# Patient Record
Sex: MALE | Race: WHITE | NOT HISPANIC OR LATINO | Employment: UNEMPLOYED | URBAN - METROPOLITAN AREA
[De-identification: names, ages, dates, MRNs, and addresses within clinical notes are randomized per-mention and may not be internally consistent; named-entity substitution may affect disease eponyms.]

---

## 2020-01-23 ENCOUNTER — TELEPHONE (OUTPATIENT)
Dept: SCHEDULING | Facility: IMAGING CENTER | Age: 65
End: 2020-01-23

## 2020-02-05 ENCOUNTER — HOSPITAL ENCOUNTER (OUTPATIENT)
Dept: RADIOLOGY | Facility: MEDICAL CENTER | Age: 65
End: 2020-02-05
Attending: PHYSICIAN ASSISTANT
Payer: COMMERCIAL

## 2020-02-05 ENCOUNTER — HOSPITAL ENCOUNTER (OUTPATIENT)
Dept: LAB | Facility: MEDICAL CENTER | Age: 65
End: 2020-02-05
Attending: PHYSICIAN ASSISTANT
Payer: COMMERCIAL

## 2020-02-05 DIAGNOSIS — R10.11 RIGHT UPPER QUADRANT PAIN: ICD-10-CM

## 2020-02-05 LAB
ALBUMIN SERPL BCP-MCNC: 4.6 G/DL (ref 3.2–4.9)
ALBUMIN/GLOB SERPL: 1.4 G/DL
ALP SERPL-CCNC: 96 U/L (ref 30–99)
ALT SERPL-CCNC: 43 U/L (ref 2–50)
ANION GAP SERPL CALC-SCNC: 7 MMOL/L (ref 0–11.9)
AST SERPL-CCNC: 21 U/L (ref 12–45)
BASOPHILS # BLD AUTO: 1.2 % (ref 0–1.8)
BASOPHILS # BLD: 0.07 K/UL (ref 0–0.12)
BILIRUB SERPL-MCNC: 0.8 MG/DL (ref 0.1–1.5)
BUN SERPL-MCNC: 14 MG/DL (ref 8–22)
CALCIUM SERPL-MCNC: 10.1 MG/DL (ref 8.5–10.5)
CHLORIDE SERPL-SCNC: 106 MMOL/L (ref 96–112)
CO2 SERPL-SCNC: 27 MMOL/L (ref 20–33)
CREAT SERPL-MCNC: 0.84 MG/DL (ref 0.5–1.4)
EOSINOPHIL # BLD AUTO: 0.16 K/UL (ref 0–0.51)
EOSINOPHIL NFR BLD: 2.8 % (ref 0–6.9)
ERYTHROCYTE [DISTWIDTH] IN BLOOD BY AUTOMATED COUNT: 47.2 FL (ref 35.9–50)
GLOBULIN SER CALC-MCNC: 3.4 G/DL (ref 1.9–3.5)
GLUCOSE SERPL-MCNC: 81 MG/DL (ref 65–99)
HCT VFR BLD AUTO: 47.3 % (ref 42–52)
HGB BLD-MCNC: 15.4 G/DL (ref 14–18)
IMM GRANULOCYTES # BLD AUTO: 0.01 K/UL (ref 0–0.11)
IMM GRANULOCYTES NFR BLD AUTO: 0.2 % (ref 0–0.9)
LIPASE SERPL-CCNC: 24 U/L (ref 11–82)
LYMPHOCYTES # BLD AUTO: 2.42 K/UL (ref 1–4.8)
LYMPHOCYTES NFR BLD: 42.5 % (ref 22–41)
MCH RBC QN AUTO: 30.6 PG (ref 27–33)
MCHC RBC AUTO-ENTMCNC: 32.6 G/DL (ref 33.7–35.3)
MCV RBC AUTO: 94 FL (ref 81.4–97.8)
MONOCYTES # BLD AUTO: 0.59 K/UL (ref 0–0.85)
MONOCYTES NFR BLD AUTO: 10.4 % (ref 0–13.4)
NEUTROPHILS # BLD AUTO: 2.45 K/UL (ref 1.82–7.42)
NEUTROPHILS NFR BLD: 42.9 % (ref 44–72)
NRBC # BLD AUTO: 0 K/UL
NRBC BLD-RTO: 0 /100 WBC
PLATELET # BLD AUTO: 242 K/UL (ref 164–446)
PMV BLD AUTO: 11.7 FL (ref 9–12.9)
POTASSIUM SERPL-SCNC: 4.3 MMOL/L (ref 3.6–5.5)
PROT SERPL-MCNC: 8 G/DL (ref 6–8.2)
RBC # BLD AUTO: 5.03 M/UL (ref 4.7–6.1)
SODIUM SERPL-SCNC: 140 MMOL/L (ref 135–145)
WBC # BLD AUTO: 5.7 K/UL (ref 4.8–10.8)

## 2020-02-05 PROCEDURE — 85025 COMPLETE CBC W/AUTO DIFF WBC: CPT

## 2020-02-05 PROCEDURE — 76705 ECHO EXAM OF ABDOMEN: CPT

## 2020-02-05 PROCEDURE — 80053 COMPREHEN METABOLIC PANEL: CPT

## 2020-02-05 PROCEDURE — 36415 COLL VENOUS BLD VENIPUNCTURE: CPT

## 2020-02-05 PROCEDURE — 83690 ASSAY OF LIPASE: CPT

## 2020-05-09 ENCOUNTER — APPOINTMENT (OUTPATIENT)
Dept: RADIOLOGY | Facility: MEDICAL CENTER | Age: 65
DRG: 004 | End: 2020-05-09
Attending: EMERGENCY MEDICINE
Payer: COMMERCIAL

## 2020-05-09 ENCOUNTER — APPOINTMENT (OUTPATIENT)
Dept: RADIOLOGY | Facility: MEDICAL CENTER | Age: 65
DRG: 004 | End: 2020-05-09
Attending: SURGERY
Payer: COMMERCIAL

## 2020-05-09 ENCOUNTER — APPOINTMENT (OUTPATIENT)
Dept: RADIOLOGY | Facility: MEDICAL CENTER | Age: 65
DRG: 004 | End: 2020-05-09
Attending: NEUROLOGICAL SURGERY
Payer: COMMERCIAL

## 2020-05-09 ENCOUNTER — APPOINTMENT (OUTPATIENT)
Dept: RADIOLOGY | Facility: MEDICAL CENTER | Age: 65
DRG: 004 | End: 2020-05-09
Attending: ORTHOPAEDIC SURGERY
Payer: COMMERCIAL

## 2020-05-09 ENCOUNTER — APPOINTMENT (OUTPATIENT)
Dept: RADIOLOGY | Facility: MEDICAL CENTER | Age: 65
DRG: 004 | End: 2020-05-09
Attending: NURSE PRACTITIONER
Payer: COMMERCIAL

## 2020-05-09 ENCOUNTER — HOSPITAL ENCOUNTER (INPATIENT)
Facility: MEDICAL CENTER | Age: 65
LOS: 20 days | DRG: 004 | End: 2020-05-29
Attending: EMERGENCY MEDICINE | Admitting: SURGERY
Payer: COMMERCIAL

## 2020-05-09 DIAGNOSIS — S00.01XA ABRASION OF SCALP, INITIAL ENCOUNTER: ICD-10-CM

## 2020-05-09 DIAGNOSIS — S06.342A: ICD-10-CM

## 2020-05-09 DIAGNOSIS — S02.85XA: ICD-10-CM

## 2020-05-09 DIAGNOSIS — R40.2431 GLASGOW COMA SCALE TOTAL SCORE 3-8, IN THE FIELD (EMT OR AMBULANCE) (HCC): ICD-10-CM

## 2020-05-09 DIAGNOSIS — S42.034A CLOSED NONDISPLACED FRACTURE OF ACROMIAL END OF RIGHT CLAVICLE, INITIAL ENCOUNTER: ICD-10-CM

## 2020-05-09 DIAGNOSIS — S09.90XA CLOSED HEAD INJURY, INITIAL ENCOUNTER: ICD-10-CM

## 2020-05-09 DIAGNOSIS — S02.0XXA CLOSED FRACTURE OF VAULT OF SKULL, INITIAL ENCOUNTER (HCC): ICD-10-CM

## 2020-05-09 DIAGNOSIS — S06.6X2A: ICD-10-CM

## 2020-05-09 DIAGNOSIS — S40.811A ABRASION OF RIGHT UPPER EXTREMITY, INITIAL ENCOUNTER: ICD-10-CM

## 2020-05-09 PROBLEM — T14.90XA TRAUMA: Status: ACTIVE | Noted: 2020-05-09

## 2020-05-09 PROBLEM — S42.035A CLOSED NONDISPLACED FRACTURE OF ACROMIAL END OF LEFT CLAVICLE: Status: ACTIVE | Noted: 2020-05-09

## 2020-05-09 PROBLEM — J96.90 RESPIRATORY FAILURE FOLLOWING TRAUMA (HCC): Status: ACTIVE | Noted: 2020-05-09

## 2020-05-09 PROBLEM — Z53.09 CONTRAINDICATION TO DEEP VEIN THROMBOSIS (DVT) PROPHYLAXIS: Status: ACTIVE | Noted: 2020-05-09

## 2020-05-09 PROBLEM — I62.9 ICB (INTRACRANIAL BLEED) (HCC): Status: ACTIVE | Noted: 2020-05-09

## 2020-05-09 PROBLEM — Z11.9 SCREENING EXAMINATION FOR INFECTIOUS DISEASE: Status: ACTIVE | Noted: 2020-05-09

## 2020-05-09 LAB
ABO + RH BLD: ABNORMAL
ABO GROUP BLD: ABNORMAL
ACTION RANGE TRIGGERED IACRT: NO
ALBUMIN SERPL BCP-MCNC: 4 G/DL (ref 3.2–4.9)
ALBUMIN/GLOB SERPL: 1.3 G/DL
ALP SERPL-CCNC: 64 U/L (ref 30–99)
ALT SERPL-CCNC: 20 U/L (ref 2–50)
AMPHET UR QL SCN: NEGATIVE
ANION GAP SERPL CALC-SCNC: 15 MMOL/L (ref 7–16)
APTT PPP: 25.3 SEC (ref 24.7–36)
AST SERPL-CCNC: 23 U/L (ref 12–45)
BARBITURATES UR QL SCN: NEGATIVE
BASE EXCESS BLDA CALC-SCNC: -1 MMOL/L (ref -4–3)
BASE EXCESS BLDA CALC-SCNC: -5 MMOL/L (ref -4–3)
BENZODIAZ UR QL SCN: NEGATIVE
BILIRUB SERPL-MCNC: 0.6 MG/DL (ref 0.1–1.5)
BLD GP AB SCN SERPL QL: ABNORMAL
BODY TEMPERATURE: ABNORMAL CENTIGRADE
BODY TEMPERATURE: ABNORMAL DEGREES
BUN SERPL-MCNC: 14 MG/DL (ref 8–22)
BZE UR QL SCN: NEGATIVE
CALCIUM SERPL-MCNC: 9.2 MG/DL (ref 8.5–10.5)
CANNABINOIDS UR QL SCN: NEGATIVE
CFT BLD TEG: 3.2 MIN (ref 5–10)
CHLORIDE SERPL-SCNC: 103 MMOL/L (ref 96–112)
CLOT ANGLE BLD TEG: 69.3 DEGREES (ref 53–72)
CLOT LYSIS 30M P MA LENFR BLD TEG: 0.1 % (ref 0–8)
CO2 BLDA-SCNC: 26 MMOL/L (ref 20–33)
CO2 SERPL-SCNC: 21 MMOL/L (ref 20–33)
COVID ORDER STATUS COVID19: NORMAL
CREAT SERPL-MCNC: 0.88 MG/DL (ref 0.5–1.4)
CT.EXTRINSIC BLD ROTEM: 1.6 MIN (ref 1–3)
ERYTHROCYTE [DISTWIDTH] IN BLOOD BY AUTOMATED COUNT: 46.7 FL (ref 35.9–50)
ETHANOL BLD-MCNC: <10.1 MG/DL (ref 0–10.1)
ETHANOL BLD-MCNC: <10.1 MG/DL (ref 0–10.1)
GLOBULIN SER CALC-MCNC: 3 G/DL (ref 1.9–3.5)
GLUCOSE SERPL-MCNC: 115 MG/DL (ref 65–99)
HCO3 BLDA-SCNC: 18 MMOL/L (ref 17–25)
HCO3 BLDA-SCNC: 24.5 MMOL/L (ref 17–25)
HCT VFR BLD AUTO: 45.6 % (ref 42–52)
HGB BLD-MCNC: 15.1 G/DL (ref 14–18)
HOROWITZ INDEX BLDA+IHG-RTO: 483 MM[HG]
INR PPP: 0.98 (ref 0.87–1.13)
INST. QUALIFIED PATIENT IIQPT: YES
LACTATE BLD-SCNC: 1.9 MMOL/L (ref 0.5–2)
MCF BLD TEG: 63.5 MM (ref 50–70)
MCH RBC QN AUTO: 30.4 PG (ref 27–33)
MCHC RBC AUTO-ENTMCNC: 33.1 G/DL (ref 33.7–35.3)
MCV RBC AUTO: 91.8 FL (ref 81.4–97.8)
METHADONE UR QL SCN: NEGATIVE
O2/TOTAL GAS SETTING VFR VENT: 100 % (ref 30–60)
O2/TOTAL GAS SETTING VFR VENT: 60 %
OPIATES UR QL SCN: NEGATIVE
OXYCODONE UR QL SCN: NEGATIVE
PA AA BLD-ACNC: 5.7 %
PA ADP BLD-ACNC: 5.6 %
PCO2 BLDA: 28.4 MMHG (ref 26–37)
PCO2 BLDA: 40.9 MMHG (ref 26–37)
PCO2 TEMP ADJ BLDA: 40.7 MMHG (ref 26–37)
PCP UR QL SCN: NEGATIVE
PH BLDA: 7.39 [PH] (ref 7.4–7.5)
PH BLDA: 7.42 [PH] (ref 7.4–7.5)
PH TEMP ADJ BLDA: 7.39 [PH] (ref 7.4–7.5)
PLATELET # BLD AUTO: 180 K/UL (ref 164–446)
PMV BLD AUTO: 11.8 FL (ref 9–12.9)
PO2 BLDA: 290 MMHG (ref 64–87)
PO2 BLDA: 368.5 MMHG (ref 64–87)
PO2 TEMP ADJ BLDA: 289 MMHG (ref 64–87)
POTASSIUM SERPL-SCNC: 3.7 MMOL/L (ref 3.6–5.5)
PROPOXYPH UR QL SCN: NEGATIVE
PROT SERPL-MCNC: 7 G/DL (ref 6–8.2)
PROTHROMBIN TIME: 13.2 SEC (ref 12–14.6)
RBC # BLD AUTO: 4.97 M/UL (ref 4.7–6.1)
RH BLD: ABNORMAL
SAO2 % BLDA: 100 % (ref 93–99)
SAO2 % BLDA: 99.1 % (ref 93–99)
SARS-COV-2 RNA RESP QL NAA+PROBE: NOTDETECTED
SODIUM SERPL-SCNC: 139 MMOL/L (ref 135–145)
SPECIMEN DRAWN FROM PATIENT: ABNORMAL
SPECIMEN SOURCE: NORMAL
TEG ALGORITHM TGALG: ABNORMAL
TRIGL SERPL-MCNC: 190 MG/DL (ref 0–149)
WBC # BLD AUTO: 9.7 K/UL (ref 4.8–10.8)

## 2020-05-09 PROCEDURE — 72128 CT CHEST SPINE W/O DYE: CPT

## 2020-05-09 PROCEDURE — 85384 FIBRINOGEN ACTIVITY: CPT

## 2020-05-09 PROCEDURE — 73000 X-RAY EXAM OF COLLAR BONE: CPT | Mod: RT

## 2020-05-09 PROCEDURE — 700111 HCHG RX REV CODE 636 W/ 250 OVERRIDE (IP): Performed by: NURSE PRACTITIONER

## 2020-05-09 PROCEDURE — 72131 CT LUMBAR SPINE W/O DYE: CPT

## 2020-05-09 PROCEDURE — 31500 INSERT EMERGENCY AIRWAY: CPT

## 2020-05-09 PROCEDURE — 303105 HCHG CATHETER EXTRA

## 2020-05-09 PROCEDURE — 74177 CT ABD & PELVIS W/CONTRAST: CPT

## 2020-05-09 PROCEDURE — 85027 COMPLETE CBC AUTOMATED: CPT

## 2020-05-09 PROCEDURE — 86850 RBC ANTIBODY SCREEN: CPT

## 2020-05-09 PROCEDURE — 700117 HCHG RX CONTRAST REV CODE 255: Performed by: EMERGENCY MEDICINE

## 2020-05-09 PROCEDURE — 83605 ASSAY OF LACTIC ACID: CPT

## 2020-05-09 PROCEDURE — 85347 COAGULATION TIME ACTIVATED: CPT

## 2020-05-09 PROCEDURE — 71045 X-RAY EXAM CHEST 1 VIEW: CPT

## 2020-05-09 PROCEDURE — 700105 HCHG RX REV CODE 258: Performed by: NURSE PRACTITIONER

## 2020-05-09 PROCEDURE — 36600 WITHDRAWAL OF ARTERIAL BLOOD: CPT

## 2020-05-09 PROCEDURE — 85730 THROMBOPLASTIN TIME PARTIAL: CPT

## 2020-05-09 PROCEDURE — 700111 HCHG RX REV CODE 636 W/ 250 OVERRIDE (IP): Performed by: SURGERY

## 2020-05-09 PROCEDURE — 70486 CT MAXILLOFACIAL W/O DYE: CPT

## 2020-05-09 PROCEDURE — 85576 BLOOD PLATELET AGGREGATION: CPT | Mod: 91

## 2020-05-09 PROCEDURE — 700101 HCHG RX REV CODE 250: Performed by: NURSE PRACTITIONER

## 2020-05-09 PROCEDURE — 96374 THER/PROPH/DIAG INJ IV PUSH: CPT

## 2020-05-09 PROCEDURE — 0BH18EZ INSERTION OF ENDOTRACHEAL AIRWAY INTO TRACHEA, VIA NATURAL OR ARTIFICIAL OPENING ENDOSCOPIC: ICD-10-PCS | Performed by: EMERGENCY MEDICINE

## 2020-05-09 PROCEDURE — 72170 X-RAY EXAM OF PELVIS: CPT

## 2020-05-09 PROCEDURE — 70450 CT HEAD/BRAIN W/O DYE: CPT

## 2020-05-09 PROCEDURE — 94002 VENT MGMT INPAT INIT DAY: CPT

## 2020-05-09 PROCEDURE — 86900 BLOOD TYPING SEROLOGIC ABO: CPT

## 2020-05-09 PROCEDURE — 72125 CT NECK SPINE W/O DYE: CPT

## 2020-05-09 PROCEDURE — 99291 CRITICAL CARE FIRST HOUR: CPT

## 2020-05-09 PROCEDURE — U0004 COV-19 TEST NON-CDC HGH THRU: HCPCS

## 2020-05-09 PROCEDURE — 85610 PROTHROMBIN TIME: CPT

## 2020-05-09 PROCEDURE — 82803 BLOOD GASES ANY COMBINATION: CPT

## 2020-05-09 PROCEDURE — 84478 ASSAY OF TRIGLYCERIDES: CPT

## 2020-05-09 PROCEDURE — 86901 BLOOD TYPING SEROLOGIC RH(D): CPT

## 2020-05-09 PROCEDURE — G2023 SPECIMEN COLLECT COVID-19: HCPCS | Performed by: NURSE PRACTITIONER

## 2020-05-09 PROCEDURE — 80053 COMPREHEN METABOLIC PANEL: CPT

## 2020-05-09 PROCEDURE — 5A1955Z RESPIRATORY VENTILATION, GREATER THAN 96 CONSECUTIVE HOURS: ICD-10-PCS | Performed by: EMERGENCY MEDICINE

## 2020-05-09 PROCEDURE — G0390 TRAUMA RESPONS W/HOSP CRITI: HCPCS

## 2020-05-09 PROCEDURE — 770022 HCHG ROOM/CARE - ICU (200)

## 2020-05-09 PROCEDURE — 80307 DRUG TEST PRSMV CHEM ANLYZR: CPT

## 2020-05-09 PROCEDURE — 51702 INSERT TEMP BLADDER CATH: CPT

## 2020-05-09 PROCEDURE — 700101 HCHG RX REV CODE 250: Performed by: SURGERY

## 2020-05-09 RX ORDER — AMOXICILLIN 250 MG
1 CAPSULE ORAL
Status: DISCONTINUED | OUTPATIENT
Start: 2020-05-09 | End: 2020-05-09

## 2020-05-09 RX ORDER — FAMOTIDINE 20 MG/1
20 TABLET, FILM COATED ORAL 2 TIMES DAILY
Status: DISCONTINUED | OUTPATIENT
Start: 2020-05-09 | End: 2020-05-24

## 2020-05-09 RX ORDER — ONDANSETRON 2 MG/ML
4 INJECTION INTRAMUSCULAR; INTRAVENOUS EVERY 4 HOURS PRN
Status: DISCONTINUED | OUTPATIENT
Start: 2020-05-09 | End: 2020-05-29 | Stop reason: HOSPADM

## 2020-05-09 RX ORDER — ONDANSETRON 2 MG/ML
INJECTION INTRAMUSCULAR; INTRAVENOUS
Status: COMPLETED
Start: 2020-05-09 | End: 2020-05-09

## 2020-05-09 RX ORDER — ACETAMINOPHEN 650 MG/1
650 SUPPOSITORY RECTAL EVERY 6 HOURS PRN
Status: DISCONTINUED | OUTPATIENT
Start: 2020-05-09 | End: 2020-05-12

## 2020-05-09 RX ORDER — ROCURONIUM BROMIDE 10 MG/ML
INJECTION, SOLUTION INTRAVENOUS
Status: COMPLETED | OUTPATIENT
Start: 2020-05-09 | End: 2020-05-09

## 2020-05-09 RX ORDER — BISACODYL 10 MG
10 SUPPOSITORY, RECTAL RECTAL
Status: DISCONTINUED | OUTPATIENT
Start: 2020-05-09 | End: 2020-05-29 | Stop reason: HOSPADM

## 2020-05-09 RX ORDER — DOCUSATE SODIUM 50 MG/5ML
100 LIQUID ORAL 2 TIMES DAILY
Status: DISCONTINUED | OUTPATIENT
Start: 2020-05-09 | End: 2020-05-29 | Stop reason: HOSPADM

## 2020-05-09 RX ORDER — BACITRACIN ZINC AND POLYMYXIN B SULFATE 500; 1000 [USP'U]/G; [USP'U]/G
OINTMENT TOPICAL 3 TIMES DAILY
Status: DISPENSED | OUTPATIENT
Start: 2020-05-09 | End: 2020-05-16

## 2020-05-09 RX ORDER — SODIUM CHLORIDE 9 MG/ML
INJECTION, SOLUTION INTRAVENOUS CONTINUOUS
Status: DISCONTINUED | OUTPATIENT
Start: 2020-05-09 | End: 2020-05-12

## 2020-05-09 RX ORDER — AMOXICILLIN 250 MG
1 CAPSULE ORAL
Status: DISCONTINUED | OUTPATIENT
Start: 2020-05-09 | End: 2020-05-29 | Stop reason: HOSPADM

## 2020-05-09 RX ORDER — KETAMINE HYDROCHLORIDE 50 MG/ML
INJECTION, SOLUTION INTRAMUSCULAR; INTRAVENOUS
Status: COMPLETED | OUTPATIENT
Start: 2020-05-09 | End: 2020-05-09

## 2020-05-09 RX ORDER — AMOXICILLIN 250 MG
1 CAPSULE ORAL NIGHTLY
Status: DISCONTINUED | OUTPATIENT
Start: 2020-05-09 | End: 2020-05-29 | Stop reason: HOSPADM

## 2020-05-09 RX ORDER — POLYETHYLENE GLYCOL 3350 17 G/17G
1 POWDER, FOR SOLUTION ORAL 2 TIMES DAILY
Status: DISCONTINUED | OUTPATIENT
Start: 2020-05-09 | End: 2020-05-29 | Stop reason: HOSPADM

## 2020-05-09 RX ORDER — ENEMA 19; 7 G/133ML; G/133ML
1 ENEMA RECTAL
Status: DISCONTINUED | OUTPATIENT
Start: 2020-05-09 | End: 2020-05-29 | Stop reason: HOSPADM

## 2020-05-09 RX ORDER — DOCUSATE SODIUM 100 MG/1
100 CAPSULE, LIQUID FILLED ORAL 2 TIMES DAILY
Status: DISCONTINUED | OUTPATIENT
Start: 2020-05-09 | End: 2020-05-09

## 2020-05-09 RX ADMIN — LEVETIRACETAM 500 MG: 100 INJECTION, SOLUTION INTRAVENOUS at 13:41

## 2020-05-09 RX ADMIN — IOHEXOL 100 ML: 350 INJECTION, SOLUTION INTRAVENOUS at 13:12

## 2020-05-09 RX ADMIN — LEVETIRACETAM 500 MG: 100 INJECTION, SOLUTION INTRAVENOUS at 21:02

## 2020-05-09 RX ADMIN — KETAMINE HYDROCHLORIDE 150 MG: 50 INJECTION INTRAMUSCULAR; INTRAVENOUS at 12:38

## 2020-05-09 RX ADMIN — PROPOFOL 10 MCG/KG/MIN: 10 INJECTION, EMULSION INTRAVENOUS at 13:29

## 2020-05-09 RX ADMIN — ROCURONIUM BROMIDE 75 MG: 10 INJECTION, SOLUTION INTRAVENOUS at 12:38

## 2020-05-09 RX ADMIN — SODIUM CHLORIDE: 9 INJECTION, SOLUTION INTRAVENOUS at 13:40

## 2020-05-09 RX ADMIN — Medication 1 EACH: at 17:36

## 2020-05-09 RX ADMIN — PROPOFOL 10 MG: 10 INJECTION, EMULSION INTRAVENOUS at 12:49

## 2020-05-09 RX ADMIN — ROCURONIUM BROMIDE 25 MG: 10 INJECTION, SOLUTION INTRAVENOUS at 12:40

## 2020-05-09 RX ADMIN — FAMOTIDINE 20 MG: 10 INJECTION INTRAVENOUS at 17:31

## 2020-05-09 ASSESSMENT — COGNITIVE AND FUNCTIONAL STATUS - GENERAL
EATING MEALS: A LOT
HELP NEEDED FOR BATHING: A LOT
TOILETING: A LOT
DRESSING REGULAR LOWER BODY CLOTHING: A LOT
STANDING UP FROM CHAIR USING ARMS: A LOT
TURNING FROM BACK TO SIDE WHILE IN FLAT BAD: A LOT
DAILY ACTIVITIY SCORE: 12
MOVING FROM LYING ON BACK TO SITTING ON SIDE OF FLAT BED: A LOT
MOBILITY SCORE: 12
CLIMB 3 TO 5 STEPS WITH RAILING: A LOT
MOVING TO AND FROM BED TO CHAIR: A LOT
DRESSING REGULAR UPPER BODY CLOTHING: A LOT
SUGGESTED CMS G CODE MODIFIER DAILY ACTIVITY: CL
SUGGESTED CMS G CODE MODIFIER MOBILITY: CL
WALKING IN HOSPITAL ROOM: A LOT
PERSONAL GROOMING: A LOT

## 2020-05-09 ASSESSMENT — COPD QUESTIONNAIRES
IN THE PAST 12 MONTHS DO YOU DO LESS THAN YOU USED TO BECAUSE OF YOUR BREATHING PROBLEMS: DISAGREE/UNSURE
HAVE YOU SMOKED AT LEAST 100 CIGARETTES IN YOUR ENTIRE LIFE: NO/DON'T KNOW
COPD SCREENING SCORE: 2
DURING THE PAST 4 WEEKS HOW MUCH DID YOU FEEL SHORT OF BREATH: NONE/LITTLE OF THE TIME
DO YOU EVER COUGH UP ANY MUCUS OR PHLEGM?: NO/ONLY WITH OCCASIONAL COLDS OR INFECTIONS

## 2020-05-09 ASSESSMENT — LIFESTYLE VARIABLES
HAVE PEOPLE ANNOYED YOU BY CRITICIZING YOUR DRINKING: NO
ON A TYPICAL DAY WHEN YOU DRINK ALCOHOL HOW MANY DRINKS DO YOU HAVE: 3
HAVE YOU EVER FELT YOU SHOULD CUT DOWN ON YOUR DRINKING: NO
TOTAL SCORE: 0
ALCOHOL_USE: YES
EVER FELT BAD OR GUILTY ABOUT YOUR DRINKING: NO
EVER HAD A DRINK FIRST THING IN THE MORNING TO STEADY YOUR NERVES TO GET RID OF A HANGOVER: NO
HOW MANY TIMES IN THE PAST YEAR HAVE YOU HAD 5 OR MORE DRINKS IN A DAY: 0
TOTAL SCORE: 0
AVERAGE NUMBER OF DAYS PER WEEK YOU HAVE A DRINK CONTAINING ALCOHOL: 7
EVER_SMOKED: NEVER
CONSUMPTION TOTAL: POSITIVE
DOES PATIENT WANT TO STOP DRINKING: NO
TOTAL SCORE: 0

## 2020-05-09 ASSESSMENT — PATIENT HEALTH QUESTIONNAIRE - PHQ9
2. FEELING DOWN, DEPRESSED, IRRITABLE, OR HOPELESS: NOT AT ALL
SUM OF ALL RESPONSES TO PHQ9 QUESTIONS 1 AND 2: 0
1. LITTLE INTEREST OR PLEASURE IN DOING THINGS: NOT AT ALL

## 2020-05-09 NOTE — ASSESSMENT & PLAN NOTE
Acute mildly displaced right distal clavicle fracture.  Non-operative management.  Weight bearing status - Nonweightbearing RUE. Sling for comfort.  Vic Pham MD. Orthopedic Surgeon. [facility].

## 2020-05-09 NOTE — ASSESSMENT & PLAN NOTE
Intubated in the Emergency Department.  5/16 Percutaneous tracheostomy.  5/26 Trach capping trials initiated.  5/28 Decannulation.  Respiratory protocol.

## 2020-05-09 NOTE — PROGRESS NOTES
/76   Pulse 69   Temp 37.2 °C (99 °F) (Temporal)   Resp (!) 24   Wt 70.6 kg (155 lb 10.3 oz)   SpO2 100%     COVID test negative, CT reviewed.    Case reviewed with Dr. Skaggs and Dr. Elizondo  Screening completed    Pt will be deescalated from Isolation.
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
## 2020-05-09 NOTE — CARE PLAN
Problem: Venous Thromboembolism (VTW)/Deep Vein Thrombosis (DVT) Prevention:  Goal: Patient will participate in Venous Thrombosis (VTE)/Deep Vein Thrombosis (DVT)Prevention Measures  Outcome: PROGRESSING AS EXPECTED   SCDs in use  Problem: Skin Integrity  Goal: Risk for impaired skin integrity will decrease  Outcome: PROGRESSING AS EXPECTED  Skin will be assessed frequently for breakdown and pt will remain clean, dry, and intact. All listed wounds will be assessed. Pressure ulcer prevention will be utilized when appropriate & based on Pt stability
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
## 2020-05-09 NOTE — ASSESSMENT & PLAN NOTE
5/9 COVID-19 Screening completed.  Admission SARS-CoV-2 PCR testing negative. LOW RISK patient. Repeat SARS-CoV-2 testing not indicated. Isolation precautions de-escalated.

## 2020-05-09 NOTE — ASSESSMENT & PLAN NOTE
Acute subarachnoid hemorrhage throughout the left cerebral hemisphere. Acute 1 cm focal hemorrhagic contusion in the right frontal lobe. Small hemorrhagic contusion in the right temporal lobe. Acute subdural hemorrhage in the frontal vertex, right more than left, measuring 4 mm. Layering intraventricular hemorrhage in the right lateral ventricle.  Serial repeat CT imaging of the brain demonstrated stable radiographic findings.  5/12 MRI of the brain demonstrated scattered areas of shear injury.  5/14 Amantadine started.  Darion Torres DO. Neurosurgeon. [facility].

## 2020-05-09 NOTE — FLOWSHEET NOTE
05/09/20 1236   Events/Summary/Plan   Events/Summary/Plan Pt intubated with an 8.0 @ 26 bl/l equal breath sounds fog in tubing and color change placed on the following settings   Vital Signs   Pulse 79   Respiration 18   Pulse Oximetry 100 %   Chest Exam   Work Of Breathing / Effort Vented   Breath Sounds   RUL Breath Sounds Clear   RML Breath Sounds Clear   RLL Breath Sounds Clear   DOMINIK Breath Sounds Clear   LLL Breath Sounds Clear   Airway ETT Oral 8.0   Placement Date: 05/09/20   Airway Type: ETT  Airway Location: Oral  Airway Size: 8.0  Inserted by: MD  Patient left with airway type: ETT   Site Assessment Intact   Airway Tube Secured [employer];Commercial securing device   Date Securing Device changed? 05/09/20   Date Securing Device to be changed (Q 7 days) 05/16/20   Secured At  (cm) 26   Cuffless No   Suctioning Device Inline Catheter Replaced   Next Closed Suction Device Change Due  05/16/20

## 2020-05-09 NOTE — ASSESSMENT & PLAN NOTE
Initial systemic anticoagulation initially contraindicated secondary to elevated bleeding risk.  RAP score 6.  5/10 Chemical DVT prophylaxis (Heparin) initiated upon admission.  Ambulate TID.

## 2020-05-10 ENCOUNTER — APPOINTMENT (OUTPATIENT)
Dept: RADIOLOGY | Facility: MEDICAL CENTER | Age: 65
DRG: 004 | End: 2020-05-10
Attending: NURSE PRACTITIONER
Payer: COMMERCIAL

## 2020-05-10 LAB
ALBUMIN SERPL BCP-MCNC: 4.1 G/DL (ref 3.2–4.9)
ALBUMIN/GLOB SERPL: 1.3 G/DL
ALP SERPL-CCNC: 61 U/L (ref 30–99)
ALT SERPL-CCNC: 22 U/L (ref 2–50)
ANION GAP SERPL CALC-SCNC: 16 MMOL/L (ref 7–16)
AST SERPL-CCNC: 24 U/L (ref 12–45)
BASOPHILS # BLD AUTO: 0.2 % (ref 0–1.8)
BASOPHILS # BLD: 0.03 K/UL (ref 0–0.12)
BILIRUB SERPL-MCNC: 1.1 MG/DL (ref 0.1–1.5)
BUN SERPL-MCNC: 10 MG/DL (ref 8–22)
CALCIUM SERPL-MCNC: 8.8 MG/DL (ref 8.5–10.5)
CHLORIDE SERPL-SCNC: 100 MMOL/L (ref 96–112)
CO2 SERPL-SCNC: 22 MMOL/L (ref 20–33)
CREAT SERPL-MCNC: 0.69 MG/DL (ref 0.5–1.4)
EOSINOPHIL # BLD AUTO: 0 K/UL (ref 0–0.51)
EOSINOPHIL NFR BLD: 0 % (ref 0–6.9)
ERYTHROCYTE [DISTWIDTH] IN BLOOD BY AUTOMATED COUNT: 47.5 FL (ref 35.9–50)
GLOBULIN SER CALC-MCNC: 3.2 G/DL (ref 1.9–3.5)
GLUCOSE SERPL-MCNC: 130 MG/DL (ref 65–99)
HCT VFR BLD AUTO: 45 % (ref 42–52)
HGB BLD-MCNC: 14.7 G/DL (ref 14–18)
IMM GRANULOCYTES # BLD AUTO: 0.03 K/UL (ref 0–0.11)
IMM GRANULOCYTES NFR BLD AUTO: 0.2 % (ref 0–0.9)
LYMPHOCYTES # BLD AUTO: 1.31 K/UL (ref 1–4.8)
LYMPHOCYTES NFR BLD: 9.3 % (ref 22–41)
MCH RBC QN AUTO: 30.4 PG (ref 27–33)
MCHC RBC AUTO-ENTMCNC: 32.7 G/DL (ref 33.7–35.3)
MCV RBC AUTO: 93.2 FL (ref 81.4–97.8)
MONOCYTES # BLD AUTO: 1.12 K/UL (ref 0–0.85)
MONOCYTES NFR BLD AUTO: 7.9 % (ref 0–13.4)
NEUTROPHILS # BLD AUTO: 11.64 K/UL (ref 1.82–7.42)
NEUTROPHILS NFR BLD: 82.4 % (ref 44–72)
NRBC # BLD AUTO: 0 K/UL
NRBC BLD-RTO: 0 /100 WBC
PLATELET # BLD AUTO: 149 K/UL (ref 164–446)
PMV BLD AUTO: 11.6 FL (ref 9–12.9)
POTASSIUM SERPL-SCNC: 4 MMOL/L (ref 3.6–5.5)
PROT SERPL-MCNC: 7.3 G/DL (ref 6–8.2)
RBC # BLD AUTO: 4.83 M/UL (ref 4.7–6.1)
SODIUM SERPL-SCNC: 138 MMOL/L (ref 135–145)
WBC # BLD AUTO: 14.1 K/UL (ref 4.8–10.8)

## 2020-05-10 PROCEDURE — 85025 COMPLETE CBC W/AUTO DIFF WBC: CPT

## 2020-05-10 PROCEDURE — 700111 HCHG RX REV CODE 636 W/ 250 OVERRIDE (IP): Performed by: NURSE PRACTITIONER

## 2020-05-10 PROCEDURE — 94003 VENT MGMT INPAT SUBQ DAY: CPT

## 2020-05-10 PROCEDURE — 700105 HCHG RX REV CODE 258: Performed by: NURSE PRACTITIONER

## 2020-05-10 PROCEDURE — 700111 HCHG RX REV CODE 636 W/ 250 OVERRIDE (IP): Performed by: SURGERY

## 2020-05-10 PROCEDURE — A9270 NON-COVERED ITEM OR SERVICE: HCPCS | Performed by: NURSE PRACTITIONER

## 2020-05-10 PROCEDURE — 700102 HCHG RX REV CODE 250 W/ 637 OVERRIDE(OP): Performed by: NURSE PRACTITIONER

## 2020-05-10 PROCEDURE — 700101 HCHG RX REV CODE 250: Performed by: NURSE PRACTITIONER

## 2020-05-10 PROCEDURE — 770022 HCHG ROOM/CARE - ICU (200)

## 2020-05-10 PROCEDURE — 71045 X-RAY EXAM CHEST 1 VIEW: CPT

## 2020-05-10 PROCEDURE — 80053 COMPREHEN METABOLIC PANEL: CPT

## 2020-05-10 PROCEDURE — 99291 CRITICAL CARE FIRST HOUR: CPT | Performed by: SURGERY

## 2020-05-10 RX ORDER — HEPARIN SODIUM 5000 [USP'U]/ML
5000 INJECTION, SOLUTION INTRAVENOUS; SUBCUTANEOUS EVERY 8 HOURS
Status: DISCONTINUED | OUTPATIENT
Start: 2020-05-10 | End: 2020-05-29 | Stop reason: HOSPADM

## 2020-05-10 RX ORDER — LABETALOL HYDROCHLORIDE 5 MG/ML
10-20 INJECTION, SOLUTION INTRAVENOUS EVERY 4 HOURS PRN
Status: DISCONTINUED | OUTPATIENT
Start: 2020-05-10 | End: 2020-05-24

## 2020-05-10 RX ORDER — HYDRALAZINE HYDROCHLORIDE 20 MG/ML
10-20 INJECTION INTRAMUSCULAR; INTRAVENOUS EVERY 4 HOURS PRN
Status: DISCONTINUED | OUTPATIENT
Start: 2020-05-10 | End: 2020-05-24

## 2020-05-10 RX ADMIN — LEVETIRACETAM 500 MG: 100 INJECTION, SOLUTION INTRAVENOUS at 05:23

## 2020-05-10 RX ADMIN — Medication 1 EACH: at 05:23

## 2020-05-10 RX ADMIN — FAMOTIDINE 20 MG: 10 INJECTION INTRAVENOUS at 06:48

## 2020-05-10 RX ADMIN — FAMOTIDINE 20 MG: 10 INJECTION INTRAVENOUS at 17:39

## 2020-05-10 RX ADMIN — Medication: at 13:00

## 2020-05-10 RX ADMIN — SODIUM CHLORIDE: 9 INJECTION, SOLUTION INTRAVENOUS at 01:01

## 2020-05-10 RX ADMIN — HEPARIN SODIUM 5000 UNITS: 5000 INJECTION, SOLUTION INTRAVENOUS; SUBCUTANEOUS at 14:04

## 2020-05-10 RX ADMIN — Medication: at 17:39

## 2020-05-10 RX ADMIN — HYDRALAZINE HYDROCHLORIDE 10 MG: 20 INJECTION INTRAMUSCULAR; INTRAVENOUS at 14:50

## 2020-05-10 RX ADMIN — PROPOFOL 25 MCG/KG/MIN: 10 INJECTION, EMULSION INTRAVENOUS at 13:55

## 2020-05-10 RX ADMIN — PROPOFOL 30 MCG/KG/MIN: 10 INJECTION, EMULSION INTRAVENOUS at 21:57

## 2020-05-10 RX ADMIN — HEPARIN SODIUM 5000 UNITS: 5000 INJECTION, SOLUTION INTRAVENOUS; SUBCUTANEOUS at 21:13

## 2020-05-10 RX ADMIN — LEVETIRACETAM 500 MG: 100 INJECTION, SOLUTION INTRAVENOUS at 19:00

## 2020-05-10 ASSESSMENT — FIBROSIS 4 INDEX: FIB4 SCORE: 2.2
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
## 2020-05-10 NOTE — CARE PLAN
Ventilator Daily Summary    Vent Day # 2    Ventilator settings changed this shift: switched to % +8 30%     Weaning trials: sbt as tolerated     Respiratory Procedures: none at this time    Plan: Continue current ventilator settings and wean mechanical ventilation as tolerated per physician orders.

## 2020-05-10 NOTE — PROGRESS NOTES
Spiritual Care Note    Patient's Name: Reyes Amezcua   MRN: [id number]    YOB: 1955   Age and Gender: 64 y.o. male   Service Area: SICU   Room (and Bed): [place] [address]   Ethnicity or Nationality: White   Primary Language: English   Spiritism/Spiritual preference: No Bahai on file   Place of Residence: Unknown   Family/Friends/Others Present: No   Clinical Team Present: No   Medical Diagnosis(-es)/Procedure(s): Trauma   Code Status: Full Code    Date of Admission: 5/9/2020   Length of Stay: 1 days        Spiritual Care Provider Information    Name of Spiritual Care Provider:   Nadeen Lawrence  Title of Spiritual Care Provider:     Phone Number:     [phone]  E-mail:      [email or web address]  Total Time:     30 minutes      Spiritual Screen Results    Gen Nursing    Is your spiritual health or inner well-being important to you as you cope with your medical condition?: Yes  Would you like to receive a visit from our Spiritual Care team or your own Roman Catholic or spiritual leader?: Yes  Was spiritual care education provided to the patient?: Yes  Cultural/Spiritual Needs During Care: Clinical  Sources of Hope/Gisele: Counseling/Support groups, Companionship     Palliative Care    Was spiritual care education provided to the patient?: Yes      Encounter/Request Information    Visited With: Patient, Health care provider, Spouse  Nature of the Visit: Follow-up, Call back  Continue Visiting: Yes  Crisis Visit: Trauma  Referral From/ Origin of Request: Verbal family      Religous Needs/Values  Spiritism Needs Visit  Spiritism Needs: Prayer    Spiritual Assessment     Observations/Symptoms: Other (Comment)(PT intubated and unresponsive)    Interacton/Conversation: Provided bedsaide prayer. Spoke with RN regarding family concerns.Spoke with PT's spouse Kerry and facilitated phone conversation between her and PT.    Assessment: Need   Need: Seeking Spiritual Assistance and Support    Intended Effects:  Convey a Calming Presence, Demonstrate Caring and Concern, Helping Someone Feel Comforted, Lessen Anxiety, Lessen Someone's Feelings of Isolation, Preserve Dignity and Respect, Promote a Sense of Peace    Interventions: Compassionate presence, prayer    Outcomes: Spiritual Comfort, Value/Dignity/Respect    Plan: Daily Visits - Will check back later today.    Notes:    (This  is a friend of PT's sister, Yahaira Elder.) (Note: PT is hard of hearing)    Thank you for allowing Spiritual Care to support this patient and family. Contact [phone] for additional assistance, changes in PT status, or with any questions/concerns.
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
## 2020-05-10 NOTE — CARE PLAN
Adult Ventilation Update    Total Vent Days: 1    Patient Lines/Drains/Airways Status      Active Airway       Name: Placement date: Placement time: Site: Days:    Airway ETT Oral 8.0  05/09/20   --   Oral  less than 1                  APV: 20, 420, +8, 50%
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
## 2020-05-10 NOTE — THERAPY
Speech Therapy Contact Note:  Orders received for cognitive-linguistic evaluation. Patient currently intubated. SLP will complete evaluation when appropriate.

## 2020-05-10 NOTE — RESPIRATORY CARE
Ventilator Daily Summary    Vent Day # 2    Ventilator settings changed this shift: Fio2 40%.        Respiratory Procedures:    Plan: Continue current ventilator settings and wean mechanical ventilation as tolerated per physician orders.

## 2020-05-10 NOTE — CARE PLAN
Problem: Communication  Goal: The ability to communicate needs accurately and effectively will improve  Outcome: PROGRESSING AS EXPECTED  Note: Discussed POC and test with wife all questions answered     Problem: Safety  Goal: Will remain free from injury  Outcome: PROGRESSING AS EXPECTED  Note: Be alarm in use     Problem: Venous Thromboembolism (VTW)/Deep Vein Thrombosis (DVT) Prevention:  Goal: Patient will participate in Venous Thrombosis (VTE)/Deep Vein Thrombosis (DVT)Prevention Measures  Outcome: PROGRESSING AS EXPECTED  Flowsheets  Taken 5/9/2020 2000  Mechanical Prophylaxis: SCDs, Sequential Compression Device  Taken 5/9/2020 5057  SCDs, Sequential Compression Device: On

## 2020-05-10 NOTE — CARE PLAN
Problem: Venous Thromboembolism (VTW)/Deep Vein Thrombosis (DVT) Prevention:  Goal: Patient will participate in Venous Thrombosis (VTE)/Deep Vein Thrombosis (DVT)Prevention Measures  Outcome: PROGRESSING AS EXPECTED  SCDs on     Problem: Skin Integrity  Goal: Risk for impaired skin integrity will decrease  Outcome: PROGRESSING AS EXPECTED   Skin will be assessed frequently for breakdown and pt will remain clean, dry, and intact. All listed wounds will be assessed. Pressure ulcer prevention will be utilized when appropriate & based on Pt stability

## 2020-05-10 NOTE — PROGRESS NOTES
Spiritual Care Note    Patient's Name: Jossy Abarca   MRN: [id number]    YOB: 1955   Age and Gender: 64 y.o. male   Service Area: SICU   Room (and Bed): [place] [address]   Ethnicity or Nationality: White   Primary Language: English   Orthodox/Spiritual preference: Evangelical   Place of Residence: Unknown   Family/Friends/Others Present: Wife Kerry   Clinical Team Present: RN   Medical Diagnosis(-es)/Procedure(s): Trauma   Code Status: Full Code    Date of Admission: 5/9/2020   Length of Stay: 0 days        Spiritual Care Provider Information    Name of Spiritual Care Provider:   Nadeen Lawrence  Title of Spiritual Care Provider:     Phone Number:     [phone]  E-mail:      [email or web address]  Total Time:      10 minutes      Encounter/Request Information    Visited With: Patient and family together  Nature of the Visit: Initial, Call back  Continue Visiting: Yes  Crisis Visit: Trauma  Referral From/ Origin of Request: Verbal family    Religous Needs/Values  Orthodox Needs Visit  Orthodox Needs: Prayer    Spiritual Assessment     Observations/Symptoms: Other (Comment)(PT intubated and unresponsive)    Interacton/Conversation: Spoke with wife Kerry. She asked that  provide on-going spiritual support (prayer)    Assessment: Need   Need: Seeking Spiritual Assistance and Support    Intended Effects: Convey a Calming Presence, Demonstrate Caring and Concern, Helping Someone Feel Comforted, Lessen Anxiety, Lessen Someone's Feelings of Isolation, Preserve Dignity and Respect, Promote a Sense of Peace    Interventions: Compassionate presence, prayer    Outcomes: Spiritual Comfort, Value/Dignity/Respect    Plan: Daily Visits    Notes:    Thank you for allowing Spiritual Care to support this patient and family. Contact [phone] for additional assistance, changes in PT status, or with any questions/concerns.

## 2020-05-10 NOTE — PROGRESS NOTES
Neurosurgery Progress Note    Subjective:  NAD, intubated and sedated. Off sedation the patient opens eyes to voice and physical stimuli. Doent follow commands    Exam:  Intubated and sedated  Off sedation opens eyes  Does not follow commands but has very purposeful movements in all 4 extremities  Antigravity in all 4 extremities  PERRL BL           BP  Min: 92/57  Max: 161/95  Pulse  Av.3  Min: 65  Max: 93  Resp  Av.9  Min: 15  Max: 32  Temp  Av.2 °C (99 °F)  Min: 36.1 °C (97 °F)  Max: 38 °C (100.4 °F)  SpO2  Av %  Min: 100 %  Max: 100 %    No data recorded    Recent Labs     20  1237 05/10/20  0435   WBC 9.7 14.1*   RBC 4.97 4.83   HEMOGLOBIN 15.1 14.7   HEMATOCRIT 45.6 45.0   MCV 91.8 93.2   MCH 30.4 30.4   MCHC 33.1* 32.7*   RDW 46.7 47.5   PLATELETCT 180 149*   MPV 11.8 11.6     Recent Labs     20  1237 05/10/20  0435   SODIUM 139 138   POTASSIUM 3.7 4.0   CHLORIDE 103 100   CO2 21 22   GLUCOSE 115* 130*   BUN 14 10   CREATININE 0.88 0.69   CALCIUM 9.2 8.8     Recent Labs     20  1237   APTT 25.3   INR 0.98     Recent Labs     20  1245   REACTMIN 3.2*   CLOTKINET 1.6   CLOTANGL 69.3   MAXCLOTS 63.5   PIR74DQL 0.1   PRCINADP 5.6   PRCINAA 5.7       Intake/Output       20 - 05/10/20 0659 05/10/20 0700 - 20 0659      [id number] Total  Total       Intake    I.V.  437.9  1226.2 1664.1  --  -- --    Pre-Hospital Volume 0 -- 0 -- -- --    Trauma Resuscitation Volume 0 -- 0 -- -- --    Propofol Volume 4.6 26.2 30.8 -- -- --    Volume (mL) (NS infusion) 433.3 1200 1633.3 -- -- --    Blood  0  -- 0  --  -- --    PRBC Total Volume (Non-Barcoded) 0 -- 0 -- -- --    FFP Total Volume (Non-Barcoded) 0 -- 0 -- -- --    Platelets Total Volume (Non-Barcoded) 0 -- 0 -- -- --    Cryoprecipitate (Pooled) Total Volume (Non-Barcoded) 0 -- 0 -- -- --    IV Piggyback  100  -- 100  --  -- --    Volume (mL) (levETIRAcetam (KEPPRA) 500 mg in   mL IVPB) 100 -- 100 -- -- --    Total Intake 537.9 1226.2 1764.1 -- -- --       Output    Urine  550  710 1260  --  -- --    Output (mL) (Urethral Catheter Temperature probe 16 Fr.)  -- -- --    Other  0  -- 0  --  -- --    Pre-Hospital Output 0 -- 0 -- -- --    Trauma Resuscitation Output 0 -- 0 -- -- --    Emesis/NG output  --  160 160  --  -- --    Output (mL) (Enteral Tube 05/09/20 Orogastric) -- 160 160 -- -- --    Blood  0  -- 0  --  -- --    Est. Blood Loss 0 -- 0 -- -- --    Total Output  -- -- --       Net I/O     -12.1 356.2 344.1 -- -- --            Intake/Output Summary (Last 24 hours) at 5/10/2020 0845  Last data filed at 5/10/2020 0600  Gross per 24 hour   Intake 1764.08 ml   Output 1420 ml   Net 344.08 ml            • Respiratory Therapy Consult   Continuous RT   • Pharmacy Consult Request  1 Each PHARMACY TO DOSE   • senna-docusate  1 Tab Nightly   • polyethylene glycol/lytes  1 Packet BID   • magnesium hydroxide  30 mL DAILY   • bisacodyl  10 mg Q24HRS PRN   • fleet  1 Each Once PRN   • NS   Continuous   • fentaNYL  50 mcg Q HOUR PRN   • famotidine  20 mg BID    Or   • famotidine  20 mg BID   • ondansetron  4 mg Q4HRS PRN   • levETIRAcetam (KEPPRA) IV  500 mg BID   • bacitracin-polymyxin b   TID   • propofol  0-80 mcg/kg/min Continuous   • senna-docusate  1 Tab Q24HRS PRN   • docusate sodium 100mg/10mL  100 mg BID   • acetaminophen  650 mg Q6HRS PRN       Assessment and Plan:  Hospital day # 2  Chemical prophylactic DVT therapy: Yes - Heparin 5000 units/q 8hrs  Start date/time: now    CT head stable   Making expected progress at this time with incremental improvements in neuro exam  SBP control with SBP less than 150mmhg  SCDs  Okay for heparin DVT ppx  Wean ventilator per ICU team  Medical management per trauma  Kera for seizure ppx

## 2020-05-10 NOTE — PROGRESS NOTES
2 RN skin check completed with JUNIOR Tucker:   Left knuckle abrasion  Right knuckle abrasion  Right posterior forearm.  Small bruising to right upper arm  Right knee and hip abrasions.  Left elbow abrasions  Abrasion to right temple, blood cleaned up. Photo taken and uploaded to file. Q2 turns in place. Pillows used for off loading, heel floated on pillows. Skin assessed under bilateral wrist restraints no skin issues noted.

## 2020-05-10 NOTE — PROGRESS NOTES
"TRAUMA TERTIARY SURVEY     Mental status adequate for full examination?: No    Spine cleared (radiologically and/or clinically): No    PHYSICAL EXAMINATION:  Vitals: /66   Pulse 72   Temp 37.6 °C (99.7 °F) (Bladder)   Resp 13   Ht 1.854 m (6' 1\")   Wt 70.6 kg (155 lb 10.3 oz)   SpO2 100%   BMI 20.53 kg/m²   Constitutional:     General Appearance: appears stated age.  HEENT:     No significant external craniofacial trauma. unable to access, vented. The extraocular muscles are intact bilaterally. on ventilator.. The nares and oropharynx are vent in place with sercretions. The midface and jaw are unable to access, vented. unable to access.  Neck:    The cervical spine is immobilized with a hard collar.  Respiratory:   Inspection: Mechanically ventilated.   Palpation:  The chest is mechanically ventalated.    Auscultation: mechanical ventilated.  Cardiovascular:   Auscultation: regular rate and rhythm.   Peripheral Pulses: Normal.   Abdomen:   Abdomen is soft, nontender, without organomegaly or masses.  Genitourinary:   (MALE):   Musculoskeletal:   unable to access. unable to access  Back:   vented  Skin:   The skin is warm.  Neurologic:    [place] Coma Scale (GCS) 3t E1V1M1. Neurologic examination revealed no focal deficits noted.  Psychiatric:   Unable to access, vented    IMAGING:  DX-CHEST-PORTABLE (1 VIEW)   Final Result         1. Lines and tubes as above.   2. Lungs are clear.         CT-HEAD W/O   Final Result         1. Multifocal subarachnoid, subdural and parenchymal hemorrhage. Subdural hemorrhage has improved since prior. All other areas of hemorrhage are stable.   2. No CT evidence of hemorrhage or new infarct.   3. Unchanged right calvarial fractures.      DX-CLAVICLE RIGHT   Final Result      Acute mildly displaced comminuted right distal clavicle fracture.      CT-CHEST,ABDOMEN,PELVIS WITH   Final Result         1. Acute mildly displaced right distal clavicle fracture.      2. Long segment " wall thickening extending from the cecum to the descending colon. This could represent infection or inflammation versus posttraumatic contusion.      3. Mild stranding and mildly prominent lymph nodes in the root of the mesentery could relate to sclerosing mesenteritis or reactive change due to colonic wall thickening.      CT-LSPINE W/O PLUS RECONS   Final Result         1. No acute fracture or malalignment appreciated in the lumbar spine         CT-TSPINE W/O PLUS RECONS   Final Result         1. No acute fracture or malalignment appreciated in the thoracic spine         CT-CSPINE WITHOUT PLUS RECONS   Final Result      No acute fracture or subluxation of cervical spine.      CT-HEAD W/O   Final Result         Acute subarachnoid hemorrhage throughout the left cerebral hemisphere.      Acute 1 cm focal hemorrhagic contusion in the right frontal lobe. Small hemorrhagic contusion in the right temporal lobe as well.      There is also acute subdural hemorrhage in the frontal vertex, right more than left, measuring 4 mm.      Layering intraventricular hemorrhage in the right lateral ventricle.      CRITICAL RESULT READ BACK: Preliminary findings discussed with and critical read back performed by Dr. Skaggs via telephone on 5/9/2020 1:20 PM      CT-MAXILLOFACIAL W/O PLUS RECONS   Final Result      1.  Nondisplaced fractures of the right frontal calvarium.   2.  Nondisplaced fractures of the right superior, lateral and medial orbital walls with extraconal hemorrhage and air. No intraconal retrobulbar hematoma.      DX-CHEST-LIMITED (1 VIEW)   Final Result         Endotracheal tube with tip projecting over the mid thoracic trachea.      Acute displaced right clavicle fracture.      DX-PELVIS-1 OR 2 VIEWS   Final Result         1. No acute osseous abnormality.      US-ABORTED US PROCEDURE    (Results Pending)     All current laboratory studies/radiology exams reviewed: Yes    Completed Consultations:  Dr. Torres -  neurosurgery  Dr. Carney - facial fracture  Dr. Pham - orthopedics     Pending Consultations:  No consults pending at this time    Newly Identified Diagnoses and Injuries:  No current findings    TOTAL RAP SCORE:  RAP Score Total: 7      ETOH Screening  CAGE Score: 0  Reason for no ETOH Intervention: Intubated  ETOH Screening  CAGE Score: 0  Assessment complete date: 5/10/2020

## 2020-05-11 ENCOUNTER — APPOINTMENT (OUTPATIENT)
Dept: RADIOLOGY | Facility: MEDICAL CENTER | Age: 65
DRG: 004 | End: 2020-05-11
Attending: SURGERY
Payer: COMMERCIAL

## 2020-05-11 ENCOUNTER — APPOINTMENT (OUTPATIENT)
Dept: RADIOLOGY | Facility: MEDICAL CENTER | Age: 65
DRG: 004 | End: 2020-05-11
Attending: NURSE PRACTITIONER
Payer: COMMERCIAL

## 2020-05-11 LAB
ALBUMIN SERPL BCP-MCNC: 3 G/DL (ref 3.2–4.9)
ALBUMIN/GLOB SERPL: 1.2 G/DL
ALP SERPL-CCNC: 43 U/L (ref 30–99)
ALT SERPL-CCNC: 16 U/L (ref 2–50)
ANION GAP SERPL CALC-SCNC: 10 MMOL/L (ref 7–16)
AST SERPL-CCNC: 19 U/L (ref 12–45)
BASOPHILS # BLD AUTO: 0.3 % (ref 0–1.8)
BASOPHILS # BLD AUTO: 0.5 % (ref 0–1.8)
BASOPHILS # BLD: 0.04 K/UL (ref 0–0.12)
BASOPHILS # BLD: 0.05 K/UL (ref 0–0.12)
BILIRUB SERPL-MCNC: 0.9 MG/DL (ref 0.1–1.5)
BUN SERPL-MCNC: 8 MG/DL (ref 8–22)
CALCIUM SERPL-MCNC: 7.3 MG/DL (ref 8.5–10.5)
CHLORIDE SERPL-SCNC: 112 MMOL/L (ref 96–112)
CO2 SERPL-SCNC: 20 MMOL/L (ref 20–33)
CREAT SERPL-MCNC: 0.5 MG/DL (ref 0.5–1.4)
EOSINOPHIL # BLD AUTO: 0.03 K/UL (ref 0–0.51)
EOSINOPHIL # BLD AUTO: 0.03 K/UL (ref 0–0.51)
EOSINOPHIL NFR BLD: 0.2 % (ref 0–6.9)
EOSINOPHIL NFR BLD: 0.3 % (ref 0–6.9)
ERYTHROCYTE [DISTWIDTH] IN BLOOD BY AUTOMATED COUNT: 49 FL (ref 35.9–50)
ERYTHROCYTE [DISTWIDTH] IN BLOOD BY AUTOMATED COUNT: 49.8 FL (ref 35.9–50)
GLOBULIN SER CALC-MCNC: 2.6 G/DL (ref 1.9–3.5)
GLUCOSE SERPL-MCNC: 95 MG/DL (ref 65–99)
HCT VFR BLD AUTO: 32.7 % (ref 42–52)
HCT VFR BLD AUTO: 40.3 % (ref 42–52)
HGB BLD-MCNC: 10.6 G/DL (ref 14–18)
HGB BLD-MCNC: 13.1 G/DL (ref 14–18)
IMM GRANULOCYTES # BLD AUTO: 0.02 K/UL (ref 0–0.11)
IMM GRANULOCYTES # BLD AUTO: 0.03 K/UL (ref 0–0.11)
IMM GRANULOCYTES NFR BLD AUTO: 0.2 % (ref 0–0.9)
IMM GRANULOCYTES NFR BLD AUTO: 0.2 % (ref 0–0.9)
LYMPHOCYTES # BLD AUTO: 1.31 K/UL (ref 1–4.8)
LYMPHOCYTES # BLD AUTO: 1.51 K/UL (ref 1–4.8)
LYMPHOCYTES NFR BLD: 10.9 % (ref 22–41)
LYMPHOCYTES NFR BLD: 16.6 % (ref 22–41)
MAGNESIUM SERPL-MCNC: 1.7 MG/DL (ref 1.5–2.5)
MCH RBC QN AUTO: 29.9 PG (ref 27–33)
MCH RBC QN AUTO: 30.3 PG (ref 27–33)
MCHC RBC AUTO-ENTMCNC: 31.1 G/DL (ref 33.7–35.3)
MCHC RBC AUTO-ENTMCNC: 32.2 G/DL (ref 33.7–35.3)
MCV RBC AUTO: 94.2 FL (ref 81.4–97.8)
MCV RBC AUTO: 96.3 FL (ref 81.4–97.8)
MONOCYTES # BLD AUTO: 0.86 K/UL (ref 0–0.85)
MONOCYTES # BLD AUTO: 0.97 K/UL (ref 0–0.85)
MONOCYTES NFR BLD AUTO: 8.1 % (ref 0–13.4)
MONOCYTES NFR BLD AUTO: 9.4 % (ref 0–13.4)
NEUTROPHILS # BLD AUTO: 6.64 K/UL (ref 1.82–7.42)
NEUTROPHILS # BLD AUTO: 9.66 K/UL (ref 1.82–7.42)
NEUTROPHILS NFR BLD: 73 % (ref 44–72)
NEUTROPHILS NFR BLD: 80.3 % (ref 44–72)
NRBC # BLD AUTO: 0 K/UL
NRBC # BLD AUTO: 0 K/UL
NRBC BLD-RTO: 0 /100 WBC
NRBC BLD-RTO: 0 /100 WBC
PHOSPHATE SERPL-MCNC: 1.8 MG/DL (ref 2.5–4.5)
PLATELET # BLD AUTO: 103 K/UL (ref 164–446)
PLATELET # BLD AUTO: 138 K/UL (ref 164–446)
PMV BLD AUTO: 11.2 FL (ref 9–12.9)
PMV BLD AUTO: 11.7 FL (ref 9–12.9)
POTASSIUM SERPL-SCNC: 2.9 MMOL/L (ref 3.6–5.5)
PROT SERPL-MCNC: 5.6 G/DL (ref 6–8.2)
RBC # BLD AUTO: 3.54 M/UL (ref 4.7–6.1)
RBC # BLD AUTO: 4.32 M/UL (ref 4.7–6.1)
SODIUM SERPL-SCNC: 142 MMOL/L (ref 135–145)
TRIGL SERPL-MCNC: 126 MG/DL (ref 0–149)
WBC # BLD AUTO: 12 K/UL (ref 4.8–10.8)
WBC # BLD AUTO: 9.1 K/UL (ref 4.8–10.8)

## 2020-05-11 PROCEDURE — A9270 NON-COVERED ITEM OR SERVICE: HCPCS | Performed by: SURGERY

## 2020-05-11 PROCEDURE — 700111 HCHG RX REV CODE 636 W/ 250 OVERRIDE (IP): Performed by: NURSE PRACTITIONER

## 2020-05-11 PROCEDURE — A9270 NON-COVERED ITEM OR SERVICE: HCPCS | Performed by: NURSE PRACTITIONER

## 2020-05-11 PROCEDURE — 84478 ASSAY OF TRIGLYCERIDES: CPT

## 2020-05-11 PROCEDURE — 83735 ASSAY OF MAGNESIUM: CPT

## 2020-05-11 PROCEDURE — 700111 HCHG RX REV CODE 636 W/ 250 OVERRIDE (IP): Performed by: SURGERY

## 2020-05-11 PROCEDURE — 84100 ASSAY OF PHOSPHORUS: CPT

## 2020-05-11 PROCEDURE — 700112 HCHG RX REV CODE 229: Performed by: NURSE PRACTITIONER

## 2020-05-11 PROCEDURE — 700102 HCHG RX REV CODE 250 W/ 637 OVERRIDE(OP): Performed by: SURGERY

## 2020-05-11 PROCEDURE — 700101 HCHG RX REV CODE 250: Performed by: SURGERY

## 2020-05-11 PROCEDURE — 80053 COMPREHEN METABOLIC PANEL: CPT

## 2020-05-11 PROCEDURE — 94770 HCHG CO2 EXPIRED GAS DETERMINATION: CPT

## 2020-05-11 PROCEDURE — 85025 COMPLETE CBC W/AUTO DIFF WBC: CPT | Mod: 91

## 2020-05-11 PROCEDURE — 700105 HCHG RX REV CODE 258: Performed by: NURSE PRACTITIONER

## 2020-05-11 PROCEDURE — 94003 VENT MGMT INPAT SUBQ DAY: CPT

## 2020-05-11 PROCEDURE — 700105 HCHG RX REV CODE 258: Performed by: SURGERY

## 2020-05-11 PROCEDURE — 71045 X-RAY EXAM CHEST 1 VIEW: CPT

## 2020-05-11 PROCEDURE — 700102 HCHG RX REV CODE 250 W/ 637 OVERRIDE(OP): Performed by: NURSE PRACTITIONER

## 2020-05-11 PROCEDURE — 770022 HCHG ROOM/CARE - ICU (200)

## 2020-05-11 PROCEDURE — 99291 CRITICAL CARE FIRST HOUR: CPT | Performed by: SURGERY

## 2020-05-11 PROCEDURE — 700101 HCHG RX REV CODE 250: Performed by: NURSE PRACTITIONER

## 2020-05-11 RX ORDER — MAGNESIUM SULFATE 1 G/100ML
1 INJECTION INTRAVENOUS ONCE
Status: COMPLETED | OUTPATIENT
Start: 2020-05-11 | End: 2020-05-11

## 2020-05-11 RX ADMIN — Medication 1 EACH: at 04:51

## 2020-05-11 RX ADMIN — Medication: at 18:09

## 2020-05-11 RX ADMIN — MAGNESIUM SULFATE 1 G: 1 INJECTION INTRAVENOUS at 08:43

## 2020-05-11 RX ADMIN — PROPOFOL 30 MCG/KG/MIN: 10 INJECTION, EMULSION INTRAVENOUS at 05:06

## 2020-05-11 RX ADMIN — HEPARIN SODIUM 5000 UNITS: 5000 INJECTION, SOLUTION INTRAVENOUS; SUBCUTANEOUS at 21:07

## 2020-05-11 RX ADMIN — HYDRALAZINE HYDROCHLORIDE 10 MG: 20 INJECTION INTRAMUSCULAR; INTRAVENOUS at 11:40

## 2020-05-11 RX ADMIN — POTASSIUM PHOSPHATE, MONOBASIC AND POTASSIUM PHOSPHATE, DIBASIC 30 MMOL: 224; 236 INJECTION, SOLUTION, CONCENTRATE INTRAVENOUS at 08:02

## 2020-05-11 RX ADMIN — LEVETIRACETAM 500 MG: 100 INJECTION, SOLUTION INTRAVENOUS at 18:18

## 2020-05-11 RX ADMIN — SODIUM CHLORIDE: 9 INJECTION, SOLUTION INTRAVENOUS at 21:08

## 2020-05-11 RX ADMIN — DOCUSATE SODIUM 50 MG AND SENNOSIDES 8.6 MG 1 TABLET: 8.6; 5 TABLET, FILM COATED ORAL at 21:07

## 2020-05-11 RX ADMIN — LEVETIRACETAM 500 MG: 100 INJECTION, SOLUTION INTRAVENOUS at 04:52

## 2020-05-11 RX ADMIN — POTASSIUM BICARBONATE 50 MEQ: 978 TABLET, EFFERVESCENT ORAL at 08:15

## 2020-05-11 RX ADMIN — FAMOTIDINE 20 MG: 20 TABLET ORAL at 18:07

## 2020-05-11 RX ADMIN — FENTANYL CITRATE 50 MCG: 0.05 INJECTION, SOLUTION INTRAMUSCULAR; INTRAVENOUS at 01:30

## 2020-05-11 RX ADMIN — DOCUSATE SODIUM 100 MG: 50 LIQUID ORAL at 18:07

## 2020-05-11 RX ADMIN — PROPOFOL 30 MCG/KG/MIN: 10 INJECTION, EMULSION INTRAVENOUS at 16:02

## 2020-05-11 RX ADMIN — HEPARIN SODIUM 5000 UNITS: 5000 INJECTION, SOLUTION INTRAVENOUS; SUBCUTANEOUS at 04:51

## 2020-05-11 RX ADMIN — HEPARIN SODIUM 5000 UNITS: 5000 INJECTION, SOLUTION INTRAVENOUS; SUBCUTANEOUS at 14:57

## 2020-05-11 RX ADMIN — FAMOTIDINE 20 MG: 10 INJECTION INTRAVENOUS at 04:55

## 2020-05-11 RX ADMIN — Medication: at 13:00

## 2020-05-11 ASSESSMENT — FIBROSIS 4 INDEX: FIB4 SCORE: 2.2

## 2020-05-11 NOTE — PROGRESS NOTES
Cortrak Placement    Tube Team verified patient name and medical record number prior to tube placement.  Cortrak tube (55 inches, 10 [place]) placed at 68 cm in right nare.  Per Cortrak picture, tube appears to be in the stomach.  Nursing Instructions: Awaiting KUB to confirm placement before use for medications or feeding. Once placement confirmed, flush tube with 30 ml of water, and then remove and save stylet, in patient medication drawer.

## 2020-05-11 NOTE — DISCHARGE PLANNING
Tamika wanted to speak with pt. ALYCE sat with pt's room phone up to his ear as Diane talked with him and sang to him. Diane nova.

## 2020-05-11 NOTE — PROGRESS NOTES
2 RN skin check completed with Xochitl RN:   Left knuckle abrasion and inner index finger abrasion  Right knuckle abrasion  Right posterior forearm abrasion  Small bruising to right upper arm  Right knee and hip abrasions.  Left elbow abrasions  Gash to right temporal scalp.   Left side of nose abrasion  Middle of forehead abrasion.     Interventions: Q2 turns, elbows and heels floated on pillows, ETT repositioned Q2, all lines repositioned PRN

## 2020-05-11 NOTE — PROGRESS NOTES
Neurosurgery Progress Note    Subjective:  NAD, intubated and sedated. Off sedation the patient does not open eyes to voice or physical stimuli. Doesn't follow commands    Exam:  Intubated and sedated  Off sedation pt does not open eyes  Does not follow commands but has very purposeful movements in all 4 extremities  Antigravity in all 4 extremities  PERRL BL           BP  Min: 113/60  Max: 163/95  Pulse  Av.1  Min: 67  Max: 96  Resp  Av  Min: 9  Max: 27  Temp  Av.6 °C (99.6 °F)  Min: 37.2 °C (99 °F)  Max: 38.1 °C (100.6 °F)  SpO2  Av.9 %  Min: 99 %  Max: 100 %    No data recorded    Recent Labs     05/10/20  0435 20  0440 20  0550   WBC 14.1* 9.1 12.0*   RBC 4.83 3.54* 4.32*   HEMOGLOBIN 14.7 10.6* 13.1*   HEMATOCRIT 45.0 32.7* 40.3*   MCV 93.2 96.3 94.2   MCH 30.4 29.9 30.3   MCHC 32.7* 31.1* 32.2*   RDW 47.5 49.8 49.0   PLATELETCT 149* 103* 138*   MPV 11.6 11.2 11.7     Recent Labs     20  1237 05/10/20  0435 20  0550   SODIUM 139 138 142   POTASSIUM 3.7 4.0 2.9*   CHLORIDE 103 100 112   CO2 21 22 20   GLUCOSE 115* 130* 95   BUN 14 10 8   CREATININE 0.88 0.69 0.50   CALCIUM 9.2 8.8 7.3*     Recent Labs     20  1237   APTT 25.3   INR 0.98     Recent Labs     20  1245   REACTMIN 3.2*   CLOTKINET 1.6   CLOTANGL 69.3   MAXCLOTS 63.5   [id number] 0.1   PRCINADP 5.6   PRCINAA 5.7       Intake/Output       05/10/20 07 - 20 0659 20 - 20 0659      6080-1292 2545-7121 Total 4710-4088 1435-3608 Total       Intake    I.V.  1305.8  1345.9 2651.7  --  -- --    Propofol Volume 105.8 145.9 251.7 -- -- --    Volume (mL) (NS infusion) 1200 1200 2400 -- -- --    Total Intake 1305.8 1345.9 2651.7 -- -- --       Output    Urine  1650  450 2100  --  -- --    Output (mL) (Urethral Catheter Temperature probe 16 Fr.) 6311 155 8737 -- -- --    Emesis/NG output  0  0 0  --  -- --    Output (mL) (Enteral Tube 20 Orogastric) 0 0 0 -- -- --    Total Output  6473 496 7694 -- -- --       Net I/O     -344.2 895.9 551.7 -- -- --            Intake/Output Summary (Last 24 hours) at 5/11/2020 0811  Last data filed at 5/11/2020 0600  Gross per 24 hour   Intake 2445.44 ml   Output 2000 ml   Net 445.44 ml            • potassium phosphate ivpb  30 mmol Once   • magnesium sulfate  1 g Once   • potassium bicarbonate  50 mEq Once   • heparin  5,000 Units Q8HRS   • hydrALAZINE  10-20 mg Q4HRS PRN   • labetalol  10-20 mg Q4HRS PRN   • Respiratory Therapy Consult   Continuous RT   • Pharmacy Consult Request  1 Each PHARMACY TO DOSE   • senna-docusate  1 Tab Nightly   • polyethylene glycol/lytes  1 Packet BID   • magnesium hydroxide  30 mL DAILY   • bisacodyl  10 mg Q24HRS PRN   • fleet  1 Each Once PRN   • NS   Continuous   • fentaNYL  50 mcg Q HOUR PRN   • famotidine  20 mg BID    Or   • famotidine  20 mg BID   • ondansetron  4 mg Q4HRS PRN   • levETIRAcetam (KEPPRA) IV  500 mg BID   • bacitracin-polymyxin b   TID   • propofol  0-80 mcg/kg/min Continuous   • senna-docusate  1 Tab Q24HRS PRN   • docusate sodium 100mg/10mL  100 mg BID   • acetaminophen  650 mg Q6HRS PRN       Assessment and Plan:  Hospital day # 3  Chemical prophylactic DVT therapy: Yes - Heparin 5000 units/q 8hrs  Start date/time: now  Maintain Na between 140-145  CT head to be ordered for tomorrow am   SBP control with SBP less than 150mmhg  SCDs  Okay for heparin DVT ppx  Wean ventilator per ICU team  Medical management per trauma  \[facility]\""ra for seizure ppx  Continue q2hr neuro checks
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
## 2020-05-11 NOTE — THERAPY
OT referral received. Per RN pt intubated, sedated, not following commands. Not yet appropriate for OT eval. Will continue to monitor status and complete eval when pt able to participate at least minimally.

## 2020-05-11 NOTE — PROGRESS NOTES
Spoke with Diane wife extensively regarding her inability to visit the patient.  Explained to her that the restrictions are for the safety of the patients and the public.  I assured her that if the policy were to get changed we would let her know.  I assured her that if she wanted to compile a recording with her voice and his favorite songs that we would do our best to ensure the patient hears it.  I also gave her the option to have a zoom meeting with the patient and the RN could put the ipad on the table and give her and the patient privacy for approx 20 minutes at best.  She was still upset with not being able to visit but felt a little better with those options presented.  Let her know she can call me if she had further questions.

## 2020-05-11 NOTE — DISCHARGE PLANNING
"Care Transition Team Assessment    In the case of an emergency, pt's legal NOK is spouse, Diane Beltran [phone]     LSW called pt's spouse, Diane, and obtained the following information used in this assessment. Diane verified accuracy of facesheet. Pt is  and Diane reports pt has no children. Diane and pt have been together for 36 years. No ACP documents. Clinically incapacitated/inappropriate at this time for booklet. Diane and pt recently moved to [place] from the [facility] after Diane received a new job at [employer]. Tamika shared that most of their family support is in [place] however pt's brother lives \"a couple hours away.\" Pt has a sister & a brother-in-law that Tamika states are her support during this time.     Pt lives in a single level house with Diane.  Prior to current hospitalization, pt was completely independent in ADLS/IADLS. Pt drives. No prior DME. Tamika reports pt is overall \"very healthy, never took meds just hard of hearing at times.\" No financial barriers at this time. Pt has health care coverage for dc planning. No hx of substance abuse and no hx of mh. Pt has not established a PCP.     Due to visitor restrictions, LSW and Diane brainstormed ways to ease her anxiety about not physically seeing pt. Zoom meetings, calling and putting the phone to pt's ear, favorite music playing in room, listening to the morning news, and holding pt's hand were just a few. Tamika to e-mail LSW with other ideas and pt's favorite bands. Diane also shared she's going to provide a MP3 player with some headphones of her talking to him.     LSW provided emotional support during this whole discussion.Diane is very concerned/anxious. Constant communication from care team will help ease her mind.     Barriers to dc: Unknown dc needs at this time.     Plan: Continue to assist with social/dc needs. Follow up with Diane.     Care Transition Team Assessment    Information Source  Orientation : Unable to " Assess  Information Given By: Spouse  Informant's Name: Diane Beltran  Who is responsible for making decisions for patient? : Spouse  Name(s) of Primary Decision Maker: Diane Beltran    Readmission Evaluation  Is this a readmission?: No    Elopement Risk  Legal Hold: No  Ambulatory or Self Mobile in Wheelchair: No-Not an Elopement Risk  Elopement Risk: Not at Risk for Elopement    Interdisciplinary Discharge Planning  Primary Care Physician: None  Lives with - Patient's Self Care Capacity: Spouse  Patient or legal guardian wants to designate a caregiver (see row info): No  Support Systems: Family Member(s), Spouse / Significant Other  Housing / Facility: [address]  Do You Take your Prescribed Medications Regularly: Yes  Able to Return to Previous ADL's: Future Time w/Therapy  Mobility Issues: Yes  Prior Services: None, Home-Independent  Assistance Needed: Unknown at this Time  Durable Medical Equipment: Not Applicable    Discharge Preparedness  What is your plan after discharge?: Uncertain - pending medical team collaboration, Other (comment)(LTAC v Rehab)  What are your discharge supports?: Sibling, Spouse  Prior Functional Level: Ambulatory, Drives Self, Independent with Activities of Daily Living, Independent with Medication Management    Functional Assesment  Prior Functional Level: Ambulatory, Drives Self, Independent with Activities of Daily Living, Independent with Medication Management    Finances  Financial Barriers to Discharge: No  Prescription Coverage: Yes    Vision / Hearing Impairment  Hearing Impairment : Yes  Hearing Impairment: Both Ears    Advance Directive  Advance Directive?: None  Advance Directive offered?: AD Booklet refused    Domestic Abuse  Have you ever been the victim of abuse or violence?: No  Physical Abuse or Sexual Abuse: No  Verbal Abuse or Emotional Abuse: No  Possible Abuse Reported to:: Not Applicable    Psychological Assessment  History of Substance Abuse: None  History of  Psychiatric Problems: No  Non-compliant with Treatment: No  Newly Diagnosed Illness: No    Discharge Risks or Barriers  Discharge risks or barriers?: No    Anticipated Discharge Information  Anticipated discharge disposition: Acute rehab, Discharge needs currently unknown, LTAC

## 2020-05-11 NOTE — PROGRESS NOTES
2 RN skin check completed with Lorna PACHECO:   Left knuckle abrasion and inner index finger abrasion  Right knuckle abrasion  Right posterior forearm abrasion  Small bruising to right upper arm  Right knee and hip abrasions.  Left elbow abrasions  Gash to right temporal scalp.   Left side of nose abrasion  Middle of forehead abrasion.

## 2020-05-12 ENCOUNTER — APPOINTMENT (OUTPATIENT)
Dept: RADIOLOGY | Facility: MEDICAL CENTER | Age: 65
DRG: 004 | End: 2020-05-12
Attending: NURSE PRACTITIONER
Payer: COMMERCIAL

## 2020-05-12 ENCOUNTER — APPOINTMENT (OUTPATIENT)
Dept: RADIOLOGY | Facility: MEDICAL CENTER | Age: 65
DRG: 004 | End: 2020-05-12
Attending: SURGERY
Payer: COMMERCIAL

## 2020-05-12 ENCOUNTER — APPOINTMENT (OUTPATIENT)
Dept: RADIOLOGY | Facility: MEDICAL CENTER | Age: 65
DRG: 004 | End: 2020-05-12
Attending: PHYSICIAN ASSISTANT
Payer: COMMERCIAL

## 2020-05-12 LAB
ALBUMIN SERPL BCP-MCNC: 3.1 G/DL (ref 3.2–4.9)
ALBUMIN/GLOB SERPL: 1 G/DL
ALP SERPL-CCNC: 66 U/L (ref 30–99)
ALT SERPL-CCNC: 41 U/L (ref 2–50)
ANION GAP SERPL CALC-SCNC: 10 MMOL/L (ref 7–16)
AST SERPL-CCNC: 47 U/L (ref 12–45)
BASOPHILS # BLD AUTO: 0.3 % (ref 0–1.8)
BASOPHILS # BLD: 0.03 K/UL (ref 0–0.12)
BILIRUB SERPL-MCNC: 1.4 MG/DL (ref 0.1–1.5)
BUN SERPL-MCNC: 6 MG/DL (ref 8–22)
CALCIUM SERPL-MCNC: 8.2 MG/DL (ref 8.5–10.5)
CHLORIDE SERPL-SCNC: 104 MMOL/L (ref 96–112)
CO2 SERPL-SCNC: 23 MMOL/L (ref 20–33)
CREAT SERPL-MCNC: 0.51 MG/DL (ref 0.5–1.4)
EOSINOPHIL # BLD AUTO: 0.01 K/UL (ref 0–0.51)
EOSINOPHIL NFR BLD: 0.1 % (ref 0–6.9)
ERYTHROCYTE [DISTWIDTH] IN BLOOD BY AUTOMATED COUNT: 47.3 FL (ref 35.9–50)
GLOBULIN SER CALC-MCNC: 3 G/DL (ref 1.9–3.5)
GLUCOSE SERPL-MCNC: 120 MG/DL (ref 65–99)
HCT VFR BLD AUTO: 36.8 % (ref 42–52)
HGB BLD-MCNC: 12.3 G/DL (ref 14–18)
IMM GRANULOCYTES # BLD AUTO: 0.03 K/UL (ref 0–0.11)
IMM GRANULOCYTES NFR BLD AUTO: 0.3 % (ref 0–0.9)
LYMPHOCYTES # BLD AUTO: 1.15 K/UL (ref 1–4.8)
LYMPHOCYTES NFR BLD: 10.2 % (ref 22–41)
MCH RBC QN AUTO: 30.8 PG (ref 27–33)
MCHC RBC AUTO-ENTMCNC: 33.4 G/DL (ref 33.7–35.3)
MCV RBC AUTO: 92 FL (ref 81.4–97.8)
MONOCYTES # BLD AUTO: 1.04 K/UL (ref 0–0.85)
MONOCYTES NFR BLD AUTO: 9.3 % (ref 0–13.4)
NEUTROPHILS # BLD AUTO: 8.96 K/UL (ref 1.82–7.42)
NEUTROPHILS NFR BLD: 79.8 % (ref 44–72)
NRBC # BLD AUTO: 0 K/UL
NRBC BLD-RTO: 0 /100 WBC
PLATELET # BLD AUTO: 112 K/UL (ref 164–446)
PMV BLD AUTO: 11.4 FL (ref 9–12.9)
POTASSIUM SERPL-SCNC: 3.5 MMOL/L (ref 3.6–5.5)
PROT SERPL-MCNC: 6.1 G/DL (ref 6–8.2)
RBC # BLD AUTO: 4 M/UL (ref 4.7–6.1)
SODIUM SERPL-SCNC: 137 MMOL/L (ref 135–145)
WBC # BLD AUTO: 11.2 K/UL (ref 4.8–10.8)

## 2020-05-12 PROCEDURE — 700101 HCHG RX REV CODE 250: Performed by: SURGERY

## 2020-05-12 PROCEDURE — 80053 COMPREHEN METABOLIC PANEL: CPT

## 2020-05-12 PROCEDURE — 770022 HCHG ROOM/CARE - ICU (200)

## 2020-05-12 PROCEDURE — 70551 MRI BRAIN STEM W/O DYE: CPT

## 2020-05-12 PROCEDURE — A9270 NON-COVERED ITEM OR SERVICE: HCPCS | Performed by: NURSE PRACTITIONER

## 2020-05-12 PROCEDURE — 97163 PT EVAL HIGH COMPLEX 45 MIN: CPT

## 2020-05-12 PROCEDURE — 70450 CT HEAD/BRAIN W/O DYE: CPT

## 2020-05-12 PROCEDURE — 700102 HCHG RX REV CODE 250 W/ 637 OVERRIDE(OP): Performed by: SURGERY

## 2020-05-12 PROCEDURE — 99291 CRITICAL CARE FIRST HOUR: CPT | Performed by: SURGERY

## 2020-05-12 PROCEDURE — 85025 COMPLETE CBC W/AUTO DIFF WBC: CPT

## 2020-05-12 PROCEDURE — 71045 X-RAY EXAM CHEST 1 VIEW: CPT

## 2020-05-12 PROCEDURE — 700101 HCHG RX REV CODE 250: Performed by: NURSE PRACTITIONER

## 2020-05-12 PROCEDURE — A9270 NON-COVERED ITEM OR SERVICE: HCPCS | Performed by: SURGERY

## 2020-05-12 PROCEDURE — 700112 HCHG RX REV CODE 229: Performed by: NURSE PRACTITIONER

## 2020-05-12 PROCEDURE — 700111 HCHG RX REV CODE 636 W/ 250 OVERRIDE (IP): Performed by: NURSE PRACTITIONER

## 2020-05-12 PROCEDURE — 94770 HCHG CO2 EXPIRED GAS DETERMINATION: CPT

## 2020-05-12 PROCEDURE — 97167 OT EVAL HIGH COMPLEX 60 MIN: CPT

## 2020-05-12 PROCEDURE — 94003 VENT MGMT INPAT SUBQ DAY: CPT

## 2020-05-12 PROCEDURE — 700111 HCHG RX REV CODE 636 W/ 250 OVERRIDE (IP): Performed by: SURGERY

## 2020-05-12 PROCEDURE — 700102 HCHG RX REV CODE 250 W/ 637 OVERRIDE(OP): Performed by: NURSE PRACTITIONER

## 2020-05-12 PROCEDURE — 700105 HCHG RX REV CODE 258: Performed by: SURGERY

## 2020-05-12 RX ORDER — MAGNESIUM SULFATE 1 G/100ML
1 INJECTION INTRAVENOUS ONCE
Status: COMPLETED | OUTPATIENT
Start: 2020-05-12 | End: 2020-05-12

## 2020-05-12 RX ORDER — OXYCODONE HYDROCHLORIDE 5 MG/1
5 TABLET ORAL EVERY 4 HOURS PRN
Status: DISCONTINUED | OUTPATIENT
Start: 2020-05-12 | End: 2020-05-29 | Stop reason: HOSPADM

## 2020-05-12 RX ORDER — SODIUM CHLORIDE 9 MG/ML
1000 INJECTION, SOLUTION INTRAVENOUS ONCE
Status: COMPLETED | OUTPATIENT
Start: 2020-05-12 | End: 2020-05-12

## 2020-05-12 RX ORDER — OXYCODONE HYDROCHLORIDE 10 MG/1
10 TABLET ORAL EVERY 4 HOURS PRN
Status: DISCONTINUED | OUTPATIENT
Start: 2020-05-12 | End: 2020-05-24

## 2020-05-12 RX ORDER — ACETAMINOPHEN 325 MG/1
650 TABLET ORAL 4 TIMES DAILY
Status: DISCONTINUED | OUTPATIENT
Start: 2020-05-12 | End: 2020-05-14

## 2020-05-12 RX ORDER — LEVETIRACETAM 500 MG/1
500 TABLET ORAL 2 TIMES DAILY
Status: COMPLETED | OUTPATIENT
Start: 2020-05-12 | End: 2020-05-16

## 2020-05-12 RX ADMIN — Medication: at 13:26

## 2020-05-12 RX ADMIN — ACETAMINOPHEN 650 MG: 325 TABLET, FILM COATED ORAL at 18:47

## 2020-05-12 RX ADMIN — MAGNESIUM SULFATE 1 G: 1 INJECTION INTRAVENOUS at 07:42

## 2020-05-12 RX ADMIN — HYDRALAZINE HYDROCHLORIDE 20 MG: 20 INJECTION INTRAMUSCULAR; INTRAVENOUS at 22:07

## 2020-05-12 RX ADMIN — POTASSIUM BICARBONATE 25 MEQ: 978 TABLET, EFFERVESCENT ORAL at 07:41

## 2020-05-12 RX ADMIN — Medication 1 EACH: at 05:28

## 2020-05-12 RX ADMIN — HEPARIN SODIUM 5000 UNITS: 5000 INJECTION, SOLUTION INTRAVENOUS; SUBCUTANEOUS at 13:26

## 2020-05-12 RX ADMIN — FAMOTIDINE 20 MG: 20 TABLET ORAL at 05:28

## 2020-05-12 RX ADMIN — HYDRALAZINE HYDROCHLORIDE 20 MG: 20 INJECTION INTRAMUSCULAR; INTRAVENOUS at 08:05

## 2020-05-12 RX ADMIN — LEVETIRACETAM 500 MG: 500 TABLET ORAL at 18:47

## 2020-05-12 RX ADMIN — PROPOFOL 30 MCG/KG/MIN: 10 INJECTION, EMULSION INTRAVENOUS at 01:15

## 2020-05-12 RX ADMIN — OXYCODONE HYDROCHLORIDE 10 MG: 10 TABLET ORAL at 08:47

## 2020-05-12 RX ADMIN — LEVETIRACETAM 500 MG: 500 TABLET ORAL at 07:41

## 2020-05-12 RX ADMIN — ACETAMINOPHEN 650 MG: 325 TABLET, FILM COATED ORAL at 13:25

## 2020-05-12 RX ADMIN — OXYCODONE 5 MG: 5 TABLET ORAL at 23:16

## 2020-05-12 RX ADMIN — MAGNESIUM HYDROXIDE 30 ML: 400 SUSPENSION ORAL at 05:27

## 2020-05-12 RX ADMIN — ACETAMINOPHEN 650 MG: 325 TABLET, FILM COATED ORAL at 07:42

## 2020-05-12 RX ADMIN — DOCUSATE SODIUM 100 MG: 50 LIQUID ORAL at 18:50

## 2020-05-12 RX ADMIN — ACETAMINOPHEN 650 MG: 325 TABLET, FILM COATED ORAL at 22:46

## 2020-05-12 RX ADMIN — POLYETHYLENE GLYCOL 3350 1 PACKET: 17 POWDER, FOR SOLUTION ORAL at 18:47

## 2020-05-12 RX ADMIN — POLYETHYLENE GLYCOL 3350 1 PACKET: 17 POWDER, FOR SOLUTION ORAL at 05:28

## 2020-05-12 RX ADMIN — POTASSIUM BICARBONATE 25 MEQ: 978 TABLET, EFFERVESCENT ORAL at 18:47

## 2020-05-12 RX ADMIN — HEPARIN SODIUM 5000 UNITS: 5000 INJECTION, SOLUTION INTRAVENOUS; SUBCUTANEOUS at 05:28

## 2020-05-12 RX ADMIN — POTASSIUM PHOSPHATE, MONOBASIC AND POTASSIUM PHOSPHATE, DIBASIC 30 MMOL: 224; 236 INJECTION, SOLUTION, CONCENTRATE INTRAVENOUS at 08:05

## 2020-05-12 RX ADMIN — DOCUSATE SODIUM 100 MG: 50 LIQUID ORAL at 05:27

## 2020-05-12 RX ADMIN — PROPOFOL 20 MCG/KG/MIN: 10 INJECTION, EMULSION INTRAVENOUS at 17:25

## 2020-05-12 RX ADMIN — FAMOTIDINE 20 MG: 20 TABLET ORAL at 18:47

## 2020-05-12 RX ADMIN — Medication: at 18:50

## 2020-05-12 RX ADMIN — SODIUM CHLORIDE 1000 ML: 9 INJECTION, SOLUTION INTRAVENOUS at 19:50

## 2020-05-12 RX ADMIN — HEPARIN SODIUM 5000 UNITS: 5000 INJECTION, SOLUTION INTRAVENOUS; SUBCUTANEOUS at 22:07

## 2020-05-12 ASSESSMENT — COGNITIVE AND FUNCTIONAL STATUS - GENERAL
CLIMB 3 TO 5 STEPS WITH RAILING: TOTAL
WALKING IN HOSPITAL ROOM: TOTAL
HELP NEEDED FOR BATHING: TOTAL
MOVING TO AND FROM BED TO CHAIR: UNABLE
DAILY ACTIVITIY SCORE: 6
TURNING FROM BACK TO SIDE WHILE IN FLAT BAD: UNABLE
MOBILITY SCORE: 6
EATING MEALS: TOTAL
STANDING UP FROM CHAIR USING ARMS: TOTAL
SUGGESTED CMS G CODE MODIFIER MOBILITY: CN
MOVING FROM LYING ON BACK TO SITTING ON SIDE OF FLAT BED: UNABLE
PERSONAL GROOMING: TOTAL
DRESSING REGULAR UPPER BODY CLOTHING: TOTAL
DRESSING REGULAR LOWER BODY CLOTHING: TOTAL
TOILETING: TOTAL
SUGGESTED CMS G CODE MODIFIER DAILY ACTIVITY: CN

## 2020-05-12 ASSESSMENT — GAIT ASSESSMENTS: GAIT LEVEL OF ASSIST: UNABLE TO PARTICIPATE

## 2020-05-12 ASSESSMENT — FIBROSIS 4 INDEX: FIB4 SCORE: 2.2

## 2020-05-12 NOTE — DISCHARGE PLANNING
"Met with pt's wife, Diane, at the hospital entrance with bedside JUNIOR Barrios and obtained personal belonging for pt. Diane brought blue tooth speaker, MP3 player, multiple chargers and earbuds for pt. RN to place stickers on belongings which Diane was grateful for. Zoom meeting with spouse to ensure everything is set up. LSW and RN empathized with spouse about how difficult it is during this time. Diane shared that her sister is flying into town to provide her support at home. \"At least I wont be alone anymore.\" Active listening & emotional support provided.     Diane then shared concerns about possible miscommunication within the medical records department. LSW and RN again empathized with Diane and voiced understanding of her concerns & inquired if concerns need to be escalated to leadership. Tamika declined.     LSW and RN then both met with Aleshia, ICU supervisor and updated her on meeting.     Plan: Continue to assist with social/dc needs. Check in with Diane   "

## 2020-05-12 NOTE — CARE PLAN
Problem: Ventilation Defect:  Goal: Ability to achieve and maintain unassisted ventilation or tolerate decreased levels of ventilator support  Outcome: PROGRESSING AS EXPECTED   Adult Ventilation Update    Total Vent Days: 4    Patient Lines/Drains/Airways Status      Active Airway       Name: Placement date: Placement time: Site: Days:    Airway ETT Oral 8.0  05/09/20   --   Oral  3        In the last 24 hours, the patient tolerated SBT for 15 hours on settings of 5/8.    Plateau Pressure: 18 (05/11/20 1841)  Static Compliance (ml / cm H2O): 63.7 (05/12/20 0233)      Pt did 15 hours on SBT and then placed back on ,+8, 30%.
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
## 2020-05-12 NOTE — THERAPY
PT eval order received and attempted. RN requesting hold at this time as pt is non responsive. Will attempt back as able and as appropriate.

## 2020-05-12 NOTE — WOUND TEAM
Wound consult received for laceration to R temple, POA. Currently treated with bacitracin, no further wound needs at this time. Pt has abrasion to lower L lip - not pressure related - continue bacitracin.  No advanced wound needs at this time. Skin check completed with Sophie PACHECO (Mary).

## 2020-05-12 NOTE — PROGRESS NOTES
Met with patient's wife at the hospital entrance with ENRIQUE Rachel to obtain personal belongings of blue tooth speaker, MP3 player and multiple chargers. Belongings were in a clear bin with white cover. Discussed that RN will place stickers on all of his belongings. Patient's wife requested a Zoom meeting while RN sets up patient's earbuds and play his music/podcasts/books.     1540: RN called patient's wife for updates on new imaging orders. RN able to obtain MRI screening questions with patient's wife on the phone. Dr. Francis also talked with patient's wife for updates. RN discussed with patient's wife that Zoom meeting can resume after patient gets settled from MRI and that patient care has been prioritized.
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
## 2020-05-12 NOTE — PROGRESS NOTES
Dr. Torres at the bedside and talking to patient's wife on the phone to update on patient's CT results and discussed current management of care.

## 2020-05-12 NOTE — PROGRESS NOTES
Page placed and returned to neurosurgery regarding questions and updates. Discussed patient's shivering with stimulation and neuro assessments, non continuous. Unchanged neuro status. Continue current plan of care. Clarified pharmacologic DVT prophylaxis, per Nickolas BETH, Dr. Brian YANEZ with heparin or lovenox. Will discuss with Dr. Francis.

## 2020-05-12 NOTE — PROGRESS NOTES
Zoom meeting in session with patient's wife, Diane, started at 0930. iPad set up in front of patient in order for wife to have a private session and speak with patient. Hearing aids placed to bilateral ears per patient's wife's request.
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
## 2020-05-12 NOTE — PROGRESS NOTES
Spiritual Care Note    Patient's Name: Reyes Amezcua   MRN: [id number]    YOB: 1955   Age and Gender: 64 y.o. male   Service Area: SICU   Room (and Bed): [place] [address]   Ethnicity or Nationality: White   Primary Language: English   Restorationist/Spiritual preference: Not able or decline to answer   Place of Residence: [facility]   Family/Friends/Others Present: No   Clinical Team Present: No   Medical Diagnosis(-es)/Procedure(s): Trauma   Code Status: Full Code    Date of Admission: 5/9/2020   Length of Stay: 3 days        Spiritual Care Provider Information    Name of Spiritual Care Provider:   Nadeen Lawrence  Title of Spiritual Care Provider:     Phone Number:     [phone]  E-mail:      [email or web address]  Total Time:     5 minutes      Spiritual Screen Results    Gen Nursing    Is your spiritual health or inner well-being important to you as you cope with your medical condition?: Yes  Would you like to receive a visit from our Spiritual Care team or your own Islam or spiritual leader?: Yes  Was spiritual care education provided to the patient?: Yes  Cultural/Spiritual Needs During Care: Clinical  Sources of Hope/Gisele: Counseling/Support groups, Companionship     Palliative Care    Was spiritual care education provided to the patient?: Yes      Encounter/Request Information    Visited With: Patient, Health care provider  Nature of the Visit: Follow-up, On shift  Continue Visiting: Yes  Crisis Visit: Trauma  Referral From/ Origin of Request: Verbal family  Referral To: Other /SCP ( Clare to follow up)      Religous Needs/Values    Restorationist Needs: Prayer    Spiritual Assessment     Observations/Symptoms: Other (Comment)(PT intubated and unresponsive)    Interacton/Conversation: Provided bedside prayer.     Assessment: Need   Need: Seeking Spiritual Assistance and Support    Intended Effects: Convey a Calming Presence, Demonstrate Caring and Concern, Helping Someone Feel  Comforted, Lessen Anxiety, Lessen Someone's Feelings of Isolation, Preserve Dignity and Respect, Promote a Sense of Peace    Interventions: Compassionate presence, prayer    Outcomes: Spiritual Comfort, Value/Dignity/Respect    Plan: DAILY VISITS    Notes:      Thank you for allowing Spiritual Care to support this patient and family. Contact [phone] for additional assistance, changes in PT status, or with any questions/concerns.
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
## 2020-05-12 NOTE — CARE PLAN
Problem: Safety  Goal: Will remain free from falls  Intervention: Implement fall precautions  Flowsheets (Taken 5/12/2020 1400)  Environmental Precautions:   Treaded Slipper Socks on Patient   Personal Belongings, Wastebasket, Call Bell etc. in Easy Reach   Transferred to Stronger Side   Report Given to Other Health Care Providers Regarding Fall Risk   Bed in Low Position  Note: Safety and fall precautions in place and implemented at all times during turns, transfers or mobility.   Discussed safety and fall precautions in place   Collaboration with PT/OT  Maintaining patency of LDAs      Problem: Infection  Goal: Will remain free from infection  Intervention: Assess signs and symptoms of infection  Note: Standard precautions in place with proper hand hygiene.   Infection control discussed with patient  Monitoring trends in MEWS, labs, VS.   Monitoring for s/s infection

## 2020-05-12 NOTE — CARE PLAN
Problem: Respiratory:  Goal: Respiratory status will improve  Intervention: Assess and monitor pulmonary status  Note: Pulmonary status assessed with each encounter. RT notified of changes. Patient suctioned as indicated. Oral care provided per protocol. Will continue to monitor.          Problem: Pain Management  Goal: Pain level will decrease to patient's comfort goal  Intervention: Follow pain managment plan developed in collaboration with patient and Interdisciplinary Team  Note: Pharmacological and nonpharmacological methods used to control pain. Pain assessed Q2.  Medications administered as needed per the MAR.

## 2020-05-12 NOTE — PROGRESS NOTES
Critical Care Follow-Up:    Discussed updates with the patient's wife.  Will proceed with MR brain given the possible appearance of shear injury in the brainstem/flaquito suggested by the patient's CT head this morning.  Anticipate a read by tomorrow if performed tonight.  Holding off on a Neurology consult at this time as the patient is not having clinically apparent seizures, which would be a main reason to obtain a consult in this clinical setting.    Zhao Francis MD

## 2020-05-12 NOTE — PROGRESS NOTES
Neurosurgery PA, Ale, at the bedside. Discussed patient's condition overnight and f/u CT. Discussed that wife wanted Dr. Torres to update her on CT results.     Per Neurosurgery PA, keep Na+ 140-145 and SBP <150

## 2020-05-13 PROBLEM — E43 SEVERE PROTEIN-CALORIE MALNUTRITION (HCC): Status: ACTIVE | Noted: 2020-05-13

## 2020-05-13 LAB
ANION GAP SERPL CALC-SCNC: 10 MMOL/L (ref 7–16)
BASOPHILS # BLD AUTO: 0.4 % (ref 0–1.8)
BASOPHILS # BLD: 0.04 K/UL (ref 0–0.12)
BUN SERPL-MCNC: 11 MG/DL (ref 8–22)
CALCIUM SERPL-MCNC: 8.5 MG/DL (ref 8.5–10.5)
CHLORIDE SERPL-SCNC: 105 MMOL/L (ref 96–112)
CO2 SERPL-SCNC: 22 MMOL/L (ref 20–33)
CREAT SERPL-MCNC: 0.49 MG/DL (ref 0.5–1.4)
CRP SERPL HS-MCNC: 16.41 MG/DL (ref 0–0.75)
EOSINOPHIL # BLD AUTO: 0.03 K/UL (ref 0–0.51)
EOSINOPHIL NFR BLD: 0.3 % (ref 0–6.9)
ERYTHROCYTE [DISTWIDTH] IN BLOOD BY AUTOMATED COUNT: 46.5 FL (ref 35.9–50)
GLUCOSE SERPL-MCNC: 135 MG/DL (ref 65–99)
HCT VFR BLD AUTO: 36.8 % (ref 42–52)
HGB BLD-MCNC: 12.4 G/DL (ref 14–18)
IMM GRANULOCYTES # BLD AUTO: 0.02 K/UL (ref 0–0.11)
IMM GRANULOCYTES NFR BLD AUTO: 0.2 % (ref 0–0.9)
LYMPHOCYTES # BLD AUTO: 1.18 K/UL (ref 1–4.8)
LYMPHOCYTES NFR BLD: 11.7 % (ref 22–41)
MCH RBC QN AUTO: 30.9 PG (ref 27–33)
MCHC RBC AUTO-ENTMCNC: 33.7 G/DL (ref 33.7–35.3)
MCV RBC AUTO: 91.8 FL (ref 81.4–97.8)
MONOCYTES # BLD AUTO: 0.78 K/UL (ref 0–0.85)
MONOCYTES NFR BLD AUTO: 7.7 % (ref 0–13.4)
NEUTROPHILS # BLD AUTO: 8.02 K/UL (ref 1.82–7.42)
NEUTROPHILS NFR BLD: 79.7 % (ref 44–72)
NRBC # BLD AUTO: 0 K/UL
NRBC BLD-RTO: 0 /100 WBC
PLATELET # BLD AUTO: 130 K/UL (ref 164–446)
PMV BLD AUTO: 11.9 FL (ref 9–12.9)
POTASSIUM SERPL-SCNC: 3.8 MMOL/L (ref 3.6–5.5)
PREALB SERPL-MCNC: 11.3 MG/DL (ref 18–38)
RBC # BLD AUTO: 4.01 M/UL (ref 4.7–6.1)
SODIUM SERPL-SCNC: 137 MMOL/L (ref 135–145)
WBC # BLD AUTO: 10.1 K/UL (ref 4.8–10.8)

## 2020-05-13 PROCEDURE — 86140 C-REACTIVE PROTEIN: CPT

## 2020-05-13 PROCEDURE — A9270 NON-COVERED ITEM OR SERVICE: HCPCS | Performed by: NURSE PRACTITIONER

## 2020-05-13 PROCEDURE — 85025 COMPLETE CBC W/AUTO DIFF WBC: CPT

## 2020-05-13 PROCEDURE — 700102 HCHG RX REV CODE 250 W/ 637 OVERRIDE(OP): Performed by: NURSE PRACTITIONER

## 2020-05-13 PROCEDURE — 700102 HCHG RX REV CODE 250 W/ 637 OVERRIDE(OP): Performed by: SURGERY

## 2020-05-13 PROCEDURE — 770022 HCHG ROOM/CARE - ICU (200)

## 2020-05-13 PROCEDURE — L4398 FOOT DROP SPLINT PRE OTS: HCPCS

## 2020-05-13 PROCEDURE — 99291 CRITICAL CARE FIRST HOUR: CPT | Performed by: SURGERY

## 2020-05-13 PROCEDURE — 80048 BASIC METABOLIC PNL TOTAL CA: CPT

## 2020-05-13 PROCEDURE — 94003 VENT MGMT INPAT SUBQ DAY: CPT

## 2020-05-13 PROCEDURE — A9270 NON-COVERED ITEM OR SERVICE: HCPCS | Performed by: SURGERY

## 2020-05-13 PROCEDURE — 71045 X-RAY EXAM CHEST 1 VIEW: CPT

## 2020-05-13 PROCEDURE — 700111 HCHG RX REV CODE 636 W/ 250 OVERRIDE (IP): Performed by: SURGERY

## 2020-05-13 PROCEDURE — 700112 HCHG RX REV CODE 229: Performed by: NURSE PRACTITIONER

## 2020-05-13 PROCEDURE — 94770 HCHG CO2 EXPIRED GAS DETERMINATION: CPT

## 2020-05-13 PROCEDURE — 84134 ASSAY OF PREALBUMIN: CPT

## 2020-05-13 PROCEDURE — 700101 HCHG RX REV CODE 250: Performed by: NURSE PRACTITIONER

## 2020-05-13 RX ADMIN — POLYETHYLENE GLYCOL 3350 1 PACKET: 17 POWDER, FOR SOLUTION ORAL at 05:11

## 2020-05-13 RX ADMIN — FAMOTIDINE 20 MG: 20 TABLET ORAL at 17:45

## 2020-05-13 RX ADMIN — ACETAMINOPHEN 650 MG: 325 TABLET, FILM COATED ORAL at 17:45

## 2020-05-13 RX ADMIN — HEPARIN SODIUM 5000 UNITS: 5000 INJECTION, SOLUTION INTRAVENOUS; SUBCUTANEOUS at 22:14

## 2020-05-13 RX ADMIN — LEVETIRACETAM 500 MG: 500 TABLET ORAL at 05:09

## 2020-05-13 RX ADMIN — DOCUSATE SODIUM 50 MG AND SENNOSIDES 8.6 MG 1 TABLET: 8.6; 5 TABLET, FILM COATED ORAL at 22:14

## 2020-05-13 RX ADMIN — Medication 1 EACH: at 12:56

## 2020-05-13 RX ADMIN — Medication: at 05:09

## 2020-05-13 RX ADMIN — FAMOTIDINE 20 MG: 20 TABLET ORAL at 05:09

## 2020-05-13 RX ADMIN — OXYCODONE 5 MG: 5 TABLET ORAL at 12:56

## 2020-05-13 RX ADMIN — HEPARIN SODIUM 5000 UNITS: 5000 INJECTION, SOLUTION INTRAVENOUS; SUBCUTANEOUS at 13:08

## 2020-05-13 RX ADMIN — DOCUSATE SODIUM 100 MG: 50 LIQUID ORAL at 17:45

## 2020-05-13 RX ADMIN — OXYCODONE HYDROCHLORIDE 10 MG: 10 TABLET ORAL at 21:39

## 2020-05-13 RX ADMIN — ACETAMINOPHEN 650 MG: 325 TABLET, FILM COATED ORAL at 22:00

## 2020-05-13 RX ADMIN — HEPARIN SODIUM 5000 UNITS: 5000 INJECTION, SOLUTION INTRAVENOUS; SUBCUTANEOUS at 05:09

## 2020-05-13 RX ADMIN — ACETAMINOPHEN 650 MG: 325 TABLET, FILM COATED ORAL at 12:56

## 2020-05-13 RX ADMIN — DOCUSATE SODIUM 100 MG: 50 LIQUID ORAL at 05:10

## 2020-05-13 RX ADMIN — LEVETIRACETAM 500 MG: 500 TABLET ORAL at 17:45

## 2020-05-13 RX ADMIN — POLYETHYLENE GLYCOL 3350 1 PACKET: 17 POWDER, FOR SOLUTION ORAL at 17:45

## 2020-05-13 RX ADMIN — Medication 1 EACH: at 17:45

## 2020-05-13 ASSESSMENT — FIBROSIS 4 INDEX: FIB4 SCORE: 4.19

## 2020-05-13 NOTE — PROGRESS NOTES
Spiritual Care Note    Patient Information     Patient's Name: Reyes Amezcua   MRN: [id number]    YOB: 1955   Age and Gender: 64 y.o. male   Service Area: SICU   Room (and Bed): [place] [address]   Ethnicity or Nationality:     Primary Language: English   Yazdanism/Spiritual preference: Not able or decline to answer   Place of Residence: [place]   Family/Friends/Others Present: Yes (wife)   Clinical Team Present: No   Medical Diagnosis(-es)/Procedure(s): Trauma   Code Status: Full Code    Date of Admission: 5/9/2020   Length of Stay: 4 days        Spiritual Care Provider Information:  Name of Spiritual Care Provider: Clare Leos  Title of Spiritual Care Provider: Associate   Phone Number: [phone]  E-mail: [email or web address]  Total time : 40 minutes    Spiritual Screen Results:    Gen Nursing  Spiritual Screen  Is your spiritual health or inner well-being important to you as you cope with your medical condition?: Yes  Would you like to receive a visit from our Spiritual Care team or your own Shinto or spiritual leader?: Yes  Was spiritual care education provided to the patient?: Yes  Cultural/Spiritual Needs During Care: Clinical  Sources of Hope/Gisele: Counseling/Support groups, Companionship     Palliative Care  PC Yazdanism/Spiritual Screening  Was spiritual care education provided to the patient?: Yes      Encounter/Request Information  Encounter/Request Type   Visited With: Family  Nature of the Visit: Follow-up, On shift  Continue Visiting: Yes  Crisis Visit: Trauma  Referral From/ Origin of Request: Verbal staff  Referral To: Other /SCP    Religous Needs/Values  Yazdanism Needs Visit  Yazdanism Needs: Prayer    Spiritual Assessment     Spiritual Care Encounters    Observations/Symptoms: Anger/Resentment, Anxious, Frustration    Interaction/Conversation: This  was referred to the pt's wife by Chaplain Senior.  After conferring with ENRIQUE Rachel, this   "called wife Kerry, who stated that she has been very frustrated with the hospital's response, but \"now that I've been noisy, I'm getting the attention I should have gotten from the beginning.\" Kerry added that she \"can't say enough good things about the nurses.\"  Kerry's sister flew in to be with her, and she feels supported by family, friends and colleagues.  She still wants to be able to visit her .  \"If I talk to him long enough, he starts opening his eyes and moving his head toward my voice.\"  She stated that her  does not respond to commands, but does squeeze fingers placed in his hand.  \"We've been together 36 years and we still hold hands, so that's familiar to him.\" She has requested as much physical touch such as hand-holding as possible, as well as bedside prayer becauase the pt was raised by Sabian[employer] ministers \"and he's more spiritual than I am.\"  This  passed these requests to Chaplain Roa, who is covering SICU in josette Senior's absence.  This  gave the wife the general Spiritual Care number and told her to call us if there is anything else we can do for her. Kerry appreciated the call and thanked the .    Assessment: Need, Distress    Need: Family Issues/Concern, Seeking Spiritual Assistance and Support    Distress: Anxiety about the Future, Coping, Overwhelmed    Interventions: Compassionate presence, active listening, offering support.    Outcomes: Ability to Communicate with Truth and Honesty, Spiritual Comfort    Plan: Visit Upon Request    Notes:            "

## 2020-05-13 NOTE — CARE PLAN
Problem: Neuro Status  Goal: Monitor neuro status and rapid identification of neuro changes  Note: Q2h neuro checks per MD order.      Problem: Mechanical Ventilation  Goal: Safe management of artificial airway and ventilation  Note: VAP bundle in place. Q2h suctioning and oral care provided. Continuous pulse ox monitoring in place. Collaborating with MD and RT for vent management.

## 2020-05-13 NOTE — PROGRESS NOTES
Patient set up for zoom call with wife  at 2115, patient and wife on call for over a hour.  Patient was opening eyes and turning toward ipad, patient not following commands or tracking.

## 2020-05-13 NOTE — PROGRESS NOTES
Late entry:    Patient back to S125 from MRI without incident. Placed back on ICU monitor. VSS throughout MRI procedure. See flowsheets.     1925: Call placed to Dr. Francis regarding low uop. Telephone order received to give 1L NS bolus once.

## 2020-05-13 NOTE — PROGRESS NOTES
1433 Call received from patient's wife. Zoom meeting set up for 3:45pm - when this RN is available to do so. We are about to mobilize pt to edge of bed at this time - pt's wife notified of this.    1500 Pt currently has audio book playing on MP3 player in his room.

## 2020-05-13 NOTE — CARE PLAN
Problem: Communication  Goal: The ability to communicate needs accurately and effectively will improve  Outcome: PROGRESSING AS EXPECTED  Note: Wife updated on POC all questions answered     Problem: Venous Thromboembolism (VTW)/Deep Vein Thrombosis (DVT) Prevention:  Goal: Patient will participate in Venous Thrombosis (VTE)/Deep Vein Thrombosis (DVT)Prevention Measures  Outcome: PROGRESSING AS EXPECTED  Flowsheets  Taken 5/12/2020 2000  Mechanical Prophylaxis: SCDs, Sequential Compression Device  Taken 5/12/2020 2050  SCDs, Sequential Compression Device: On     Problem: Skin Integrity  Goal: Risk for impaired skin integrity will decrease  Outcome: PROGRESSING AS EXPECTED  Note: Q 2 turns in place, wound consult completed aall interventions are applied.
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
## 2020-05-13 NOTE — PROGRESS NOTES
1105 Zoom call initiated with pt's wife Diane, pt's brother Virginie Rey , this RN and Dr. Francis. Question and answer time provided with all members of meeting.    1135 Pt's wife given time to socialize with pt via zoom.     1145 Zoom call ended.
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
## 2020-05-13 NOTE — DISCHARGE PLANNING
LSW attempted to provide therapeutic touch prior to end of shift. RN was on Zoom call with wife discussing pt's hearing. LSW to follow up tomorrow for daily comfort to pt, per wife's request.
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
## 2020-05-13 NOTE — PROGRESS NOTES
Patient transported to MRI of brain accompanied by ACLS RN, transport tech x2 and RT x2. Patient placed on ICU monitor and transport vent and transferred to [facility] with safety precautions.     HR 72  /70  O2 sat: 97% en route.

## 2020-05-13 NOTE — ASSESSMENT & PLAN NOTE
Multifactorial dysphagia secondary to ventilator dependency and traumatic brain injury.  Continue nasoenteric tube feeding.  5/23 Laparoscopic gastrostomy tube placement.  May remove bolsters on post op day 10-14.  SLP following.

## 2020-05-13 NOTE — DISCHARGE PLANNING
"Zoom care conference held with LSW, Dr. Francis and bedside RN with spouse, Diane Contreras's sister and brother, who is a physician. Dr. Francis was able to provide an update and answered all their questions appropriately. RN was able to speak about mobility and neuro exams. Diane inquired about an individual who would provide \"extra touch\" to pt. LSW informed all that LSW & esme can hold his hands and provide theraputic touch throughout the day, as well as the nursing staff. RN also spoke regarding this. Diane was pleased.     LSW tiger text esmetavo Sparks informing her about Diane's request to have LSW & esme provide extra healing comfort to pt.     Plan: LSW to continue to assist with social/dc needs   "

## 2020-05-13 NOTE — PROGRESS NOTES
Spiritual Care Note    Patient Information     Patient's Name: Reyes Amezcua   MRN: [id number]    YOB: 1955   Age and Gender: 64 y.o. male   Service Area: SICU   Room (and Bed): [place] [address]   Ethnicity or Nationality:     Primary Language: English   Orthodoxy/Spiritual preference: Orthodox   Place of Residence: [place]   Family/Friends/Others Present: no   Clinical Team Present: PT, BS Nurse   Medical Diagnosis(-es)/Procedure(s): Trauma   Code Status: Full Code    Date of Admission: 5/9/2020   Length of Stay: 4 days        Spiritual Care Provider Information:  Name of Spiritual Care Provider: Janina Young  Title of Spiritual Care Provider: Associate   Phone Number: [phone]  E-mail: [email or web address]  Total time : 15 minutes    Spiritual Screen Results:    Gen Nursing  Spiritual Screen  Is your spiritual health or inner well-being important to you as you cope with your medical condition?: Yes  Would you like to receive a visit from our Spiritual Care team or your own Mosque or spiritual leader?: Yes  Was spiritual care education provided to the patient?: Yes  Cultural/Spiritual Needs During Care: Clinical  Sources of Hope/Gisele: Counseling/Support groups, Companionship     Palliative Care  PC Orthodoxy/Spiritual Screening  Was spiritual care education provided to the patient?: Yes      Encounter/Request Information  Encounter/Request Type     Visited With: Patient not available    Nature of the Visit: Follow-up, On shift    Continue Visiting: Yes    Next Follow-up Date: 05/14/20    Crisis Visit: Critical care    Referral From/ Origin of Request: Verbal staff, Phone        Religous Needs/Values  Orthodoxy Needs Visit    Orthodoxy Needs: Prayer (and Therapeutic Touch per wife request)    Spiritual Assessment   Spiritual Care Encounters      Interacton/Conversation: Upon 's arrival to pt's room,  observed pt at EOB in session with PT, with active  movement of right arm. 2 Nurses were present.  is aware, per conversation with  Clare and ALYCE Rachel, that wife of pt would like prayer from Quaker tradition, and for pt to receive therapeutic touch whenever possible. This writer is the scheduled  for pt's unit for the rest of the week. This  will f/u with pt. tomorrow after checking in with ALYCE and BS Nurse.    Plan: Daily Visits    Notes:
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
## 2020-05-14 ENCOUNTER — APPOINTMENT (OUTPATIENT)
Dept: RADIOLOGY | Facility: MEDICAL CENTER | Age: 65
DRG: 004 | End: 2020-05-14
Attending: NURSE PRACTITIONER
Payer: COMMERCIAL

## 2020-05-14 PROBLEM — E44.0 PROTEIN-CALORIE MALNUTRITION, MODERATE (HCC): Status: ACTIVE | Noted: 2020-05-13

## 2020-05-14 LAB
ANION GAP SERPL CALC-SCNC: 13 MMOL/L (ref 7–16)
BASOPHILS # BLD AUTO: 0.4 % (ref 0–1.8)
BASOPHILS # BLD: 0.04 K/UL (ref 0–0.12)
BUN SERPL-MCNC: 14 MG/DL (ref 8–22)
CALCIUM SERPL-MCNC: 8.8 MG/DL (ref 8.5–10.5)
CHLORIDE SERPL-SCNC: 104 MMOL/L (ref 96–112)
CO2 SERPL-SCNC: 20 MMOL/L (ref 20–33)
CREAT SERPL-MCNC: 0.49 MG/DL (ref 0.5–1.4)
EOSINOPHIL # BLD AUTO: 0.08 K/UL (ref 0–0.51)
EOSINOPHIL NFR BLD: 0.8 % (ref 0–6.9)
ERYTHROCYTE [DISTWIDTH] IN BLOOD BY AUTOMATED COUNT: 46.5 FL (ref 35.9–50)
GLUCOSE SERPL-MCNC: 126 MG/DL (ref 65–99)
HCT VFR BLD AUTO: 37 % (ref 42–52)
HGB BLD-MCNC: 12.4 G/DL (ref 14–18)
IMM GRANULOCYTES # BLD AUTO: 0.06 K/UL (ref 0–0.11)
IMM GRANULOCYTES NFR BLD AUTO: 0.6 % (ref 0–0.9)
LYMPHOCYTES # BLD AUTO: 1.62 K/UL (ref 1–4.8)
LYMPHOCYTES NFR BLD: 16.1 % (ref 22–41)
MAGNESIUM SERPL-MCNC: 2.1 MG/DL (ref 1.5–2.5)
MCH RBC QN AUTO: 30.6 PG (ref 27–33)
MCHC RBC AUTO-ENTMCNC: 33.5 G/DL (ref 33.7–35.3)
MCV RBC AUTO: 91.4 FL (ref 81.4–97.8)
MONOCYTES # BLD AUTO: 0.95 K/UL (ref 0–0.85)
MONOCYTES NFR BLD AUTO: 9.4 % (ref 0–13.4)
NEUTROPHILS # BLD AUTO: 7.34 K/UL (ref 1.82–7.42)
NEUTROPHILS NFR BLD: 72.7 % (ref 44–72)
NRBC # BLD AUTO: 0 K/UL
NRBC BLD-RTO: 0 /100 WBC
PHOSPHATE SERPL-MCNC: 1.8 MG/DL (ref 2.5–4.5)
PLATELET # BLD AUTO: 154 K/UL (ref 164–446)
PMV BLD AUTO: 11.8 FL (ref 9–12.9)
POTASSIUM SERPL-SCNC: 3.6 MMOL/L (ref 3.6–5.5)
RBC # BLD AUTO: 4.05 M/UL (ref 4.7–6.1)
SODIUM SERPL-SCNC: 137 MMOL/L (ref 135–145)
TRIGL SERPL-MCNC: 106 MG/DL (ref 0–149)
WBC # BLD AUTO: 10.1 K/UL (ref 4.8–10.8)

## 2020-05-14 PROCEDURE — 84100 ASSAY OF PHOSPHORUS: CPT

## 2020-05-14 PROCEDURE — 700102 HCHG RX REV CODE 250 W/ 637 OVERRIDE(OP): Performed by: SURGERY

## 2020-05-14 PROCEDURE — 94003 VENT MGMT INPAT SUBQ DAY: CPT

## 2020-05-14 PROCEDURE — 700111 HCHG RX REV CODE 636 W/ 250 OVERRIDE (IP): Performed by: SURGERY

## 2020-05-14 PROCEDURE — 700112 HCHG RX REV CODE 229: Performed by: NURSE PRACTITIONER

## 2020-05-14 PROCEDURE — 80048 BASIC METABOLIC PNL TOTAL CA: CPT

## 2020-05-14 PROCEDURE — A9270 NON-COVERED ITEM OR SERVICE: HCPCS | Performed by: SURGERY

## 2020-05-14 PROCEDURE — 83735 ASSAY OF MAGNESIUM: CPT

## 2020-05-14 PROCEDURE — 700101 HCHG RX REV CODE 250: Performed by: SURGERY

## 2020-05-14 PROCEDURE — A9270 NON-COVERED ITEM OR SERVICE: HCPCS | Performed by: NURSE PRACTITIONER

## 2020-05-14 PROCEDURE — 700101 HCHG RX REV CODE 250: Performed by: NURSE PRACTITIONER

## 2020-05-14 PROCEDURE — 85025 COMPLETE CBC W/AUTO DIFF WBC: CPT

## 2020-05-14 PROCEDURE — 770022 HCHG ROOM/CARE - ICU (200)

## 2020-05-14 PROCEDURE — 700105 HCHG RX REV CODE 258: Performed by: SURGERY

## 2020-05-14 PROCEDURE — 94770 HCHG CO2 EXPIRED GAS DETERMINATION: CPT

## 2020-05-14 PROCEDURE — 71045 X-RAY EXAM CHEST 1 VIEW: CPT

## 2020-05-14 PROCEDURE — 99291 CRITICAL CARE FIRST HOUR: CPT | Performed by: SURGERY

## 2020-05-14 PROCEDURE — 84478 ASSAY OF TRIGLYCERIDES: CPT

## 2020-05-14 PROCEDURE — 700102 HCHG RX REV CODE 250 W/ 637 OVERRIDE(OP): Performed by: NURSE PRACTITIONER

## 2020-05-14 RX ORDER — AMANTADINE HYDROCHLORIDE 100 MG/1
100 CAPSULE, GELATIN COATED ORAL 2 TIMES DAILY
Status: DISCONTINUED | OUTPATIENT
Start: 2020-05-14 | End: 2020-05-19

## 2020-05-14 RX ORDER — ACETAMINOPHEN 325 MG/1
650 TABLET ORAL EVERY 6 HOURS
Status: DISCONTINUED | OUTPATIENT
Start: 2020-05-14 | End: 2020-05-16

## 2020-05-14 RX ADMIN — HYDRALAZINE HYDROCHLORIDE 20 MG: 20 INJECTION INTRAMUSCULAR; INTRAVENOUS at 10:22

## 2020-05-14 RX ADMIN — HYDRALAZINE HYDROCHLORIDE 20 MG: 20 INJECTION INTRAMUSCULAR; INTRAVENOUS at 20:33

## 2020-05-14 RX ADMIN — Medication 1 EACH: at 13:32

## 2020-05-14 RX ADMIN — POTASSIUM PHOSPHATE, MONOBASIC AND POTASSIUM PHOSPHATE, DIBASIC 30 MMOL: 224; 236 INJECTION, SOLUTION, CONCENTRATE INTRAVENOUS at 09:28

## 2020-05-14 RX ADMIN — ACETAMINOPHEN 650 MG: 325 TABLET, FILM COATED ORAL at 10:22

## 2020-05-14 RX ADMIN — OXYCODONE HYDROCHLORIDE 10 MG: 10 TABLET ORAL at 22:57

## 2020-05-14 RX ADMIN — HYDRALAZINE HYDROCHLORIDE 20 MG: 20 INJECTION INTRAMUSCULAR; INTRAVENOUS at 04:04

## 2020-05-14 RX ADMIN — AMANTADINE HYDROCHLORIDE 100 MG: 100 CAPSULE ORAL at 13:32

## 2020-05-14 RX ADMIN — HEPARIN SODIUM 5000 UNITS: 5000 INJECTION, SOLUTION INTRAVENOUS; SUBCUTANEOUS at 22:26

## 2020-05-14 RX ADMIN — LEVETIRACETAM 500 MG: 500 TABLET ORAL at 17:56

## 2020-05-14 RX ADMIN — DOCUSATE SODIUM 100 MG: 50 LIQUID ORAL at 17:57

## 2020-05-14 RX ADMIN — Medication 1 EACH: at 05:43

## 2020-05-14 RX ADMIN — OXYCODONE HYDROCHLORIDE 10 MG: 10 TABLET ORAL at 09:06

## 2020-05-14 RX ADMIN — FAMOTIDINE 20 MG: 20 TABLET ORAL at 17:56

## 2020-05-14 RX ADMIN — POLYETHYLENE GLYCOL 3350 1 PACKET: 17 POWDER, FOR SOLUTION ORAL at 17:56

## 2020-05-14 RX ADMIN — HEPARIN SODIUM 5000 UNITS: 5000 INJECTION, SOLUTION INTRAVENOUS; SUBCUTANEOUS at 13:32

## 2020-05-14 RX ADMIN — OXYCODONE HYDROCHLORIDE 10 MG: 10 TABLET ORAL at 16:11

## 2020-05-14 RX ADMIN — POLYETHYLENE GLYCOL 3350 1 PACKET: 17 POWDER, FOR SOLUTION ORAL at 05:43

## 2020-05-14 RX ADMIN — AMANTADINE HYDROCHLORIDE 100 MG: 100 CAPSULE ORAL at 17:57

## 2020-05-14 RX ADMIN — FAMOTIDINE 20 MG: 20 TABLET ORAL at 05:43

## 2020-05-14 RX ADMIN — LEVETIRACETAM 500 MG: 500 TABLET ORAL at 05:43

## 2020-05-14 RX ADMIN — OXYCODONE 5 MG: 5 TABLET ORAL at 04:04

## 2020-05-14 RX ADMIN — ACETAMINOPHEN 650 MG: 325 TABLET, FILM COATED ORAL at 17:56

## 2020-05-14 RX ADMIN — DOCUSATE SODIUM 100 MG: 50 LIQUID ORAL at 05:43

## 2020-05-14 RX ADMIN — HEPARIN SODIUM 5000 UNITS: 5000 INJECTION, SOLUTION INTRAVENOUS; SUBCUTANEOUS at 05:43

## 2020-05-14 RX ADMIN — Medication 1 EACH: at 17:56

## 2020-05-14 RX ADMIN — ACETAMINOPHEN 650 MG: 325 TABLET, FILM COATED ORAL at 13:32

## 2020-05-14 ASSESSMENT — FIBROSIS 4 INDEX: FIB4 SCORE: 3.05

## 2020-05-14 NOTE — CARE PLAN
Problem: Communication  Goal: The ability to communicate needs accurately and effectively will improve  Intervention: Educate patient and significant other/support system about the plan of care, procedures, treatments, medications and allow for questions  Note: Patient educated on plan of care and education provided on current disease process.  Patient demonstrates understanding.  Will continue to re-inforce education when required.       Problem: Safety  Goal: Will remain free from falls  Intervention: Implement fall precautions  Note: Bed in lowest position, call light in reach, bed locked, alarm activated.  Hourly rounding on patient to ensure safety is maintained.  Patient educated on safety concerns at this time.

## 2020-05-14 NOTE — PROGRESS NOTES
Zoom conference started with wifeDiane at 2030.  Answered questions for patients spouse and gave updates on patient status.    Wife inquired about consults to neurology, RN will pass this message along to dayshift RN for rounds discussion.    2035 zoom call ended with wife.

## 2020-05-14 NOTE — PROGRESS NOTES
Zoom meeting initiated with RN, pts wife and pt at 0820. RN introduced self to wife, reiterated plan of care for the day. iPad set up on bedside table with adjustments of screen/positioning made per wife, Diane's, request.    0925 - Zoom meeting commenced. Updates from IDT rounds given to wife. Plan made for afternoon zoom meeting.
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
## 2020-05-14 NOTE — CARE PLAN
Ventilator Daily Summary    Vent Day # 5    Ventilator settings changed this shift: not at this time    Weaning trials: yes    Respiratory Procedures: not at this time    Plan: Continue current ventilator settings and wean mechanical ventilation as tolerated per physician orders.

## 2020-05-14 NOTE — PROGRESS NOTES
Conference call with Shayla SHOEMAKER RN and Jud MAR RN with Wife Diane. Updates provided regarding who is the day shift RN, plan of care for the next hour until Zoom call and pertinent overnight updates. Pt Reyes made aware of discussion with wife and plan for morning.
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
## 2020-05-14 NOTE — DISCHARGE PLANNING
EDUARDOW provided therapeutic touch to pt this AM. Wife updated.    1500: informed by unit supervisor, Aleshia, that wife now has permission to visit between 4pm and 9pm. No therapeutic touch this afternoon due to wife coming to hospital.
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
## 2020-05-14 NOTE — PROGRESS NOTES
ORTHO RESPONSE TIME:  I was contacted by Reyes Cameron PA-C at 2;14pm requesting a formal orthopaedic consultation.  I responded to the consultation request at 2:15pm.  I physically evaluated the patient at 5:00pm.    Vic Pham M.D.
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
## 2020-05-14 NOTE — PROGRESS NOTES
Conference call with pt's wife Kerry, janell RN and ICU supervisor Alehsia.  Updated wife on permission to have her come visit this afternoon between 4pm and 9PM. Updated wife on neurology consult and continued updates on plan of care for the day. Agreed to cancel planned 1400 zoom conference in tracey of Kerry coming to bedside this evening.

## 2020-05-15 ENCOUNTER — APPOINTMENT (OUTPATIENT)
Dept: RADIOLOGY | Facility: MEDICAL CENTER | Age: 65
DRG: 004 | End: 2020-05-15
Attending: SURGERY
Payer: COMMERCIAL

## 2020-05-15 ENCOUNTER — APPOINTMENT (OUTPATIENT)
Dept: RADIOLOGY | Facility: MEDICAL CENTER | Age: 65
DRG: 004 | End: 2020-05-15
Attending: NURSE PRACTITIONER
Payer: COMMERCIAL

## 2020-05-15 PROBLEM — R04.0 ACUTE ANTERIOR EPISTAXIS: Status: ACTIVE | Noted: 2020-05-15

## 2020-05-15 LAB
ANION GAP SERPL CALC-SCNC: 11 MMOL/L (ref 7–16)
BASOPHILS # BLD AUTO: 0.6 % (ref 0–1.8)
BASOPHILS # BLD: 0.05 K/UL (ref 0–0.12)
BUN SERPL-MCNC: 16 MG/DL (ref 8–22)
CALCIUM SERPL-MCNC: 8.9 MG/DL (ref 8.5–10.5)
CHLORIDE SERPL-SCNC: 99 MMOL/L (ref 96–112)
CO2 SERPL-SCNC: 22 MMOL/L (ref 20–33)
CREAT SERPL-MCNC: 0.47 MG/DL (ref 0.5–1.4)
EOSINOPHIL # BLD AUTO: 0.14 K/UL (ref 0–0.51)
EOSINOPHIL NFR BLD: 1.6 % (ref 0–6.9)
ERYTHROCYTE [DISTWIDTH] IN BLOOD BY AUTOMATED COUNT: 46.6 FL (ref 35.9–50)
GLUCOSE SERPL-MCNC: 124 MG/DL (ref 65–99)
HCT VFR BLD AUTO: 36.8 % (ref 42–52)
HGB BLD-MCNC: 12.1 G/DL (ref 14–18)
IMM GRANULOCYTES # BLD AUTO: 0.06 K/UL (ref 0–0.11)
IMM GRANULOCYTES NFR BLD AUTO: 0.7 % (ref 0–0.9)
LYMPHOCYTES # BLD AUTO: 1.19 K/UL (ref 1–4.8)
LYMPHOCYTES NFR BLD: 13.6 % (ref 22–41)
MCH RBC QN AUTO: 30.1 PG (ref 27–33)
MCHC RBC AUTO-ENTMCNC: 32.9 G/DL (ref 33.7–35.3)
MCV RBC AUTO: 91.5 FL (ref 81.4–97.8)
MONOCYTES # BLD AUTO: 1.13 K/UL (ref 0–0.85)
MONOCYTES NFR BLD AUTO: 12.9 % (ref 0–13.4)
NEUTROPHILS # BLD AUTO: 6.18 K/UL (ref 1.82–7.42)
NEUTROPHILS NFR BLD: 70.6 % (ref 44–72)
NRBC # BLD AUTO: 0 K/UL
NRBC BLD-RTO: 0 /100 WBC
PLATELET # BLD AUTO: 179 K/UL (ref 164–446)
PMV BLD AUTO: 11.7 FL (ref 9–12.9)
POTASSIUM SERPL-SCNC: 3.7 MMOL/L (ref 3.6–5.5)
RBC # BLD AUTO: 4.02 M/UL (ref 4.7–6.1)
SODIUM SERPL-SCNC: 132 MMOL/L (ref 135–145)
WBC # BLD AUTO: 8.8 K/UL (ref 4.8–10.8)

## 2020-05-15 PROCEDURE — 700111 HCHG RX REV CODE 636 W/ 250 OVERRIDE (IP): Performed by: SURGERY

## 2020-05-15 PROCEDURE — A9270 NON-COVERED ITEM OR SERVICE: HCPCS | Performed by: SURGERY

## 2020-05-15 PROCEDURE — 99291 CRITICAL CARE FIRST HOUR: CPT | Mod: 25 | Performed by: SURGERY

## 2020-05-15 PROCEDURE — 99253 IP/OBS CNSLTJ NEW/EST LOW 45: CPT | Performed by: PSYCHIATRY & NEUROLOGY

## 2020-05-15 PROCEDURE — 700112 HCHG RX REV CODE 229: Performed by: NURSE PRACTITIONER

## 2020-05-15 PROCEDURE — 700101 HCHG RX REV CODE 250: Performed by: NURSE PRACTITIONER

## 2020-05-15 PROCEDURE — 80048 BASIC METABOLIC PNL TOTAL CA: CPT

## 2020-05-15 PROCEDURE — 2Y41X5Z PACKING OF NASAL REGION USING PACKING MATERIAL: ICD-10-PCS | Performed by: SURGERY

## 2020-05-15 PROCEDURE — 770022 HCHG ROOM/CARE - ICU (200)

## 2020-05-15 PROCEDURE — 700101 HCHG RX REV CODE 250: Performed by: SURGERY

## 2020-05-15 PROCEDURE — 700102 HCHG RX REV CODE 250 W/ 637 OVERRIDE(OP): Performed by: SURGERY

## 2020-05-15 PROCEDURE — 94003 VENT MGMT INPAT SUBQ DAY: CPT

## 2020-05-15 PROCEDURE — 71045 X-RAY EXAM CHEST 1 VIEW: CPT

## 2020-05-15 PROCEDURE — 94150 VITAL CAPACITY TEST: CPT

## 2020-05-15 PROCEDURE — 85025 COMPLETE CBC W/AUTO DIFF WBC: CPT

## 2020-05-15 PROCEDURE — 700102 HCHG RX REV CODE 250 W/ 637 OVERRIDE(OP): Performed by: NURSE PRACTITIONER

## 2020-05-15 PROCEDURE — A9270 NON-COVERED ITEM OR SERVICE: HCPCS | Performed by: NURSE PRACTITIONER

## 2020-05-15 PROCEDURE — 97140 MANUAL THERAPY 1/> REGIONS: CPT

## 2020-05-15 PROCEDURE — 94770 HCHG CO2 EXPIRED GAS DETERMINATION: CPT

## 2020-05-15 RX ORDER — ENALAPRIL MALEATE 5 MG/1
5 TABLET ORAL
Status: DISCONTINUED | OUTPATIENT
Start: 2020-05-15 | End: 2020-05-29 | Stop reason: HOSPADM

## 2020-05-15 RX ADMIN — POLYETHYLENE GLYCOL 3350 1 PACKET: 17 POWDER, FOR SOLUTION ORAL at 19:59

## 2020-05-15 RX ADMIN — Medication 1 EACH: at 12:26

## 2020-05-15 RX ADMIN — LABETALOL HYDROCHLORIDE 10 MG: 5 INJECTION, SOLUTION INTRAVENOUS at 20:03

## 2020-05-15 RX ADMIN — HEPARIN SODIUM 5000 UNITS: 5000 INJECTION, SOLUTION INTRAVENOUS; SUBCUTANEOUS at 14:01

## 2020-05-15 RX ADMIN — AMANTADINE HYDROCHLORIDE 100 MG: 100 CAPSULE ORAL at 05:14

## 2020-05-15 RX ADMIN — OXYCODONE HYDROCHLORIDE 10 MG: 10 TABLET ORAL at 07:42

## 2020-05-15 RX ADMIN — LEVETIRACETAM 500 MG: 500 TABLET ORAL at 19:59

## 2020-05-15 RX ADMIN — FAMOTIDINE 20 MG: 20 TABLET ORAL at 19:59

## 2020-05-15 RX ADMIN — DOCUSATE SODIUM 100 MG: 50 LIQUID ORAL at 05:14

## 2020-05-15 RX ADMIN — Medication: at 05:14

## 2020-05-15 RX ADMIN — MAGNESIUM HYDROXIDE 30 ML: 400 SUSPENSION ORAL at 05:14

## 2020-05-15 RX ADMIN — DOCUSATE SODIUM 100 MG: 50 LIQUID ORAL at 19:59

## 2020-05-15 RX ADMIN — ENALAPRIL MALEATE 5 MG: 5 TABLET ORAL at 09:47

## 2020-05-15 RX ADMIN — ACETAMINOPHEN 650 MG: 325 TABLET, FILM COATED ORAL at 05:14

## 2020-05-15 RX ADMIN — AMANTADINE HYDROCHLORIDE 100 MG: 100 CAPSULE ORAL at 19:59

## 2020-05-15 RX ADMIN — LABETALOL HYDROCHLORIDE 10 MG: 5 INJECTION, SOLUTION INTRAVENOUS at 07:17

## 2020-05-15 RX ADMIN — LEVETIRACETAM 500 MG: 500 TABLET ORAL at 05:14

## 2020-05-15 RX ADMIN — FAMOTIDINE 20 MG: 20 TABLET ORAL at 05:14

## 2020-05-15 RX ADMIN — Medication: at 19:59

## 2020-05-15 RX ADMIN — HEPARIN SODIUM 5000 UNITS: 5000 INJECTION, SOLUTION INTRAVENOUS; SUBCUTANEOUS at 22:16

## 2020-05-15 RX ADMIN — LABETALOL HYDROCHLORIDE 10 MG: 5 INJECTION, SOLUTION INTRAVENOUS at 14:01

## 2020-05-15 RX ADMIN — HYDRALAZINE HYDROCHLORIDE 20 MG: 20 INJECTION INTRAMUSCULAR; INTRAVENOUS at 15:03

## 2020-05-15 RX ADMIN — POLYETHYLENE GLYCOL 3350 1 PACKET: 17 POWDER, FOR SOLUTION ORAL at 05:14

## 2020-05-15 RX ADMIN — HEPARIN SODIUM 5000 UNITS: 5000 INJECTION, SOLUTION INTRAVENOUS; SUBCUTANEOUS at 05:14

## 2020-05-15 RX ADMIN — ACETAMINOPHEN 650 MG: 325 TABLET, FILM COATED ORAL at 19:59

## 2020-05-15 RX ADMIN — ACETAMINOPHEN 650 MG: 325 TABLET, FILM COATED ORAL at 00:53

## 2020-05-15 ASSESSMENT — COGNITIVE AND FUNCTIONAL STATUS - GENERAL
EATING MEALS: TOTAL
DRESSING REGULAR UPPER BODY CLOTHING: TOTAL
TOILETING: TOTAL
HELP NEEDED FOR BATHING: TOTAL
SUGGESTED CMS G CODE MODIFIER DAILY ACTIVITY: CN
PERSONAL GROOMING: TOTAL
DRESSING REGULAR LOWER BODY CLOTHING: TOTAL
DAILY ACTIVITIY SCORE: 6

## 2020-05-15 ASSESSMENT — FIBROSIS 4 INDEX: FIB4 SCORE: 2.62

## 2020-05-15 ASSESSMENT — PULMONARY FUNCTION TESTS: FVC: 1.6

## 2020-05-15 NOTE — PROGRESS NOTES
Spiritual Care Note    Patient Information     Patient's Name: Reyes Amezcua   MRN: [id number]    YOB: 1955   Age and Gender: 64 y.o. male   Service Area: SICU   Room (and Bed): [place] [address]   Ethnicity or Nationality:     Primary Language: English   Pentecostal/Spiritual preference: Restorationism   Place of Residence: [place]   Family/Friends/Others Present: No   Clinical Team Present: No   Medical Diagnosis(-es)/Procedure(s): Trauma   Code Status: Full Code    Date of Admission: 5/9/2020   Length of Stay: 6 days      Spiritual Care Provider Information:  Name of Spiritual Care Provider: Clare Leos  Title of Spiritual Care Provider: Associate   Phone Number: [phone]  E-mail: [email or web address] for iPhone and Android  Total time : 15 minutes    Spiritual Screen Results:    Gen Nursing  Spiritual Screen  Is your spiritual health or inner well-being important to you as you cope with your medical condition?: Yes  Would you like to receive a visit from our Spiritual Care team or your own Anabaptism or spiritual leader?: Yes  Was spiritual care education provided to the patient?: Yes  Cultural/Spiritual Needs During Care: Clinical  Sources of Hope/Gisele: Counseling/Support groups, Companionship     Palliative Care  PC Pentecostal/Spiritual Screening  Was spiritual care education provided to the patient?: Yes      Encounter/Request Information  Encounter/Request Type   Visited With: Patient  Nature of the Visit: Follow-up, On shift  Continue Visiting: Yes  Next Follow-up Date: 05/14/20  Crisis Visit: Critical care  Referral From/ Origin of Request: Verbal family  Referral To: Other /SCP    Religous Needs/Values  Pentecostal Needs Visit  Pentecostal Needs: Prayer  Ritual Needs Visit  Ritual Needs: [place]    Spiritual Assessment     Spiritual Care Encounters    Observations/Symptoms: Accepting, Thankfulness    Interaction/Conversation: Pt was largely unresponsive, but squeezed her hand and moved his head  toward her as she prayed with him and offered a blessing.  Visits will continue aa per wife's request.    Assessment: Need    Need: Seeking Spiritual Assistance and Support (family request)    IInterventions: Compassionate presence, therapeutic touch, prayer.    Outcomes: Ability to Communicate with Truth and Honesty, Spiritual Comfort    Plan: Daily Visits    Notes:

## 2020-05-15 NOTE — CARE PLAN
Adult Ventilation Update    Total Vent Days: 7   +8 30%    Pt tolerated sbt x2 for total of 2 hours. Copious amounts of yellow/tan secretions throughout shift.
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
## 2020-05-15 NOTE — CARE PLAN
Problem: Safety  Goal: Will remain free from injury  Outcome: PROGRESSING AS EXPECTED   Bed is in lowest and locked position, call light is within reach, bed alarm is on. Patient oriented to room, plan of care and educated on how to call for when they need assistance. Patient educated to not get out of bed without staff present. Patient educated on safety instructions for mobility.     Problem: Infection  Goal: Will remain free from infection  Outcome: PROGRESSING AS EXPECTED   Patient and significant other educated by RN on hand hygiene and other infection protocols in use by RN + staff. RN to watch WBC's, bands, in CBC to assess for infection. RN to assess temperature q2 hour w/ appropriate interventions as needed. RN to assess LDA's for signs of infection and to discuss with IDT for need of LDA.

## 2020-05-15 NOTE — ASSESSMENT & PLAN NOTE
Right nares nosebleed, site of core TRAC  Core TRAC removed and balloon tamponade performed.  No recurrence after balloon taken down.  Core TRAC replaced on left  5/16 no further nosebleeds   saline nasal spray ordered

## 2020-05-15 NOTE — PROGRESS NOTES
Cortrak Placement    Tube Team verified patient name and medical record number prior to tube placement.  Cortrak tube (55 inches, 10 Albanian) placed at 70 cm in left nare.  Per Cortrak picture, tube appears to be in the small bowel.    Nursing Instructions: Awaiting KUB to confirm placement before use for medications or feeding. Once placement confirmed, flush tube with 30 ml of water, and then remove and save stylet, in patient medication drawer.

## 2020-05-15 NOTE — PROGRESS NOTES
Zoom meeting with patients wife started at 0830. Morning updates given to wife, plan of care updated for the day by this RN. All questions and concerns addressed, RN noted things wife wanted to address in rounds with MD. MD made aware of requests. During zoom, Debora JUAREZ provided therapy updates to wife as well in regards to current therapy practices and needs. Zoom meeting over at 0914

## 2020-05-15 NOTE — PROGRESS NOTES
RN assumed care of patient and introduced self to pts wife at bedside for visiting hours. All questions and concerns addressed. Discussed plan of care for the evening and informed Diane she could call at any time for updates after she leaves. All needs met, no further questions.

## 2020-05-15 NOTE — PROGRESS NOTES
Spiritual Care Note    Patient Information     Patient's Name: Reyes Amezcua   MRN: [id number]    YOB: 1955   Age and Gender: 64 y.o. male   Service Area: SICU   Room (and Bed): [place] [address]   Ethnicity or Nationality:     Primary Language: English   Hindu/Spiritual preference: Episcopal   Place of Residence: [place]   Family/Friends/Others Present: Wife   Clinical Team Present: No   Medical Diagnosis(-es)/Procedure(s): Trauma   Code Status: Full Code    Date of Admission: 5/9/2020   Length of Stay: 6 days        Spiritual Care Provider Information:  Name of Spiritual Care Provider: Clare Leos  Title of Spiritual Care Provider: Associate   Phone Number: [phone]  E-mail: [email or web address]  Total time : 15 minutes    Spiritual Screen Results:    Gen Nursing  Spiritual Screen  Is your spiritual health or inner well-being important to you as you cope with your medical condition?: Yes  Would you like to receive a visit from our Spiritual Care team or your own Jainism or spiritual leader?: Yes  Was spiritual care education provided to the patient?: Yes  Cultural/Spiritual Needs During Care: Clinical  Sources of Hope/Gisele: Counseling/Support groups, Companionship     Palliative Care  PC Hindu/Spiritual Screening  Was spiritual care education provided to the patient?: Yes      Encounter/Request Information  Encounter/Request Type   Visited With: Family  Nature of the Visit: Follow-up, On shift  Continue Visiting: Yes  Next Follow-up Date: 05/14/20  Crisis Visit: Critical care  Referral From/ Origin of Request: Verbal staff  Referral To: Other /SCP    Religous Needs/Values  Hindu Needs Visit  Hindu Needs: Prayer(Therapeutic Touch)    Spiritual Assessment     Spiritual Care Encounters    Observations/Symptoms: Accepting, Thankfulness    Interaction/Conversation: At request of Virginie NUNEZ, this  called pt's wife Kerry to follow up with her.  She reports  feeling much better since she is being allowed to visit the pt.  She requested continued  visits for prayer.      Assessment: Need    Need: Family Issues/Concern    Distress: Anxiety about the Future, Coping, Overwhelmed    Interventions: Compassionate presence, active listening, offering support.    Outcomes: Ability to Communicate with Truth and Honesty, Spiritual Comfort    Plan: Daily Visits    Notes:

## 2020-05-15 NOTE — PROGRESS NOTES
pts wife at bedside for visiting hours. Afternoon updates given to wife, all questions and concerns addressed. Discussed visits by Virginie and the  today, provided education on ROM and how she can help perform ROM. Educated on appropriate hand hygiene while in the ICU. All needs met, no further questions

## 2020-05-15 NOTE — CARE PLAN
Problem: Venous Thromboembolism (VTW)/Deep Vein Thrombosis (DVT) Prevention:  Goal: Patient will participate in Venous Thrombosis (VTE)/Deep Vein Thrombosis (DVT)Prevention Measures  Outcome: PROGRESSING SLOWER THAN EXPECTED  Intervention: Ensure patient wears graduated elastic stockings (RAMONA hose) and/or SCDs, if ordered, when in bed or chair (Remove at least once per shift for skin check)  Note: Patient has SCDs in place and is receiving unfractionated heparin for DVT prophylaxis. Mobilized as tolerated atleast x2 a day.      Problem: Knowledge Deficit  Goal: Knowledge of disease process/condition, treatment plan, diagnostic tests, and medications will improve  Outcome: PROGRESSING SLOWER THAN EXPECTED  Intervention: Explain information regarding disease process/condition, treatment plan, diagnostic tests, and medications and document in education  Note: Wife at bedside and educated on current patient condition and plan of care at this time. All questions and concerns addressed.

## 2020-05-15 NOTE — THERAPY
Occupational Therapy  Daily Treatment     Patient Name: Reyes Amezcua  Age:  64 y.o., Sex:  male  Medical Record #: [id number]  Today's Date: 5/15/2020     Precautions  Precautions: Non Weight Bearing Right Upper Extremity, Fall Risk, Other (See Comments), Swallow Precautions ( See Comments), Nasogastric Tube, Cervical Collar  (ET tube )    Subjective    Unresponsive       Objective       05/15/20 0916   Cognition    Comments No command following, L eye opened intermittently, no tracking    Supine Upper Body Exercises   Comments PROM to BUE to assess for possible splinting        Assessment    Pt seen for BUE assessment for possible splinting due to tone. Pt demos decreased tone BUE. Able to achieve full PROM BUE (distal to shoulders) with minimal resistance. Splinting not indicated at this time. Pt's spouse Diane present via Zoom on iPad. Educated spouse on role of OT/PT in ICU setting. Also demonstrated PROM to elbow, wrist and digits that Diane can provide during in-person visits. Educated Diane on value of tactile and verbal stimulation. Will continue to follow for acute OT.     Plan    Continue current treatment plan.    Discharge recommendations: Recommend post-acute placement for additional occupational therapy services prior to discharge home.
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
## 2020-05-15 NOTE — PROGRESS NOTES
RN entered room from 1200 assessment and meds to find patient's face, mouth and nose covered in blood. Upon inspection, blood seemed to becoming from R nostril. MD updated. Cortrak removed. Dr Diez came to bedside and placed a balloon tamponade and bleeding controlled. RN to closely monitor for further bleeding    1325 - Wife Kerry called RN. Updates given regarding nose bleed. Kerry also verbalized consent for tracheostomy. 2 RN phone consent achieved with Belkis PACHECO.

## 2020-05-16 ENCOUNTER — APPOINTMENT (OUTPATIENT)
Dept: RADIOLOGY | Facility: MEDICAL CENTER | Age: 65
DRG: 004 | End: 2020-05-16
Attending: SURGERY
Payer: COMMERCIAL

## 2020-05-16 ENCOUNTER — APPOINTMENT (OUTPATIENT)
Dept: RADIOLOGY | Facility: MEDICAL CENTER | Age: 65
DRG: 004 | End: 2020-05-16
Attending: NURSE PRACTITIONER
Payer: COMMERCIAL

## 2020-05-16 PROBLEM — R13.12 DYSPHAGIA, OROPHARYNGEAL: Status: ACTIVE | Noted: 2020-05-13

## 2020-05-16 LAB
ANION GAP SERPL CALC-SCNC: 13 MMOL/L (ref 7–16)
BASOPHILS # BLD AUTO: 0.4 % (ref 0–1.8)
BASOPHILS # BLD: 0.04 K/UL (ref 0–0.12)
BUN SERPL-MCNC: 17 MG/DL (ref 8–22)
CALCIUM SERPL-MCNC: 8.6 MG/DL (ref 8.5–10.5)
CHLORIDE SERPL-SCNC: 101 MMOL/L (ref 96–112)
CO2 SERPL-SCNC: 22 MMOL/L (ref 20–33)
CREAT SERPL-MCNC: 0.49 MG/DL (ref 0.5–1.4)
EOSINOPHIL # BLD AUTO: 0.2 K/UL (ref 0–0.51)
EOSINOPHIL NFR BLD: 1.9 % (ref 0–6.9)
ERYTHROCYTE [DISTWIDTH] IN BLOOD BY AUTOMATED COUNT: 47.8 FL (ref 35.9–50)
GLUCOSE SERPL-MCNC: 136 MG/DL (ref 65–99)
HCT VFR BLD AUTO: 38 % (ref 42–52)
HGB BLD-MCNC: 12.3 G/DL (ref 14–18)
IMM GRANULOCYTES # BLD AUTO: 0.07 K/UL (ref 0–0.11)
IMM GRANULOCYTES NFR BLD AUTO: 0.7 % (ref 0–0.9)
LYMPHOCYTES # BLD AUTO: 1.08 K/UL (ref 1–4.8)
LYMPHOCYTES NFR BLD: 10.5 % (ref 22–41)
MCH RBC QN AUTO: 30.1 PG (ref 27–33)
MCHC RBC AUTO-ENTMCNC: 32.4 G/DL (ref 33.7–35.3)
MCV RBC AUTO: 92.9 FL (ref 81.4–97.8)
MONOCYTES # BLD AUTO: 1.2 K/UL (ref 0–0.85)
MONOCYTES NFR BLD AUTO: 11.7 % (ref 0–13.4)
NEUTROPHILS # BLD AUTO: 7.71 K/UL (ref 1.82–7.42)
NEUTROPHILS NFR BLD: 74.8 % (ref 44–72)
NRBC # BLD AUTO: 0 K/UL
NRBC BLD-RTO: 0 /100 WBC
PLATELET # BLD AUTO: 215 K/UL (ref 164–446)
PMV BLD AUTO: 11.3 FL (ref 9–12.9)
POTASSIUM SERPL-SCNC: 4.1 MMOL/L (ref 3.6–5.5)
RBC # BLD AUTO: 4.09 M/UL (ref 4.7–6.1)
SODIUM SERPL-SCNC: 136 MMOL/L (ref 135–145)
WBC # BLD AUTO: 10.3 K/UL (ref 4.8–10.8)

## 2020-05-16 PROCEDURE — 700111 HCHG RX REV CODE 636 W/ 250 OVERRIDE (IP): Performed by: SURGERY

## 2020-05-16 PROCEDURE — 700102 HCHG RX REV CODE 250 W/ 637 OVERRIDE(OP): Performed by: SURGERY

## 2020-05-16 PROCEDURE — 0B968ZZ DRAINAGE OF RIGHT LOWER LOBE BRONCHUS, VIA NATURAL OR ARTIFICIAL OPENING ENDOSCOPIC: ICD-10-PCS | Performed by: SURGERY

## 2020-05-16 PROCEDURE — 94640 AIRWAY INHALATION TREATMENT: CPT

## 2020-05-16 PROCEDURE — 85025 COMPLETE CBC W/AUTO DIFF WBC: CPT

## 2020-05-16 PROCEDURE — 0B113F4 BYPASS TRACHEA TO CUTANEOUS WITH TRACHEOSTOMY DEVICE, PERCUTANEOUS APPROACH: ICD-10-PCS | Performed by: SURGERY

## 2020-05-16 PROCEDURE — A9270 NON-COVERED ITEM OR SERVICE: HCPCS | Performed by: SURGERY

## 2020-05-16 PROCEDURE — 700101 HCHG RX REV CODE 250: Performed by: SURGERY

## 2020-05-16 PROCEDURE — 700112 HCHG RX REV CODE 229: Performed by: NURSE PRACTITIONER

## 2020-05-16 PROCEDURE — 700111 HCHG RX REV CODE 636 W/ 250 OVERRIDE (IP)

## 2020-05-16 PROCEDURE — 304255 HCHG KIT,PERC TRACHE

## 2020-05-16 PROCEDURE — 99153 MOD SED SAME PHYS/QHP EA: CPT

## 2020-05-16 PROCEDURE — A9270 NON-COVERED ITEM OR SERVICE: HCPCS | Performed by: NURSE PRACTITIONER

## 2020-05-16 PROCEDURE — 71045 X-RAY EXAM CHEST 1 VIEW: CPT

## 2020-05-16 PROCEDURE — 700102 HCHG RX REV CODE 250 W/ 637 OVERRIDE(OP): Performed by: NURSE PRACTITIONER

## 2020-05-16 PROCEDURE — 72141 MRI NECK SPINE W/O DYE: CPT

## 2020-05-16 PROCEDURE — 302977 HCHG BRONCHOSCOPY PROC-THERAPEUTIC

## 2020-05-16 PROCEDURE — 99152 MOD SED SAME PHYS/QHP 5/>YRS: CPT

## 2020-05-16 PROCEDURE — 94003 VENT MGMT INPAT SUBQ DAY: CPT

## 2020-05-16 PROCEDURE — 99291 CRITICAL CARE FIRST HOUR: CPT | Performed by: SURGERY

## 2020-05-16 PROCEDURE — 770022 HCHG ROOM/CARE - ICU (200)

## 2020-05-16 PROCEDURE — 80048 BASIC METABOLIC PNL TOTAL CA: CPT

## 2020-05-16 PROCEDURE — 31600 PLANNED TRACHEOSTOMY: CPT

## 2020-05-16 PROCEDURE — 31600 PLANNED TRACHEOSTOMY: CPT | Performed by: SURGERY

## 2020-05-16 PROCEDURE — 94770 HCHG CO2 EXPIRED GAS DETERMINATION: CPT

## 2020-05-16 RX ORDER — MIDAZOLAM HYDROCHLORIDE 1 MG/ML
INJECTION INTRAMUSCULAR; INTRAVENOUS
Status: COMPLETED
Start: 2020-05-16 | End: 2020-05-16

## 2020-05-16 RX ORDER — MIDAZOLAM HYDROCHLORIDE 1 MG/ML
1-5 INJECTION INTRAMUSCULAR; INTRAVENOUS ONCE
Status: COMPLETED | OUTPATIENT
Start: 2020-05-16 | End: 2020-05-16

## 2020-05-16 RX ORDER — ROCURONIUM BROMIDE 10 MG/ML
INJECTION, SOLUTION INTRAVENOUS
Status: COMPLETED
Start: 2020-05-16 | End: 2020-05-16

## 2020-05-16 RX ORDER — ECHINACEA PURPUREA EXTRACT 125 MG
2 TABLET ORAL
Status: DISCONTINUED | OUTPATIENT
Start: 2020-05-16 | End: 2020-05-29 | Stop reason: HOSPADM

## 2020-05-16 RX ORDER — ROCURONIUM BROMIDE 10 MG/ML
50 INJECTION, SOLUTION INTRAVENOUS ONCE
Status: COMPLETED | OUTPATIENT
Start: 2020-05-16 | End: 2020-05-16

## 2020-05-16 RX ADMIN — DOCUSATE SODIUM 100 MG: 50 LIQUID ORAL at 17:04

## 2020-05-16 RX ADMIN — ENALAPRIL MALEATE 5 MG: 5 TABLET ORAL at 05:33

## 2020-05-16 RX ADMIN — OXYCODONE HYDROCHLORIDE 10 MG: 10 TABLET ORAL at 05:32

## 2020-05-16 RX ADMIN — ALBUTEROL SULFATE 2.5 MG: 2.5 SOLUTION RESPIRATORY (INHALATION) at 02:20

## 2020-05-16 RX ADMIN — HYDRALAZINE HYDROCHLORIDE 20 MG: 20 INJECTION INTRAMUSCULAR; INTRAVENOUS at 04:26

## 2020-05-16 RX ADMIN — Medication: at 05:32

## 2020-05-16 RX ADMIN — HEPARIN SODIUM 5000 UNITS: 5000 INJECTION, SOLUTION INTRAVENOUS; SUBCUTANEOUS at 20:50

## 2020-05-16 RX ADMIN — ROCURONIUM BROMIDE 50 MG: 10 INJECTION, SOLUTION INTRAVENOUS at 10:24

## 2020-05-16 RX ADMIN — LEVETIRACETAM 500 MG: 500 TABLET ORAL at 05:32

## 2020-05-16 RX ADMIN — MIDAZOLAM HYDROCHLORIDE 5 MG: 1 INJECTION INTRAMUSCULAR; INTRAVENOUS at 10:31

## 2020-05-16 RX ADMIN — SODIUM BICARBONATE 3 ML: 84 INJECTION, SOLUTION INTRAVENOUS at 07:03

## 2020-05-16 RX ADMIN — POLYETHYLENE GLYCOL 3350 1 PACKET: 17 POWDER, FOR SOLUTION ORAL at 17:04

## 2020-05-16 RX ADMIN — OXYCODONE HYDROCHLORIDE 10 MG: 10 TABLET ORAL at 00:10

## 2020-05-16 RX ADMIN — AMANTADINE HYDROCHLORIDE 100 MG: 100 CAPSULE ORAL at 05:33

## 2020-05-16 RX ADMIN — MIDAZOLAM HYDROCHLORIDE 5 MG: 1 INJECTION, SOLUTION INTRAMUSCULAR; INTRAVENOUS at 10:31

## 2020-05-16 RX ADMIN — FENTANYL CITRATE 100 MCG: 50 INJECTION INTRAMUSCULAR; INTRAVENOUS at 10:24

## 2020-05-16 RX ADMIN — HEPARIN SODIUM 5000 UNITS: 5000 INJECTION, SOLUTION INTRAVENOUS; SUBCUTANEOUS at 05:32

## 2020-05-16 RX ADMIN — POLYETHYLENE GLYCOL 3350 1 PACKET: 17 POWDER, FOR SOLUTION ORAL at 05:31

## 2020-05-16 RX ADMIN — AMANTADINE HYDROCHLORIDE 100 MG: 100 CAPSULE ORAL at 17:04

## 2020-05-16 RX ADMIN — DOCUSATE SODIUM 50 MG AND SENNOSIDES 8.6 MG 1 TABLET: 8.6; 5 TABLET, FILM COATED ORAL at 20:51

## 2020-05-16 RX ADMIN — ACETAMINOPHEN 650 MG: 325 TABLET, FILM COATED ORAL at 05:32

## 2020-05-16 RX ADMIN — FAMOTIDINE 20 MG: 20 TABLET ORAL at 05:32

## 2020-05-16 RX ADMIN — MIDAZOLAM HYDROCHLORIDE 5 MG: 1 INJECTION, SOLUTION INTRAMUSCULAR; INTRAVENOUS at 10:24

## 2020-05-16 RX ADMIN — LEVETIRACETAM 500 MG: 500 TABLET ORAL at 17:04

## 2020-05-16 RX ADMIN — FAMOTIDINE 20 MG: 20 TABLET ORAL at 17:04

## 2020-05-16 RX ADMIN — ACETAMINOPHEN 650 MG: 325 TABLET, FILM COATED ORAL at 00:09

## 2020-05-16 RX ADMIN — SODIUM BICARBONATE 3 ML: 84 INJECTION, SOLUTION INTRAVENOUS at 19:18

## 2020-05-16 RX ADMIN — OXYCODONE HYDROCHLORIDE 10 MG: 10 TABLET ORAL at 23:53

## 2020-05-16 RX ADMIN — OXYCODONE HYDROCHLORIDE 10 MG: 10 TABLET ORAL at 13:49

## 2020-05-16 RX ADMIN — SODIUM BICARBONATE 3 ML: 84 INJECTION, SOLUTION INTRAVENOUS at 02:20

## 2020-05-16 RX ADMIN — ALBUTEROL SULFATE 2.5 MG: 2.5 SOLUTION RESPIRATORY (INHALATION) at 19:18

## 2020-05-16 RX ADMIN — ALBUTEROL SULFATE 2.5 MG: 2.5 SOLUTION RESPIRATORY (INHALATION) at 07:03

## 2020-05-16 RX ADMIN — HEPARIN SODIUM 5000 UNITS: 5000 INJECTION, SOLUTION INTRAVENOUS; SUBCUTANEOUS at 13:49

## 2020-05-16 RX ADMIN — DOCUSATE SODIUM 100 MG: 50 LIQUID ORAL at 05:32

## 2020-05-16 ASSESSMENT — FIBROSIS 4 INDEX: FIB4 SCORE: 2.62

## 2020-05-16 ASSESSMENT — PATIENT HEALTH QUESTIONNAIRE - PHQ9
SUM OF ALL RESPONSES TO PHQ9 QUESTIONS 1 AND 2: 0
2. FEELING DOWN, DEPRESSED, IRRITABLE, OR HOPELESS: NOT AT ALL
1. LITTLE INTEREST OR PLEASURE IN DOING THINGS: NOT AT ALL

## 2020-05-16 NOTE — PROCEDURES
"Perc Trach Op Note    Date of operation: 5/16/2020    Preoperative diagnosis: Acute on chronic ventilator dependent respiratory failure    Postoperative diagnosis: Same    Operation performed:    Surgeon: Dr. Diez    Assistant/endoscopist: Dr. Francis    Anesthesia: local anesthesia, Intravenous analgesia, sedation and pharmacologic restraint     Indications: The patient is a 64 y.o. male with persistent ventilator dependency and copious secretions after head injury.  Patient is making minimal progress with weaning due to mental status.  I discussed the need for tracheostomy with the family for pulmonary hygiene and ventilator liberation.  They agreed and signed consent. Percutaneous tracheostomy is carried out in the SICU under  bronchoscopic guidance.    Findings: Bronchoscopic findings included no sign of hemorrhage.  Thick secretions that were cleared with lavage.  Tracheostomy in appropriate position    Specimen: None sent.    Procedure: Following informed consent, the patient was properly identified and optimally positioned in bed.  The patient was preoxygenated with 100% oxygen and placed on a set ventilator rate. A roll was placed between the patient's shoulders and the neck was slightly extended.  A \"time out\" was initiated. The correct patient, procedure and operative site were verified. The patient's allergy status was assessed. Procedural modifications were made as required. local anesthesia, Intravenous analgesia, sedation and pharmacologic restraint  was administered. The surgical site was prepped with chlorhexidine.     Dr. Francis performed the bronchoscopy. Please see dictation for full details. The fiberoptic bronchoscope was advanced through the indwelling endotracheal tube. The tube was withdrawn to the level of the vocal cords under direct vision. The anterior trachea was transilluminated through the end of the endotracheal tube and the surface landmarks for the tracheostomy were established. " The scope was withdrawn prior to cannulation of the trachea  to prevent damage to the bronchoscope.    The anterior trachea was cannulated percutaneously midway between the thyroid cartilage and the suprasternal notch. The needle was withdrawn from the angiocatheter and a wire was passed without resistance under direct bronchoscopic vision. A 1 cm vertical incision was made and the starter dilator was passed. The single graduated dilator was passed over the wire under direct vision. An 8mm cuffed Portex  tracheostomy tube was passed over the wire under direct visualization. The tracheostomy tube was connected to the ventilator circuit and the cuff was inflated. Satisfactory oxygenation and ventilation was observed. The tracheostomy tube was secured to the neck with interrupted sutures. A tracheostomy strap was applied.    The patient tolerated the procedure well and there were no apparent complications.

## 2020-05-16 NOTE — CARE PLAN
Problem: Safety  Goal: Will remain free from injury  Outcome: PROGRESSING AS EXPECTED   Bed is in lowest and locked position, call light is within reach, bed alarm is on. Patient oriented to room, plan of care and educated on how to call for when they need assistance. Patient educated to not get out of bed without staff present. Patient educated on safety instructions for mobility.     Problem: Bowel/Gastric:  Goal: Normal bowel function is maintained or improved  Outcome: PROGRESSING AS EXPECTED   RN to evaluate need for bowel medications 1BM's in 24hr. RN to assess bowel sounds and abdomen with assessments. RN to check for incontinence with rounding to ensure pt's skin in free from moisture and patricio is clean to reduce risk of infection. RN to clean with soap and water to reduce infection risk and apply barrier cream following to reduce risk of skin breakdown.

## 2020-05-16 NOTE — CARE PLAN
Adult Ventilation Update    Total Vent Days: 8  Trache Day: 1     +8 30%    Pt with tenacious thick secretion  Bicarb Q6    Pt tolerated sbt x1 for 1 hour
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
## 2020-05-16 NOTE — PROGRESS NOTES
Wife at bedside. Updates given, all questions and concerns addressed. Discussed MRi screening form, need for MRI of spine, trach procedure details. Provided education regarding ROM and therapeutic touch, need for cortrak replacement, and how pt will need XRAY soon to verify cortrak placement. Diane verbalizes understanding, all needs met at this time.     Wife Kerry stepped out for cortrak procedure at 1642. Wife welcomes back into room post procedure. Pt tolerated procedure well. No new bleeding visualized by RN. Periodic checks on pt and Diane to make sure there are no needs or concerns throughout Diane's time at bedside

## 2020-05-16 NOTE — PROGRESS NOTES
Morning zoom call with pt's wife Diane from 0818 - 0845. Discussed plan for tracheostomy placement after rounds.

## 2020-05-16 NOTE — PROGRESS NOTES
Spoke with pt's wife Diane on the phone. Morning updates given. All questions an concerns addressed. Planned morning zoom call for 0830

## 2020-05-16 NOTE — RESPIRATORY CARE
Tracheostomy Update      MD at bedside; 8.0  trache placed. Pt tolerated well with no complications
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
## 2020-05-16 NOTE — CARE PLAN
Problem: Venous Thromboembolism (VTW)/Deep Vein Thrombosis (DVT) Prevention:  Goal: Patient will participate in Venous Thrombosis (VTE)/Deep Vein Thrombosis (DVT)Prevention Measures  Outcome: PROGRESSING SLOWER THAN EXPECTED  Intervention: Ensure patient wears graduated elastic stockings (RAMONA hose) and/or SCDs, if ordered, when in bed or chair (Remove at least once per shift for skin check)  Note: Patient has SCDs and unfractionated heparin for DVT prophylaxis at this time.      Problem: Bowel/Gastric:  Goal: Will not experience complications related to bowel motility  Outcome: PROGRESSING SLOWER THAN EXPECTED  Intervention: Implement interventions to promote bowel evacuation if inadequate bowel movements in past 48 hours  Note: Receiving tube feedings at goal and given bowel medications to stimulate appropriate bowel function.

## 2020-05-16 NOTE — PROCEDURES
DATE OF OPERATION: 5/16/2020     PRE-PROCEDURE DIAGNOSIS: tracheostomy     POST-PROCEDURE DIAGNOSIS: tracheostomy     PROCEDURE PERFORMED: Flexible, Fiberoptic bronchoscopy with bronchoalveolar lavage    SURGEON: Bart Francis M.D.    ANESTHESIA: Intravenous sedation and pharmacologic restraint      FINDINGS: Thin secretions  RLL, otherwise normal, non-inflamed airway anatomy.    PROCEDURE IN DETAIL: The patient was preoxygenated with 100% oxygen and then sedated with intravenous sedation and paralytic.  The bronchoscope was advanced down the endotracheal tube and the right and left segmental and subsegmental airways were explored.  The findings are as noted above.  20mL of saline was injected into the right lower lobe airway and suctioned but not sent as a specimen.  The endotracheal tube was then repositioned just above the vocal cords under direct vision with the assistance of the respiratory therapist. Satisfactory ventilation and delivered tidal volumes were confirmed. The anterior neck was transilluminated at the surface landmark site for the tracheostomy. I then directly observed the needle, catheter, wire, dilators and tracheostomy placement percutaneously. The bronchoscope was then advanced down to the level of the nara through the tracheostomy and correct positioning was confirmed. There was no significant bleeding noted.     Overall, the patient tolerated the procedure well.  No immediate complications were noted.  The patient was placed back on ventilator support.  I remained at bedside until I was assured the patient was ventilating well, had stable hemodynamics, and had stable vital signs.  Care was then turned back over to the patient's nurse.    Zhao Francis MD    DD: 5/16/2020  11:53 AM
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
## 2020-05-16 NOTE — PROGRESS NOTES
1020 - Dr Francis and Dr Diez at bedside with RTx2 and RN for tracheostomy. Time out called at 1022. Medications given, see MAR. VSS, pt tolerated procedure well

## 2020-05-17 ENCOUNTER — APPOINTMENT (OUTPATIENT)
Dept: RADIOLOGY | Facility: MEDICAL CENTER | Age: 65
DRG: 004 | End: 2020-05-17
Attending: NURSE PRACTITIONER
Payer: COMMERCIAL

## 2020-05-17 ENCOUNTER — APPOINTMENT (OUTPATIENT)
Dept: RADIOLOGY | Facility: MEDICAL CENTER | Age: 65
DRG: 004 | End: 2020-05-17
Attending: SURGERY
Payer: COMMERCIAL

## 2020-05-17 PROBLEM — R50.9 FEVER, UNSPECIFIED: Status: ACTIVE | Noted: 2020-05-17

## 2020-05-17 LAB
ANION GAP SERPL CALC-SCNC: 14 MMOL/L (ref 7–16)
BASOPHILS # BLD AUTO: 0.7 % (ref 0–1.8)
BASOPHILS # BLD: 0.08 K/UL (ref 0–0.12)
BUN SERPL-MCNC: 20 MG/DL (ref 8–22)
CALCIUM SERPL-MCNC: 9.2 MG/DL (ref 8.5–10.5)
CHLORIDE SERPL-SCNC: 99 MMOL/L (ref 96–112)
CO2 SERPL-SCNC: 24 MMOL/L (ref 20–33)
CREAT SERPL-MCNC: 0.58 MG/DL (ref 0.5–1.4)
EOSINOPHIL # BLD AUTO: 0.16 K/UL (ref 0–0.51)
EOSINOPHIL NFR BLD: 1.4 % (ref 0–6.9)
ERYTHROCYTE [DISTWIDTH] IN BLOOD BY AUTOMATED COUNT: 48.9 FL (ref 35.9–50)
GLUCOSE SERPL-MCNC: 135 MG/DL (ref 65–99)
HCT VFR BLD AUTO: 39 % (ref 42–52)
HGB BLD-MCNC: 12.7 G/DL (ref 14–18)
IMM GRANULOCYTES # BLD AUTO: 0.09 K/UL (ref 0–0.11)
IMM GRANULOCYTES NFR BLD AUTO: 0.8 % (ref 0–0.9)
LYMPHOCYTES # BLD AUTO: 1.24 K/UL (ref 1–4.8)
LYMPHOCYTES NFR BLD: 11.1 % (ref 22–41)
MCH RBC QN AUTO: 30.6 PG (ref 27–33)
MCHC RBC AUTO-ENTMCNC: 32.6 G/DL (ref 33.7–35.3)
MCV RBC AUTO: 94 FL (ref 81.4–97.8)
MONOCYTES # BLD AUTO: 1.33 K/UL (ref 0–0.85)
MONOCYTES NFR BLD AUTO: 11.9 % (ref 0–13.4)
NEUTROPHILS # BLD AUTO: 8.26 K/UL (ref 1.82–7.42)
NEUTROPHILS NFR BLD: 74.1 % (ref 44–72)
NRBC # BLD AUTO: 0 K/UL
NRBC BLD-RTO: 0 /100 WBC
PLATELET # BLD AUTO: 261 K/UL (ref 164–446)
PMV BLD AUTO: 10.8 FL (ref 9–12.9)
POTASSIUM SERPL-SCNC: 4.3 MMOL/L (ref 3.6–5.5)
RBC # BLD AUTO: 4.15 M/UL (ref 4.7–6.1)
SODIUM SERPL-SCNC: 137 MMOL/L (ref 135–145)
WBC # BLD AUTO: 11.2 K/UL (ref 4.8–10.8)

## 2020-05-17 PROCEDURE — 71045 X-RAY EXAM CHEST 1 VIEW: CPT

## 2020-05-17 PROCEDURE — A9270 NON-COVERED ITEM OR SERVICE: HCPCS | Performed by: SURGERY

## 2020-05-17 PROCEDURE — 700112 HCHG RX REV CODE 229: Performed by: NURSE PRACTITIONER

## 2020-05-17 PROCEDURE — 700111 HCHG RX REV CODE 636 W/ 250 OVERRIDE (IP): Performed by: SURGERY

## 2020-05-17 PROCEDURE — 94003 VENT MGMT INPAT SUBQ DAY: CPT

## 2020-05-17 PROCEDURE — A9270 NON-COVERED ITEM OR SERVICE: HCPCS | Performed by: NURSE PRACTITIONER

## 2020-05-17 PROCEDURE — 770022 HCHG ROOM/CARE - ICU (200)

## 2020-05-17 PROCEDURE — 700102 HCHG RX REV CODE 250 W/ 637 OVERRIDE(OP): Performed by: NURSE PRACTITIONER

## 2020-05-17 PROCEDURE — 94640 AIRWAY INHALATION TREATMENT: CPT

## 2020-05-17 PROCEDURE — 85025 COMPLETE CBC W/AUTO DIFF WBC: CPT

## 2020-05-17 PROCEDURE — 99291 CRITICAL CARE FIRST HOUR: CPT | Performed by: SURGERY

## 2020-05-17 PROCEDURE — 70450 CT HEAD/BRAIN W/O DYE: CPT

## 2020-05-17 PROCEDURE — 700102 HCHG RX REV CODE 250 W/ 637 OVERRIDE(OP): Performed by: SURGERY

## 2020-05-17 PROCEDURE — 302093 ICE PACK LG: Performed by: SURGERY

## 2020-05-17 PROCEDURE — 80048 BASIC METABOLIC PNL TOTAL CA: CPT

## 2020-05-17 PROCEDURE — 94760 N-INVAS EAR/PLS OXIMETRY 1: CPT

## 2020-05-17 PROCEDURE — 700101 HCHG RX REV CODE 250: Performed by: SURGERY

## 2020-05-17 RX ORDER — ACETAMINOPHEN 325 MG/1
650 TABLET ORAL EVERY 6 HOURS PRN
Status: DISCONTINUED | OUTPATIENT
Start: 2020-05-17 | End: 2020-05-29 | Stop reason: HOSPADM

## 2020-05-17 RX ORDER — ACETAMINOPHEN 325 MG/1
650 TABLET ORAL EVERY 6 HOURS PRN
Status: DISCONTINUED | OUTPATIENT
Start: 2020-05-17 | End: 2020-05-17

## 2020-05-17 RX ADMIN — OXYCODONE 5 MG: 5 TABLET ORAL at 20:08

## 2020-05-17 RX ADMIN — LABETALOL HYDROCHLORIDE 10 MG: 5 INJECTION, SOLUTION INTRAVENOUS at 14:50

## 2020-05-17 RX ADMIN — HEPARIN SODIUM 5000 UNITS: 5000 INJECTION, SOLUTION INTRAVENOUS; SUBCUTANEOUS at 21:49

## 2020-05-17 RX ADMIN — SODIUM BICARBONATE 3 ML: 84 INJECTION, SOLUTION INTRAVENOUS at 19:32

## 2020-05-17 RX ADMIN — ACETAMINOPHEN 650 MG: 325 TABLET, FILM COATED ORAL at 15:33

## 2020-05-17 RX ADMIN — ALBUTEROL SULFATE 2.5 MG: 2.5 SOLUTION RESPIRATORY (INHALATION) at 19:32

## 2020-05-17 RX ADMIN — ALBUTEROL SULFATE 2.5 MG: 2.5 SOLUTION RESPIRATORY (INHALATION) at 15:15

## 2020-05-17 RX ADMIN — HEPARIN SODIUM 5000 UNITS: 5000 INJECTION, SOLUTION INTRAVENOUS; SUBCUTANEOUS at 05:31

## 2020-05-17 RX ADMIN — DOCUSATE SODIUM 100 MG: 50 LIQUID ORAL at 05:31

## 2020-05-17 RX ADMIN — FAMOTIDINE 20 MG: 20 TABLET ORAL at 18:30

## 2020-05-17 RX ADMIN — ENALAPRIL MALEATE 5 MG: 5 TABLET ORAL at 05:31

## 2020-05-17 RX ADMIN — SODIUM BICARBONATE 3 ML: 84 INJECTION, SOLUTION INTRAVENOUS at 02:59

## 2020-05-17 RX ADMIN — LABETALOL HYDROCHLORIDE 10 MG: 5 INJECTION, SOLUTION INTRAVENOUS at 20:09

## 2020-05-17 RX ADMIN — OXYCODONE HYDROCHLORIDE 10 MG: 10 TABLET ORAL at 05:31

## 2020-05-17 RX ADMIN — MAGNESIUM HYDROXIDE 30 ML: 400 SUSPENSION ORAL at 05:31

## 2020-05-17 RX ADMIN — AMANTADINE HYDROCHLORIDE 100 MG: 100 CAPSULE ORAL at 05:31

## 2020-05-17 RX ADMIN — HEPARIN SODIUM 5000 UNITS: 5000 INJECTION, SOLUTION INTRAVENOUS; SUBCUTANEOUS at 14:50

## 2020-05-17 RX ADMIN — AMANTADINE HYDROCHLORIDE 100 MG: 100 CAPSULE ORAL at 18:30

## 2020-05-17 RX ADMIN — ALBUTEROL SULFATE 2.5 MG: 2.5 SOLUTION RESPIRATORY (INHALATION) at 07:57

## 2020-05-17 RX ADMIN — ACETAMINOPHEN 650 MG: 325 TABLET, FILM COATED ORAL at 21:49

## 2020-05-17 RX ADMIN — SODIUM BICARBONATE 3 ML: 84 INJECTION, SOLUTION INTRAVENOUS at 15:15

## 2020-05-17 RX ADMIN — FAMOTIDINE 20 MG: 20 TABLET ORAL at 05:31

## 2020-05-17 RX ADMIN — ACETAMINOPHEN 650 MG: 325 TABLET, FILM COATED ORAL at 09:40

## 2020-05-17 RX ADMIN — ALBUTEROL SULFATE 2.5 MG: 2.5 SOLUTION RESPIRATORY (INHALATION) at 02:59

## 2020-05-17 RX ADMIN — POLYETHYLENE GLYCOL 3350 1 PACKET: 17 POWDER, FOR SOLUTION ORAL at 05:31

## 2020-05-17 ASSESSMENT — FIBROSIS 4 INDEX: FIB4 SCORE: 2.18

## 2020-05-17 NOTE — CARE PLAN
Ventilator Daily Summary     Total Vent Days: 9  Trache Day: 2      +8 30%   Albuterol Q6  Bicarb Q6
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
## 2020-05-17 NOTE — CARE PLAN
Problem: Venous Thromboembolism (VTW)/Deep Vein Thrombosis (DVT) Prevention:  Goal: Patient will participate in Venous Thrombosis (VTE)/Deep Vein Thrombosis (DVT)Prevention Measures  Outcome: PROGRESSING SLOWER THAN EXPECTED  Intervention: Ensure patient wears graduated elastic stockings (RAMONA hose) and/or SCDs, if ordered, when in bed or chair (Remove at least once per shift for skin check)  Note: Receiving unfractionated heparin and has SCDs in place.     Problem: Bowel/Gastric:  Goal: Normal bowel function is maintained or improved  Outcome: PROGRESSING SLOWER THAN EXPECTED  Intervention: Educate patient and significant other/support system about diet, fluid intake, medications and activity to promote bowel function  Note: Tube feedings at goal, bowel medications given as needed.

## 2020-05-17 NOTE — CARE PLAN
Problem: Safety  Goal: Will remain free from injury  Note: Room near nursing station and restraints applied appropriately to ensure pt safety.     Problem: Skin Integrity  Goal: Risk for impaired skin integrity will decrease  Note: Pt turned every 2hrs and pressure points floated using pillows.

## 2020-05-18 ENCOUNTER — APPOINTMENT (OUTPATIENT)
Dept: RADIOLOGY | Facility: MEDICAL CENTER | Age: 65
DRG: 004 | End: 2020-05-18
Attending: NURSE PRACTITIONER
Payer: COMMERCIAL

## 2020-05-18 ENCOUNTER — APPOINTMENT (OUTPATIENT)
Dept: RADIOLOGY | Facility: MEDICAL CENTER | Age: 65
DRG: 004 | End: 2020-05-18
Attending: SURGERY
Payer: COMMERCIAL

## 2020-05-18 PROBLEM — R04.0 ACUTE ANTERIOR EPISTAXIS: Status: RESOLVED | Noted: 2020-05-15 | Resolved: 2020-05-18

## 2020-05-18 PROBLEM — Z78.9 NO CONTRAINDICATION TO ANTICOAGULATION THERAPY: Status: ACTIVE | Noted: 2020-05-09

## 2020-05-18 PROBLEM — S06.2XAA DIFFUSE AXONAL BRAIN INJURY (HCC): Status: ACTIVE | Noted: 2020-05-09

## 2020-05-18 PROBLEM — I62.9 ICB (INTRACRANIAL BLEED) (HCC): Status: RESOLVED | Noted: 2020-05-09 | Resolved: 2020-05-18

## 2020-05-18 LAB
ANION GAP SERPL CALC-SCNC: 13 MMOL/L (ref 7–16)
BASOPHILS # BLD AUTO: 0.5 % (ref 0–1.8)
BASOPHILS # BLD: 0.08 K/UL (ref 0–0.12)
BUN SERPL-MCNC: 20 MG/DL (ref 8–22)
CALCIUM SERPL-MCNC: 9 MG/DL (ref 8.5–10.5)
CHLORIDE SERPL-SCNC: 99 MMOL/L (ref 96–112)
CO2 SERPL-SCNC: 23 MMOL/L (ref 20–33)
CREAT SERPL-MCNC: 0.56 MG/DL (ref 0.5–1.4)
EOSINOPHIL # BLD AUTO: 0.16 K/UL (ref 0–0.51)
EOSINOPHIL NFR BLD: 1.1 % (ref 0–6.9)
ERYTHROCYTE [DISTWIDTH] IN BLOOD BY AUTOMATED COUNT: 48.8 FL (ref 35.9–50)
GLUCOSE SERPL-MCNC: 147 MG/DL (ref 65–99)
GRAM STN SPEC: NORMAL
HCT VFR BLD AUTO: 40.9 % (ref 42–52)
HGB BLD-MCNC: 13.1 G/DL (ref 14–18)
IMM GRANULOCYTES # BLD AUTO: 0.09 K/UL (ref 0–0.11)
IMM GRANULOCYTES NFR BLD AUTO: 0.6 % (ref 0–0.9)
LYMPHOCYTES # BLD AUTO: 1.33 K/UL (ref 1–4.8)
LYMPHOCYTES NFR BLD: 8.8 % (ref 22–41)
MAGNESIUM SERPL-MCNC: 2.4 MG/DL (ref 1.5–2.5)
MCH RBC QN AUTO: 30.1 PG (ref 27–33)
MCHC RBC AUTO-ENTMCNC: 32 G/DL (ref 33.7–35.3)
MCV RBC AUTO: 94 FL (ref 81.4–97.8)
MONOCYTES # BLD AUTO: 1.42 K/UL (ref 0–0.85)
MONOCYTES NFR BLD AUTO: 9.4 % (ref 0–13.4)
MRSA DNA SPEC QL NAA+PROBE: NORMAL
NEUTROPHILS # BLD AUTO: 12.07 K/UL (ref 1.82–7.42)
NEUTROPHILS NFR BLD: 79.6 % (ref 44–72)
NRBC # BLD AUTO: 0 K/UL
NRBC BLD-RTO: 0 /100 WBC
PHOSPHATE SERPL-MCNC: 3.7 MG/DL (ref 2.5–4.5)
PLATELET # BLD AUTO: 279 K/UL (ref 164–446)
PMV BLD AUTO: 10.8 FL (ref 9–12.9)
POTASSIUM SERPL-SCNC: 4.4 MMOL/L (ref 3.6–5.5)
RBC # BLD AUTO: 4.35 M/UL (ref 4.7–6.1)
SIGNIFICANT IND 70042: NORMAL
SIGNIFICANT IND 70042: NORMAL
SITE SITE: NORMAL
SITE SITE: NORMAL
SODIUM SERPL-SCNC: 135 MMOL/L (ref 135–145)
SOURCE SOURCE: NORMAL
SOURCE SOURCE: NORMAL
WBC # BLD AUTO: 15.2 K/UL (ref 4.8–10.8)

## 2020-05-18 PROCEDURE — 71045 X-RAY EXAM CHEST 1 VIEW: CPT

## 2020-05-18 PROCEDURE — 99152 MOD SED SAME PHYS/QHP 5/>YRS: CPT

## 2020-05-18 PROCEDURE — 94760 N-INVAS EAR/PLS OXIMETRY 1: CPT

## 2020-05-18 PROCEDURE — 84100 ASSAY OF PHOSPHORUS: CPT

## 2020-05-18 PROCEDURE — A9270 NON-COVERED ITEM OR SERVICE: HCPCS | Performed by: NURSE PRACTITIONER

## 2020-05-18 PROCEDURE — 700101 HCHG RX REV CODE 250: Performed by: SURGERY

## 2020-05-18 PROCEDURE — 700102 HCHG RX REV CODE 250 W/ 637 OVERRIDE(OP): Performed by: SURGERY

## 2020-05-18 PROCEDURE — A9270 NON-COVERED ITEM OR SERVICE: HCPCS | Performed by: SURGERY

## 2020-05-18 PROCEDURE — 94003 VENT MGMT INPAT SUBQ DAY: CPT

## 2020-05-18 PROCEDURE — 31624 DX BRONCHOSCOPE/LAVAGE: CPT | Performed by: SURGERY

## 2020-05-18 PROCEDURE — 99291 CRITICAL CARE FIRST HOUR: CPT | Mod: 25 | Performed by: SURGERY

## 2020-05-18 PROCEDURE — 80048 BASIC METABOLIC PNL TOTAL CA: CPT

## 2020-05-18 PROCEDURE — 31645 BRNCHSC W/THER ASPIR 1ST: CPT | Performed by: SURGERY

## 2020-05-18 PROCEDURE — 700105 HCHG RX REV CODE 258: Performed by: SURGERY

## 2020-05-18 PROCEDURE — 302978 HCHG BRONCHOSCOPY-DIAGNOSTIC

## 2020-05-18 PROCEDURE — 302132 K THERMIA MOTOR: Performed by: SURGERY

## 2020-05-18 PROCEDURE — 83735 ASSAY OF MAGNESIUM: CPT

## 2020-05-18 PROCEDURE — 700111 HCHG RX REV CODE 636 W/ 250 OVERRIDE (IP): Performed by: SURGERY

## 2020-05-18 PROCEDURE — 0B948ZZ DRAINAGE OF RIGHT UPPER LOBE BRONCHUS, VIA NATURAL OR ARTIFICIAL OPENING ENDOSCOPIC: ICD-10-PCS | Performed by: SURGERY

## 2020-05-18 PROCEDURE — 0B9B8ZZ DRAINAGE OF LEFT LOWER LOBE BRONCHUS, VIA NATURAL OR ARTIFICIAL OPENING ENDOSCOPIC: ICD-10-PCS | Performed by: SURGERY

## 2020-05-18 PROCEDURE — 302101 FENESTRATED FOAM: Performed by: SURGERY

## 2020-05-18 PROCEDURE — 700102 HCHG RX REV CODE 250 W/ 637 OVERRIDE(OP): Performed by: NURSE PRACTITIONER

## 2020-05-18 PROCEDURE — 0B968ZZ DRAINAGE OF RIGHT LOWER LOBE BRONCHUS, VIA NATURAL OR ARTIFICIAL OPENING ENDOSCOPIC: ICD-10-PCS | Performed by: SURGERY

## 2020-05-18 PROCEDURE — 87077 CULTURE AEROBIC IDENTIFY: CPT

## 2020-05-18 PROCEDURE — 87205 SMEAR GRAM STAIN: CPT

## 2020-05-18 PROCEDURE — 302151 K-PAD 14X20: Performed by: SURGERY

## 2020-05-18 PROCEDURE — 87070 CULTURE OTHR SPECIMN AEROBIC: CPT

## 2020-05-18 PROCEDURE — 85025 COMPLETE CBC W/AUTO DIFF WBC: CPT

## 2020-05-18 PROCEDURE — 94640 AIRWAY INHALATION TREATMENT: CPT

## 2020-05-18 PROCEDURE — 87641 MR-STAPH DNA AMP PROBE: CPT

## 2020-05-18 PROCEDURE — 770022 HCHG ROOM/CARE - ICU (200)

## 2020-05-18 PROCEDURE — 87040 BLOOD CULTURE FOR BACTERIA: CPT

## 2020-05-18 PROCEDURE — 700111 HCHG RX REV CODE 636 W/ 250 OVERRIDE (IP)

## 2020-05-18 RX ORDER — MIDAZOLAM HYDROCHLORIDE 1 MG/ML
1-5 INJECTION INTRAMUSCULAR; INTRAVENOUS ONCE
Status: COMPLETED | OUTPATIENT
Start: 2020-05-18 | End: 2020-05-18

## 2020-05-18 RX ORDER — ADENOSINE 3 MG/ML
INJECTION, SOLUTION INTRAVENOUS
Status: COMPLETED
Start: 2020-05-18 | End: 2020-05-18

## 2020-05-18 RX ORDER — MIDAZOLAM HYDROCHLORIDE 1 MG/ML
INJECTION INTRAMUSCULAR; INTRAVENOUS
Status: COMPLETED
Start: 2020-05-18 | End: 2020-05-18

## 2020-05-18 RX ORDER — ROCURONIUM BROMIDE 10 MG/ML
50 INJECTION, SOLUTION INTRAVENOUS ONCE
Status: COMPLETED | OUTPATIENT
Start: 2020-05-18 | End: 2020-05-18

## 2020-05-18 RX ADMIN — AMANTADINE HYDROCHLORIDE 100 MG: 100 CAPSULE ORAL at 19:44

## 2020-05-18 RX ADMIN — MIDAZOLAM HYDROCHLORIDE 3 MG: 1 INJECTION INTRAMUSCULAR; INTRAVENOUS at 10:35

## 2020-05-18 RX ADMIN — ALBUTEROL SULFATE 2.5 MG: 2.5 SOLUTION RESPIRATORY (INHALATION) at 14:46

## 2020-05-18 RX ADMIN — FENTANYL CITRATE 25 MCG: 50 INJECTION INTRAMUSCULAR; INTRAVENOUS at 10:37

## 2020-05-18 RX ADMIN — ACETAMINOPHEN 650 MG: 325 TABLET, FILM COATED ORAL at 23:32

## 2020-05-18 RX ADMIN — CEFEPIME 2 G: 2 INJECTION, POWDER, FOR SOLUTION INTRAVENOUS at 15:20

## 2020-05-18 RX ADMIN — ROCURONIUM BROMIDE 50 MG: 10 INJECTION, SOLUTION INTRAVENOUS at 10:35

## 2020-05-18 RX ADMIN — SODIUM BICARBONATE 2 ML: 84 INJECTION, SOLUTION INTRAVENOUS at 06:49

## 2020-05-18 RX ADMIN — LABETALOL HYDROCHLORIDE 10 MG: 5 INJECTION, SOLUTION INTRAVENOUS at 14:11

## 2020-05-18 RX ADMIN — ALBUTEROL SULFATE 2.5 MG: 2.5 SOLUTION RESPIRATORY (INHALATION) at 06:49

## 2020-05-18 RX ADMIN — MIDAZOLAM HYDROCHLORIDE 3 MG: 1 INJECTION, SOLUTION INTRAMUSCULAR; INTRAVENOUS at 10:35

## 2020-05-18 RX ADMIN — SODIUM BICARBONATE 2 ML: 84 INJECTION, SOLUTION INTRAVENOUS at 19:15

## 2020-05-18 RX ADMIN — ENALAPRIL MALEATE 5 MG: 5 TABLET ORAL at 05:19

## 2020-05-18 RX ADMIN — AMANTADINE HYDROCHLORIDE 100 MG: 100 CAPSULE ORAL at 05:19

## 2020-05-18 RX ADMIN — OXYCODONE 5 MG: 5 TABLET ORAL at 21:29

## 2020-05-18 RX ADMIN — ACETAMINOPHEN 650 MG: 325 TABLET, FILM COATED ORAL at 17:02

## 2020-05-18 RX ADMIN — HEPARIN SODIUM 5000 UNITS: 5000 INJECTION, SOLUTION INTRAVENOUS; SUBCUTANEOUS at 21:29

## 2020-05-18 RX ADMIN — FAMOTIDINE 20 MG: 20 TABLET ORAL at 17:02

## 2020-05-18 RX ADMIN — OXYCODONE 5 MG: 5 TABLET ORAL at 14:01

## 2020-05-18 RX ADMIN — LABETALOL HYDROCHLORIDE 10 MG: 5 INJECTION, SOLUTION INTRAVENOUS at 03:01

## 2020-05-18 RX ADMIN — ACETAMINOPHEN 650 MG: 325 TABLET, FILM COATED ORAL at 04:21

## 2020-05-18 RX ADMIN — ALBUTEROL SULFATE 2.5 MG: 2.5 SOLUTION RESPIRATORY (INHALATION) at 19:15

## 2020-05-18 RX ADMIN — VANCOMYCIN HYDROCHLORIDE 2000 MG: 500 INJECTION, POWDER, LYOPHILIZED, FOR SOLUTION INTRAVENOUS at 12:03

## 2020-05-18 RX ADMIN — ALBUTEROL SULFATE 2.5 MG: 2.5 SOLUTION RESPIRATORY (INHALATION) at 02:09

## 2020-05-18 RX ADMIN — OXYCODONE 5 MG: 5 TABLET ORAL at 02:38

## 2020-05-18 RX ADMIN — SODIUM BICARBONATE 2 ML: 84 INJECTION, SOLUTION INTRAVENOUS at 14:46

## 2020-05-18 RX ADMIN — HEPARIN SODIUM 5000 UNITS: 5000 INJECTION, SOLUTION INTRAVENOUS; SUBCUTANEOUS at 05:19

## 2020-05-18 RX ADMIN — ACETAMINOPHEN 650 MG: 325 TABLET, FILM COATED ORAL at 09:55

## 2020-05-18 RX ADMIN — SODIUM BICARBONATE 3 ML: 84 INJECTION, SOLUTION INTRAVENOUS at 02:08

## 2020-05-18 RX ADMIN — HEPARIN SODIUM 5000 UNITS: 5000 INJECTION, SOLUTION INTRAVENOUS; SUBCUTANEOUS at 14:01

## 2020-05-18 RX ADMIN — FAMOTIDINE 20 MG: 20 TABLET ORAL at 05:19

## 2020-05-18 RX ADMIN — CEFEPIME 2 G: 2 INJECTION, POWDER, FOR SOLUTION INTRAVENOUS at 21:29

## 2020-05-18 ASSESSMENT — FIBROSIS 4 INDEX: FIB4 SCORE: 1.8

## 2020-05-18 NOTE — DISCHARGE PLANNING
Anticipated Discharge Disposition: Rehab - placement unknown     Action: LSW received a call from Diane this AM wanting to discuss dc planning. Diane's sister was also apart of the call. Diane stated she spoke with Dr. Diez over the weekend about neuro rehabs and was educated about [facility] and [facility].     LSW first educated Diane and her sister about having a PMR consult & adding that MD to pt's care team. All questions answered re that consult & what that process looks like. Diane's agreeable. LSW requested consult in IDT rounds. Dr. Almendarez agreeable to consult.     LSW then explained the process of sending referrals & all the logistics regarding placement/transfer. Diane verbalized understanding to all. Diane shared that she has been looking at [facility] due to [place] a lot of [place] support in [place].     LSW briefly discussed transportation and encouraged Diane to start thinking about cost & the logistics around transport. Both ground & air transportation were discussed. Diane verbalized understanding.     Tamika inquired if [facility] or [facility] are in network with her coverage. LSW explained that after a referral has been sent we would be able to work with that facility to determine that. LSW briefly explained JANET's & stated that the  will inform us if that's something that can be done. Diane verbalized understanding. Diane reports she will reach out to her insurance company to determine in network facilities and if they have transportation benefits.     Diane lastly inquired if during visiting hours, her sister or pt's brother can come and visit. LSW to reach out to unit leadership who stated, no. Pt's only visitor can be his wife, Diane. Bedside RN was present when LSW asked leadership. All aware.     Barriers to Discharge: Medical clearance, starting dc plan  - no referrals have been sent at this time.     Plan: Follow up

## 2020-05-18 NOTE — CARE PLAN
Problem: Knowledge Deficit  Goal: Knowledge of disease process/condition, treatment plan, diagnostic tests, and medications will improve  5/18/2020 0814 by Ki Mendoza R.N.  Note: Discussed pt's condition and plan of care for today with the patient's wife.  Questions answered.  5/18/2020 0738 by Ki Mendoza R.N.  Note: Discussed plan of care with the patient and his wife.  Pt's wife verbalized understanding.     Problem: Pain Management  Goal: Pain level will decrease to patient's comfort goal  Note: CPOT scale used to ensure adequate pain control.
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
## 2020-05-18 NOTE — RESPIRATORY CARE
Ventilator Daily Summary     Vent Day #10  Trach 3  8.0 portex cuffed   %/+8/30%       Ventilator settings changed this shift: no       Weaning trials: yesx2    Respiratory Procedures: bronch     Plan: Continue current ventilator settings and wean mechanical ventilation as tolerated per physician orders.

## 2020-05-18 NOTE — PROGRESS NOTES
Dr. Almendarez updated on pt's high fever.  Tylenol administered and packed with ice packs.  Cooling blankets ordered as well.

## 2020-05-18 NOTE — PROGRESS NOTES
2RN skin check with JUNIOR Burroughs    Small bruising on face  Bruising on right shoulder  Healing lacerations on right arm/elbow
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
## 2020-05-18 NOTE — CARE PLAN
Ventilator Daily Summary    Vent Day #10; Trach Day 3    8.0 Portex    % +8, 30%      Ventilator settings changed this shift: NONE    Weaning trials: NONE    Respiratory Procedures:     Albuterol Q6  Sodium Bicarb Q6    Plan: Continue current ventilator settings and wean mechanical ventilation as tolerated per physician orders.

## 2020-05-18 NOTE — PROGRESS NOTES
Spiritual Care Note    Patient Information     Patient's Name: Reyes Amezcua   MRN: [id number]    YOB: 1955   Age and Gender: 64 y.o. male   Service Area: SICU   Room (and Bed): [place] [address]   Ethnicity or Nationality:     Primary Language: English   Buddhism/Spiritual preference: Protestant   Place of Residence: [place]   Family/Friends/Others Present: No   Clinical Team Present: No   Medical Diagnosis(-es)/Procedure(s): Trauma   Code Status: Full Code    Date of Admission: 5/9/2020   Length of Stay: 9 days        Spiritual Care Provider Information:  Name of Spiritual Care Provider: Clare Leos  Title of Spiritual Care Provider: Associate   Phone Number: [phone]  E-mail: [email or web address]  Total time : 10 minutes    Spiritual Screen Results:    Gen Nursing  Spiritual Screen  Is your spiritual health or inner well-being important to you as you cope with your medical condition?: Yes  Would you like to receive a visit from our Spiritual Care team or your own Mandaen or spiritual leader?: Yes  Was spiritual care education provided to the patient?: Yes  Cultural/Spiritual Needs During Care: Clinical  Sources of Hope/Gisele: Counseling/Support groups, Companionship     Palliative Care  PC Buddhism/Spiritual Screening  Was spiritual care education provided to the patient?: Yes      Encounter/Request Information  Encounter/Request Type   Visited With: Patient  Nature of the Visit: Follow-up, On shift  Continue Visiting: Yes  Next Follow-up Date: 05/14/20  Crisis Visit: Critical care  Referral From/ Origin of Request: Verbal family  Referral To: Other /SCP    Religous Needs/Values  Buddhism Needs Visit  Buddhism Needs: Prayer  Ritual Needs Visit  Ritual Needs: [place]    Spiritual Assessment     Spiritual Care Encounters    Observations/Symptoms: Accepting    Interaction: As per wife's wishes,  prayed over pt, who was largely unresponsive.  Visits will  continue.    Assessment: Need    Need: Seeking Spiritual Assistance and Support    Interventions: Compassionate presence, prayer.    Outcomes: Ability to Communicate with Truth and Honesty, Spiritual Comfort    Plan: Daily Visits    Notes:

## 2020-05-18 NOTE — PROGRESS NOTES
"      Spiritual Care Note    Patient Information     Patient's Name: Reyes Amezcua   MRN: [id number]    YOB: 1955   Age and Gender: 64 y.o. male   Service Area: SICU   Room (and Bed): [place] [address]   Ethnicity or Nationality:     Primary Language: English   Hinduism/Spiritual preference: Congregational   Place of Residence: [place]   Family/Friends/Others Present: Wife (phone)   Clinical Team Present: n/a   Medical Diagnosis(-es)/Procedure(s): Trauma   Code Status: Full Code    Date of Admission: 5/9/2020   Length of Stay: 9 days        Spiritual Care Provider Information:  Name of Spiritual Care Provider: Clare Leos  Title of Spiritual Care Provider: Associate   Phone Number: [phone]  E-mail: [email or web address]  Total time : 10 minutes    Spiritual Screen Results:    Gen Nursing  Spiritual Screen  Is your spiritual health or inner well-being important to you as you cope with your medical condition?: Yes  Would you like to receive a visit from our Spiritual Care team or your own Adventism or spiritual leader?: Yes  Was spiritual care education provided to the patient?: Yes  Cultural/Spiritual Needs During Care: Clinical  Sources of Hope/G[place]: Counseling/Support groups, Companionship     Palliative Care  PC Hinduism/Spiritual Screening  Was spiritual care education provided to the patient?: Yes      Encounter/Request Information  Encounter/Request Type   Visited With: Family  Nature of the Visit: Follow-up, On shift  Continue Visiting: Yes  Next Follow-up Date: 05/14/20  Crisis Visit: Critical care  Referral From/ Origin of Request: Verbal family  Referral To: Other /SCP    Religous Needs/Values  Hinduism Needs Visit  Hinduism Needs: Prayer  Ritual Needs Visit  Ritual Needs: [place]    Spiritual Assessment     Spiritual Care Encounters    Observations/Symptoms: Accepting    Interaction/Conversation:  called wife Kerry, who reported that pt is \"starting to wake up\" " and is being cultured for an infection, but that she is feeling okay.  Her sister is leaving tomorrow to go back home.  The  explained that she had prayed with the pt on Friday and will do so again today; Kerry thanked her.    Assessment: Need    Need: Seeking Spiritual Assistance and Support    Interventions: Active listening.    Plan: Daily Visits    Notes:

## 2020-05-18 NOTE — PROGRESS NOTES
"Patient's temperature elevated sustaining above 102F. Heart rate low 100s and BP slightly elevated 150-160s. Patient medicated per MAR. Wife, Diane at the bedside vocalizing concerns with current patient vitals. RN updated Dr. Diez on current patient status and per Dr. Diez ok to utilize cooling blanket as needed however no other orders at this time. RN explained this to Diane. Diane vocalized concerns and asked \"why are we delaying cultures if we suspect an infection\" RN reiterated our ICU policy and explained that we were waiting for results of morning labs to assess WBC and ensure appropriate before initiating antibiotics and sending cultures. Diane verbalized understanding and expressed feeling anxious over the circumstance. RN reassured Diane and provided additional answers on patient condition and support. RN also reassured Diane she would update MD over any new changes to patient's status.  "

## 2020-05-18 NOTE — PROGRESS NOTES
CT head from yesterday reviewed. Improving hemorrhagic abnormalities. No surgical interventions necessary. Repeat imaging of head as out patient. No further neurosurgical interventions nor recommendations.

## 2020-05-18 NOTE — OP REPORT
DATE OF OPERATION: 5/18/2020     PREOPERATIVE DIAGNOSIS: fever, leukocytosis and chest x-ray infiltrate.    POSTOPERATIVE DIAGNOSIS: purulent secretions, fever, leukocytosis and chest x-ray infiltrate.    PROCEDURE PERFORMED: Diagnostic flexible fiberoptic bronchoscopy with bronchoalveolar lavage.    SURGEON: Harpreet Almendarez M.D.    ANESTHESIA: Anesthesia consisting of intravenous sedation, parenteral analgesia and pharmacologic restraint was administered.    INDICATIONS: The patient is a 64 year-old man with acute respiratory failure and fever, leukocytosis and chest x-ray infiltrate. Diagnostic flexible fiberoptic bronchoscopy with bronchoalveolar lavage is performed in the ICU.    FINDINGS:   Normal anatomy.  Moderate Secretions: Right superior lobe, posterior (B2) and anterior (B3) segments. Right inferior lobe, posterior basal (B9) and medial basal (B10) segments. Left lower lobe, posterior basal (B9) and anterior medial basal (B10) segments.    SPECIMEN: Bronchoalveolar lavage for quantitative culture.    PROCEDURE: Following informed consent consent, the patient was properly identified and optimally positioned in bed. He was preoxygenated with 100% oxygen and placed on a regular ventilatory rate. Anesthesia consisting of intravenous sedation, parenteral analgesia and pharmacologic restraint was administered.    The fiberoptic bronchoscope was advanced through the tracheostomy tube. The upper airways were suctioned. All airways were evaluated to the sub-segmental level.The airways were systematically and sequentially inspected and lavaged with sterile saline using a wedged alveolar technique. The pooled effluent was collected in a sterile specimen trap and submitted for quantitative cultures.     The patient tolerated the procedure well. There were no apparent complications.           ____________________________________     Harpreet Almendarez M.D.    DD: 5/18/2020  3:53 PM
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
## 2020-05-18 NOTE — ASSESSMENT & PLAN NOTE
5/18 Bronchoscopy with BAL and blood cultures for fever, leukocytosis and chest x-ray infiltrate. Empiric vancomycin and cefepime initiated.  5/19 Antibiotic therapy deescalated to cefepime monotherapy.  5/20 Cultures remarkable for moderate growth of Beta Streptococcus Group G. Therapy deescalated to Augmentin monotherapy.  5/25 Antibiotic therapy day 7/7.

## 2020-05-19 ENCOUNTER — APPOINTMENT (OUTPATIENT)
Dept: RADIOLOGY | Facility: MEDICAL CENTER | Age: 65
DRG: 004 | End: 2020-05-19
Attending: NURSE PRACTITIONER
Payer: COMMERCIAL

## 2020-05-19 ENCOUNTER — APPOINTMENT (OUTPATIENT)
Dept: RADIOLOGY | Facility: MEDICAL CENTER | Age: 65
DRG: 004 | End: 2020-05-19
Attending: SURGERY
Payer: COMMERCIAL

## 2020-05-19 LAB
ANION GAP SERPL CALC-SCNC: 10 MMOL/L (ref 7–16)
BASOPHILS # BLD AUTO: 0.5 % (ref 0–1.8)
BASOPHILS # BLD: 0.07 K/UL (ref 0–0.12)
BUN SERPL-MCNC: 27 MG/DL (ref 8–22)
CALCIUM SERPL-MCNC: 9 MG/DL (ref 8.5–10.5)
CHLORIDE SERPL-SCNC: 99 MMOL/L (ref 96–112)
CO2 SERPL-SCNC: 24 MMOL/L (ref 20–33)
CREAT SERPL-MCNC: 0.65 MG/DL (ref 0.5–1.4)
EOSINOPHIL # BLD AUTO: 0.25 K/UL (ref 0–0.51)
EOSINOPHIL NFR BLD: 1.6 % (ref 0–6.9)
ERYTHROCYTE [DISTWIDTH] IN BLOOD BY AUTOMATED COUNT: 48.9 FL (ref 35.9–50)
GLUCOSE SERPL-MCNC: 133 MG/DL (ref 65–99)
HCT VFR BLD AUTO: 36 % (ref 42–52)
HGB BLD-MCNC: 11.6 G/DL (ref 14–18)
IMM GRANULOCYTES # BLD AUTO: 0.11 K/UL (ref 0–0.11)
IMM GRANULOCYTES NFR BLD AUTO: 0.7 % (ref 0–0.9)
LYMPHOCYTES # BLD AUTO: 2 K/UL (ref 1–4.8)
LYMPHOCYTES NFR BLD: 12.9 % (ref 22–41)
MCH RBC QN AUTO: 30.1 PG (ref 27–33)
MCHC RBC AUTO-ENTMCNC: 32.2 G/DL (ref 33.7–35.3)
MCV RBC AUTO: 93.3 FL (ref 81.4–97.8)
MONOCYTES # BLD AUTO: 1.33 K/UL (ref 0–0.85)
MONOCYTES NFR BLD AUTO: 8.6 % (ref 0–13.4)
NEUTROPHILS # BLD AUTO: 11.71 K/UL (ref 1.82–7.42)
NEUTROPHILS NFR BLD: 75.7 % (ref 44–72)
NRBC # BLD AUTO: 0 K/UL
NRBC BLD-RTO: 0 /100 WBC
PLATELET # BLD AUTO: 305 K/UL (ref 164–446)
PMV BLD AUTO: 10.6 FL (ref 9–12.9)
POTASSIUM SERPL-SCNC: 4 MMOL/L (ref 3.6–5.5)
RBC # BLD AUTO: 3.86 M/UL (ref 4.7–6.1)
SODIUM SERPL-SCNC: 133 MMOL/L (ref 135–145)
WBC # BLD AUTO: 15.5 K/UL (ref 4.8–10.8)

## 2020-05-19 PROCEDURE — A9270 NON-COVERED ITEM OR SERVICE: HCPCS | Performed by: NURSE PRACTITIONER

## 2020-05-19 PROCEDURE — 700111 HCHG RX REV CODE 636 W/ 250 OVERRIDE (IP): Performed by: SURGERY

## 2020-05-19 PROCEDURE — 71045 X-RAY EXAM CHEST 1 VIEW: CPT

## 2020-05-19 PROCEDURE — A9270 NON-COVERED ITEM OR SERVICE: HCPCS | Performed by: SURGERY

## 2020-05-19 PROCEDURE — 700102 HCHG RX REV CODE 250 W/ 637 OVERRIDE(OP): Performed by: SURGERY

## 2020-05-19 PROCEDURE — 700101 HCHG RX REV CODE 250: Performed by: SURGERY

## 2020-05-19 PROCEDURE — 94640 AIRWAY INHALATION TREATMENT: CPT

## 2020-05-19 PROCEDURE — 80048 BASIC METABOLIC PNL TOTAL CA: CPT

## 2020-05-19 PROCEDURE — 99254 IP/OBS CNSLTJ NEW/EST MOD 60: CPT | Performed by: PHYSICAL MEDICINE & REHABILITATION

## 2020-05-19 PROCEDURE — 94760 N-INVAS EAR/PLS OXIMETRY 1: CPT

## 2020-05-19 PROCEDURE — 700102 HCHG RX REV CODE 250 W/ 637 OVERRIDE(OP): Performed by: NURSE PRACTITIONER

## 2020-05-19 PROCEDURE — 99291 CRITICAL CARE FIRST HOUR: CPT | Performed by: SURGERY

## 2020-05-19 PROCEDURE — 97112 NEUROMUSCULAR REEDUCATION: CPT

## 2020-05-19 PROCEDURE — 97535 SELF CARE MNGMENT TRAINING: CPT

## 2020-05-19 PROCEDURE — 97110 THERAPEUTIC EXERCISES: CPT

## 2020-05-19 PROCEDURE — 85025 COMPLETE CBC W/AUTO DIFF WBC: CPT

## 2020-05-19 PROCEDURE — 700112 HCHG RX REV CODE 229: Performed by: NURSE PRACTITIONER

## 2020-05-19 PROCEDURE — 700105 HCHG RX REV CODE 258: Performed by: SURGERY

## 2020-05-19 PROCEDURE — 94003 VENT MGMT INPAT SUBQ DAY: CPT

## 2020-05-19 PROCEDURE — 97129 THER IVNTJ 1ST 15 MIN: CPT

## 2020-05-19 PROCEDURE — 770022 HCHG ROOM/CARE - ICU (200)

## 2020-05-19 RX ORDER — AMANTADINE HYDROCHLORIDE 100 MG/1
100 CAPSULE, GELATIN COATED ORAL 2 TIMES DAILY
Status: DISCONTINUED | OUTPATIENT
Start: 2020-05-20 | End: 2020-05-26

## 2020-05-19 RX ADMIN — SODIUM BICARBONATE 3 ML: 84 INJECTION, SOLUTION INTRAVENOUS at 02:02

## 2020-05-19 RX ADMIN — LABETALOL HYDROCHLORIDE 10 MG: 5 INJECTION, SOLUTION INTRAVENOUS at 20:06

## 2020-05-19 RX ADMIN — ALBUTEROL SULFATE 2.5 MG: 2.5 SOLUTION RESPIRATORY (INHALATION) at 02:02

## 2020-05-19 RX ADMIN — CEFEPIME 2 G: 2 INJECTION, POWDER, FOR SOLUTION INTRAVENOUS at 21:48

## 2020-05-19 RX ADMIN — OXYCODONE HYDROCHLORIDE 10 MG: 10 TABLET ORAL at 11:51

## 2020-05-19 RX ADMIN — ACETAMINOPHEN 650 MG: 325 TABLET, FILM COATED ORAL at 06:06

## 2020-05-19 RX ADMIN — DOCUSATE SODIUM 100 MG: 50 LIQUID ORAL at 06:00

## 2020-05-19 RX ADMIN — SODIUM BICARBONATE 3 ML: 84 INJECTION, SOLUTION INTRAVENOUS at 07:31

## 2020-05-19 RX ADMIN — ACETAMINOPHEN 650 MG: 325 TABLET, FILM COATED ORAL at 12:14

## 2020-05-19 RX ADMIN — HEPARIN SODIUM 5000 UNITS: 5000 INJECTION, SOLUTION INTRAVENOUS; SUBCUTANEOUS at 05:59

## 2020-05-19 RX ADMIN — CEFEPIME 2 G: 2 INJECTION, POWDER, FOR SOLUTION INTRAVENOUS at 06:00

## 2020-05-19 RX ADMIN — ALBUTEROL SULFATE 2.5 MG: 2.5 SOLUTION RESPIRATORY (INHALATION) at 14:44

## 2020-05-19 RX ADMIN — LABETALOL HYDROCHLORIDE 10 MG: 5 INJECTION, SOLUTION INTRAVENOUS at 11:52

## 2020-05-19 RX ADMIN — ALBUTEROL SULFATE 2.5 MG: 2.5 SOLUTION RESPIRATORY (INHALATION) at 19:46

## 2020-05-19 RX ADMIN — SODIUM BICARBONATE 3 ML: 84 INJECTION, SOLUTION INTRAVENOUS at 14:44

## 2020-05-19 RX ADMIN — HEPARIN SODIUM 5000 UNITS: 5000 INJECTION, SOLUTION INTRAVENOUS; SUBCUTANEOUS at 13:14

## 2020-05-19 RX ADMIN — CEFEPIME 2 G: 2 INJECTION, POWDER, FOR SOLUTION INTRAVENOUS at 13:15

## 2020-05-19 RX ADMIN — HEPARIN SODIUM 5000 UNITS: 5000 INJECTION, SOLUTION INTRAVENOUS; SUBCUTANEOUS at 21:48

## 2020-05-19 RX ADMIN — ENALAPRIL MALEATE 5 MG: 5 TABLET ORAL at 06:00

## 2020-05-19 RX ADMIN — ACETAMINOPHEN 650 MG: 325 TABLET, FILM COATED ORAL at 19:54

## 2020-05-19 RX ADMIN — VANCOMYCIN HYDROCHLORIDE 1400 MG: 500 INJECTION, POWDER, LYOPHILIZED, FOR SOLUTION INTRAVENOUS at 00:34

## 2020-05-19 RX ADMIN — AMANTADINE HYDROCHLORIDE 100 MG: 100 CAPSULE ORAL at 06:00

## 2020-05-19 RX ADMIN — SODIUM BICARBONATE 3 ML: 84 INJECTION, SOLUTION INTRAVENOUS at 19:46

## 2020-05-19 RX ADMIN — FAMOTIDINE 20 MG: 20 TABLET ORAL at 17:22

## 2020-05-19 RX ADMIN — FAMOTIDINE 20 MG: 20 TABLET ORAL at 06:00

## 2020-05-19 RX ADMIN — ALBUTEROL SULFATE 2.5 MG: 2.5 SOLUTION RESPIRATORY (INHALATION) at 07:31

## 2020-05-19 ASSESSMENT — COGNITIVE AND FUNCTIONAL STATUS - GENERAL
WALKING IN HOSPITAL ROOM: TOTAL
DRESSING REGULAR UPPER BODY CLOTHING: TOTAL
PERSONAL GROOMING: TOTAL
SUGGESTED CMS G CODE MODIFIER DAILY ACTIVITY: CN
DRESSING REGULAR LOWER BODY CLOTHING: TOTAL
MOBILITY SCORE: 6
SUGGESTED CMS G CODE MODIFIER MOBILITY: CN
DAILY ACTIVITIY SCORE: 6
MOVING TO AND FROM BED TO CHAIR: UNABLE
HELP NEEDED FOR BATHING: TOTAL
CLIMB 3 TO 5 STEPS WITH RAILING: TOTAL
EATING MEALS: TOTAL
MOVING FROM LYING ON BACK TO SITTING ON SIDE OF FLAT BED: UNABLE
STANDING UP FROM CHAIR USING ARMS: TOTAL
TOILETING: TOTAL
TURNING FROM BACK TO SIDE WHILE IN FLAT BAD: UNABLE

## 2020-05-19 ASSESSMENT — FIBROSIS 4 INDEX: FIB4 SCORE: 1.54

## 2020-05-19 ASSESSMENT — GAIT ASSESSMENTS: GAIT LEVEL OF ASSIST: UNABLE TO PARTICIPATE

## 2020-05-19 NOTE — CARE PLAN
Problem: Ventilation Defect:  Goal: Ability to achieve and maintain unassisted ventilation or tolerate decreased levels of ventilator support  Outcome: PROGRESSING SLOWER THAN EXPECTED      Ventilator Daily Summary    Vent Day # 11  Trache Day # 4    Weaning trials: SBT/T-Piece x1/3.5 hours    Plan: Continue current ventilator settings and wean mechanical ventilation as tolerated per physician orders.

## 2020-05-19 NOTE — DISCHARGE PLANNING
[facility]ute [facility]/ Trans[facility] Care CoordinationRehabilitation:  Dx Traumatic, Close TBI  Physiatry consult completed indicating patient will need rehab services.  Currently he is too low level to qualify for rehab however he is expected to improve.  Placement will depend on level of medical need.     TCC will continue to follow.

## 2020-05-19 NOTE — PROGRESS NOTES
Patient presented in IDT rounds. Per Dr. Almendarez, remove trache sutures. Plan of care discussed.

## 2020-05-19 NOTE — PROGRESS NOTES
"1500: Dr. Almendarez made aware of patient's wife's request of daily physician updates. RN to call Dr. Almendarez when patient's wife is at the bedside later today.     1610: PT and OT with patient, mobilizing and assessing. Patient's wife at the bedside.   Call placed to Dr. Almendarez as previously discussed. Alerted patient's wife that Dr. Almendarez was on the unit phone. RN asked patient's wife if she would like to talk to Dr. Almendarez regarding specific questions. Patient's wife states, \"I just got here. I have a slew of questions. Can he call me later?\"    RN updated Dr. Almendarez. RN to call Dr. Almendarez when patient's wife is ready for updates.   "
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
## 2020-05-19 NOTE — PROGRESS NOTES
"Zoom meeting ended. Called patient's wife to offer to resume zoom meeting during patient's bed bath and mobility. Patient's wife refused. Rehab physician Dr. Willams at the bedside talking to patient's wife via Zoom to discuss plans. Questions/concerns addressed by physician at bedside.     Per patient's wife, she would like a phone call from MD. Updates given regarding ongoing plan of care, patient's wife states, \"I'm not interested in getting updates from nurses. I'd like to talk to the attending.\" Will discuss with Dr. Almendarez.   "

## 2020-05-19 NOTE — CARE PLAN
Ventilator Daily Summary     Vent Day #11  Trach 4  8.0 portex cuffed   %/+8/30%        Ventilator settings changed this shift: no        Weaning trials: no     Respiratory Procedures: None     Plan: Continue current ventilator settings and wean mechanical ventilation as tolerated per physician orders.

## 2020-05-19 NOTE — CARE PLAN
Problem: Infection  Goal: Will remain free from infection  Intervention: Assess signs and symptoms of infection  Note: RN monitors Pt VS and lab values to observe for S/S of infection.  Hand hygiene implemented before and after Pt care.       Problem: Mobility  Goal: Risk for activity intolerance will decrease  Intervention: Assess and monitor signs of activity intolerance  Note: Durga scale being used to assess skin break down risks.  Providing assistance with repositioning.  Collaborating and communicating with health care team to prevent pressure ulcer.  Q2 turns & ADILENE in place.

## 2020-05-19 NOTE — DISCHARGE PLANNING
"Anticipated Discharge Disposition: Rehab    Action: Briefly was updated by Dr. Willams about pt's evaluation & conversation with spouse, Diane.     LSW called Diane to check in with her today. Diane was tearful and stated, \"today is a hard day.\" Emotional support provided. Diane and LSW talked about dc planning and the different options that can be taken. LSW inquired if Diane would be open to receiving information/pamphlets on the different rehabs that we've talked about so far ([facility], [facility] and [facility]). Diane was very pleased with that and would love to see what each one offers. During discussion, Diane talked a lot about [facility] and their program. Diane is wanting to wait on sending referrals at this time to see how pt progresses.     LSW placed rehab packet on nursing cart for Diane when she arrives during visiting hours. Bedside RN aware.     Barriers to Discharge: medical clearance, complex medical needs - TBI    Plan: Continue to follow     "
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
## 2020-05-19 NOTE — PROGRESS NOTES
2 RN skin check complete with Antonette  · Devices in place: pulse ox, BP cuff, cardiac monitor leads, trach, peripheral IVs, SCDs, patricio catheter, bilateral soft wrist restraints.  · Skin assessed under devices: Yes.  · Confirmed pressure ulcers found on: N/A  · New potential pressure ulcers noted on: N/A  · Skin breakdown noted on:   · Generalized abrasions to LUE, BLEs, nose, & R lateral head   · Edema & blanchable redness under trach site- dressing in place   · Heels red/blanching- Mepilexs in place  · Bruising to R eye/posterior shoulder  · Healing wound to chin     The following interventions in place: Q2H turns and repositioning, rotate pulse ox. Heels floated on pillows. Q2 turns in place. Assess under restraints Q2H. Ensure patient is not laying on tubing/lines. No redness or indications of pressure from devices or bony prominences. Mepilex in place. Low air loss mattress in use and mattress pressure appropriate for patient.

## 2020-05-20 ENCOUNTER — APPOINTMENT (OUTPATIENT)
Dept: RADIOLOGY | Facility: MEDICAL CENTER | Age: 65
DRG: 004 | End: 2020-05-20
Attending: NURSE PRACTITIONER
Payer: COMMERCIAL

## 2020-05-20 LAB
ANION GAP SERPL CALC-SCNC: 9 MMOL/L (ref 7–16)
BACTERIA SPEC RESP CULT: ABNORMAL
BACTERIA SPEC RESP CULT: ABNORMAL
BASOPHILS # BLD AUTO: 0.6 % (ref 0–1.8)
BASOPHILS # BLD: 0.08 K/UL (ref 0–0.12)
BUN SERPL-MCNC: 26 MG/DL (ref 8–22)
CALCIUM SERPL-MCNC: 8.9 MG/DL (ref 8.5–10.5)
CHLORIDE SERPL-SCNC: 104 MMOL/L (ref 96–112)
CO2 SERPL-SCNC: 24 MMOL/L (ref 20–33)
CREAT SERPL-MCNC: 0.51 MG/DL (ref 0.5–1.4)
EOSINOPHIL # BLD AUTO: 0.26 K/UL (ref 0–0.51)
EOSINOPHIL NFR BLD: 1.8 % (ref 0–6.9)
ERYTHROCYTE [DISTWIDTH] IN BLOOD BY AUTOMATED COUNT: 47.8 FL (ref 35.9–50)
GLUCOSE SERPL-MCNC: 119 MG/DL (ref 65–99)
GRAM STN SPEC: ABNORMAL
HCT VFR BLD AUTO: 35.3 % (ref 42–52)
HGB BLD-MCNC: 11.2 G/DL (ref 14–18)
IMM GRANULOCYTES # BLD AUTO: 0.1 K/UL (ref 0–0.11)
IMM GRANULOCYTES NFR BLD AUTO: 0.7 % (ref 0–0.9)
LYMPHOCYTES # BLD AUTO: 2.45 K/UL (ref 1–4.8)
LYMPHOCYTES NFR BLD: 17.1 % (ref 22–41)
MCH RBC QN AUTO: 29.9 PG (ref 27–33)
MCHC RBC AUTO-ENTMCNC: 31.7 G/DL (ref 33.7–35.3)
MCV RBC AUTO: 94.1 FL (ref 81.4–97.8)
MONOCYTES # BLD AUTO: 0.85 K/UL (ref 0–0.85)
MONOCYTES NFR BLD AUTO: 5.9 % (ref 0–13.4)
NEUTROPHILS # BLD AUTO: 10.58 K/UL (ref 1.82–7.42)
NEUTROPHILS NFR BLD: 73.9 % (ref 44–72)
NRBC # BLD AUTO: 0 K/UL
NRBC BLD-RTO: 0 /100 WBC
PLATELET # BLD AUTO: 359 K/UL (ref 164–446)
PMV BLD AUTO: 10.6 FL (ref 9–12.9)
POTASSIUM SERPL-SCNC: 3.9 MMOL/L (ref 3.6–5.5)
RBC # BLD AUTO: 3.75 M/UL (ref 4.7–6.1)
SIGNIFICANT IND 70042: ABNORMAL
SITE SITE: ABNORMAL
SODIUM SERPL-SCNC: 137 MMOL/L (ref 135–145)
SOURCE SOURCE: ABNORMAL
WBC # BLD AUTO: 14.3 K/UL (ref 4.8–10.8)

## 2020-05-20 PROCEDURE — 700112 HCHG RX REV CODE 229: Performed by: NURSE PRACTITIONER

## 2020-05-20 PROCEDURE — 700105 HCHG RX REV CODE 258: Performed by: SURGERY

## 2020-05-20 PROCEDURE — 94640 AIRWAY INHALATION TREATMENT: CPT

## 2020-05-20 PROCEDURE — 700111 HCHG RX REV CODE 636 W/ 250 OVERRIDE (IP): Performed by: SURGERY

## 2020-05-20 PROCEDURE — 85025 COMPLETE CBC W/AUTO DIFF WBC: CPT

## 2020-05-20 PROCEDURE — 700101 HCHG RX REV CODE 250: Performed by: SURGERY

## 2020-05-20 PROCEDURE — A9270 NON-COVERED ITEM OR SERVICE: HCPCS | Performed by: NURSE PRACTITIONER

## 2020-05-20 PROCEDURE — 99291 CRITICAL CARE FIRST HOUR: CPT | Performed by: SURGERY

## 2020-05-20 PROCEDURE — A9270 NON-COVERED ITEM OR SERVICE: HCPCS | Performed by: SURGERY

## 2020-05-20 PROCEDURE — 770022 HCHG ROOM/CARE - ICU (200)

## 2020-05-20 PROCEDURE — A9270 NON-COVERED ITEM OR SERVICE: HCPCS | Performed by: PHYSICAL MEDICINE & REHABILITATION

## 2020-05-20 PROCEDURE — 700102 HCHG RX REV CODE 250 W/ 637 OVERRIDE(OP): Performed by: PHYSICAL MEDICINE & REHABILITATION

## 2020-05-20 PROCEDURE — 94003 VENT MGMT INPAT SUBQ DAY: CPT

## 2020-05-20 PROCEDURE — 71045 X-RAY EXAM CHEST 1 VIEW: CPT

## 2020-05-20 PROCEDURE — 700102 HCHG RX REV CODE 250 W/ 637 OVERRIDE(OP): Performed by: SURGERY

## 2020-05-20 PROCEDURE — 80048 BASIC METABOLIC PNL TOTAL CA: CPT

## 2020-05-20 PROCEDURE — 700102 HCHG RX REV CODE 250 W/ 637 OVERRIDE(OP): Performed by: NURSE PRACTITIONER

## 2020-05-20 RX ORDER — AMOXICILLIN AND CLAVULANATE POTASSIUM 875; 125 MG/1; MG/1
1 TABLET, FILM COATED ORAL EVERY 12 HOURS
Status: DISCONTINUED | OUTPATIENT
Start: 2020-05-20 | End: 2020-05-20

## 2020-05-20 RX ORDER — AMOXICILLIN AND CLAVULANATE POTASSIUM 875; 125 MG/1; MG/1
1 TABLET, FILM COATED ORAL EVERY 12 HOURS
Status: COMPLETED | OUTPATIENT
Start: 2020-05-20 | End: 2020-05-25

## 2020-05-20 RX ADMIN — AMANTADINE HYDROCHLORIDE 100 MG: 100 CAPSULE ORAL at 11:54

## 2020-05-20 RX ADMIN — HEPARIN SODIUM 5000 UNITS: 5000 INJECTION, SOLUTION INTRAVENOUS; SUBCUTANEOUS at 05:04

## 2020-05-20 RX ADMIN — HEPARIN SODIUM 5000 UNITS: 5000 INJECTION, SOLUTION INTRAVENOUS; SUBCUTANEOUS at 14:33

## 2020-05-20 RX ADMIN — ALBUTEROL SULFATE 2.5 MG: 2.5 SOLUTION RESPIRATORY (INHALATION) at 03:20

## 2020-05-20 RX ADMIN — CEFEPIME 2 G: 2 INJECTION, POWDER, FOR SOLUTION INTRAVENOUS at 14:33

## 2020-05-20 RX ADMIN — DOCUSATE SODIUM 100 MG: 50 LIQUID ORAL at 05:07

## 2020-05-20 RX ADMIN — SODIUM BICARBONATE 3 ML: 84 INJECTION, SOLUTION INTRAVENOUS at 03:20

## 2020-05-20 RX ADMIN — HYDRALAZINE HYDROCHLORIDE 10 MG: 20 INJECTION INTRAMUSCULAR; INTRAVENOUS at 19:54

## 2020-05-20 RX ADMIN — SODIUM BICARBONATE 3 ML: 84 INJECTION, SOLUTION INTRAVENOUS at 19:32

## 2020-05-20 RX ADMIN — CEFEPIME 2 G: 2 INJECTION, POWDER, FOR SOLUTION INTRAVENOUS at 05:04

## 2020-05-20 RX ADMIN — ENALAPRIL MALEATE 5 MG: 5 TABLET ORAL at 05:04

## 2020-05-20 RX ADMIN — HEPARIN SODIUM 5000 UNITS: 5000 INJECTION, SOLUTION INTRAVENOUS; SUBCUTANEOUS at 21:26

## 2020-05-20 RX ADMIN — DOCUSATE SODIUM 100 MG: 50 LIQUID ORAL at 17:59

## 2020-05-20 RX ADMIN — POLYETHYLENE GLYCOL 3350 1 PACKET: 17 POWDER, FOR SOLUTION ORAL at 17:59

## 2020-05-20 RX ADMIN — LABETALOL HYDROCHLORIDE 10 MG: 5 INJECTION, SOLUTION INTRAVENOUS at 18:17

## 2020-05-20 RX ADMIN — AMOXICILLIN AND CLAVULANATE POTASSIUM 1 TABLET: 875; 125 TABLET, FILM COATED ORAL at 17:59

## 2020-05-20 RX ADMIN — AMANTADINE HYDROCHLORIDE 100 MG: 100 CAPSULE ORAL at 05:04

## 2020-05-20 RX ADMIN — SODIUM BICARBONATE 2 ML: 84 INJECTION, SOLUTION INTRAVENOUS at 06:23

## 2020-05-20 RX ADMIN — FAMOTIDINE 20 MG: 20 TABLET ORAL at 05:04

## 2020-05-20 RX ADMIN — ALBUTEROL SULFATE 2.5 MG: 2.5 SOLUTION RESPIRATORY (INHALATION) at 06:26

## 2020-05-20 RX ADMIN — SODIUM BICARBONATE 2 ML: 84 INJECTION, SOLUTION INTRAVENOUS at 14:46

## 2020-05-20 RX ADMIN — ALBUTEROL SULFATE 2.5 MG: 2.5 SOLUTION RESPIRATORY (INHALATION) at 19:32

## 2020-05-20 RX ADMIN — ALBUTEROL SULFATE 2.5 MG: 2.5 SOLUTION RESPIRATORY (INHALATION) at 14:46

## 2020-05-20 RX ADMIN — DOCUSATE SODIUM 50 MG AND SENNOSIDES 8.6 MG 1 TABLET: 8.6; 5 TABLET, FILM COATED ORAL at 21:27

## 2020-05-20 RX ADMIN — FAMOTIDINE 20 MG: 20 TABLET ORAL at 17:59

## 2020-05-20 ASSESSMENT — FIBROSIS 4 INDEX: FIB4 SCORE: 1.54
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
## 2020-05-20 NOTE — PROGRESS NOTES
Cortrak Placement    Tube Team verified patient name and medical record number prior to tube placement.  Cortrak tube (55 inches, 12 Vietnamese) placed at 75 cm in right nare.  Per Cortrak picture, tube appears to be in the stomach.  Nursing Instructions: Awaiting KUB to confirm placement before use for medications or feeding. Once placement confirmed, flush tube with 30 ml of water, and then remove and save stylet, in patient medication drawer.

## 2020-05-20 NOTE — THERAPY
Occupational Therapy  Daily Treatment     Patient Name: Reyes Amezcua  Age:  64 y.o., Sex:  male  Medical Record #: [id number]  Today's Date: 5/19/2020     Precautions  Precautions: Non Weight Bearing Right Upper Extremity, Fall Risk, Nasogastric Tube, Swallow Precautions ( See Comments), Tracheostomy     Subjective    Pt non-verbal. Spouse expressed appreciation for involvement in tx and education provided.      Objective       05/19/20 1704   Cognition    Comments Pt's eyes open, no tracking, intermittent blink to threat; no command following    Active ROM Upper Body   Comments No active motor to command; intermittent reaching for face with LUE only    Other Treatments   Other Treatments Provided Caregiver training with spouse on PROM to BUE (excluding shoulders). Extensive education with spouse on TBI recover, goals of OT at various levels of recovery.    Balance   Sitting Balance (Static) Trace   Bed Mobility    Supine to Sit Total Assist   Activities of Daily Living   Grooming Total Assist  (face wiping)   Lower Body Dressing Total Assist  (socks donning )   Functional Mobility   Sit to Stand Unable to Participate   Bed, Chair, Wheelchair Transfer Unable to Participate   Short Term Goals   Short Term Goal # 1 Pt will follow 1-step commands 25% of time during functional activity    Goal Outcome # 1 Goal not met   Short Term Goal # 2 Pt will sit with max A for >3 min to participate in grooming task    Goal Outcome # 2 Goal not met       Assessment    Pt seen for OT tx, partial co-tx with PT due to complex TBI. Pt's spouse Diane present for entire session. Pt's eyes open almost entire session, but not tracking. Intermittent blink to visual threat. Mobilized pt to EOB (total A) and provided variety of auditory, tactile, visual stimulation. Once sitting, pt able to maintain head position and balance with as little as min A, but appears due to trunk tone versus volitional engagement. Pt unable to follow any commands;  moves LUE spontaneously to reach for face. Trained spouse on PROM to BUE (excluding shoulders). BUE present with mild tone. Provided B scapular mobilizations. Pt demos slightly improved arousal this session, but continues to demo severe cognitive deficits. Will continue to benefit from acute OT with emphasis on basic command following and ADL.     Plan    Treatment plan modified to 3 times per week until therapy goals are met for the following treatments:  Adaptive Equipment, Cognitive Skill Development, Neuro Re-Education / Balance, Self Care/Activities of Daily Living, Sensory Integration Techniques, Therapeutic Activities and Therapeutic Exercises.    Discharge recommendations:  Recommend post-acute placement for additional occupational therapy services prior to discharge home.

## 2020-05-20 NOTE — PROGRESS NOTES
Spiritual Care Note    Patient Information     Patient's Name: Reyes Amezcua   MRN: [id number]    YOB: 1955   Age and Gender: 64 y.o. male   Service Area: SICU   Room (and Bed): [place] [address]   Ethnicity or Nationality:     Primary Language: English   Taoism/Spiritual preference: Mu-ism   Place of Residence: [place]   Family/Friends/Others Present: Yes (wife on phone)   Clinical Team Present: No   Medical Diagnosis(-es)/Procedure(s): Trauma   Code Status: Full Code    Date of Admission: 5/9/2020   Length of Stay: 11 days      Spiritual Care Provider Information:  Name of Spiritual Care Provider: Clare Leos  Title of Spiritual Care Provider: Associate   Phone Number: [phone]  E-mail: [email or web address]  Total time : 15 minutes    Spiritual Screen Results:    Gen Nursing  Spiritual Screen  Is your spiritual health or inner well-being important to you as you cope with your medical condition?: Yes  Would you like to receive a visit from our Spiritual Care team or your own Worship or spiritual leader?: Yes  Was spiritual care education provided to the patient?: Yes  Cultural/Spiritual Needs During Care: Clinical  Sources of Hope/Gisele: Counseling/Support groups, Companionship     Palliative Care  PC Taoism/Spiritual Screening  Was spiritual care education provided to the patient?: Yes      Encounter/Request Information  Encounter/Request Type   Visited With: Patient, Family  Nature of the Visit: Follow-up, On shift  Continue Visiting: Yes  Next Follow-up Date: 05/14/20  Crisis Visit: Critical care  Referral From/ Origin of Request: Verbal family  Referral To: Other /SCP    Religous Needs/Values  Taoism Needs Visit  Taoism Needs: Prayer  Ritual Needs Visit  Ritual Needs: [place]    Spiritual Assessment     Spiritual Care Encounters    Observations/Symptoms: Accepting    Interaction/Conversation: Pt prayed with pt on unit and called wife to check in on  her.  Visits and calls will continue.    Assessment: Need    Need: Seeking Spiritual Assistance and Support    Interventions: Compassionate presence, active listening, prayer.    Outcomes: Ability to Communicate with Truth and Honesty, Spiritual Comfort    Plan: Daily Visits    Notes:

## 2020-05-20 NOTE — CARE PLAN
Problem: Communication  Goal: The ability to communicate needs accurately and effectively will improve  Outcome: PROGRESSING AS EXPECTED  Intervention: Reorient patient to environment as needed  Note: Patient appears to acknowledge and act on directions during neuro exam.      Problem: Venous Thromboembolism (VTW)/Deep Vein Thrombosis (DVT) Prevention:  Goal: Patient will participate in Venous Thrombosis (VTE)/Deep Vein Thrombosis (DVT)Prevention Measures  Outcome: PROGRESSING AS EXPECTED  Intervention: Ensure patient wears graduated elastic stockings (RAMONA hose) and/or SCDs, if ordered, when in bed or chair (Remove at least once per shift for skin check)  Flowsheets (Taken 5/20/2020 0800)  Mechanical Prophylaxis: SCDs, Sequential Compression Device  SCDs, Sequential Compression Device: On     Problem: Respiratory:  Goal: Respiratory status will improve  Outcome: PROGRESSING AS EXPECTED  Intervention: Collaborate with respiratory therapist and Interdisciplinary Team on treatment measures to improve respiratory function  Note: Pt tolerating 12 hour t-piece trial by maintaining O2 Sat > 90%.

## 2020-05-20 NOTE — PROGRESS NOTES
2 RN skin check complete with JUNIOR Mcguire.    Devices in place:  -EKG leads, BP cuff, pulse ox probe, b/l SCDs  -PIV to R wrist and L forearm   -Soft wrist restraint to left wrist  -Trache  -Nickerson catheter with temp sensing probe  -Cooling blanket  -Heel float boot and foot drop brace (changing feet q2h)    Skin assessed under the following areas:  -Mentioned medical devices above  -Bony prominences of the body; posterior head, b/l hips, b/l heels/feet, b/l elbows, sacrum/coccyx    Preventative measures in place including:  -Patient on low air loss mattress  -Q2h turns with pillows  -Q2h repositioning of medical devices, including SCDs, foot drop brace and heel float boot.  -Preventative mepilexes to appropriate areas   -Elevating heels and elbows onto pillows   -Skin care provided with full bed and CHG bath   -Z-pillow to posterior head   -Increasing mobility as tolerated     Following areas noted or of concern:    -Abrasions and bruising to LUE (shoulder/collar bone)  -Abrasions to right lateral forehead, nose and left chin(healing)  -Bruising to right eye  -Abrasions to b/l hands/knuckles, right knee, right forearm    Wound consult placedYES/NO: No  Wound reported YES/NO: N/a  Appropriate LDAs opened YES/NO: Yes

## 2020-05-20 NOTE — DISCHARGE PLANNING
Received Choice form at 1230  Agency/Facility Name: [facility] ([place], [place])  Referral sent per Choice form at 1236

## 2020-05-20 NOTE — CARE PLAN
Problem: Mobility  Goal: Risk for activity intolerance will decrease  Outcome: PROGRESSING SLOWER THAN EXPECTED  Intervention: Assess and monitor signs of activity intolerance  Note: Mobilized patient to edge of the bed twice during this shift.   PT/OT at the bedside to work with patient.   ROM exercises performed with Q2h turns.   Safety and fall precautions implemented with mobility     Problem: Respiratory:  Goal: Respiratory status will improve  Intervention: Collaborate with respiratory therapist and Interdisciplinary Team on treatment measures to improve respiratory function  Note: VAP prevention in place.   Collaboration with RT regarding SBTs and t-piece trials.   Pulmonary hygiene with suctioning and oral care provided q2h and prn.   Monitoring continuous spO2  Monitoring daily cxr.

## 2020-05-20 NOTE — DISCHARGE PLANNING
Wife at bedside to visit. LSW was able to speak with her briefly. No new concerns or needs at this time.

## 2020-05-20 NOTE — PROGRESS NOTES
Call placed to Dr. Almendarez. Patient's wife, Kerry, speaking on the unit phone with Dr. Almendarez to answer patient's wife's questions and concerns.

## 2020-05-20 NOTE — PROGRESS NOTES
RN spoke with pt's wife. All questions & concerns addressed for pt's wife at this time from MD & RN.

## 2020-05-20 NOTE — PROGRESS NOTES
2 RN skin check completed with RN, Kamini.     Scabbed over area visible over left temple.   Left eye is bruised and swollen shut. Pt is able to open occasionally.   Cortrak present in left nare secured by primaporte tape at  75cm.   Trach area is saturated with heavy, sanguinous secretions.   Abrasions also present over left elbow and forearm.  Abrasions present  On left lateral side of knee and shin.   Abrasion present on left elbow.   Back, sacrum and heels are PWD and intact. All areas are blanching att this time.

## 2020-05-20 NOTE — CARE PLAN
Problem: Venous Thromboembolism (VTW)/Deep Vein Thrombosis (DVT) Prevention:  Goal: Patient will participate in Venous Thrombosis (VTE)/Deep Vein Thrombosis (DVT)Prevention Measures  Intervention: Assess and monitor for anticoagulation complications  Note: No s/s of DVT, has sequentials on, ROM performed Q2 hrs, Heparin scheduled & mobilizing to EOB Qshift.        Problem: Pain Management  Goal: Pain level will decrease to patient's comfort goal  Intervention: Follow pain managment plan developed in collaboration with patient and Interdisciplinary Team  Note: Assessing pain level frequently using CPOT.  Providing medication per MAR.  Providing non-pharmacological intervention, therapeutic communication.

## 2020-05-20 NOTE — DISCHARGE PLANNING
LSW received a call from Stephanie with [facility] P:[phone] stating that one of their physicians received a call from a Dr. Tena (pt's brothers colleague) requesting that their team reach out to LSW. Stephanie reports she needs a referral before speaking with Diane so she is aware of where pt is clinically.     LSW called Diane and informed her of information above. Diane reports that Dr. Tean has been very helpful during this time and she trusts his opinion. Diane agreed & gave LSW verbal consent to send a referral to [facility]. LSW completed choice form and faxed to [facility] for processing. Diane is looking forward to speaking with Stephanie about their programs and what they can offer pt.     Plan: Continue to assist with social/dc needs.
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
## 2020-05-20 NOTE — DISCHARGE PLANNING
"Received call from Diane this AM to discuss dc plan/needs. Diane reports her two top rehab facilities for pt at this time are [facility] and [facility].     Diane reports that pt's current insurance policy is an EPO plan meaning they'll only cover rehabs that are in network. Out of network rehabs will need to request an JANET, which might not always be approved.     Diane has done her own research and is contemplating enrolling pt over to the PPO plan which covers 3 networks, one being out of [facility] rehab facilities such as [facility] or [facility]. This transition would take effect on 07/01/2020. Diane then stated that per her insurance, if pt needed a rehab facility out of network for the month of June, they would transition coverage into a \"GAP extension\" which sounds like an JANET until the new policy becomes effective. Diane then shared that no matter what plan (EPO or PPO) pt is on, prior auth is required for air transport. Diane just wanted to share her thinking with LSW & states she's reaching out to family/friends for feed back before making a decision.     Diane stated she spoke with Dr. Almendarez yesterday and agrees with POC at this time. LSW and Diane agree that it is to soon to send referrals to rehabs as pt's expected to improve. Placement will depend on level of need.    Plan: Continue to assist with social/dc needs   "

## 2020-05-20 NOTE — PROGRESS NOTES
Skin check complete  · Devices in place: pulse ox, BP cuff, cardiac monitor leads, trach, peripheral IVs, SCDs, patricio catheter, bilateral soft wrist restraints.  · Skin assessed under devices: Yes.  · Confirmed pressure ulcers found on: N/A  · New potential pressure ulcers noted on: N/A  · Skin breakdown noted on:   ? Generalized abrasions to LUE, BLEs, nose, & R lateral head   ? Edema & blanchable redness under trach site- dressing in place   ? Heels red/blanching- Mepilexs in place  ? Bruising to R eye/posterior shoulder  ? Healing wound to chin      The following interventions in place: Q2H turns and repositioning, rotate pulse ox. Heels floated on pillows. Q2 turns in place. Assess under restraints Q2H. Ensure patient is not laying on tubing/lines. No redness or indications of pressure from devices or bony prominences. Mepilex in place. Low air loss mattress in use and mattress pressure appropriate for patient. Z Pillow in place

## 2020-05-20 NOTE — THERAPY
Physical Therapy   Daily Treatment     Patient Name: Reyes Amezcua  Age:  64 y.o., Sex:  male  Medical Record #: [id number]  Today's Date: 5/19/2020     Precautions  Precautions: Non Weight Bearing Right Upper Extremity, Fall Risk, Nasogastric Tube, Tracheostomy       Assessment    Pt with more consistent eye opening today than eval. He was able to maintain eyes open throughout upright activity, but in supine, could not maintain without ongoing stimulation (light pinch of jaw). Initiated the JFK Coma Recovery Scale; pt was noted to visually startle and withdraw B LE to noxious stimuli, but could not participate in the other four domains of the assessment. In sitting, he was able to sustain upright at min A, but suspect this was due to positioning rather than true functional trunk control. Spouse, Diane, present throughout mobility session and was educated on each portion of session and its purpose. Extensive education provided to Diane regarding reasoning behind infrequent PT intervention (at this point, pt's limited level of alertness as demonstrated by JFK Scale and inability to follow commands). Diane verbalized understanding. PT will continue to follow to address impaired trunk control and limited activity tolerance. Recommend placement.     Plan    Treatment plan modified to 2 times per week until therapy goals are met for the following treatments:  Bed Mobility, Equipment, Gait Training, Manual Therapy, Neuro Re-Education / Balance, Orthotics Training, Self Care/Home Evaluation, Sensory Integration Techniques, Stair Training, Therapeutic Activities and Therapeutic Exercises.    Discharge recommendations:  Recommend post-acute placement for continued physical therapy services prior to discharge home.          05/19/20 1653   Other Treatments   Other Treatments Provided Initiated the JFK Coma Recovery Scale- revised. Cognition limited completion in all domains. Auditory function scale=0; pt did not auditory  startle. Visual function scale=1 (pt visually startled to hand coming swiftly toward face). Motor Function Scale= withdrawal of B LE to noxious stimuli. None noted at sternum or UE. Spouse, Diane, present. PT answered questions regarding reasoning behind frequency of treatment, mobility findings, JFK scale findings, and purpose of tasks performed with pt today. Diane appeared to take copius notes and verbalized understanding regarding reasoning behind pt not receiving daily therapy.    Balance   Sitting Balance (Static) Trace   Sitting Balance (Dynamic) Dependent   Weight Shift Sitting Absent   Comments Seated EOB at total A. WIth positioning, pt able to maintain upright with essentially min A. Perturbations resulted in slow LOB suggesting more positional balance rather than true functional seated trunk control. No righting noted.    Gait Analysis   Gait Level Of Assist Unable to Participate   Bed Mobility    Supine to Sit Total Assist  (x2)   Sit to Supine Total Assist  (x2)   Scooting Total Assist   Functional Mobility   Sit to Stand Unable to Participate   Bed, Chair, Wheelchair Transfer Unable to Participate   Short Term Goals    Short Term Goal # 1 Pt will sit upright EOB at min A in 6tx to improve upright trunk control.    Goal Outcome # 1 goal not met   Short Term Goal # 2 Pt will perform bed mob at min A in 6tx to improve functional mobiltiy.    Goal Outcome # 2 Goal not met   Short Term Goal # 3 Pt will achieve a 6 on the Motor Function Scale portion of the JFK in 6tx to improve functional mobility.    Goal Outcome # 3 Goal not met   Anticipated Discharge Equipment   DC Equipment Unable To Determine At This Time

## 2020-05-21 ENCOUNTER — APPOINTMENT (OUTPATIENT)
Dept: RADIOLOGY | Facility: MEDICAL CENTER | Age: 65
DRG: 004 | End: 2020-05-21
Attending: NURSE PRACTITIONER
Payer: COMMERCIAL

## 2020-05-21 ENCOUNTER — APPOINTMENT (OUTPATIENT)
Dept: RADIOLOGY | Facility: MEDICAL CENTER | Age: 65
DRG: 004 | End: 2020-05-21
Attending: SURGERY
Payer: COMMERCIAL

## 2020-05-21 LAB
ANION GAP SERPL CALC-SCNC: 12 MMOL/L (ref 7–16)
BASOPHILS # BLD AUTO: 1.7 % (ref 0–1.8)
BASOPHILS # BLD: 0.21 K/UL (ref 0–0.12)
BUN SERPL-MCNC: 25 MG/DL (ref 8–22)
CALCIUM SERPL-MCNC: 9.1 MG/DL (ref 8.5–10.5)
CHLORIDE SERPL-SCNC: 97 MMOL/L (ref 96–112)
CO2 SERPL-SCNC: 22 MMOL/L (ref 20–33)
CREAT SERPL-MCNC: 0.51 MG/DL (ref 0.5–1.4)
EOSINOPHIL # BLD AUTO: 0 K/UL (ref 0–0.51)
EOSINOPHIL NFR BLD: 0 % (ref 0–6.9)
ERYTHROCYTE [DISTWIDTH] IN BLOOD BY AUTOMATED COUNT: 45.2 FL (ref 35.9–50)
GLUCOSE SERPL-MCNC: 132 MG/DL (ref 65–99)
HCT VFR BLD AUTO: 35.2 % (ref 42–52)
HGB BLD-MCNC: 11.7 G/DL (ref 14–18)
LYMPHOCYTES # BLD AUTO: 1.5 K/UL (ref 1–4.8)
LYMPHOCYTES NFR BLD: 12.2 % (ref 22–41)
MAGNESIUM SERPL-MCNC: 2.5 MG/DL (ref 1.5–2.5)
MANUAL DIFF BLD: NORMAL
MCH RBC QN AUTO: 30.2 PG (ref 27–33)
MCHC RBC AUTO-ENTMCNC: 33.2 G/DL (ref 33.7–35.3)
MCV RBC AUTO: 91 FL (ref 81.4–97.8)
MONOCYTES # BLD AUTO: 0.53 K/UL (ref 0–0.85)
MONOCYTES NFR BLD AUTO: 4.3 % (ref 0–13.4)
MORPHOLOGY BLD-IMP: NORMAL
NEUTROPHILS # BLD AUTO: 10.05 K/UL (ref 1.82–7.42)
NEUTROPHILS NFR BLD: 81.7 % (ref 44–72)
NRBC # BLD AUTO: 0 K/UL
NRBC BLD-RTO: 0 /100 WBC
PHOSPHATE SERPL-MCNC: 3.6 MG/DL (ref 2.5–4.5)
PLATELET # BLD AUTO: 412 K/UL (ref 164–446)
PLATELET BLD QL SMEAR: NORMAL
PMV BLD AUTO: 10.4 FL (ref 9–12.9)
POTASSIUM SERPL-SCNC: 3.9 MMOL/L (ref 3.6–5.5)
RBC # BLD AUTO: 3.87 M/UL (ref 4.7–6.1)
RBC BLD AUTO: NORMAL
SODIUM SERPL-SCNC: 131 MMOL/L (ref 135–145)
WBC # BLD AUTO: 12.3 K/UL (ref 4.8–10.8)

## 2020-05-21 PROCEDURE — 700102 HCHG RX REV CODE 250 W/ 637 OVERRIDE(OP): Performed by: SURGERY

## 2020-05-21 PROCEDURE — A9270 NON-COVERED ITEM OR SERVICE: HCPCS | Performed by: SURGERY

## 2020-05-21 PROCEDURE — 71045 X-RAY EXAM CHEST 1 VIEW: CPT

## 2020-05-21 PROCEDURE — 700111 HCHG RX REV CODE 636 W/ 250 OVERRIDE (IP): Performed by: SURGERY

## 2020-05-21 PROCEDURE — 99232 SBSQ HOSP IP/OBS MODERATE 35: CPT | Performed by: PHYSICAL MEDICINE & REHABILITATION

## 2020-05-21 PROCEDURE — 83735 ASSAY OF MAGNESIUM: CPT

## 2020-05-21 PROCEDURE — A9270 NON-COVERED ITEM OR SERVICE: HCPCS | Performed by: NURSE PRACTITIONER

## 2020-05-21 PROCEDURE — 94640 AIRWAY INHALATION TREATMENT: CPT

## 2020-05-21 PROCEDURE — A9270 NON-COVERED ITEM OR SERVICE: HCPCS | Performed by: PHYSICAL MEDICINE & REHABILITATION

## 2020-05-21 PROCEDURE — 99291 CRITICAL CARE FIRST HOUR: CPT | Performed by: SURGERY

## 2020-05-21 PROCEDURE — 51798 US URINE CAPACITY MEASURE: CPT

## 2020-05-21 PROCEDURE — 700102 HCHG RX REV CODE 250 W/ 637 OVERRIDE(OP): Performed by: NURSE PRACTITIONER

## 2020-05-21 PROCEDURE — 85007 BL SMEAR W/DIFF WBC COUNT: CPT

## 2020-05-21 PROCEDURE — 700101 HCHG RX REV CODE 250: Performed by: SURGERY

## 2020-05-21 PROCEDURE — 94770 HCHG CO2 EXPIRED GAS DETERMINATION: CPT

## 2020-05-21 PROCEDURE — 700112 HCHG RX REV CODE 229: Performed by: NURSE PRACTITIONER

## 2020-05-21 PROCEDURE — 85027 COMPLETE CBC AUTOMATED: CPT

## 2020-05-21 PROCEDURE — 770022 HCHG ROOM/CARE - ICU (200)

## 2020-05-21 PROCEDURE — 84100 ASSAY OF PHOSPHORUS: CPT

## 2020-05-21 PROCEDURE — 80048 BASIC METABOLIC PNL TOTAL CA: CPT

## 2020-05-21 PROCEDURE — 700102 HCHG RX REV CODE 250 W/ 637 OVERRIDE(OP): Performed by: PHYSICAL MEDICINE & REHABILITATION

## 2020-05-21 PROCEDURE — 94003 VENT MGMT INPAT SUBQ DAY: CPT

## 2020-05-21 PROCEDURE — 97112 NEUROMUSCULAR REEDUCATION: CPT

## 2020-05-21 PROCEDURE — 97129 THER IVNTJ 1ST 15 MIN: CPT

## 2020-05-21 RX ADMIN — SODIUM BICARBONATE 3 ML: 84 INJECTION, SOLUTION INTRAVENOUS at 19:12

## 2020-05-21 RX ADMIN — AMANTADINE HYDROCHLORIDE 100 MG: 100 CAPSULE ORAL at 13:48

## 2020-05-21 RX ADMIN — HEPARIN SODIUM 5000 UNITS: 5000 INJECTION, SOLUTION INTRAVENOUS; SUBCUTANEOUS at 22:29

## 2020-05-21 RX ADMIN — FAMOTIDINE 20 MG: 20 TABLET ORAL at 05:17

## 2020-05-21 RX ADMIN — OXYCODONE 5 MG: 5 TABLET ORAL at 23:00

## 2020-05-21 RX ADMIN — POLYETHYLENE GLYCOL 3350 1 PACKET: 17 POWDER, FOR SOLUTION ORAL at 05:17

## 2020-05-21 RX ADMIN — ALBUTEROL SULFATE 2.5 MG: 2.5 SOLUTION RESPIRATORY (INHALATION) at 02:34

## 2020-05-21 RX ADMIN — ALBUTEROL SULFATE 2.5 MG: 2.5 SOLUTION RESPIRATORY (INHALATION) at 15:16

## 2020-05-21 RX ADMIN — ENALAPRIL MALEATE 5 MG: 5 TABLET ORAL at 05:17

## 2020-05-21 RX ADMIN — AMOXICILLIN AND CLAVULANATE POTASSIUM 1 TABLET: 875; 125 TABLET, FILM COATED ORAL at 05:51

## 2020-05-21 RX ADMIN — FAMOTIDINE 20 MG: 20 TABLET ORAL at 18:00

## 2020-05-21 RX ADMIN — HEPARIN SODIUM 5000 UNITS: 5000 INJECTION, SOLUTION INTRAVENOUS; SUBCUTANEOUS at 05:17

## 2020-05-21 RX ADMIN — HEPARIN SODIUM 5000 UNITS: 5000 INJECTION, SOLUTION INTRAVENOUS; SUBCUTANEOUS at 13:49

## 2020-05-21 RX ADMIN — DOCUSATE SODIUM 100 MG: 50 LIQUID ORAL at 05:17

## 2020-05-21 RX ADMIN — AMOXICILLIN AND CLAVULANATE POTASSIUM 1 TABLET: 875; 125 TABLET, FILM COATED ORAL at 18:00

## 2020-05-21 RX ADMIN — AMANTADINE HYDROCHLORIDE 100 MG: 100 CAPSULE ORAL at 05:51

## 2020-05-21 RX ADMIN — ALBUTEROL SULFATE 2.5 MG: 2.5 SOLUTION RESPIRATORY (INHALATION) at 19:12

## 2020-05-21 RX ADMIN — SODIUM BICARBONATE 3 ML: 84 INJECTION, SOLUTION INTRAVENOUS at 15:16

## 2020-05-21 RX ADMIN — ALBUTEROL SULFATE 2.5 MG: 2.5 SOLUTION RESPIRATORY (INHALATION) at 06:31

## 2020-05-21 RX ADMIN — SODIUM BICARBONATE 3 ML: 84 INJECTION, SOLUTION INTRAVENOUS at 06:31

## 2020-05-21 ASSESSMENT — COGNITIVE AND FUNCTIONAL STATUS - GENERAL
TOILETING: TOTAL
DRESSING REGULAR UPPER BODY CLOTHING: TOTAL
HELP NEEDED FOR BATHING: TOTAL
DRESSING REGULAR LOWER BODY CLOTHING: TOTAL
SUGGESTED CMS G CODE MODIFIER DAILY ACTIVITY: CN
EATING MEALS: TOTAL
PERSONAL GROOMING: TOTAL
DAILY ACTIVITIY SCORE: 6

## 2020-05-21 ASSESSMENT — FIBROSIS 4 INDEX: FIB4 SCORE: 1.14

## 2020-05-21 NOTE — CARE PLAN
Adult Ventilation Update    Total Vent Days: 12    Patient Lines/Drains/Airways Status    Active Airway     Name: Placement date: Placement time: Site: Days:    Airway Trach Tracheostomy 8.0  05/16/20   1024   Tracheostomy  4              $ FVC / Vital Capacity (liters) : (not following) (05/18/20 0740)  NIF (cm H2O) : (not following) (05/18/20 0740)  Rapid Shallow Breathing Index (RR/VT): 68 (05/18/20 0740)  Plateau Pressure: 16 (05/19/20 1942)  Static Compliance (ml / cm H2O): 60.3 (05/20/20 0322)    Patient failed trials because of Barriers to Wean: No Order (05/09/20 1435)  Barriers to SBT Weaning Trial Stopped due to:: Pt weaned for 1 hour and returned to rest settings per protocol (05/18/20 0740)  Length of Weaning Trial Length of Weaning Trial (Hours): 1 (05/18/20 0740)    5 day post trache.  The patient is currently on t-piece 4L 28%    Cough: Productive;Strong (05/20/20 1600)  Sputum Amount: Moderate (05/20/20 1600)  Sputum Color: Yellow;White (05/20/20 1600)  Sputum Consistency: Thin;Mucous Plugs;Thick (05/20/20 1600)    Events/Summary/Plan: T-peice and Omniflex soiled and replaced (05/20/20 7673)

## 2020-05-21 NOTE — CARE PLAN
Problem: Infection  Goal: Will remain free from infection  Intervention: Assess signs and symptoms of infection  Note: RN monitors Pt VS and lab values to observe for S/S of infection.  Hand hygiene implemented before and after Pt care.       Problem: Knowledge Deficit  Goal: Knowledge of disease process/condition, treatment plan, diagnostic tests, and medications will improve  Intervention: Explain information regarding disease process/condition, treatment plan, diagnostic tests, and medications and document in education  Note: Reviewing plan of care, activities, and medication with pt & pt wife.  Encouraging pt wife to ask questions and participate in plan of care.  Providing answers to all questions.  Continuing with current plan of care.

## 2020-05-21 NOTE — CARE PLAN
Problem: Skin Integrity  Goal: Risk for impaired skin integrity will decrease  Outcome: PROGRESSING AS EXPECTED  Intervention: Implement precautions to protect skin integrity in collaboration with the interdisciplinary team  Note: Thorough assessment with skin team. Verified all existing skin irregularities. Provided mobility and skin hygiene around trach site.      Problem: Mobility  Goal: Risk for activity intolerance will decrease  Outcome: PROGRESSING AS EXPECTED  Intervention: Encourage patient to increase activity level in collaboration with Interdisciplinary Team  Note: EOB of 15 min.
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
## 2020-05-21 NOTE — PROGRESS NOTES
2 RN skin check complete with Ade   · Devices in place: pulse ox, BP cuff, cardiac monitor leads, trach, peripheral IVs, SCDs, patricio catheter, bilateral soft wrist restraints.  · Skin assessed under devices: Yes.  · Confirmed pressure ulcers found on: N/A  · New potential pressure ulcers noted on: N/A  · Skin breakdown noted on:   ? Generalized abrasions to BUEs, BLEs, nose, & R lateral head   ? Edema & blanchable redness under trach site- dressing in place   ? Heels red/blanching- Mepilexs in place  ? Bruising to R eye/posterior shoulder  ? Healing wound to chin      The following interventions in place: Q2H turns and repositioning, rotate pulse ox. Heels floated on pillows. Q2 turns in place. Assess under restraints Q2H. Ensure patient is not laying on tubing/lines. No redness or indications of pressure from devices or bony prominences. Mepilex in place. Low air loss mattress in use and mattress pressure appropriate for patient. Z Pillow in place

## 2020-05-21 NOTE — THERAPY
Occupational Therapy  Daily Treatment     Patient Name: Reyes Amezcua  Age:  64 y.o., Sex:  male  Medical Record #: [id number]  Today's Date: 5/21/2020     Precautions: Non Weight Bearing Right Upper Extremity, Fall Risk, Tracheostomy , Swallow Precautions, Nasogastric Tube       Objective       05/21/20 1134   Cognition    Comments Pt quan increased command following this session. See assessment for details.    Active ROM Upper Body   Comments Moving LUE spontaneously and intermittent grasp/release on command; no active motor to RUE noted    Balance   Sitting Balance (Static) Poor +   Sitting Balance (Dynamic) Trace +   Comments Continues to have slow LOBs with perurbance; no righting reactions    Bed Mobility    Supine to Sit Total Assist   Sit to Supine Total Assist   Activities of Daily Living   Grooming Total Assist  (hand-over-hand suction oral care and face wiping )   Lower Body Dressing Total Assist   Functional Mobility   Sit to Stand Total Assist  (2-person assist for partial stands )   Short Term Goals   Short Term Goal # 1 Pt will follow 1-step commands 25% of time during functional activity    Goal Outcome # 1 Progressing slower than expected   Short Term Goal # 2 B  Pt will complete seated oral care with min A   Goal Outcome # 2 B Goal not met   Short Term Goal # 3 Pt will change gown with mod A  (5/21 new goal )   Goal Outcome # 3 Goal not met       Assessment    Pt seen for OT tx. Quan increased command following this session. Still only ~20% with variable delay (at times as long as 10 seconds), but pt released OTs hand, released suction wand, kicked BLE EOB, turned to look at therapist, all on command. Pt completed 2 partial stands with heavy 2-person assist; demonstrated partial WB on BLE. Pt demonstrate improved coordination/puposefullness with wiping forehead using damp cloth. Pt demos small functional gains this session. Will continue to benefit from acute OT.      Plan    Continue current  treatment plan.    Discharge recommendations:  Recommend post-acute placement for additional occupational therapy services prior to discharge home.
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
## 2020-05-21 NOTE — CARE PLAN
Ventilator Daily Summary     Vent Day #13; Trach Day #6    8.0 portex cuffed    %/+8/30%    T-Piece 4L/28%   Last trial length - 13 hours      Bicarb Q6  Albuterol Q6      Ventilator settings changed this shift: none        Weaning trials: none     Respiratory Procedures: None     Plan: Continue current ventilator settings and wean mechanical ventilation as tolerated per physician orders.

## 2020-05-22 ENCOUNTER — APPOINTMENT (OUTPATIENT)
Dept: RADIOLOGY | Facility: MEDICAL CENTER | Age: 65
DRG: 004 | End: 2020-05-22
Attending: NURSE PRACTITIONER
Payer: COMMERCIAL

## 2020-05-22 LAB
ANION GAP SERPL CALC-SCNC: 15 MMOL/L (ref 7–16)
BASOPHILS # BLD AUTO: 0.6 % (ref 0–1.8)
BASOPHILS # BLD: 0.08 K/UL (ref 0–0.12)
BUN SERPL-MCNC: 24 MG/DL (ref 8–22)
CALCIUM SERPL-MCNC: 9.6 MG/DL (ref 8.5–10.5)
CHLORIDE SERPL-SCNC: 99 MMOL/L (ref 96–112)
CO2 SERPL-SCNC: 20 MMOL/L (ref 20–33)
COVID ORDER STATUS COVID19: NORMAL
CREAT SERPL-MCNC: 0.54 MG/DL (ref 0.5–1.4)
EOSINOPHIL # BLD AUTO: 0.26 K/UL (ref 0–0.51)
EOSINOPHIL NFR BLD: 2.1 % (ref 0–6.9)
ERYTHROCYTE [DISTWIDTH] IN BLOOD BY AUTOMATED COUNT: 45 FL (ref 35.9–50)
GLUCOSE SERPL-MCNC: 131 MG/DL (ref 65–99)
HCT VFR BLD AUTO: 39 % (ref 42–52)
HGB BLD-MCNC: 12.5 G/DL (ref 14–18)
IMM GRANULOCYTES # BLD AUTO: 0.12 K/UL (ref 0–0.11)
IMM GRANULOCYTES NFR BLD AUTO: 0.9 % (ref 0–0.9)
LYMPHOCYTES # BLD AUTO: 2 K/UL (ref 1–4.8)
LYMPHOCYTES NFR BLD: 15.8 % (ref 22–41)
MCH RBC QN AUTO: 29.8 PG (ref 27–33)
MCHC RBC AUTO-ENTMCNC: 32.1 G/DL (ref 33.7–35.3)
MCV RBC AUTO: 92.9 FL (ref 81.4–97.8)
MONOCYTES # BLD AUTO: 0.77 K/UL (ref 0–0.85)
MONOCYTES NFR BLD AUTO: 6.1 % (ref 0–13.4)
NEUTROPHILS # BLD AUTO: 9.45 K/UL (ref 1.82–7.42)
NEUTROPHILS NFR BLD: 74.5 % (ref 44–72)
NRBC # BLD AUTO: 0 K/UL
NRBC BLD-RTO: 0 /100 WBC
PLATELET # BLD AUTO: 506 K/UL (ref 164–446)
PMV BLD AUTO: 10.3 FL (ref 9–12.9)
POTASSIUM SERPL-SCNC: 4.5 MMOL/L (ref 3.6–5.5)
RBC # BLD AUTO: 4.2 M/UL (ref 4.7–6.1)
SODIUM SERPL-SCNC: 134 MMOL/L (ref 135–145)
WBC # BLD AUTO: 12.7 K/UL (ref 4.8–10.8)

## 2020-05-22 PROCEDURE — 700111 HCHG RX REV CODE 636 W/ 250 OVERRIDE (IP): Performed by: SURGERY

## 2020-05-22 PROCEDURE — 700101 HCHG RX REV CODE 250: Performed by: SURGERY

## 2020-05-22 PROCEDURE — 99291 CRITICAL CARE FIRST HOUR: CPT | Performed by: SURGERY

## 2020-05-22 PROCEDURE — A9270 NON-COVERED ITEM OR SERVICE: HCPCS | Performed by: NURSE PRACTITIONER

## 2020-05-22 PROCEDURE — U0004 COV-19 TEST NON-CDC HGH THRU: HCPCS

## 2020-05-22 PROCEDURE — 99356 PR PROLONGED SVC I/P OR OBS SETTING 1ST HOUR: CPT | Performed by: PHYSICAL MEDICINE & REHABILITATION

## 2020-05-22 PROCEDURE — A9270 NON-COVERED ITEM OR SERVICE: HCPCS | Performed by: SURGERY

## 2020-05-22 PROCEDURE — 700102 HCHG RX REV CODE 250 W/ 637 OVERRIDE(OP): Performed by: PHYSICAL MEDICINE & REHABILITATION

## 2020-05-22 PROCEDURE — 94003 VENT MGMT INPAT SUBQ DAY: CPT

## 2020-05-22 PROCEDURE — 94640 AIRWAY INHALATION TREATMENT: CPT

## 2020-05-22 PROCEDURE — 71045 X-RAY EXAM CHEST 1 VIEW: CPT

## 2020-05-22 PROCEDURE — 700102 HCHG RX REV CODE 250 W/ 637 OVERRIDE(OP): Performed by: SURGERY

## 2020-05-22 PROCEDURE — 99232 SBSQ HOSP IP/OBS MODERATE 35: CPT | Mod: 25 | Performed by: PHYSICAL MEDICINE & REHABILITATION

## 2020-05-22 PROCEDURE — A9270 NON-COVERED ITEM OR SERVICE: HCPCS | Performed by: PHYSICAL MEDICINE & REHABILITATION

## 2020-05-22 PROCEDURE — 97530 THERAPEUTIC ACTIVITIES: CPT

## 2020-05-22 PROCEDURE — 80048 BASIC METABOLIC PNL TOTAL CA: CPT

## 2020-05-22 PROCEDURE — 302101 FENESTRATED FOAM: Performed by: SURGERY

## 2020-05-22 PROCEDURE — C9803 HOPD COVID-19 SPEC COLLECT: HCPCS | Performed by: SURGERY

## 2020-05-22 PROCEDURE — 700102 HCHG RX REV CODE 250 W/ 637 OVERRIDE(OP): Performed by: NURSE PRACTITIONER

## 2020-05-22 PROCEDURE — 770022 HCHG ROOM/CARE - ICU (200)

## 2020-05-22 PROCEDURE — 85025 COMPLETE CBC W/AUTO DIFF WBC: CPT

## 2020-05-22 RX ADMIN — AMANTADINE HYDROCHLORIDE 100 MG: 100 CAPSULE ORAL at 05:25

## 2020-05-22 RX ADMIN — AMANTADINE HYDROCHLORIDE 100 MG: 100 CAPSULE ORAL at 12:44

## 2020-05-22 RX ADMIN — SODIUM BICARBONATE 3 ML: 84 INJECTION, SOLUTION INTRAVENOUS at 14:21

## 2020-05-22 RX ADMIN — SODIUM BICARBONATE 3 ML: 84 INJECTION, SOLUTION INTRAVENOUS at 02:07

## 2020-05-22 RX ADMIN — SODIUM BICARBONATE 3 ML: 84 INJECTION, SOLUTION INTRAVENOUS at 06:07

## 2020-05-22 RX ADMIN — ALBUTEROL SULFATE 2.5 MG: 2.5 SOLUTION RESPIRATORY (INHALATION) at 06:07

## 2020-05-22 RX ADMIN — ALBUTEROL SULFATE 2.5 MG: 2.5 SOLUTION RESPIRATORY (INHALATION) at 14:21

## 2020-05-22 RX ADMIN — FAMOTIDINE 20 MG: 20 TABLET ORAL at 18:21

## 2020-05-22 RX ADMIN — HEPARIN SODIUM 5000 UNITS: 5000 INJECTION, SOLUTION INTRAVENOUS; SUBCUTANEOUS at 05:25

## 2020-05-22 RX ADMIN — AMOXICILLIN AND CLAVULANATE POTASSIUM 1 TABLET: 875; 125 TABLET, FILM COATED ORAL at 18:21

## 2020-05-22 RX ADMIN — HEPARIN SODIUM 5000 UNITS: 5000 INJECTION, SOLUTION INTRAVENOUS; SUBCUTANEOUS at 16:22

## 2020-05-22 RX ADMIN — ENALAPRIL MALEATE 5 MG: 5 TABLET ORAL at 05:26

## 2020-05-22 RX ADMIN — ALBUTEROL SULFATE 2.5 MG: 2.5 SOLUTION RESPIRATORY (INHALATION) at 20:00

## 2020-05-22 RX ADMIN — HEPARIN SODIUM 5000 UNITS: 5000 INJECTION, SOLUTION INTRAVENOUS; SUBCUTANEOUS at 22:56

## 2020-05-22 RX ADMIN — SODIUM BICARBONATE 3 ML: 84 INJECTION, SOLUTION INTRAVENOUS at 20:00

## 2020-05-22 RX ADMIN — FAMOTIDINE 20 MG: 20 TABLET ORAL at 05:25

## 2020-05-22 RX ADMIN — AMOXICILLIN AND CLAVULANATE POTASSIUM 1 TABLET: 875; 125 TABLET, FILM COATED ORAL at 05:26

## 2020-05-22 RX ADMIN — ALBUTEROL SULFATE 2.5 MG: 2.5 SOLUTION RESPIRATORY (INHALATION) at 02:06

## 2020-05-22 ASSESSMENT — FIBROSIS 4 INDEX: FIB4 SCORE: 1.14

## 2020-05-22 ASSESSMENT — COGNITIVE AND FUNCTIONAL STATUS - GENERAL
SUGGESTED CMS G CODE MODIFIER MOBILITY: CN
MOVING FROM LYING ON BACK TO SITTING ON SIDE OF FLAT BED: UNABLE
CLIMB 3 TO 5 STEPS WITH RAILING: TOTAL
MOVING TO AND FROM BED TO CHAIR: UNABLE
WALKING IN HOSPITAL ROOM: TOTAL
TURNING FROM BACK TO SIDE WHILE IN FLAT BAD: UNABLE
STANDING UP FROM CHAIR USING ARMS: TOTAL
MOBILITY SCORE: 6

## 2020-05-22 ASSESSMENT — GAIT ASSESSMENTS: GAIT LEVEL OF ASSIST: UNABLE TO PARTICIPATE
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
## 2020-05-22 NOTE — CARE PLAN
Problem: Venous Thromboembolism (VTW)/Deep Vein Thrombosis (DVT) Prevention:  Goal: Patient will participate in Venous Thrombosis (VTE)/Deep Vein Thrombosis (DVT)Prevention Measures  Outcome: PROGRESSING AS EXPECTED     Problem: Bowel/Gastric:  Goal: Normal bowel function is maintained or improved  Outcome: PROGRESSING AS EXPECTED     Problem: Knowledge Deficit  Goal: Knowledge of disease process/condition, treatment plan, diagnostic tests, and medications will improve  Outcome: PROGRESSING AS EXPECTED
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
## 2020-05-22 NOTE — CARE PLAN
Ventilator Daily Summary    Vent Day #6    Ventilator settings changed this shift:%/+8/30%    Weaning trials: T-piece trail goal is 24hrs. T- piece trail initiated at 0626. T- piece settings 4lpm/28%    Respiratory Procedures:none    Plan: Continue current ventilator settings and wean mechanical ventilation as tolerated per physician orders.

## 2020-05-22 NOTE — THERAPY
"Physical Therapy   Daily Treatment     Patient Name: Reyes Amezcua  Age:  64 y.o., Sex:  male  Medical Record #: [id number]  Today's Date: 5/22/2020     Precautions: Non Weight Bearing Right Upper Extremity, Fall Risk, Tracheostomy , Swallow Precautions, Nasogastric Tube    Assessment    Pt with improved command following today and initiation in fxnl mob. Pt with consistent eye opening. Able to localize therapist when therapist asked pt \"look at me\". Pt also scanning room for RN when RN informed pt she would look for glasses. Pt able to use LUE to reach and grasp object when asked to do so in the supine position. Pt unable to distinguish between two different objects today. Pt initiating attaining EOB, requiring max A to sequence this task. He conts to demonstrate impaired balance in sitting with lack of righting rxns. Pt initiating STS x5 reps today. Initially, pt requiring Max A for this task and was dependent to maintain hip extension for standing. With subsequent trials, pt with improved strength to complete STS transition as well as maintain standing with a posterior lean. Pt was able to initiate sit>stand within 7-8s of verbal command and stand>sit within 2s of the command. Pt did nod his head today when therapist asked \"are you telling me no?\" on one occasion, this could not be reproduced on another trial. Plan to increase frequency of PT intervention as pt with improved participation and command following. Continue to recommend placement.    Plan    Treatment plan modified to 3 times per week until therapy goals are met for the following treatments:  Bed Mobility, Gait Training, Manual Therapy, Neuro Re-Education / Balance, Orthotics Training, Self Care/Home Evaluation, Sensory Integration Techniques, Stair Training, Therapeutic Activities and Therapeutic Exercises.    Discharge recommendations:  Recommend post-acute placement for continued physical therapy services prior to discharge home.          " "05/22/20 1231   Cognition    Cognition / Consciousness X   Speech/ Communication Intubated / Trached   Level of Consciousness Lethargic   Ability To Follow Commands Other (See Comments)  (Follows 1 step commands ~30% of time with delay 3-8s)   Comments Pt with increased command follow. Able to use LUE to reach and grab object. Able to initiate mob with command consistently. Pt initiated bed mob and STS. When pt did not follow command to STS therapist asked \"are you telling me no\" and pt nodded his head.   Strength Lower Body   Lower Body Strength  X   Comments Pt able to initiate STS max>mod A with repetition   Vision   Occulomotor Scanning   Vision Comments Pt able to visualize therapist when given command \"look at me\" in opposite direction, pt able to scan for RN in room. Does not appear to be tracking objects   Other Treatments   Other Treatments Provided Spouse informed of therapy session outcome   Balance   Sitting Balance (Static) Poor +   Sitting Balance (Dynamic) Poor -   Standing Balance (Static) Trace +   Weight Shift Sitting Poor   Comments Pt initiating scooting EOB, posterior lean with sitting/standing, lack of righting rxns sitting   Gait Analysis   Gait Level Of Assist Unable to Participate   Bed Mobility    Supine to Sit Maximal Assist  (Pt initiating)   Sit to Supine Maximal Assist  (Pt initiating sequence to lie down)   Functional Mobility   Sit to Stand Maximal Assist  (progressed to mod A with repetition)   Activity Tolerance   Sitting Edge of Bed 10 min   Standing 5 reps 20-30s each   Comments significant fatigue following session   Short Term Goals    Short Term Goal # 1 Pt will sit upright EOB at min A in 6tx to improve upright trunk control.    Goal Outcome # 1 Progressing as expected   Short Term Goal # 2 Pt will perform bed mob at min A in 6tx to improve functional mobiltiy.    Goal Outcome # 2 Goal not met   Short Term Goal # 3 Pt will achieve a 6 on the Motor Function Scale portion of the " JUAN M in 6tx to improve functional mobility.    Goal Outcome # 3 Progressing as expected  ([place] motor scale 4/6- reaching with LUE)   Short Term Goal # 4 Pt will perform fxnl transfers with mod A within 6 visits.   Anticipated Discharge Equipment   DC Equipment Unable To Determine At This Time

## 2020-05-22 NOTE — CARE PLAN
Ventilator Daily Summary    Vent Day #14   Trach day 7    Ventilator settings changed this shift:  +8 30%    8.0 Portex     Weaning trials: Pt on aerosol 4L 28% for 15 hours. Placed back on vent after becoming hypertensive and tachypneic        Plan: Continue current ventilator settings and wean mechanical ventilation as tolerated per physician orders.

## 2020-05-22 NOTE — PROGRESS NOTES
2 RN skin check complete with Ade HARRELL RN     Devices in place: pulse ox, BP cuff, cardiac monitor, trache, peripheral IV, SCDs, patricio catheter, bilateral soft wrist restraints.  Skin assessed under devices: Yes.    Head to Toe Skin assessment:   Scabbed/healing abrasions to BUEs, BLEs, nose, & Right head   Blanchable redness under trach site- dressing in place   Heels red/blanching- Mepilexs in place; alternating Foot drop boot and Soft cushion boot  Bruising to Right eye & Right posterior shoulder    Interventions:   Q2H turning; Heels floated; Ensure patient clear of tubing & lines; Mepilex; Low air loss mattress; Z Pillow in place

## 2020-05-22 NOTE — CARE PLAN
Problem: Skin Integrity  Goal: Risk for impaired skin integrity will decrease  Outcome: PROGRESSING AS EXPECTED  Note: Problem: Skin Integrity  Goal: Skin Integrity is maintained or improved  Intervention: Durga Skin Risk Assessment  Durga assessed q shift-15, repositioned q 2hrs  Intervention: TURN EVERY 2 HOURS WHILE ON BEDREST  Pillows for positioning, mattress pressure weight specific, SCDs in place, Heel Float Boots in Place      Problem: Mobility  Goal: Risk for activity intolerance will decrease  Outcome: PROGRESSING AS EXPECTED  Note: Patient mobilized to EOB x 10 minutes; Pt did tolerate EOB; Patient able to participate in mobilization with moderate assist.
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
## 2020-05-23 ENCOUNTER — ANESTHESIA EVENT (OUTPATIENT)
Dept: SURGERY | Facility: MEDICAL CENTER | Age: 65
DRG: 004 | End: 2020-05-23
Payer: COMMERCIAL

## 2020-05-23 ENCOUNTER — APPOINTMENT (OUTPATIENT)
Dept: RADIOLOGY | Facility: MEDICAL CENTER | Age: 65
DRG: 004 | End: 2020-05-23
Attending: NURSE PRACTITIONER
Payer: COMMERCIAL

## 2020-05-23 ENCOUNTER — ANESTHESIA (OUTPATIENT)
Dept: SURGERY | Facility: MEDICAL CENTER | Age: 65
DRG: 004 | End: 2020-05-23
Payer: COMMERCIAL

## 2020-05-23 PROBLEM — K63.9 COLON WALL THICKENING: Status: ACTIVE | Noted: 2020-05-23

## 2020-05-23 LAB
ANION GAP SERPL CALC-SCNC: 15 MMOL/L (ref 7–16)
BACTERIA BLD CULT: NORMAL
BACTERIA BLD CULT: NORMAL
BASOPHILS # BLD AUTO: 0.8 % (ref 0–1.8)
BASOPHILS # BLD: 0.1 K/UL (ref 0–0.12)
BUN SERPL-MCNC: 28 MG/DL (ref 8–22)
CALCIUM SERPL-MCNC: 9.8 MG/DL (ref 8.5–10.5)
CHLORIDE SERPL-SCNC: 100 MMOL/L (ref 96–112)
CO2 SERPL-SCNC: 19 MMOL/L (ref 20–33)
CREAT SERPL-MCNC: 0.54 MG/DL (ref 0.5–1.4)
EOSINOPHIL # BLD AUTO: 0.26 K/UL (ref 0–0.51)
EOSINOPHIL NFR BLD: 2.1 % (ref 0–6.9)
ERYTHROCYTE [DISTWIDTH] IN BLOOD BY AUTOMATED COUNT: 44.2 FL (ref 35.9–50)
GLUCOSE SERPL-MCNC: 122 MG/DL (ref 65–99)
HCT VFR BLD AUTO: 39.9 % (ref 42–52)
HGB BLD-MCNC: 13 G/DL (ref 14–18)
IMM GRANULOCYTES # BLD AUTO: 0.12 K/UL (ref 0–0.11)
IMM GRANULOCYTES NFR BLD AUTO: 1 % (ref 0–0.9)
LYMPHOCYTES # BLD AUTO: 2.35 K/UL (ref 1–4.8)
LYMPHOCYTES NFR BLD: 18.7 % (ref 22–41)
MCH RBC QN AUTO: 29.9 PG (ref 27–33)
MCHC RBC AUTO-ENTMCNC: 32.6 G/DL (ref 33.7–35.3)
MCV RBC AUTO: 91.7 FL (ref 81.4–97.8)
MONOCYTES # BLD AUTO: 0.86 K/UL (ref 0–0.85)
MONOCYTES NFR BLD AUTO: 6.9 % (ref 0–13.4)
NEUTROPHILS # BLD AUTO: 8.86 K/UL (ref 1.82–7.42)
NEUTROPHILS NFR BLD: 70.5 % (ref 44–72)
NRBC # BLD AUTO: 0 K/UL
NRBC BLD-RTO: 0 /100 WBC
PLATELET # BLD AUTO: 588 K/UL (ref 164–446)
PMV BLD AUTO: 10.3 FL (ref 9–12.9)
POTASSIUM SERPL-SCNC: 4.3 MMOL/L (ref 3.6–5.5)
RBC # BLD AUTO: 4.35 M/UL (ref 4.7–6.1)
SARS-COV-2 RNA RESP QL NAA+PROBE: NOTDETECTED
SIGNIFICANT IND 70042: NORMAL
SIGNIFICANT IND 70042: NORMAL
SITE SITE: NORMAL
SITE SITE: NORMAL
SODIUM SERPL-SCNC: 134 MMOL/L (ref 135–145)
SOURCE SOURCE: NORMAL
SOURCE SOURCE: NORMAL
SPECIMEN SOURCE: NORMAL
WBC # BLD AUTO: 12.6 K/UL (ref 4.8–10.8)

## 2020-05-23 PROCEDURE — A9270 NON-COVERED ITEM OR SERVICE: HCPCS | Performed by: NURSE PRACTITIONER

## 2020-05-23 PROCEDURE — 700105 HCHG RX REV CODE 258: Performed by: ANESTHESIOLOGY

## 2020-05-23 PROCEDURE — 700102 HCHG RX REV CODE 250 W/ 637 OVERRIDE(OP): Performed by: SURGERY

## 2020-05-23 PROCEDURE — 71045 X-RAY EXAM CHEST 1 VIEW: CPT

## 2020-05-23 PROCEDURE — A9270 NON-COVERED ITEM OR SERVICE: HCPCS | Performed by: PHYSICAL MEDICINE & REHABILITATION

## 2020-05-23 PROCEDURE — 700102 HCHG RX REV CODE 250 W/ 637 OVERRIDE(OP): Performed by: PHYSICAL MEDICINE & REHABILITATION

## 2020-05-23 PROCEDURE — 94640 AIRWAY INHALATION TREATMENT: CPT

## 2020-05-23 PROCEDURE — A9270 NON-COVERED ITEM OR SERVICE: HCPCS | Performed by: SURGERY

## 2020-05-23 PROCEDURE — A6402 STERILE GAUZE <= 16 SQ IN: HCPCS | Performed by: SURGERY

## 2020-05-23 PROCEDURE — 700111 HCHG RX REV CODE 636 W/ 250 OVERRIDE (IP): Performed by: SURGERY

## 2020-05-23 PROCEDURE — 43653 LAPAROSCOPY GASTROSTOMY: CPT | Performed by: SURGERY

## 2020-05-23 PROCEDURE — 500868 HCHG NEEDLE, SURGI(VARES): Performed by: SURGERY

## 2020-05-23 PROCEDURE — 160208 HCHG ENDO MINUTES - EA ADDL 1 MIN LEVEL 4: Performed by: SURGERY

## 2020-05-23 PROCEDURE — 700101 HCHG RX REV CODE 250: Performed by: SURGERY

## 2020-05-23 PROCEDURE — 700102 HCHG RX REV CODE 250 W/ 637 OVERRIDE(OP): Performed by: NURSE PRACTITIONER

## 2020-05-23 PROCEDURE — 700111 HCHG RX REV CODE 636 W/ 250 OVERRIDE (IP): Performed by: ANESTHESIOLOGY

## 2020-05-23 PROCEDURE — 501572 HCHG TROCAR, SHIELD OBTU 5X100: Performed by: SURGERY

## 2020-05-23 PROCEDURE — 502571 HCHG PACK, LAP CHOLE: Performed by: SURGERY

## 2020-05-23 PROCEDURE — 80048 BASIC METABOLIC PNL TOTAL CA: CPT

## 2020-05-23 PROCEDURE — 501838 HCHG SUTURE GENERAL: Performed by: SURGERY

## 2020-05-23 PROCEDURE — 700112 HCHG RX REV CODE 229: Performed by: NURSE PRACTITIONER

## 2020-05-23 PROCEDURE — 0DH64UZ INSERTION OF FEEDING DEVICE INTO STOMACH, PERCUTANEOUS ENDOSCOPIC APPROACH: ICD-10-PCS | Performed by: SURGERY

## 2020-05-23 PROCEDURE — 502240 HCHG MISC OR SUPPLY RC 0272: Performed by: SURGERY

## 2020-05-23 PROCEDURE — 501570 HCHG TROCAR, SEPARATOR: Performed by: SURGERY

## 2020-05-23 PROCEDURE — 770022 HCHG ROOM/CARE - ICU (200)

## 2020-05-23 PROCEDURE — 99291 CRITICAL CARE FIRST HOUR: CPT | Mod: 25 | Performed by: SURGERY

## 2020-05-23 PROCEDURE — 500440 HCHG DRESSING, STERILE ROLL (KERLIX): Performed by: SURGERY

## 2020-05-23 PROCEDURE — 85025 COMPLETE CBC W/AUTO DIFF WBC: CPT

## 2020-05-23 PROCEDURE — 160203 HCHG ENDO MINUTES - 1ST 30 MINS LEVEL 4: Performed by: SURGERY

## 2020-05-23 PROCEDURE — 160009 HCHG ANES TIME/MIN: Performed by: SURGERY

## 2020-05-23 PROCEDURE — 0WJG4ZZ INSPECTION OF PERITONEAL CAVITY, PERCUTANEOUS ENDOSCOPIC APPROACH: ICD-10-PCS | Performed by: SURGERY

## 2020-05-23 PROCEDURE — 160048 HCHG OR STATISTICAL LEVEL 1-5: Performed by: SURGERY

## 2020-05-23 RX ORDER — OXYCODONE HCL 5 MG/5 ML
10 SOLUTION, ORAL ORAL
Status: DISCONTINUED | OUTPATIENT
Start: 2020-05-23 | End: 2020-05-23

## 2020-05-23 RX ORDER — DIPHENHYDRAMINE HYDROCHLORIDE 50 MG/ML
12.5 INJECTION INTRAMUSCULAR; INTRAVENOUS
Status: DISCONTINUED | OUTPATIENT
Start: 2020-05-23 | End: 2020-05-23

## 2020-05-23 RX ORDER — SUCCINYLCHOLINE/SOD CL,ISO/PF 200MG/10ML
SYRINGE (ML) INTRAVENOUS PRN
Status: DISCONTINUED | OUTPATIENT
Start: 2020-05-23 | End: 2020-05-23 | Stop reason: SURG

## 2020-05-23 RX ORDER — CEFAZOLIN SODIUM 1 G/3ML
INJECTION, POWDER, FOR SOLUTION INTRAMUSCULAR; INTRAVENOUS PRN
Status: DISCONTINUED | OUTPATIENT
Start: 2020-05-23 | End: 2020-05-23 | Stop reason: SURG

## 2020-05-23 RX ORDER — SODIUM CHLORIDE, SODIUM LACTATE, POTASSIUM CHLORIDE, CALCIUM CHLORIDE 600; 310; 30; 20 MG/100ML; MG/100ML; MG/100ML; MG/100ML
INJECTION, SOLUTION INTRAVENOUS
Status: DISCONTINUED | OUTPATIENT
Start: 2020-05-23 | End: 2020-05-23 | Stop reason: SURG

## 2020-05-23 RX ORDER — BUPIVACAINE HYDROCHLORIDE AND EPINEPHRINE 5; 5 MG/ML; UG/ML
INJECTION, SOLUTION EPIDURAL; INTRACAUDAL; PERINEURAL
Status: DISCONTINUED | OUTPATIENT
Start: 2020-05-23 | End: 2020-05-23 | Stop reason: HOSPADM

## 2020-05-23 RX ORDER — ONDANSETRON 2 MG/ML
4 INJECTION INTRAMUSCULAR; INTRAVENOUS
Status: DISCONTINUED | OUTPATIENT
Start: 2020-05-23 | End: 2020-05-23

## 2020-05-23 RX ORDER — SODIUM CHLORIDE, SODIUM LACTATE, POTASSIUM CHLORIDE, CALCIUM CHLORIDE 600; 310; 30; 20 MG/100ML; MG/100ML; MG/100ML; MG/100ML
INJECTION, SOLUTION INTRAVENOUS CONTINUOUS
Status: DISCONTINUED | OUTPATIENT
Start: 2020-05-23 | End: 2020-05-23

## 2020-05-23 RX ORDER — OXYCODONE HCL 5 MG/5 ML
5 SOLUTION, ORAL ORAL
Status: DISCONTINUED | OUTPATIENT
Start: 2020-05-23 | End: 2020-05-23

## 2020-05-23 RX ORDER — HALOPERIDOL 5 MG/ML
1 INJECTION INTRAMUSCULAR
Status: DISCONTINUED | OUTPATIENT
Start: 2020-05-23 | End: 2020-05-23

## 2020-05-23 RX ADMIN — HEPARIN SODIUM 5000 UNITS: 5000 INJECTION, SOLUTION INTRAVENOUS; SUBCUTANEOUS at 22:56

## 2020-05-23 RX ADMIN — SODIUM BICARBONATE 3 ML: 84 INJECTION, SOLUTION INTRAVENOUS at 20:00

## 2020-05-23 RX ADMIN — CEFAZOLIN 2 G: 330 INJECTION, POWDER, FOR SOLUTION INTRAMUSCULAR; INTRAVENOUS at 11:29

## 2020-05-23 RX ADMIN — ALBUTEROL SULFATE 2.5 MG: 2.5 SOLUTION RESPIRATORY (INHALATION) at 20:00

## 2020-05-23 RX ADMIN — AMANTADINE HYDROCHLORIDE 100 MG: 100 CAPSULE ORAL at 04:47

## 2020-05-23 RX ADMIN — ALBUTEROL SULFATE 2.5 MG: 2.5 SOLUTION RESPIRATORY (INHALATION) at 07:15

## 2020-05-23 RX ADMIN — SODIUM BICARBONATE 3 ML: 84 INJECTION, SOLUTION INTRAVENOUS at 14:35

## 2020-05-23 RX ADMIN — FENTANYL CITRATE 250 MCG: 50 INJECTION, SOLUTION INTRAMUSCULAR; INTRAVENOUS at 11:27

## 2020-05-23 RX ADMIN — HEPARIN SODIUM 5000 UNITS: 5000 INJECTION, SOLUTION INTRAVENOUS; SUBCUTANEOUS at 13:54

## 2020-05-23 RX ADMIN — SODIUM CHLORIDE, POTASSIUM CHLORIDE, SODIUM LACTATE AND CALCIUM CHLORIDE: 600; 310; 30; 20 INJECTION, SOLUTION INTRAVENOUS at 11:20

## 2020-05-23 RX ADMIN — Medication 60 MG: at 11:41

## 2020-05-23 RX ADMIN — SODIUM BICARBONATE 3 ML: 84 INJECTION, SOLUTION INTRAVENOUS at 02:00

## 2020-05-23 RX ADMIN — HEPARIN SODIUM 5000 UNITS: 5000 INJECTION, SOLUTION INTRAVENOUS; SUBCUTANEOUS at 04:48

## 2020-05-23 RX ADMIN — AMOXICILLIN AND CLAVULANATE POTASSIUM 1 TABLET: 875; 125 TABLET, FILM COATED ORAL at 17:29

## 2020-05-23 RX ADMIN — SODIUM BICARBONATE 3 ML: 84 INJECTION, SOLUTION INTRAVENOUS at 07:15

## 2020-05-23 RX ADMIN — FAMOTIDINE 20 MG: 20 TABLET ORAL at 17:29

## 2020-05-23 RX ADMIN — AMANTADINE HYDROCHLORIDE 100 MG: 100 CAPSULE ORAL at 14:08

## 2020-05-23 RX ADMIN — SODIUM CHLORIDE, POTASSIUM CHLORIDE, SODIUM LACTATE AND CALCIUM CHLORIDE: 600; 310; 30; 20 INJECTION, SOLUTION INTRAVENOUS at 13:54

## 2020-05-23 RX ADMIN — AMOXICILLIN AND CLAVULANATE POTASSIUM 1 TABLET: 875; 125 TABLET, FILM COATED ORAL at 04:47

## 2020-05-23 RX ADMIN — ALBUTEROL SULFATE 2.5 MG: 2.5 SOLUTION RESPIRATORY (INHALATION) at 02:00

## 2020-05-23 RX ADMIN — ENALAPRIL MALEATE 5 MG: 5 TABLET ORAL at 04:47

## 2020-05-23 RX ADMIN — FAMOTIDINE 20 MG: 20 TABLET ORAL at 04:48

## 2020-05-23 RX ADMIN — DOCUSATE SODIUM 100 MG: 50 LIQUID ORAL at 17:29

## 2020-05-23 RX ADMIN — ALBUTEROL SULFATE 2.5 MG: 2.5 SOLUTION RESPIRATORY (INHALATION) at 14:35

## 2020-05-23 ASSESSMENT — FIBROSIS 4 INDEX: FIB4 SCORE: 0.8

## 2020-05-23 ASSESSMENT — PAIN SCALES - GENERAL: PAIN_LEVEL: 0

## 2020-05-23 NOTE — ANESTHESIA POSTPROCEDURE EVALUATION
Patient: Reyes Amezcua    Procedure Summary     Date:  05/23/20 Room / Location:  [facility] 09 / SURGERY [facility]    Anesthesia Start:  1120 Anesthesia Stop:  1208    Procedure:  CREATION, GASTROSTOMY, LAPAROSCOPIC (Abdomen) Diagnosis:      Surgeon:  Norbert Prajapati M.D. Responsible Provider:  Elton Ballard M.D.    Anesthesia Type:  general ASA Status:  3          Final Anesthesia Type: general  Last vitals  BP   Blood Pressure: 122/85    Temp   36.7 °C (98 °F)    Pulse   Pulse: 93   Resp   18    SpO2   98 %      Anesthesia Post Evaluation    Patient location during evaluation: PACU  Patient participation: complete - patient participated  Level of consciousness: sleepy but conscious  Pain score: 0    Airway patency: patent  Anesthetic complications: no  Cardiovascular status: hemodynamically stable  Respiratory status: T-piece and spontaneous ventilation  Hydration status: euvolemic    PONV: none

## 2020-05-23 NOTE — PROGRESS NOTES
Patient returned from OR.  Patient is awake and spontaneously breathing.  Patient placed back on t-piece.

## 2020-05-23 NOTE — PROGRESS NOTES
Trauma staff      Plan laparoscopic gastrostomy tube today.    Norbert Prajapati MD  [phone]

## 2020-05-23 NOTE — ANESTHESIA PREPROCEDURE EVALUATION
Relevant Problems   No relevant active problems       Physical Exam    Airway   Mallampati: II  TM distance: >3 FB  Neck ROM: full  Patient is intubated/trached     Cardiovascular - normal exam  Rhythm: regular  Rate: normal  (-) murmur     Dental - normal exam           Pulmonary - normal exam  Breath sounds clear to auscultation     Abdominal    Neurological - normal exam                 Anesthesia Plan    ASA 3   ASA physical status 3 criteria: respiratory insufficiency or compromise    Plan - general       Airway plan will be Tracheostomy        Induction: intravenous    Postoperative Plan: Postoperative administration of opioids is intended.    Pertinent diagnostic labs and testing reviewed    Informed Consent:    Anesthetic plan and risks discussed with patient.    Use of blood products discussed with: patient whom consented to blood products.

## 2020-05-23 NOTE — ASSESSMENT & PLAN NOTE
Bouts of right sided abdominal pain in January 2020.  Admission CT showed thickened right colon, findings confirmed at laparoscopy during G tube placement.  Recommend outpatient colonoscopy.

## 2020-05-23 NOTE — CARE PLAN
Ventilator Daily Summary    Vent Day # 14  Airway day# 7      Ventilator settings changed this shift:%/+8/30%    Weaning trials: T- piece trials, 4lpm/28%, 24hr trial initialed at 0609    Respiratory Procedures:none    Plan: Continue current ventilator settings and wean mechanical ventilation as tolerated per physician orders.

## 2020-05-23 NOTE — CARE PLAN
Problem: Skin Integrity  Goal: Risk for impaired skin integrity will decrease  Outcome: PROGRESSING AS EXPECTED  Note: Patient's skin assessed around all medical devices, patient has mepliex in place to coccyx and z-toy pillow in use.       Problem: Mobility  Goal: Risk for activity intolerance will decrease  Outcome: PROGRESSING AS EXPECTED  Note: Goal to mobilize patient three times per day.  Patient to edge of bed and stand prior to OR, goal for a chair this afternoon.  PT and OT involved.

## 2020-05-23 NOTE — OR NURSING
WHO checklist completed with OR RN, Consents signed by wife and by 2 RN's over the phone. Patient straight to OR from ICU.
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
## 2020-05-23 NOTE — ANESTHESIA PREPROCEDURE EVALUATION
Relevant Problems   No relevant active problems       Physical Exam    Airway   Mallampati: II  TM distance: >3 FB  Neck ROM: full  Patient is intubated/trached     Cardiovascular - normal exam  Rhythm: regular  Rate: normal  (-) murmur     Dental - normal exam           Pulmonary - normal exam  Breath sounds clear to auscultation     Abdominal    Neurological - normal exam                 Anesthesia Plan    ASA 3       Plan - general       Airway plan will be Tracheostomy        Induction: intravenous    Postoperative Plan: Postoperative administration of opioids is intended.    Pertinent diagnostic labs and testing reviewed    Informed Consent:    Anesthetic plan and risks discussed with patient.    Use of blood products discussed with: patient whom consented to blood products.

## 2020-05-23 NOTE — ANESTHESIA QCDR
2019 [facility] Clinical Data Registry (for Quality Improvement)     Postoperative nausea/vomiting risk protocol (Adult = 18 yrs and Pediatric 3-17 yrs)- (430 and 463)  General inhalation anesthetic (NOT TIVA) with PONV risk factors: No  Provision of anti-emetic therapy with at least 2 different classes of agents: N/A  Patient DID NOT receive anti-emetic therapy and reason is documented in Medical Record: N/A    Multimodal Pain Management- (477)  Non-emergent surgery AND patient age >= 18: Yes  Use of Multimodal Pain Management, two or more drugs and/or interventions, NOT including systemic opioids: No  Exception: Documented allergy to multiple classes of analgesics: No       Smoking Abstinence (404)  Patient is current smoker (cigarette, pipe, e-cig, marijuanna): No  Elective Surgery:   Abstinence instructions provided prior to day of surgery:   Patient abstained from smoking on day of surgery:     Pre-Op Beta-Blocker in Isolated CABG (44)  Isolated CABG AND patient age >= 18: No  Beta-blocker admin within 24 hours of surgical incision:   Exception:of medical reason(s) for not administering beta blocker within 24 hours prior to surgical incision (e.g., not  indicated,other medical reason):     PACU assessment of acute postoperative pain prior to Anesthesia Care End- Applies to Patients Age = 18- (ABG7)  Initial PACU pain score is which of the following: < 7/10  Patient unable to report pain score: N/A    Post-anesthetic transfer of care checklist/protocol to PACU/ICU- (426 and 427)  Upon conclusion of case, patient transferred to which of the following locations: PACU/Non-ICU  Use of transfer checklist/protocol: Yes  Exclusion: Service Performed in Patient Hospital Room (and thus did not require transfer): N/A  Unplanned admission to ICU related to anesthesia service up through end of PACU care- (MD51)  Unplanned admission to ICU (not initially anticipated at anesthesia start time): No

## 2020-05-23 NOTE — PROGRESS NOTES
Trauma staff progress note      Spoke with patient's wife Diane. Full update of condition, operative proceedings, and plans of care given. All questions answered.     Norbert Prajapati MD  [phone]
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
## 2020-05-24 ENCOUNTER — APPOINTMENT (OUTPATIENT)
Dept: RADIOLOGY | Facility: MEDICAL CENTER | Age: 65
DRG: 004 | End: 2020-05-24
Attending: NURSE PRACTITIONER
Payer: COMMERCIAL

## 2020-05-24 LAB
ANION GAP SERPL CALC-SCNC: 13 MMOL/L (ref 7–16)
BASOPHILS # BLD AUTO: 0.6 % (ref 0–1.8)
BASOPHILS # BLD: 0.08 K/UL (ref 0–0.12)
BUN SERPL-MCNC: 23 MG/DL (ref 8–22)
CALCIUM SERPL-MCNC: 9.4 MG/DL (ref 8.5–10.5)
CHLORIDE SERPL-SCNC: 96 MMOL/L (ref 96–112)
CO2 SERPL-SCNC: 21 MMOL/L (ref 20–33)
CREAT SERPL-MCNC: 0.55 MG/DL (ref 0.5–1.4)
EOSINOPHIL # BLD AUTO: 0.12 K/UL (ref 0–0.51)
EOSINOPHIL NFR BLD: 0.8 % (ref 0–6.9)
ERYTHROCYTE [DISTWIDTH] IN BLOOD BY AUTOMATED COUNT: 43.8 FL (ref 35.9–50)
GLUCOSE SERPL-MCNC: 108 MG/DL (ref 65–99)
HCT VFR BLD AUTO: 37 % (ref 42–52)
HGB BLD-MCNC: 12 G/DL (ref 14–18)
IMM GRANULOCYTES # BLD AUTO: 0.11 K/UL (ref 0–0.11)
IMM GRANULOCYTES NFR BLD AUTO: 0.8 % (ref 0–0.9)
LYMPHOCYTES # BLD AUTO: 1.9 K/UL (ref 1–4.8)
LYMPHOCYTES NFR BLD: 13.1 % (ref 22–41)
MCH RBC QN AUTO: 29.9 PG (ref 27–33)
MCHC RBC AUTO-ENTMCNC: 32.4 G/DL (ref 33.7–35.3)
MCV RBC AUTO: 92 FL (ref 81.4–97.8)
MONOCYTES # BLD AUTO: 0.86 K/UL (ref 0–0.85)
MONOCYTES NFR BLD AUTO: 5.9 % (ref 0–13.4)
NEUTROPHILS # BLD AUTO: 11.41 K/UL (ref 1.82–7.42)
NEUTROPHILS NFR BLD: 78.8 % (ref 44–72)
NRBC # BLD AUTO: 0 K/UL
NRBC BLD-RTO: 0 /100 WBC
PLATELET # BLD AUTO: 549 K/UL (ref 164–446)
PMV BLD AUTO: 10.3 FL (ref 9–12.9)
POTASSIUM SERPL-SCNC: 4 MMOL/L (ref 3.6–5.5)
RBC # BLD AUTO: 4.02 M/UL (ref 4.7–6.1)
SODIUM SERPL-SCNC: 130 MMOL/L (ref 135–145)
WBC # BLD AUTO: 14.5 K/UL (ref 4.8–10.8)

## 2020-05-24 PROCEDURE — 94640 AIRWAY INHALATION TREATMENT: CPT

## 2020-05-24 PROCEDURE — 80048 BASIC METABOLIC PNL TOTAL CA: CPT

## 2020-05-24 PROCEDURE — A9270 NON-COVERED ITEM OR SERVICE: HCPCS | Performed by: PHYSICAL MEDICINE & REHABILITATION

## 2020-05-24 PROCEDURE — 700102 HCHG RX REV CODE 250 W/ 637 OVERRIDE(OP): Performed by: PHYSICAL MEDICINE & REHABILITATION

## 2020-05-24 PROCEDURE — 700101 HCHG RX REV CODE 250: Performed by: SURGERY

## 2020-05-24 PROCEDURE — A9270 NON-COVERED ITEM OR SERVICE: HCPCS | Performed by: NURSE PRACTITIONER

## 2020-05-24 PROCEDURE — 700102 HCHG RX REV CODE 250 W/ 637 OVERRIDE(OP): Performed by: NURSE PRACTITIONER

## 2020-05-24 PROCEDURE — A9270 NON-COVERED ITEM OR SERVICE: HCPCS | Performed by: SURGERY

## 2020-05-24 PROCEDURE — 71045 X-RAY EXAM CHEST 1 VIEW: CPT

## 2020-05-24 PROCEDURE — 700102 HCHG RX REV CODE 250 W/ 637 OVERRIDE(OP): Performed by: SURGERY

## 2020-05-24 PROCEDURE — 700111 HCHG RX REV CODE 636 W/ 250 OVERRIDE (IP): Performed by: SURGERY

## 2020-05-24 PROCEDURE — 99233 SBSQ HOSP IP/OBS HIGH 50: CPT | Mod: 24 | Performed by: SURGERY

## 2020-05-24 PROCEDURE — 770006 HCHG ROOM/CARE - MED/SURG/GYN SEMI*

## 2020-05-24 PROCEDURE — 302136 NUTRITION PUMP: Performed by: SURGERY

## 2020-05-24 PROCEDURE — 85025 COMPLETE CBC W/AUTO DIFF WBC: CPT

## 2020-05-24 RX ORDER — HYDRALAZINE HYDROCHLORIDE 20 MG/ML
10 INJECTION INTRAMUSCULAR; INTRAVENOUS EVERY 4 HOURS PRN
Status: DISCONTINUED | OUTPATIENT
Start: 2020-05-24 | End: 2020-05-24

## 2020-05-24 RX ORDER — LABETALOL HYDROCHLORIDE 5 MG/ML
10 INJECTION, SOLUTION INTRAVENOUS EVERY 4 HOURS PRN
Status: DISCONTINUED | OUTPATIENT
Start: 2020-05-24 | End: 2020-05-29 | Stop reason: HOSPADM

## 2020-05-24 RX ADMIN — HEPARIN SODIUM 5000 UNITS: 5000 INJECTION, SOLUTION INTRAVENOUS; SUBCUTANEOUS at 14:27

## 2020-05-24 RX ADMIN — ALBUTEROL SULFATE 2.5 MG: 2.5 SOLUTION RESPIRATORY (INHALATION) at 02:00

## 2020-05-24 RX ADMIN — HEPARIN SODIUM 5000 UNITS: 5000 INJECTION, SOLUTION INTRAVENOUS; SUBCUTANEOUS at 22:01

## 2020-05-24 RX ADMIN — AMOXICILLIN AND CLAVULANATE POTASSIUM 1 TABLET: 875; 125 TABLET, FILM COATED ORAL at 05:56

## 2020-05-24 RX ADMIN — SODIUM BICARBONATE 3 ML: 84 INJECTION, SOLUTION INTRAVENOUS at 14:11

## 2020-05-24 RX ADMIN — AMANTADINE HYDROCHLORIDE 100 MG: 100 CAPSULE ORAL at 05:56

## 2020-05-24 RX ADMIN — ALBUTEROL SULFATE 2.5 MG: 2.5 SOLUTION RESPIRATORY (INHALATION) at 14:11

## 2020-05-24 RX ADMIN — HEPARIN SODIUM 5000 UNITS: 5000 INJECTION, SOLUTION INTRAVENOUS; SUBCUTANEOUS at 05:56

## 2020-05-24 RX ADMIN — SODIUM BICARBONATE 3 ML: 84 INJECTION, SOLUTION INTRAVENOUS at 06:25

## 2020-05-24 RX ADMIN — SODIUM BICARBONATE 3 ML: 84 INJECTION, SOLUTION INTRAVENOUS at 02:00

## 2020-05-24 RX ADMIN — SODIUM BICARBONATE 3 ML: 84 INJECTION, SOLUTION INTRAVENOUS at 20:16

## 2020-05-24 RX ADMIN — ALBUTEROL SULFATE 2.5 MG: 2.5 SOLUTION RESPIRATORY (INHALATION) at 06:25

## 2020-05-24 RX ADMIN — FAMOTIDINE 20 MG: 20 TABLET ORAL at 05:56

## 2020-05-24 RX ADMIN — AMANTADINE HYDROCHLORIDE 100 MG: 100 CAPSULE ORAL at 12:04

## 2020-05-24 RX ADMIN — ALBUTEROL SULFATE 2.5 MG: 2.5 SOLUTION RESPIRATORY (INHALATION) at 20:16

## 2020-05-24 RX ADMIN — ENALAPRIL MALEATE 5 MG: 5 TABLET ORAL at 07:22

## 2020-05-24 RX ADMIN — AMOXICILLIN AND CLAVULANATE POTASSIUM 1 TABLET: 875; 125 TABLET, FILM COATED ORAL at 17:29

## 2020-05-24 ASSESSMENT — ENCOUNTER SYMPTOMS: CONSTIPATION: 0

## 2020-05-24 NOTE — PROGRESS NOTES
2 RN skin check complete with Therese SEGURA notified of trache wound at 1230  Wound JUNIOR Felder was at bedside at 1300 to place trache wound order    Devices in place:  -EKG leads, BP cuff, pulse ox probe, b/l SCDs  -PIV to L FA  -Trache with neck ties  -Condom catheter     Skin assessed under the following areas:  -Mentioned medical devices above  -Bony prominences of the body; posterior head, b/l hips, b/l heels/feet, b/l elbows, sacrum/coccyx    Preventative measures in place including:  -Patient on low air loss mattress  -Q2h turns with pillows  -Q2h repositioning of medical devices, including SCDs  -Preventative mepilexes to appropriate areas   -Elevating heels and elbows onto pillows    -Following wound care instructions    -Waffle cushion when in chair     Following areas noted or of concern:    -Bottom of trache site with some skin erosion, wound bed yellow, photo taken. Placed bifurcated padding/dressing to offload pressure. Breakdown related to secretions and not device related.    -Healing abrasion to right forearm, knuckles, left knee.    -Bruising to right shoulder     Wound consult placedYES/NO: N/a, consult not indicated per wound RN.   Wound reported YES/NO: Yes to wound RN and APRN  Appropriate LDAs opened YES/NO: Yes
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
## 2020-05-24 NOTE — PROGRESS NOTES
"Spoke with patient's wife via telephone regarding patient transfer order out of ICU. Discussed that therapies such as RT/PT/OT/Speech will continue to follow patient on the general surgical floor. Discussed that our critical care services will continue to round on the patient despite not being in the ICU. Discussed that patient will be near a nurse's station and have a private room and that her visitation privilege will be continued throughout his stay. Patient's wife states, \"that's just unfortunate.\" This RN asked for clarification on her concerns, patient's wife states, \"it's just too many changes all at once.\" Other concerns included, \"changing social workers.\" Will update patient's wife who the [facility]  will be.   "

## 2020-05-24 NOTE — CARE PLAN
Problem: Safety  Goal: Will remain free from falls  Note: Safety and fall precautions in place and implemented at all times during turns, transfers or mobility.   Discussed safety and fall precautions in place   Hourly rounding in place to assess patient's needs  Maintaining patency of LDAs      Problem: Bowel/Gastric:  Goal: Normal bowel function is maintained or improved  Note: New g-tube placed yesterday.   Will reassess tube feed nutrition in rounds.   Abdomen soft, nontender. Last BM 5/23

## 2020-05-24 NOTE — WOUND TEAM
[facility]  Inpatient Services  Initial Wound and Skin Care Evaluation    Admission Date: 5/9/2020     Last order of IP CONSULT TO WOUND CARE was found on 5/12/2020 from Hospital Encounter on 5/3/2020     HPI, PMH, SH: Reviewed    Unit where seen by Wound Team: T424/02 - seen is SICU S125    WOUND CONSULT/FOLLOW UP RELATED TO:  Trach site      Self Report / Pain Level:  No s/s of pain        OBJECTIVE:  In bed, restless.  Seen     WOUND TYPE, LOCATION, CHARACTERISTICS (Pressure Injuries: location, stage, POA or date identified)  Number of days: 1       Wound 05/24/20 Other (comment) Neck Anterior erosion to distal tracheostomy site (Active)   Site Assessment Red    Periwound Assessment Intact    Margins Unattached edges    Closure None    Drainage Amount Scant    Drainage Description Serosanguineous    Treatments Cleansed;Site care    Wound Cleansing Normal Saline Irrigation    Periwound Protectant Skin Protectant Wipes to Periwound    Dressing Cleansing/Solutions Not Applicable    Dressing Options Dry Gauze    Dressing Changed Changed    Dressing Status Clean;Dry;Intact    Dressing Change/Treatment Frequency Daily, and As Needed    NEXT Dressing Change/Treatment Date 05/25/20    NEXT Weekly Photo (Inpatient Only) 05/31/20    Non-staged Wound Description Full thickness    WOUND NURSE ONLY - Time Spent with Patient (mins) 60    Number of days: 0     Vascular:    SJ:   No results found.  Lab Values:    Lab Results   Component Value Date/Time    WBC 14.5 (H) 05/24/2020 05:30 AM    RBC 4.02 (L) 05/24/2020 05:30 AM    HEMOGLOBIN 12.0 (L) 05/24/2020 05:30 AM    HEMATOCRIT 37.0 (L) 05/24/2020 05:30 AM    CREACTPROT 16.41 (H) 05/13/2020 04:20 AM      Culture Results show:  No results found for this or any previous visit (from the past 720 hour(s)).    INTERVENTIONS BY WOUND TEAM:  Previous dressing removed with RN.  Has erosion under trach.  Cleansed with NS and gauze.  No sting skin prep applied.   Fenestrated foam applied.  Advised RN to ensure site below trach free of pressure.         Interdisciplinary consultation: Patient, Bedside RN (Estrella)     EVALUATION: patient with trach for about 10 days.  Erosion noted to distal trach.  Skin prep to protect from moisture and guaze for padding.      Goals: Steady decrease in wound area and depth weekly.    NURSING PLAN OF CARE ORDERS (X):    Dressing changes: See Dressing Care orders: X  Skin care: See Skin Care orders: per protocol   Rectal tube care: See Rectal Tube Care orders:   Other orders:    RSKIN:   CURRENTLY IN PLACE (X), APPLIED THIS VISIT (A), ORDERED (O):   Q shift Durga:    Q shift pressure point assessments:    Pressure redistribution mattress          X  Low Airloss          Bariatric ADILENE         Bariatric foam           Heel float boots     Heel Silicone dressing      O  Float Heels off Bed with Pillows               Barrier wipes         Barrier Cream         Barrier paste          Sacral silicone dressing       O  Silicone O2 tubing         Anchorfast         Cannula fixation Device (Tender )          Gray Foam Ear protectors           Trach with Optifoam split foam                 Waffle cushion        Waffle Overlay         Rectal tube or BMS    Purwick/Condom Cath          Antifungal tx      Interdry          Reposition q 2 hours      O  Up to chair        Ambulate      PT/OT        Dietician        Diabetes Education      PO     TF  X   TPN     NPO   # days   Other        WOUND TEAM PLAN OF CARE:   Dressing changes by wound team:          Follow up 1-2 times weekly:               Follow up 3 times weekly:                NPWT change 3 times weekly:     Follow up as needed:     X  Other (explain):     Anticipated discharge plans: TBA - may need ongoing care dependent of plan and time of DC  LTACH:        SNF/Rehab:                  Home Care:           Outpatient Wound Center:            Self Care:

## 2020-05-24 NOTE — PROGRESS NOTES
Call placed to patient's wife to update on patient's new room number.   Discussed medical surgical floor expectations.     1515: Report given to Shayla PACHECO.   1530: Patient transferred to [facility] accompanied by this RN, CCT and RT.   Patient transferred to floor bed out of ICU bed with safety precautions.   RT placed pt on t-piece.

## 2020-05-24 NOTE — CARE PLAN
Problem: Bronchopulmonary Hygiene:  Goal: Increase mobilization of retained secretions  Outcome: PROGRESSING SLOWER THAN EXPECTED   Off vent since 5/22, t-piece 28%, small secretions. Svn q6

## 2020-05-25 ENCOUNTER — APPOINTMENT (OUTPATIENT)
Dept: RADIOLOGY | Facility: MEDICAL CENTER | Age: 65
DRG: 004 | End: 2020-05-25
Attending: NURSE PRACTITIONER
Payer: COMMERCIAL

## 2020-05-25 PROBLEM — B37.0 THRUSH: Status: ACTIVE | Noted: 2020-05-25

## 2020-05-25 LAB
ANION GAP SERPL CALC-SCNC: 11 MMOL/L (ref 7–16)
BASOPHILS # BLD AUTO: 0.8 % (ref 0–1.8)
BASOPHILS # BLD: 0.09 K/UL (ref 0–0.12)
BUN SERPL-MCNC: 24 MG/DL (ref 8–22)
CALCIUM SERPL-MCNC: 9.4 MG/DL (ref 8.5–10.5)
CHLORIDE SERPL-SCNC: 102 MMOL/L (ref 96–112)
CO2 SERPL-SCNC: 22 MMOL/L (ref 20–33)
CREAT SERPL-MCNC: 0.59 MG/DL (ref 0.5–1.4)
CRP SERPL HS-MCNC: 4.01 MG/DL (ref 0–0.75)
EOSINOPHIL # BLD AUTO: 0.23 K/UL (ref 0–0.51)
EOSINOPHIL NFR BLD: 1.9 % (ref 0–6.9)
ERYTHROCYTE [DISTWIDTH] IN BLOOD BY AUTOMATED COUNT: 42 FL (ref 35.9–50)
GLUCOSE SERPL-MCNC: 126 MG/DL (ref 65–99)
HCT VFR BLD AUTO: 38.7 % (ref 42–52)
HGB BLD-MCNC: 12.7 G/DL (ref 14–18)
IMM GRANULOCYTES # BLD AUTO: 0.08 K/UL (ref 0–0.11)
IMM GRANULOCYTES NFR BLD AUTO: 0.7 % (ref 0–0.9)
LYMPHOCYTES # BLD AUTO: 2.02 K/UL (ref 1–4.8)
LYMPHOCYTES NFR BLD: 17 % (ref 22–41)
MAGNESIUM SERPL-MCNC: 2.4 MG/DL (ref 1.5–2.5)
MCH RBC QN AUTO: 29.9 PG (ref 27–33)
MCHC RBC AUTO-ENTMCNC: 32.8 G/DL (ref 33.7–35.3)
MCV RBC AUTO: 91.1 FL (ref 81.4–97.8)
MONOCYTES # BLD AUTO: 0.93 K/UL (ref 0–0.85)
MONOCYTES NFR BLD AUTO: 7.8 % (ref 0–13.4)
NEUTROPHILS # BLD AUTO: 8.54 K/UL (ref 1.82–7.42)
NEUTROPHILS NFR BLD: 71.8 % (ref 44–72)
NRBC # BLD AUTO: 0 K/UL
NRBC BLD-RTO: 0 /100 WBC
PHOSPHATE SERPL-MCNC: 3.1 MG/DL (ref 2.5–4.5)
PLATELET # BLD AUTO: 610 K/UL (ref 164–446)
PMV BLD AUTO: 10.3 FL (ref 9–12.9)
POTASSIUM SERPL-SCNC: 4.1 MMOL/L (ref 3.6–5.5)
PREALB SERPL-MCNC: 24.1 MG/DL (ref 18–38)
RBC # BLD AUTO: 4.25 M/UL (ref 4.7–6.1)
SODIUM SERPL-SCNC: 135 MMOL/L (ref 135–145)
WBC # BLD AUTO: 11.9 K/UL (ref 4.8–10.8)

## 2020-05-25 PROCEDURE — 700111 HCHG RX REV CODE 636 W/ 250 OVERRIDE (IP): Performed by: SURGERY

## 2020-05-25 PROCEDURE — A9270 NON-COVERED ITEM OR SERVICE: HCPCS | Performed by: NURSE PRACTITIONER

## 2020-05-25 PROCEDURE — A9270 NON-COVERED ITEM OR SERVICE: HCPCS | Performed by: SURGERY

## 2020-05-25 PROCEDURE — 94640 AIRWAY INHALATION TREATMENT: CPT

## 2020-05-25 PROCEDURE — 94760 N-INVAS EAR/PLS OXIMETRY 1: CPT

## 2020-05-25 PROCEDURE — 97530 THERAPEUTIC ACTIVITIES: CPT

## 2020-05-25 PROCEDURE — 85025 COMPLETE CBC W/AUTO DIFF WBC: CPT

## 2020-05-25 PROCEDURE — 307059 PAD,EAR PROTECTOR: Performed by: SURGERY

## 2020-05-25 PROCEDURE — 36415 COLL VENOUS BLD VENIPUNCTURE: CPT

## 2020-05-25 PROCEDURE — 86140 C-REACTIVE PROTEIN: CPT

## 2020-05-25 PROCEDURE — A9270 NON-COVERED ITEM OR SERVICE: HCPCS | Performed by: PHYSICAL MEDICINE & REHABILITATION

## 2020-05-25 PROCEDURE — 84100 ASSAY OF PHOSPHORUS: CPT

## 2020-05-25 PROCEDURE — 84134 ASSAY OF PREALBUMIN: CPT

## 2020-05-25 PROCEDURE — 71045 X-RAY EXAM CHEST 1 VIEW: CPT

## 2020-05-25 PROCEDURE — 700112 HCHG RX REV CODE 229: Performed by: NURSE PRACTITIONER

## 2020-05-25 PROCEDURE — 700102 HCHG RX REV CODE 250 W/ 637 OVERRIDE(OP): Performed by: SURGERY

## 2020-05-25 PROCEDURE — 700102 HCHG RX REV CODE 250 W/ 637 OVERRIDE(OP): Performed by: NURSE PRACTITIONER

## 2020-05-25 PROCEDURE — A6213 FOAM DRG >16<=48 SQ IN W/BDR: HCPCS | Performed by: SURGERY

## 2020-05-25 PROCEDURE — 770001 HCHG ROOM/CARE - MED/SURG/GYN PRIV*

## 2020-05-25 PROCEDURE — 97112 NEUROMUSCULAR REEDUCATION: CPT

## 2020-05-25 PROCEDURE — 700101 HCHG RX REV CODE 250: Performed by: SURGERY

## 2020-05-25 PROCEDURE — 80048 BASIC METABOLIC PNL TOTAL CA: CPT

## 2020-05-25 PROCEDURE — 700102 HCHG RX REV CODE 250 W/ 637 OVERRIDE(OP): Performed by: PHYSICAL MEDICINE & REHABILITATION

## 2020-05-25 PROCEDURE — 83735 ASSAY OF MAGNESIUM: CPT

## 2020-05-25 RX ADMIN — SODIUM BICARBONATE 3 ML: 84 INJECTION, SOLUTION INTRAVENOUS at 09:01

## 2020-05-25 RX ADMIN — HEPARIN SODIUM 5000 UNITS: 5000 INJECTION, SOLUTION INTRAVENOUS; SUBCUTANEOUS at 07:55

## 2020-05-25 RX ADMIN — ALBUTEROL SULFATE 2.5 MG: 2.5 SOLUTION RESPIRATORY (INHALATION) at 09:01

## 2020-05-25 RX ADMIN — SODIUM BICARBONATE 3 ML: 84 INJECTION, SOLUTION INTRAVENOUS at 20:05

## 2020-05-25 RX ADMIN — ALBUTEROL SULFATE 2.5 MG: 2.5 SOLUTION RESPIRATORY (INHALATION) at 20:05

## 2020-05-25 RX ADMIN — DOCUSATE SODIUM 100 MG: 50 LIQUID ORAL at 07:55

## 2020-05-25 RX ADMIN — NYSTATIN 500000 UNITS: 100000 SUSPENSION ORAL at 16:31

## 2020-05-25 RX ADMIN — MAGNESIUM HYDROXIDE 30 ML: 400 SUSPENSION ORAL at 07:55

## 2020-05-25 RX ADMIN — HEPARIN SODIUM 5000 UNITS: 5000 INJECTION, SOLUTION INTRAVENOUS; SUBCUTANEOUS at 22:05

## 2020-05-25 RX ADMIN — HEPARIN SODIUM 5000 UNITS: 5000 INJECTION, SOLUTION INTRAVENOUS; SUBCUTANEOUS at 16:30

## 2020-05-25 RX ADMIN — SODIUM BICARBONATE 3 ML: 84 INJECTION, SOLUTION INTRAVENOUS at 03:04

## 2020-05-25 RX ADMIN — OXYCODONE 5 MG: 5 TABLET ORAL at 22:06

## 2020-05-25 RX ADMIN — AMOXICILLIN AND CLAVULANATE POTASSIUM 1 TABLET: 875; 125 TABLET, FILM COATED ORAL at 07:55

## 2020-05-25 RX ADMIN — ALBUTEROL SULFATE 2.5 MG: 2.5 SOLUTION RESPIRATORY (INHALATION) at 03:04

## 2020-05-25 RX ADMIN — AMANTADINE HYDROCHLORIDE 100 MG: 100 CAPSULE ORAL at 11:35

## 2020-05-25 RX ADMIN — OXYCODONE 5 MG: 5 TABLET ORAL at 11:34

## 2020-05-25 RX ADMIN — AMANTADINE HYDROCHLORIDE 100 MG: 100 CAPSULE ORAL at 07:57

## 2020-05-25 RX ADMIN — NYSTATIN 500000 UNITS: 100000 SUSPENSION ORAL at 22:05

## 2020-05-25 RX ADMIN — ALBUTEROL SULFATE 2.5 MG: 2.5 SOLUTION RESPIRATORY (INHALATION) at 14:08

## 2020-05-25 RX ADMIN — ENALAPRIL MALEATE 5 MG: 5 TABLET ORAL at 07:55

## 2020-05-25 RX ADMIN — SODIUM BICARBONATE 3 ML: 84 INJECTION, SOLUTION INTRAVENOUS at 14:08

## 2020-05-25 ASSESSMENT — COGNITIVE AND FUNCTIONAL STATUS - GENERAL
TURNING FROM BACK TO SIDE WHILE IN FLAT BAD: A LITTLE
SUGGESTED CMS G CODE MODIFIER MOBILITY: CL
WALKING IN HOSPITAL ROOM: TOTAL
STANDING UP FROM CHAIR USING ARMS: TOTAL
MOVING TO AND FROM BED TO CHAIR: A LOT
CLIMB 3 TO 5 STEPS WITH RAILING: TOTAL
MOBILITY SCORE: 10
MOVING FROM LYING ON BACK TO SITTING ON SIDE OF FLAT BED: A LOT

## 2020-05-25 ASSESSMENT — ENCOUNTER SYMPTOMS: CONSTIPATION: 0

## 2020-05-25 ASSESSMENT — GAIT ASSESSMENTS: GAIT LEVEL OF ASSIST: UNABLE TO PARTICIPATE

## 2020-05-25 NOTE — THERAPY
Physical Therapy   Daily Treatment     Patient Name: Reyes Amezcua  Age:  64 y.o., Sex:  male  Medical Record #: [id number]  Today's Date: 5/25/2020     Precautions: Non Weight Bearing Right Upper Extremity, Fall Risk, PEG Tube, Tracheostomy , Swallow Precautions ( See Comments)       Objective       05/25/20 1445   Cognition    Speech/ Communication Intubated / Trached   Level of Consciousness Lethargic   Ability To Follow Commands 1 Step  (approx 25%)   New Learning Impaired   Attention Impaired   Initiation Impaired   Comments cooperative t/o tx   Other Treatments   Other Treatments Provided Spouse informed of therapy session outcome   Balance   Sitting Balance (Static) Fair +   Standing Balance (Static) Poor   Standing Balance (Dynamic) Poor -   Weight Shift Standing Absent   Skilled Intervention Verbal Cuing;Tactile Cuing;Sequencing;Facilitation   Comments Pt initiated EOB scooting with B UEs priot to STS   Gait Analysis   Gait Level Of Assist Unable to Participate   Bed Mobility    Supine to Sit Moderate Assist   Sit to Supine Moderate Assist   Skilled Intervention Verbal Cuing;Tactile Cuing   Functional Mobility   Sit to Stand Moderate Assist  (L HHA)   Bed, Chair, Wheelchair Transfer Unable to Participate   Skilled Intervention Verbal Cuing;Tactile Cuing;Sequencing   Activity Tolerance   Sitting Edge of Bed 20 min   Standing 30 sec x 3   Short Term Goals    Short Term Goal # 1 Pt will sit upright EOB at min A in 6tx to improve upright trunk control.    Goal Outcome # 1 Goal met   Short Term Goal # 2 Pt will perform bed mob at min A in 6tx to improve functional mobiltiy.    Goal Outcome # 2 Goal not met   Short Term Goal # 3 Pt will achieve a 6 on the Motor Function Scale portion of the [place] in 6tx to improve functional mobility.    Goal Outcome # 3 Goal not met   Short Term Goal # 4 Pt will perform fxnl transfers with mod A within 6 visits.   Goal Outcome # 4 Goal not met   Anticipated Discharge Equipment    DC Equipment Unable To Determine At This Time       Assessment    Pt found lying on side near bottom of the bed. Opens eyes on command. ModA for supine<>sit at EOB. Pt demonstrating improved EOB sitting balance with SPV x 15 min. Pt cooperative and appropriate, follows 25% simple commands. Pt used B UEs to scoot to EOB for STS x 3 trials with L HHA/ModA. Pt stood x 30 sec each trial, attempted weight shifting to get pt to take a step to the Left but pt not able to follow.     Plan    Continue current treatment plan.    Discharge recommendations:  Recommend post-acute placement for continued physical therapy services prior to discharge home.

## 2020-05-25 NOTE — PROGRESS NOTES
Patient was restless most of the shift with very short and intermittent periods of rest or dozing. His wife was at bedside the first portion of the shift. Patient was pulling at his tubes and lines so soft wrist restraints were placed and Q2hr rounds and restraint checks were performed. Trach care was done and trach dressing changed. During rounds oral care was provided and patient was suctioned as needed to clear secretions which were purulent to yellow in color, small amounts, patient was routinely boosted and repositioned for comfort, however patient turned and repositioned himself. Patient has a patricio in place after discontinuing of previous patricio was unsuccessful.  Nutrition was hung during this shift and all lines replaced. His J-tube was flushed and checked for residual as per documentation.  Patricio care was completed.   This patient is time intensive to care for and is completely dependent on others or his care. PIN rating completed.

## 2020-05-25 NOTE — PROGRESS NOTES
"Pt able to open eyes upon verbal command. Restless. Pt unable to squeeze hand on right. Left arm strength 2/5. Right lower extremity unable to push on hand with feet. Left leg 2/5. T piece in place. Pt able to cough most of his secretions. Suctioned x 1. Pt moving in bed. Bilateral wrist restraints in place for pulling lines. Nickerson to gravity with clear urine output. G tube in place with tube feeds at 55 an hour which is goal. /82   Pulse 90   Temp 37.2 °C (98.9 °F) (Temporal)   Resp 18   Ht 1.854 m (6' 1\")   Wt 72.3 kg (159 lb 6.3 oz)   SpO2 97%     "
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
## 2020-05-26 ENCOUNTER — APPOINTMENT (OUTPATIENT)
Dept: RADIOLOGY | Facility: MEDICAL CENTER | Age: 65
DRG: 004 | End: 2020-05-26
Attending: NURSE PRACTITIONER
Payer: COMMERCIAL

## 2020-05-26 LAB
ANION GAP SERPL CALC-SCNC: 13 MMOL/L (ref 7–16)
BASOPHILS # BLD AUTO: 1.1 % (ref 0–1.8)
BASOPHILS # BLD: 0.11 K/UL (ref 0–0.12)
BUN SERPL-MCNC: 28 MG/DL (ref 8–22)
CALCIUM SERPL-MCNC: 9.8 MG/DL (ref 8.5–10.5)
CHLORIDE SERPL-SCNC: 102 MMOL/L (ref 96–112)
CO2 SERPL-SCNC: 22 MMOL/L (ref 20–33)
CREAT SERPL-MCNC: 0.56 MG/DL (ref 0.5–1.4)
EOSINOPHIL # BLD AUTO: 0.26 K/UL (ref 0–0.51)
EOSINOPHIL NFR BLD: 2.5 % (ref 0–6.9)
ERYTHROCYTE [DISTWIDTH] IN BLOOD BY AUTOMATED COUNT: 42.5 FL (ref 35.9–50)
GLUCOSE SERPL-MCNC: 127 MG/DL (ref 65–99)
HCT VFR BLD AUTO: 40.4 % (ref 42–52)
HGB BLD-MCNC: 13.2 G/DL (ref 14–18)
IMM GRANULOCYTES # BLD AUTO: 0.06 K/UL (ref 0–0.11)
IMM GRANULOCYTES NFR BLD AUTO: 0.6 % (ref 0–0.9)
LYMPHOCYTES # BLD AUTO: 2 K/UL (ref 1–4.8)
LYMPHOCYTES NFR BLD: 19.3 % (ref 22–41)
MCH RBC QN AUTO: 29.5 PG (ref 27–33)
MCHC RBC AUTO-ENTMCNC: 32.7 G/DL (ref 33.7–35.3)
MCV RBC AUTO: 90.4 FL (ref 81.4–97.8)
MONOCYTES # BLD AUTO: 0.97 K/UL (ref 0–0.85)
MONOCYTES NFR BLD AUTO: 9.4 % (ref 0–13.4)
NEUTROPHILS # BLD AUTO: 6.94 K/UL (ref 1.82–7.42)
NEUTROPHILS NFR BLD: 67.1 % (ref 44–72)
NRBC # BLD AUTO: 0 K/UL
NRBC BLD-RTO: 0 /100 WBC
PLATELET # BLD AUTO: 630 K/UL (ref 164–446)
PMV BLD AUTO: 10.3 FL (ref 9–12.9)
POTASSIUM SERPL-SCNC: 4 MMOL/L (ref 3.6–5.5)
RBC # BLD AUTO: 4.47 M/UL (ref 4.7–6.1)
SODIUM SERPL-SCNC: 137 MMOL/L (ref 135–145)
WBC # BLD AUTO: 10.3 K/UL (ref 4.8–10.8)

## 2020-05-26 PROCEDURE — 94760 N-INVAS EAR/PLS OXIMETRY 1: CPT

## 2020-05-26 PROCEDURE — 36415 COLL VENOUS BLD VENIPUNCTURE: CPT

## 2020-05-26 PROCEDURE — 97112 NEUROMUSCULAR REEDUCATION: CPT

## 2020-05-26 PROCEDURE — A9270 NON-COVERED ITEM OR SERVICE: HCPCS | Performed by: PHYSICAL MEDICINE & REHABILITATION

## 2020-05-26 PROCEDURE — 97535 SELF CARE MNGMENT TRAINING: CPT

## 2020-05-26 PROCEDURE — 700102 HCHG RX REV CODE 250 W/ 637 OVERRIDE(OP): Performed by: PHYSICAL MEDICINE & REHABILITATION

## 2020-05-26 PROCEDURE — 99232 SBSQ HOSP IP/OBS MODERATE 35: CPT | Performed by: PHYSICAL MEDICINE & REHABILITATION

## 2020-05-26 PROCEDURE — A9270 NON-COVERED ITEM OR SERVICE: HCPCS | Performed by: NURSE PRACTITIONER

## 2020-05-26 PROCEDURE — 700102 HCHG RX REV CODE 250 W/ 637 OVERRIDE(OP): Performed by: NURSE PRACTITIONER

## 2020-05-26 PROCEDURE — 770001 HCHG ROOM/CARE - MED/SURG/GYN PRIV*

## 2020-05-26 PROCEDURE — 71045 X-RAY EXAM CHEST 1 VIEW: CPT

## 2020-05-26 PROCEDURE — A9270 NON-COVERED ITEM OR SERVICE: HCPCS | Performed by: SURGERY

## 2020-05-26 PROCEDURE — 94640 AIRWAY INHALATION TREATMENT: CPT

## 2020-05-26 PROCEDURE — 700111 HCHG RX REV CODE 636 W/ 250 OVERRIDE (IP): Performed by: SURGERY

## 2020-05-26 PROCEDURE — 85025 COMPLETE CBC W/AUTO DIFF WBC: CPT

## 2020-05-26 PROCEDURE — 80048 BASIC METABOLIC PNL TOTAL CA: CPT

## 2020-05-26 PROCEDURE — 700102 HCHG RX REV CODE 250 W/ 637 OVERRIDE(OP): Performed by: SURGERY

## 2020-05-26 RX ORDER — AMANTADINE HYDROCHLORIDE 100 MG/1
200 CAPSULE, GELATIN COATED ORAL 2 TIMES DAILY
Status: DISCONTINUED | OUTPATIENT
Start: 2020-05-27 | End: 2020-05-29 | Stop reason: HOSPADM

## 2020-05-26 RX ADMIN — HEPARIN SODIUM 5000 UNITS: 5000 INJECTION, SOLUTION INTRAVENOUS; SUBCUTANEOUS at 12:25

## 2020-05-26 RX ADMIN — HEPARIN SODIUM 5000 UNITS: 5000 INJECTION, SOLUTION INTRAVENOUS; SUBCUTANEOUS at 21:28

## 2020-05-26 RX ADMIN — NYSTATIN 500000 UNITS: 100000 SUSPENSION ORAL at 12:25

## 2020-05-26 RX ADMIN — AMANTADINE HYDROCHLORIDE 100 MG: 100 CAPSULE ORAL at 06:31

## 2020-05-26 RX ADMIN — AMANTADINE HYDROCHLORIDE 100 MG: 100 CAPSULE ORAL at 12:25

## 2020-05-26 RX ADMIN — HEPARIN SODIUM 5000 UNITS: 5000 INJECTION, SOLUTION INTRAVENOUS; SUBCUTANEOUS at 06:31

## 2020-05-26 RX ADMIN — NYSTATIN 500000 UNITS: 100000 SUSPENSION ORAL at 09:00

## 2020-05-26 RX ADMIN — ENALAPRIL MALEATE 5 MG: 5 TABLET ORAL at 06:31

## 2020-05-26 RX ADMIN — NYSTATIN 500000 UNITS: 100000 SUSPENSION ORAL at 21:28

## 2020-05-26 ASSESSMENT — COGNITIVE AND FUNCTIONAL STATUS - GENERAL
EATING MEALS: TOTAL
DRESSING REGULAR LOWER BODY CLOTHING: A LOT
SUGGESTED CMS G CODE MODIFIER DAILY ACTIVITY: CL
DAILY ACTIVITIY SCORE: 10
PERSONAL GROOMING: A LITTLE
HELP NEEDED FOR BATHING: TOTAL
DRESSING REGULAR UPPER BODY CLOTHING: A LOT
TOILETING: TOTAL

## 2020-05-26 ASSESSMENT — ENCOUNTER SYMPTOMS: CONSTIPATION: 0

## 2020-05-26 NOTE — THERAPY
05/26/20 4906   Interdisciplinary Plan of Care Collaboration   Collaboration Comments Spoke to MARQUISE Donovan regarding Speaking Valve Eval not being indicated now that pt is tolerating capping trials. Ok to cancel as speaking valve is not indicated. Please reorder if pt does not tolerate capping/decannulation.     Alda Michele MS., CCC-SLP  Pager #: [phone]

## 2020-05-26 NOTE — PROGRESS NOTES
"Report received.  Assumed Care.  Pt in bed, Q2 hour turns.   G-Tube  For feedings Impact peptide 1.5 at 55 ml/hr. Trach #8 georgi cornelius care preformed per protocol. Oral care preformed, teeth brushed, Nystatin swab per order.  Restrained with soft wrist restraints. AOx0, responds painful stimuli, does not follow commands. Bed alarm on and armed appropriately , bed in lowest position. /76   Pulse 83   Temp 37.3 °C (99.2 °F) (Temporal)   Resp 18   Ht 1.854 m (6' 1\")   Wt 72.3 kg (159 lb 6.3 oz)   SpO2 96%   BMI 21.03 kg/m²     "

## 2020-05-26 NOTE — DISCHARGE PLANNING
Anticipated Discharge Disposition: Acute Rehab-[facility]    Action: [facility] Referral is being forwarded to MD for review.  Stephanie would like to do an onsite visit with pt's wife; referred her to Charge RN for coordination of visit. Stephanie stated they are working on [facility] from insurance company as they are considered out of network. Stephanie will reach out to CM when they have received it to begin coordination of transfer.     Barriers to Discharge: Medical clearance pending.   Referral acceptance pending insurance auth.     Plan: CM to continue to follow and assist with coordination of transfer to acute rehab facility in [place].       0910  Received call from pt's wife requesting an MD update and permission to increase her visits with patient.  Referred her to charge RN Teresa to discuss visitation. MD/APRN contacted regarding requested update.

## 2020-05-26 NOTE — PROGRESS NOTES
Zoom call set up for patient with wife and other family member. At 11:30 call went until 12:14. Plan of care was communicated to wife.

## 2020-05-26 NOTE — CARE PLAN
Problem: Safety  Goal: Will remain free from injury  Outcome: PROGRESSING AS EXPECTED  Pt high fall risk per ida villarreal. In restraints. Bed alarm on.      Problem: Venous Thromboembolism (VTW)/Deep Vein Thrombosis (DVT) Prevention:  Goal: Patient will participate in Venous Thrombosis (VTE)/Deep Vein Thrombosis (DVT)Prevention Measures  Outcome: PROGRESSING AS EXPECTED  SCDs in place.

## 2020-05-26 NOTE — CARE PLAN
Pt tolerated manual capping trial with no distress, began 24 hr capping trial at 1345 today,  No distress noted pt on RA, RR 20, SpO2 95-96%  Communicated with MD,Speech, RN and CNA   Plan to continue with fast trach wean per pt tolerating

## 2020-05-26 NOTE — CARE PLAN
Problem: Communication  Goal: The ability to communicate needs accurately and effectively will improve  Outcome: PROGRESSING AS EXPECTED     Problem: Safety  Goal: Will remain free from injury  Outcome: PROGRESSING AS EXPECTED     Problem: Bowel/Gastric:  Goal: Normal bowel function is maintained or improved  Outcome: PROGRESSING AS EXPECTED     Problem: Skin Integrity  Goal: Risk for impaired skin integrity will decrease  Outcome: PROGRESSING AS EXPECTED

## 2020-05-26 NOTE — PROGRESS NOTES
Patient continues to be restless and active in the bed; having frequent loose stools; rounding frequently to boost patient, check tube and lines as patient is grabbing at his tubes.and change linens as needed. Nutrimix and bowel stimulants held per patient having loose stools. Pain med administered to see if pain is reason for restlessness.Swabbed mouth with nystatin, oral suction provided.  End of Shift:  Patient did have a period where he was less restless after receiving a pain med per emar. He had frequent unformed stools this shift and had frequent bed changes. So I held all Bowel protocol meds and as well as the nutrimix/talha mixture. He has received suctioning as needed and was doing better at expelling those secretions tonight. His J-tube was flushed per protocol and is infusing Impact Peptide at 55ML/hr. Of note is that the patient started making eye contact with the staff tonight periodically, and a[[eraedd to be listening and trying to follow commands at times..

## 2020-05-26 NOTE — CARE PLAN
Problem: Discharge Barriers/Planning  Goal: Patient's continuum of care needs will be met  Outcome: NOT MET     Problem: Safety  Goal: Will remain free from injury  Outcome: PROGRESSING AS EXPECTED  Goal: Will remain free from falls  Outcome: PROGRESSING AS EXPECTED   Pending medical clearance and Rehab acceptance. Bed alarm on and armed appropriately
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
## 2020-05-26 NOTE — PROGRESS NOTES
2 RN skin assessment complete.     Healing scabbed abrasion on the right side of head  mulitple healed abrasions on bilateral arms  Bilateral elbows pink and blanching  Right knee scabbed abrasion  Bilateral heel pink and blanching  Sacrum pink and blanching. mepilex in place.   Trach site intact and red non blanching  G tube site intact no redness noted. Sutures in place.   Nickerson with stat lock and intact    Pt on waffle overlay  q2 turns in place.

## 2020-05-26 NOTE — THERAPY
Missed Therapy     Patient Name: Reyes Amezcua  Age:  64 y.o., Sex:  male  Medical Record #: [id number]  Today's Date: 5/26/2020    Discussed missed therapy with RN Minnie and RT Maggie    RT is about to start capping trials with pt. If he tolerates, he will not need a speaking valve. Plan to round back with RT and determine if pt needs speaking valve eval after capping trial.

## 2020-05-27 ENCOUNTER — APPOINTMENT (OUTPATIENT)
Dept: RADIOLOGY | Facility: MEDICAL CENTER | Age: 65
DRG: 004 | End: 2020-05-27
Attending: NURSE PRACTITIONER
Payer: COMMERCIAL

## 2020-05-27 PROBLEM — R33.9 URINARY RETENTION: Status: ACTIVE | Noted: 2020-05-27

## 2020-05-27 LAB
ANION GAP SERPL CALC-SCNC: 13 MMOL/L (ref 7–16)
BASOPHILS # BLD AUTO: 0.7 % (ref 0–1.8)
BASOPHILS # BLD: 0.08 K/UL (ref 0–0.12)
BUN SERPL-MCNC: 30 MG/DL (ref 8–22)
CALCIUM SERPL-MCNC: 10.3 MG/DL (ref 8.5–10.5)
CHLORIDE SERPL-SCNC: 103 MMOL/L (ref 96–112)
CO2 SERPL-SCNC: 25 MMOL/L (ref 20–33)
CREAT SERPL-MCNC: 0.59 MG/DL (ref 0.5–1.4)
EOSINOPHIL # BLD AUTO: 0.21 K/UL (ref 0–0.51)
EOSINOPHIL NFR BLD: 1.9 % (ref 0–6.9)
ERYTHROCYTE [DISTWIDTH] IN BLOOD BY AUTOMATED COUNT: 42.9 FL (ref 35.9–50)
GLUCOSE SERPL-MCNC: 124 MG/DL (ref 65–99)
HCT VFR BLD AUTO: 43.4 % (ref 42–52)
HGB BLD-MCNC: 14 G/DL (ref 14–18)
IMM GRANULOCYTES # BLD AUTO: 0.06 K/UL (ref 0–0.11)
IMM GRANULOCYTES NFR BLD AUTO: 0.5 % (ref 0–0.9)
LYMPHOCYTES # BLD AUTO: 2.15 K/UL (ref 1–4.8)
LYMPHOCYTES NFR BLD: 19.2 % (ref 22–41)
MCH RBC QN AUTO: 29.5 PG (ref 27–33)
MCHC RBC AUTO-ENTMCNC: 32.3 G/DL (ref 33.7–35.3)
MCV RBC AUTO: 91.6 FL (ref 81.4–97.8)
MONOCYTES # BLD AUTO: 0.9 K/UL (ref 0–0.85)
MONOCYTES NFR BLD AUTO: 8 % (ref 0–13.4)
NEUTROPHILS # BLD AUTO: 7.79 K/UL (ref 1.82–7.42)
NEUTROPHILS NFR BLD: 69.7 % (ref 44–72)
NRBC # BLD AUTO: 0 K/UL
NRBC BLD-RTO: 0 /100 WBC
PLATELET # BLD AUTO: 588 K/UL (ref 164–446)
PMV BLD AUTO: 10.2 FL (ref 9–12.9)
POTASSIUM SERPL-SCNC: 4.4 MMOL/L (ref 3.6–5.5)
RBC # BLD AUTO: 4.74 M/UL (ref 4.7–6.1)
SODIUM SERPL-SCNC: 141 MMOL/L (ref 135–145)
WBC # BLD AUTO: 11.2 K/UL (ref 4.8–10.8)

## 2020-05-27 PROCEDURE — 99232 SBSQ HOSP IP/OBS MODERATE 35: CPT | Performed by: PHYSICAL MEDICINE & REHABILITATION

## 2020-05-27 PROCEDURE — A9270 NON-COVERED ITEM OR SERVICE: HCPCS | Performed by: NURSE PRACTITIONER

## 2020-05-27 PROCEDURE — 700102 HCHG RX REV CODE 250 W/ 637 OVERRIDE(OP): Performed by: NURSE PRACTITIONER

## 2020-05-27 PROCEDURE — 80048 BASIC METABOLIC PNL TOTAL CA: CPT

## 2020-05-27 PROCEDURE — 770001 HCHG ROOM/CARE - MED/SURG/GYN PRIV*

## 2020-05-27 PROCEDURE — 36415 COLL VENOUS BLD VENIPUNCTURE: CPT

## 2020-05-27 PROCEDURE — 700102 HCHG RX REV CODE 250 W/ 637 OVERRIDE(OP): Performed by: PHYSICAL MEDICINE & REHABILITATION

## 2020-05-27 PROCEDURE — 85025 COMPLETE CBC W/AUTO DIFF WBC: CPT

## 2020-05-27 PROCEDURE — 97535 SELF CARE MNGMENT TRAINING: CPT

## 2020-05-27 PROCEDURE — 700102 HCHG RX REV CODE 250 W/ 637 OVERRIDE(OP): Performed by: SURGERY

## 2020-05-27 PROCEDURE — 97112 NEUROMUSCULAR REEDUCATION: CPT

## 2020-05-27 PROCEDURE — 700111 HCHG RX REV CODE 636 W/ 250 OVERRIDE (IP): Performed by: SURGERY

## 2020-05-27 PROCEDURE — 700112 HCHG RX REV CODE 229: Performed by: NURSE PRACTITIONER

## 2020-05-27 PROCEDURE — A9270 NON-COVERED ITEM OR SERVICE: HCPCS | Performed by: PHYSICAL MEDICINE & REHABILITATION

## 2020-05-27 PROCEDURE — 71045 X-RAY EXAM CHEST 1 VIEW: CPT

## 2020-05-27 PROCEDURE — 94760 N-INVAS EAR/PLS OXIMETRY 1: CPT

## 2020-05-27 PROCEDURE — A9270 NON-COVERED ITEM OR SERVICE: HCPCS | Performed by: SURGERY

## 2020-05-27 RX ADMIN — NYSTATIN 500000 UNITS: 100000 SUSPENSION ORAL at 08:39

## 2020-05-27 RX ADMIN — NYSTATIN 500000 UNITS: 100000 SUSPENSION ORAL at 14:33

## 2020-05-27 RX ADMIN — AMANTADINE HYDROCHLORIDE 200 MG: 100 CAPSULE ORAL at 14:33

## 2020-05-27 RX ADMIN — DOCUSATE SODIUM 50 MG AND SENNOSIDES 8.6 MG 1 TABLET: 8.6; 5 TABLET, FILM COATED ORAL at 21:00

## 2020-05-27 RX ADMIN — NYSTATIN 500000 UNITS: 100000 SUSPENSION ORAL at 21:00

## 2020-05-27 RX ADMIN — DOCUSATE SODIUM 100 MG: 50 LIQUID ORAL at 06:00

## 2020-05-27 RX ADMIN — AMANTADINE HYDROCHLORIDE 200 MG: 100 CAPSULE ORAL at 05:26

## 2020-05-27 RX ADMIN — HEPARIN SODIUM 5000 UNITS: 5000 INJECTION, SOLUTION INTRAVENOUS; SUBCUTANEOUS at 05:26

## 2020-05-27 RX ADMIN — HEPARIN SODIUM 5000 UNITS: 5000 INJECTION, SOLUTION INTRAVENOUS; SUBCUTANEOUS at 22:30

## 2020-05-27 RX ADMIN — ENALAPRIL MALEATE 5 MG: 5 TABLET ORAL at 05:26

## 2020-05-27 RX ADMIN — HEPARIN SODIUM 5000 UNITS: 5000 INJECTION, SOLUTION INTRAVENOUS; SUBCUTANEOUS at 14:33

## 2020-05-27 ASSESSMENT — COGNITIVE AND FUNCTIONAL STATUS - GENERAL
PERSONAL GROOMING: A LITTLE
DRESSING REGULAR LOWER BODY CLOTHING: A LOT
SUGGESTED CMS G CODE MODIFIER DAILY ACTIVITY: CL
DRESSING REGULAR UPPER BODY CLOTHING: A LOT
EATING MEALS: TOTAL
TOILETING: TOTAL
HELP NEEDED FOR BATHING: A LOT
DAILY ACTIVITIY SCORE: 11

## 2020-05-27 NOTE — DISCHARGE PLANNING
[facility]/ Transitional Care Coordination  Tc to Diane Beltran @ [phone] as she is considering [facility] for her .    She had two questions before making the choice of [facility].   1. Is there a neuo-psychiatrist to evaluate pt for depression?  Psychiatry provides consults @ [facility].  2. Is pool therapy available? Yes- With MD order and medical clearance.      After questions answered, she prefers [facility] for her spouse.  I informed her I would submit to [facility] for authorization.   She indicates she has to be able to see her  @ [facility] before he is transferred to Rehab.  She states she visits him daily @ 4pm    Tc to Maggi PACHECO who is CM on unit @ [facility]> advised her of spouse's request to see pt before transfer which usually occurs before 4pm.  She indicates she will discuss w/ RN and Nurse Manager.     I explained process of seeing [facility] in their room  through a window, but she was not interested indicating it would be too confusing for pt. She verbalized her  responds to her touch, and started to cry.  Provided therapeutic listening/reflecting.  Advised her I would call her back tomorrow.      Per RT note from 5/26,pt tolerated manual capping trial with no distress, began 24 hr capping trial at 1345.     Plan:  Submitted to insurance today for authorization for Rehab.

## 2020-05-27 NOTE — CARE PLAN
Problem: Safety  Goal: Will remain free from injury  Outcome: PROGRESSING AS EXPECTED  Goal: Will remain free from falls  Outcome: PROGRESSING AS EXPECTED   Updated about POC. Reinforce call light use. Pt acknowledged understanding    Problem: Respiratory:  Goal: Respiratory status will improve  Outcome: PROGRESSING AS EXPECTED  Trach capping trial started today. Pt tolerating well     Problem: Skin Integrity  Goal: Risk for impaired skin integrity will decrease  Outcome: PROGRESSING AS EXPECTED   Pt repositioning self. No breakdown noted

## 2020-05-27 NOTE — PREADMISSION SCREENING NOTE
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 Vital Signs:   /73   Pulse 78   Temp 36.5 °C (97.7 °F) (Temporal)   Resp 18   Ht 1.854 m (6' 1\")   Wt 72.3 kg (159 lb 6.3 oz)   SpO2 98%   Weight: 72.3 kg (159 lb 6.3 oz) Height: 185.4 cm (6' 1\")  Pulse Oximetry: 96 % O2 (LPM): 0      Results for NANETTE DANIELS (MRN [id number]) as of 5/29/2020 09:24   Ref. Range 5/28/2020 04:04 5/28/2020 06:49   WBC Latest Ref Range: 4.8 - 10.8 K/uL 9.4    RBC Latest Ref Range: 4.70 - 6.10 M/uL 4.71    Hemoglobin Latest Ref Range: 14.0 - 18.0 g/dL 13.8 (L)    Hematocrit Latest Ref Range: 42.0 - 52.0 % 43.1    MCV Latest Ref Range: 81.4 " - 97.8 fL 91.5    MCH Latest Ref Range: 27.0 - 33.0 pg 29.3    MCHC Latest Ref Range: 33.7 - 35.3 g/dL 32.0 (L)    RDW Latest Ref Range: 35.9 - 50.0 fL 44.1    Platelet Count Latest Ref Range: 164 - 446 K/uL 566 (H)    MPV Latest Ref Range: 9.0 - 12.9 fL 10.4    Neutrophils-Polys Latest Ref Range: 44.00 - 72.00 % 64.30    Neutrophils (Absolute) Latest Ref Range: 1.82 - 7.42 K/uL 6.06    Lymphocytes Latest Ref Range: 22.00 - 41.00 % 23.70    Lymphs (Absolute) Latest Ref Range: 1.00 - 4.80 K/uL 2.24    Monocytes Latest Ref Range: 0.00 - 13.40 % 7.80    Monos (Absolute) Latest Ref Range: 0.00 - 0.85 K/uL 0.74    Eosinophils Latest Ref Range: 0.00 - 6.90 % 2.90    Eos (Absolute) Latest Ref Range: 0.00 - 0.51 K/uL 0.27    Basophils Latest Ref Range: 0.00 - 1.80 % 0.80    Baso (Absolute) Latest Ref Range: 0.00 - 0.12 K/uL 0.08    Immature Granulocytes Latest Ref Range: 0.00 - 0.90 % 0.50    Immature Granulocytes (abs) Latest Ref Range: 0.00 - 0.11 K/uL 0.05    Nucleated RBC Latest Units: /100 WBC 0.00    NRBC (Absolute) Latest Units: K/uL 0.00    Sodium Latest Ref Range: 135 - 145 mmol/L 140    Potassium Latest Ref Range: 3.6 - 5.5 mmol/L 4.2    Chloride Latest Ref Range: 96 - 112 mmol/L 102    Co2 Latest Ref Range: 20 - 33 mmol/L 26    Anion Gap Latest Ref Range: 7.0 - 16.0  12.0    Glucose Latest Ref Range: 65 - 99 mg/dL 115 (H)    Bun Latest Ref Range: 8 - 22 mg/dL 34 (H)    Creatinine Latest Ref Range: 0.50 - 1.40 mg/dL 0.61    GFR If  Latest Ref Range: >60 mL/min/1.73 m 2 >60    GFR If Non  Latest Ref Range: >60 mL/min/1.73 m 2 >60    Calcium Latest Ref Range: 8.5 - 10.5 mg/dL 10.2    Phosphorus Latest Ref Range: 2.5 - 4.5 mg/dL 4.2    Magnesium Latest Ref Range: 1.5 - 2.5 mg/dL 2.7 (H)    DX-CHEST-PORTABLE (1 VIEW) Unknown  Rpt       Prior Living Situation:   Housing / Facility: 1 Story House  Lives with - Patient's Self Care Capacity: Spouse      Prior Level of Function / Living  Situation:   Physical Therapy: Prior Services: None, Home-Independent  Housing / Facility: [address]  Lives with - Patient's Self Care Capacity: Spouse     Current Level of Function:   Gait Level Of Assist: Minimal Assist  Assistive Device: None  Distance (Feet): 5  Deviation: Decreased Toe Off, Decreased Heel Strike  Weight Bearing Status: NWB RUE  Skilled Intervention: Verbal Cuing, Tactile Cuing, Sequencing, Facilitation, Compensatory Strategies  Supine to Sit: Supervised  Sit to Supine: Minimal Assist  Scooting: Supervised  Skilled Intervention: Verbal Cuing  Comments: HOB elevated  Sit to Stand: Minimal Assist  Bed, Chair, Wheelchair Transfer: Minimal Assist  Transfer Method: Stand Step  Distance Wheelchair (Feet or Distance): 500  Wheelchair Assist: Total Assist  Skilled Intervention: Verbal Cuing, Sequencing, Tactile Cuing, Compensatory Strategies  Sitting in Chair: 20 in w/c  Sitting Edge of Bed: 5  Standin  Occupational Therapy:   Current Level of Function:   Eating: Total Assist  Upper Body Dressing: Moderate Assist  Lower Body Dressing: Maximal Assist  Toileting: Total Assist  Skilled Intervention: Verbal Cuing, Tactile Cuing, Sequencing, Facilitation, Compensatory Strategies  Comments: participation impaired by increased lethargy today    Speech Language Pathology:                                                                                                  Rehabilitation Prognosis/Potential: Good  Estimated Length of Stay: 21 - 30  days    Nursing:   Orientation : Unable to Assess  Incontinent and Nickerson in Place    Scope/Intensity of Services Recommended:  Physical Therapy: 1 hr / day  5 days / week. Therapeutic Interventions Required: Maximize Endurance, Mobility, Strength and Safety  Occupational Therapy: 1 hr / day 5 days / week. Therapeutic Interventions Required: Maximize Self Care, ADLs, IADLs, Energy Conservation and family training  Speech & Language Pathology: 1 hr / day 5 days /  week. Therapeutic Interventions Required: Maximize Cognition, Swallowing, Safety and family training  Rehabilitation Nursin/7. Therapeutic Interventions Required: Monitor Pain, Skin, Vital Signs, Intake and Output, Labs, Safety, Aspiration Risk and Family Training  Rehabilitation Physician: 3 - 5 days / week. Therapeutic Interventions Required: Medical Management  Respiratory Care: Eval and Tx.. Therapeutic Interventions Required: Per Protocol.  Dietician: Eval and Tx.. Therapeutic Interventions Required: Per protocol    He requires 24-hour rehabilitation nursing to manage bowel and bladder function, skin care, wound, nutrition and fluid intake, pulmonary hygiene, pain control, safety, medication management and patient/family goals. In addition, rehabilitation nursing will reiterate and reinforce therapy skills and equipment use, including ADLs, as well as provide education to the patient and family. Reyes Amezcua is willing to participate in and is able to tolerate the proposed plan of care.    Rehabilitation Goals and Plan (Expected frequency & duration of treatment in the IRF):   Return to the Community  Anticipated Date of Rehabilitation Admission: 2020  Patient/Family oriented IRF level of care/facility/plan: Yes  Patient/Family willing to participate in IRF care/facility/plan: Yes  Patient able to tolerate IRF level of care proposed: Yes  Patient has potential to benefit IRF level of care proposed: Yes  Comments: Not Applicable    Special Needs or Precautions - Medical Necessity:  Trach Size: 8mm cuffed  Insert Date: 2020 by Dr. Darrion Diez / Dr. Bart Francis    Trach capped. Decannualated on   G tube placed on  by Dr. Norbert Prajapati   Weight Bearing Status: NWB RUE  Fall risk  Cognitive impairments      Diet:   DIET ORDERS (From admission to next 24h)     Start     Ordered    20 1022  Diet Tube Feeding  CONTINUOUS DIET     Question Answer Comment   Which Rate/Volume?  70 mL/hr    Formula: DIABETISOURCE AC    Specify Type: CONTINUOUS        05/27/20 1021    05/09/20 1315  Diet NPO  ALL MEALS     Question:  Restrict to:  Answer:  Strict    05/09/20 1321                Anticipated Discharge Destination / Patient/Family Goal:  Destination: Home with Assistance from spouse.  [facility] in [place]; no steps to enter; walk in shower; seperate tub  Support System: Spouse Kerry Beltran . No children;  for 36 years. Diane's  Brother Blas Beltran  and his wife live 3 hours away.   Pt recently relocated from [facility] to [place] where spouse has a position @ [employer].  She is a  of the school of community health science.   PLOF:  Active/indep w/ gait, adl's and iadl's.       Anticipated home health services: For home health, pt will need PT/OT/SLP/RN    Previously used HH service/ provider: Not Applicable    Anticipated DME Needs: To be determined.     Outpatient Services: Out pt SLP/ PT/OT; Possible Rehab w/o [facility] if pt has benefit for this program.     Alternative resources to address additional identified needs:   Confirm if pt is established with a PCP  Follow up with Dr. Prajapati Surgeon/ Gtube  placed 5/23/2020  Anticipate Out paitnet Neurology/ Physiatry       Pre-Screen Completed: 5/27/2020 3:02 PM ALYCE Krishna, CCM
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
## 2020-05-27 NOTE — DISCHARGE PLANNING
Anticipated Disposition:  [facility]    Action:   Kelly from [facility] reported, Pt's wife has chosen [facility]. Wife also requested more visitation time, this CM called Alayna villaseñor RN, she reported, Pt's wife already has the exceptional rights to visit Pt from 4pm -9pm every day.   This CM informed Diane wife, she needs to contact the unit Manger to discuss the visitation rights.           Barriers to Discharge:   [facility]      Plan:  Please follow up with [facility] IRF for acceptance.

## 2020-05-27 NOTE — CARE PLAN
Problem: Safety  Goal: Will remain free from injury  Outcome: PROGRESSING AS EXPECTED  Note: Patient free from accidental injury at this time. Bed near nurses station. Safety precautions in place, including bed alarm. Hourly rounding in place.
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
## 2020-05-27 NOTE — PROGRESS NOTES
Bedside report received.  Assessment complete.  Soft wrist restraints in place with active order  Unable to assess orientation. Patient able to squeeze hands upon command.  Patient ambulates with one assist. Bed alarm on both strip and actual bed alarm.   Patient appears to be comfortable.  Impact peptide running at goal rate of 55 to PEG tube.   Trache in place and capped. Patient tolerating room air, sating at 98%.  Scattered healing abrasions.  + void via patricio catheter, 5/26 BM.  SCD's in place and on.  Q2 turns in place, patient frequently turning and moving.  Review plan with of care with patient. Call light and personal belongings with in reach. Hourly rounding in place. All needs met at this time.

## 2020-05-27 NOTE — PROGRESS NOTES
Zoom call established with wife Diane at [phone]. This RN was at bedside with call for approximately an hour and fifteen minutes. Zoom call left at bedside table for patent until approximately 1040.
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
## 2020-05-27 NOTE — PROGRESS NOTES
2 RN Skin Assessment     Dressing in place on the right knee for skin tear     Bilateral elbows are pink/excoriated    Bilateral elbow mepilex placed     Abrasions on BUE     G-tube in the LLQ    Sutured in place    Surrounding skin is pink/CDI     Abdominal bruising on the right and left    Small scab by the umbilicus     Sacrum/bilateral hips are red but blanching    Q2 turns in place     Right orbit fx    Bruising, ecchymosis, and slight edema     Tracheostomy in place   Skin inferior to the tube is red w/ drainage    Trache care completed     Nickerson catheter in place    Surrounding skin is CDI    Stat-lock in place      Waffle overlay in place, Q2 turns in place SCDs on, bilateral elbow mepilex placed, pillows in use for repositioning

## 2020-05-27 NOTE — PROGRESS NOTES
Unable to assess orientation; pt was able to give a thumbs up when asked if his last name was Amezcua   Unresponsive to other questions     No pain assumed; pt restless but no grimacing or labored breathing     Strict NPO per MD order   Tracheostomy is capped    Trache care completed     Oxygen saturations in the mid 90's  VSS     Impact peptide running at 55 (goal)    G-tube is sutured in place   CDI     Nickerson catheter in place     Pt was able to do oral care with little staff assistance and followed instructions appropriately     Q2 turns in place, soft wrist restraints in place w/ active order, call light within reach, bed locked and in the lowest position, SCDs on,  on, bed alarm on, all needs met at this time     Q4 neuro checks in place

## 2020-05-27 NOTE — DISCHARGE PLANNING
Anticipated Disposition:  [facility]    Action:   This CM called Stephanie to obtain updates. Stephanie reported currently working with insurance company to get auth and JANET, per Stephanie's experience it may take several days.  Stephanie also reported, transportation preferred method is air, and family aware of it and the cost.   This CM also called [facility] to request to speak with a assigned CM, hope to speed up the process. Left VM for the insurance CM to call this CM back.   Javed Patel [phone] Ext [phone] is the CM.     This CM got a VM from Diane Beltran, requested  to call her, this CM TTed  informed him.           Barriers to Discharge:   Insurance Auth, transportation.       Plan:  Please follow up with Stephanie to assist her for the discharge/transfer.     Addendum  10:36am This CM introduced Javed and Stephanie to each other over the phone so we can work together.

## 2020-05-27 NOTE — THERAPY
Occupational Therapy  Daily Treatment     Patient Name: Reyes Amezcua  Age:  64 y.o., Sex:  male  Medical Record #: [id number]  Today's Date: 5/26/2020     Precautions: Non Weight Bearing Right Upper Extremity, Fall Risk, Tracheostomy , PEG Tube, Swallow Precautions ( See Comments)  Comments: trach capped      Assessment    Pt seen for OT tx while wife present during her approved visitation time. Pt demonstrated improved command following and visual tracking today, still with significant delay, pt requires 5-10 seconds to process instruction prior to initiating motor movements in reaction to instruction. Pt responded to instructions to move legs towards the edge of the bed to assist with supine>sit. Pt stand-pivot transferred to cardiac chair with HHAx2 and ModA facilitation. Pt provided warm wash cloth and instructed to wash his face, pt completed thorough face clean wiping his eyes, cheeks, lips and forehead holding the cloth with LUE. Pt completed oral care with suction toothbrush using both LUE and RUE, passing the toothbrush between his hands. Pt also twisted open a lotion bottle when cued to do so, overall pt demonstrated much improved direction following, bilateral coordination and execution of familiar self-care grooming tasks today. Pt left in the company of his wife, seated in locked cardiac chair, RN updated.         Objective       05/26/20 1705   Precautions   Precautions Non Weight Bearing Right Upper Extremity;Fall Risk;Tracheostomy ;PEG Tube;Swallow Precautions ( See Comments)   Comments trach capped   Cognition    Speech/ Communication Intubated / Trached  (trach capped today)   Level of Consciousness Alert   Ability To Follow Commands 1 Step   Attention Impaired   Initiation Impaired   Sitting Upper Body Exercises   Front Arm Raise Bilateral  (against gravity)   Comments pt demonstrated anterior reach to instructions, reached for OT's hand at various heights   Bed Mobility    Supine to Sit  Moderate Assist   Sit to Supine   (up to chair)   Scooting Moderate Assist   Skilled Intervention Sequencing;Tactile Cuing;Verbal Cuing;Postural Facilitation;Facilitation   Activities of Daily Living   Grooming Minimal Assist;Seated  (face wipe with cloth, suction oral care)   Lower Body Dressing Maximal Assist   Toileting Total Assist   Skilled Intervention Verbal Cuing;Tactile Cuing;Facilitation   Functional Mobility   Sit to Stand Minimal Assist   Bed, Chair, Wheelchair Transfer Moderate Assist   Transfer Method Stand Pivot   Mobility stand pivot to cardiac chair with modA facilitation and HHAx2   Short Term Goals   Short Term Goal # 1 Pt will follow 1-step commands 25% of time during functional activity    Goal Outcome # 1 Goal met, new goal added   Short Term Goal # 1 B  Pt will follow 2-step instruction during functional activity   Short Term Goal # 2 Pt will sit with max A for >3 min to participate in grooming task    Goal Outcome # 2 Goal met, new goal added   Short Term Goal # 2 B  Pt will complete seated oral care with min A   Goal Outcome # 2 B Progressing as expected   Short Term Goal # 3 Pt will change gown with mod A   Goal Outcome # 3 Goal not met       Plan    Continue current treatment plan.    Discharge recommendations:  Recommend post-acute placement for additional occupational therapy services prior to discharge home.

## 2020-05-28 ENCOUNTER — APPOINTMENT (OUTPATIENT)
Dept: RADIOLOGY | Facility: MEDICAL CENTER | Age: 65
DRG: 004 | End: 2020-05-28
Attending: NURSE PRACTITIONER
Payer: COMMERCIAL

## 2020-05-28 LAB
ANION GAP SERPL CALC-SCNC: 12 MMOL/L (ref 7–16)
BASOPHILS # BLD AUTO: 0.8 % (ref 0–1.8)
BASOPHILS # BLD: 0.08 K/UL (ref 0–0.12)
BUN SERPL-MCNC: 34 MG/DL (ref 8–22)
CALCIUM SERPL-MCNC: 10.2 MG/DL (ref 8.5–10.5)
CHLORIDE SERPL-SCNC: 102 MMOL/L (ref 96–112)
CO2 SERPL-SCNC: 26 MMOL/L (ref 20–33)
CREAT SERPL-MCNC: 0.61 MG/DL (ref 0.5–1.4)
EOSINOPHIL # BLD AUTO: 0.27 K/UL (ref 0–0.51)
EOSINOPHIL NFR BLD: 2.9 % (ref 0–6.9)
ERYTHROCYTE [DISTWIDTH] IN BLOOD BY AUTOMATED COUNT: 44.1 FL (ref 35.9–50)
GLUCOSE SERPL-MCNC: 115 MG/DL (ref 65–99)
HCT VFR BLD AUTO: 43.1 % (ref 42–52)
HGB BLD-MCNC: 13.8 G/DL (ref 14–18)
IMM GRANULOCYTES # BLD AUTO: 0.05 K/UL (ref 0–0.11)
IMM GRANULOCYTES NFR BLD AUTO: 0.5 % (ref 0–0.9)
LYMPHOCYTES # BLD AUTO: 2.24 K/UL (ref 1–4.8)
LYMPHOCYTES NFR BLD: 23.7 % (ref 22–41)
MAGNESIUM SERPL-MCNC: 2.7 MG/DL (ref 1.5–2.5)
MCH RBC QN AUTO: 29.3 PG (ref 27–33)
MCHC RBC AUTO-ENTMCNC: 32 G/DL (ref 33.7–35.3)
MCV RBC AUTO: 91.5 FL (ref 81.4–97.8)
MONOCYTES # BLD AUTO: 0.74 K/UL (ref 0–0.85)
MONOCYTES NFR BLD AUTO: 7.8 % (ref 0–13.4)
NEUTROPHILS # BLD AUTO: 6.06 K/UL (ref 1.82–7.42)
NEUTROPHILS NFR BLD: 64.3 % (ref 44–72)
NRBC # BLD AUTO: 0 K/UL
NRBC BLD-RTO: 0 /100 WBC
PHOSPHATE SERPL-MCNC: 4.2 MG/DL (ref 2.5–4.5)
PLATELET # BLD AUTO: 566 K/UL (ref 164–446)
PMV BLD AUTO: 10.4 FL (ref 9–12.9)
POTASSIUM SERPL-SCNC: 4.2 MMOL/L (ref 3.6–5.5)
RBC # BLD AUTO: 4.71 M/UL (ref 4.7–6.1)
SODIUM SERPL-SCNC: 140 MMOL/L (ref 135–145)
WBC # BLD AUTO: 9.4 K/UL (ref 4.8–10.8)

## 2020-05-28 PROCEDURE — A9270 NON-COVERED ITEM OR SERVICE: HCPCS | Performed by: PHYSICAL MEDICINE & REHABILITATION

## 2020-05-28 PROCEDURE — A9270 NON-COVERED ITEM OR SERVICE: HCPCS | Performed by: SURGERY

## 2020-05-28 PROCEDURE — 83735 ASSAY OF MAGNESIUM: CPT

## 2020-05-28 PROCEDURE — A9270 NON-COVERED ITEM OR SERVICE: HCPCS | Performed by: NURSE PRACTITIONER

## 2020-05-28 PROCEDURE — 700102 HCHG RX REV CODE 250 W/ 637 OVERRIDE(OP): Performed by: NURSE PRACTITIONER

## 2020-05-28 PROCEDURE — 85025 COMPLETE CBC W/AUTO DIFF WBC: CPT

## 2020-05-28 PROCEDURE — 97112 NEUROMUSCULAR REEDUCATION: CPT

## 2020-05-28 PROCEDURE — 71045 X-RAY EXAM CHEST 1 VIEW: CPT

## 2020-05-28 PROCEDURE — 97530 THERAPEUTIC ACTIVITIES: CPT

## 2020-05-28 PROCEDURE — 700112 HCHG RX REV CODE 229: Performed by: NURSE PRACTITIONER

## 2020-05-28 PROCEDURE — 99232 SBSQ HOSP IP/OBS MODERATE 35: CPT | Performed by: PHYSICAL MEDICINE & REHABILITATION

## 2020-05-28 PROCEDURE — 97535 SELF CARE MNGMENT TRAINING: CPT

## 2020-05-28 PROCEDURE — 36415 COLL VENOUS BLD VENIPUNCTURE: CPT

## 2020-05-28 PROCEDURE — 97116 GAIT TRAINING THERAPY: CPT

## 2020-05-28 PROCEDURE — 770001 HCHG ROOM/CARE - MED/SURG/GYN PRIV*

## 2020-05-28 PROCEDURE — 94760 N-INVAS EAR/PLS OXIMETRY 1: CPT

## 2020-05-28 PROCEDURE — 80048 BASIC METABOLIC PNL TOTAL CA: CPT

## 2020-05-28 PROCEDURE — 84100 ASSAY OF PHOSPHORUS: CPT

## 2020-05-28 PROCEDURE — 700111 HCHG RX REV CODE 636 W/ 250 OVERRIDE (IP): Performed by: SURGERY

## 2020-05-28 PROCEDURE — 700102 HCHG RX REV CODE 250 W/ 637 OVERRIDE(OP): Performed by: SURGERY

## 2020-05-28 PROCEDURE — 700102 HCHG RX REV CODE 250 W/ 637 OVERRIDE(OP): Performed by: PHYSICAL MEDICINE & REHABILITATION

## 2020-05-28 RX ORDER — FLUOXETINE HYDROCHLORIDE 20 MG/1
20 CAPSULE ORAL DAILY
Status: DISCONTINUED | OUTPATIENT
Start: 2020-05-28 | End: 2020-05-29 | Stop reason: HOSPADM

## 2020-05-28 RX ADMIN — AMANTADINE HYDROCHLORIDE 200 MG: 100 CAPSULE ORAL at 05:29

## 2020-05-28 RX ADMIN — NYSTATIN 500000 UNITS: 100000 SUSPENSION ORAL at 22:00

## 2020-05-28 RX ADMIN — NYSTATIN 500000 UNITS: 100000 SUSPENSION ORAL at 13:02

## 2020-05-28 RX ADMIN — AMANTADINE HYDROCHLORIDE 200 MG: 100 CAPSULE ORAL at 13:02

## 2020-05-28 RX ADMIN — POLYETHYLENE GLYCOL 3350 1 PACKET: 17 POWDER, FOR SOLUTION ORAL at 16:23

## 2020-05-28 RX ADMIN — FLUOXETINE HYDROCHLORIDE 20 MG: 20 CAPSULE ORAL at 16:23

## 2020-05-28 RX ADMIN — DOCUSATE SODIUM 100 MG: 50 LIQUID ORAL at 16:23

## 2020-05-28 RX ADMIN — ENALAPRIL MALEATE 5 MG: 5 TABLET ORAL at 05:27

## 2020-05-28 RX ADMIN — NYSTATIN 500000 UNITS: 100000 SUSPENSION ORAL at 16:23

## 2020-05-28 RX ADMIN — HEPARIN SODIUM 5000 UNITS: 5000 INJECTION, SOLUTION INTRAVENOUS; SUBCUTANEOUS at 13:02

## 2020-05-28 RX ADMIN — HEPARIN SODIUM 5000 UNITS: 5000 INJECTION, SOLUTION INTRAVENOUS; SUBCUTANEOUS at 05:26

## 2020-05-28 RX ADMIN — HEPARIN SODIUM 5000 UNITS: 5000 INJECTION, SOLUTION INTRAVENOUS; SUBCUTANEOUS at 22:00

## 2020-05-28 RX ADMIN — NYSTATIN 500000 UNITS: 100000 SUSPENSION ORAL at 08:56

## 2020-05-28 ASSESSMENT — COGNITIVE AND FUNCTIONAL STATUS - GENERAL
WALKING IN HOSPITAL ROOM: A LITTLE
MOBILITY SCORE: 17
MOVING TO AND FROM BED TO CHAIR: A LITTLE
SUGGESTED CMS G CODE MODIFIER MOBILITY: CK
WALKING IN HOSPITAL ROOM: A LOT
MOVING FROM LYING ON BACK TO SITTING ON SIDE OF FLAT BED: A LITTLE
STANDING UP FROM CHAIR USING ARMS: A LOT
STANDING UP FROM CHAIR USING ARMS: A LITTLE
CLIMB 3 TO 5 STEPS WITH RAILING: TOTAL
SUGGESTED CMS G CODE MODIFIER MOBILITY: CK
CLIMB 3 TO 5 STEPS WITH RAILING: TOTAL
MOVING TO AND FROM BED TO CHAIR: A LITTLE
MOVING FROM LYING ON BACK TO SITTING ON SIDE OF FLAT BED: A LITTLE
MOBILITY SCORE: 15

## 2020-05-28 ASSESSMENT — GAIT ASSESSMENTS
DEVIATION: DECREASED TOE OFF;DECREASED HEEL STRIKE
GAIT LEVEL OF ASSIST: MINIMAL ASSIST
DISTANCE (FEET): 5
DISTANCE (FEET): 15
GAIT LEVEL OF ASSIST: MINIMAL ASSIST
DEVIATION: DECREASED TOE OFF;DECREASED HEEL STRIKE

## 2020-05-28 NOTE — PROGRESS NOTES
Trauma Progress Note    Case discussed with Dr. Willams, Physiatry  Awaiting insurance authorization for Rehab transfer  Anticipate transfer on Friday, 5/28    Decannulation today  Discussed with RN and RT

## 2020-05-28 NOTE — RESPIRATORY CARE
Tracheostomy Weaning/Decannulation Update    Pt decannulated. Pt remains on room air with no distress noted.

## 2020-05-28 NOTE — DISCHARGE PLANNING
Acute Rehab Hospital/ Transitional Care Coordination  PAR @ Rehab indicating verbal request would expedite authorization for rehab  Tc to [facility]  Reference [id number]  I left message w/ Maikel Almaraz RN who I was informed is following today.    I Sperry texted Edwina informing anticipate admission to [facility] on Friday 5/29; autoriztion pending.    Also, pt was decannulated today.       Plan:  Will continue to follow.

## 2020-05-28 NOTE — DISCHARGE PLANNING
Acute Rehab Hospital/ Transitional Care Coordination  Transport to Rehab set up for 1030 on Friday 5/29  PT will have a 1:1 in van for transport.   Tc to pt's spouse, Kerry informing her of above.   She is in agreement.   Tc to Edwina NUNEZ informing her also    Plan:  Transfer to [facility] on Friday 5/29.

## 2020-05-28 NOTE — DISCHARGE PLANNING
Anticipated Discharge Disposition: Acute Rehab.    Action: ALYCE sent a Tiger Text message to Shaniqua Garrett with [facility] requesting an update on insurance authorization.  Per Shaniqua, insurance authorization might be received tomorrow 5/29/20.    Barriers to Discharge: Insurance Authorization.    Plan: Acute Rehab, pending insurance authorization.

## 2020-05-28 NOTE — THERAPY
Physical Therapy   Daily Treatment     Patient Name: Reyes Amezcua  Age:  64 y.o., Sex:  male  Medical Record #: [id number]  Today's Date: 5/28/2020     Precautions: Non Weight Bearing Right Upper Extremity, Fall Risk, Swallow Precautions ( See Comments)    Assessment    Pt alert and appropriate t/o tx, trach removed today. SpO2 93-95% on RA. Pt following most 1-step commands, unable to speak but can answer simple yes/no with thumbs up/down.  Pt ambulated 15' x 3 with B HHA and Aniya for B wt shifting.  Pt able to perform seated LAQs and marching with verbal and tactile cues. Pt SPV for supine>sit, Aniya for STS and gait with B HHA. Pt making more purposeful movements (taking a suzanne and holding it to his nose to smell it.  Tolerated 20 min in w/c, out of room, in high stimulus environment.     Plan    Continue current treatment plan.    Discharge recommendations:  Recommend post-acute placement for continued physical therapy services prior to discharge home.              Objective       05/28/20 1401   Cognition    Speech/ Communication Sign Language / Gestures;Mute  (responds to yes/no with thumbs up/down)   Level of Consciousness Alert   Ability To Follow Commands 1 Step   New Learning Impaired   Initiation Impaired   Comments cooperative and appropriate during tx   Active ROM Lower Body    Active ROM Lower Body  WDL   Strength Lower Body   Gross Strength Generalized Weakness, Equal Bilaterally   Neuro-Muscular Treatments   Neuro-Muscular Treatments Weight Shift Right;Weight Shift Left;Verbal Cuing;Tactile Cuing   Comments with B HHA   Balance   Sitting Balance (Static) Good   Standing Balance (Static) Fair   Standing Balance (Dynamic) Fair -   Weight Shift Standing Poor   Skilled Intervention Verbal Cuing;Tactile Cuing;Sequencing   Comments with B HHA   Gait Analysis   Gait Level Of Assist Minimal Assist   Assistive Device None   Distance (Feet) 15   # of Times Distance was Traveled 3   Deviation Decreased Toe  Off;Decreased Heel Strike  (assist needed for weight shifting)   Weight Bearing Status NWB RUE   Skilled Intervention Verbal Cuing;Tactile Cuing;Sequencing;Facilitation   Bed Mobility    Supine to Sit Supervised   Sit to Supine Minimal Assist   Scooting Supervised   Skilled Intervention Verbal Cuing;Tactile Cuing  (HOB elevated, pt using rails)   Functional Mobility   Sit to Stand Minimal Assist   Bed, Chair, Wheelchair Transfer Minimal Assist   Transfer Method Stand Step   Mobility gait in room, w/c in benavides   Wheelchair Assist Total Assist   Distance Wheelchair (Feet or Distance) 500   Skilled Intervention Verbal Cuing;Tactile Cuing;Sequencing;Facilitation   Activity Tolerance   Sitting in Chair 20 in w/c   Sitting Edge of Bed 5   Standing 3 min x 3   Short Term Goals    Short Term Goal # 1 Pt will sit upright EOB at min A in 6tx to improve upright trunk control.    Goal Outcome # 1 Goal met   Short Term Goal # 2 Pt will perform bed mob at min A in 6tx to improve functional mobiltiy.    Goal Outcome # 2 Goal met   Short Term Goal # 3 Pt will achieve a 6 on the Motor Function Scale portion of the [place] in 6tx to improve functional mobility.    Goal Outcome # 3 Progressing as expected   Short Term Goal # 4 Pt will perform fxnl transfers with mod A within 6 visits.   Goal Outcome # 4 Goal met   Education Group   Education Provided Gait Training   Gait Training Patient Response Patient;Acceptance;Explanation;Action Demonstration;Reinforcement Needed   Anticipated Discharge Equipment   DC Equipment Unable To Determine At This Time

## 2020-05-28 NOTE — RESPIRATORY CARE
Tracheostomy Update    Pt tracheostomy has been capped for 41 hours and is tolerating well. Pt remains on room air with strong cough and able to effectively clear secretions.
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
## 2020-05-28 NOTE — THERAPY
Occupational Therapy  Daily Treatment     Patient Name: Reyes Amezcua  Age:  64 y.o., Sex:  male  Medical Record #: [id number]  Today's Date: 5/27/2020     Precautions: Non Weight Bearing Right Upper Extremity, Tracheostomy , Fall Risk, Swallow Precautions ( See Comments)  Comments: trach capped     Assessment    Pt seen for OT tx with wife present, pt with increased lethargy compared to yesterday's session. Pt arousal increased at EOB however was less responsive to cues/required more facilitation for seated grooming today. Demonstrated improved independence with sit>stand, followed instruction to side step x1 but was unable to string side steps together to assist in repositioning higher in bed, likely related to fatigue. Pt tolerating capped trach very well, O2 saturations remained 98-99% throughout functional activities. Pt will continue to benefit from acute OT intervention, recommend post-acute intensive neuro rehabilitation.       Objective       05/27/20 1645   Precautions   Precautions Non Weight Bearing Right Upper Extremity;Tracheostomy ;Fall Risk;Swallow Precautions ( See Comments)   Comments trach capped   Pain 0 - 10 Group   Therapist Pain Assessment   (no indication of pain)   Cognition    Speech/ Communication Intubated / Trached   Level of Consciousness Alert   Ability To Follow Commands 1 Step  (50%)   New Learning Impaired   Attention Impaired   Initiation Impaired   Balance   Weight Shift Sitting Fair   Weight Shift Standing Poor   Bed Mobility    Supine to Sit Maximal Assist   Sit to Supine Minimal Assist   Scooting Moderate Assist   Activities of Daily Living   Eating Total Assist   Grooming Seated;Moderate Assist   Upper Body Dressing Moderate Assist   Lower Body Dressing Maximal Assist   Toileting Total Assist   Comments participation impaired by increased lethargy today   Functional Mobility   Sit to Stand Minimal Assist   Transfer Method   (sit>stand)   Mobility transfer deferred today d/t  lethargy   Short Term Goals   Short Term Goal # 1 Pt will follow 1-step commands 25% of time during functional activity    Goal Outcome # 1 Goal met, new goal added   Short Term Goal # 1 B  Pt will follow 2-step instruction during functional activity   Goal Outcome  # 1 B Goal not met   Short Term Goal # 2 Pt will sit with max A for >3 min to participate in grooming task    Goal Outcome # 2 Goal met, new goal added   Short Term Goal # 2 B  Pt will complete seated oral care with min A   Goal Outcome # 2 B Progressing as expected   Short Term Goal # 3 Pt will change gown with mod A   Goal Outcome # 3 Goal not met       Plan    Continue current treatment plan.    Discharge recommendations:  Recommend post-acute placement for additional occupational therapy services prior to discharge home.

## 2020-05-28 NOTE — PROGRESS NOTES
Report received   Assumed care of patient at 1915  Unable to assess orientation - pt able to respond to name and following commands with slow effort  Unable to assess pain, N/V/D - pt able to rest comfortably after assessments - nonverbal descriptors noted to be calm with unlabored breathing.  NPO with enteral tube feedings - no emesis.    Urine output noted to be bloody at change of shift, clearing up to lizbeth/yellow at this time  5/27 last BM     SCD's in place  Discussed care plan with pt's wife  Bed in lowest position and locked.  Pt resting comfortably now.  Call light within reach  Hourly rounds in place  All needs met at this time

## 2020-05-28 NOTE — PROGRESS NOTES
Spiritual Care Note    Patient Information     Patient's Name: Reyes Amezcua   MRN: [id number]    YOB: 1955   Age and Gender: 64 y.o. male   Service Area: [facility]   Room (and Bed): [place] [address]   Ethnicity or Nationality:     Primary Language: English   Confucianism/Spiritual preference: Jew   Place of Residence: [place]   Family/Friends/Others Present: No   Clinical Team Present: No   Medical Diagnosis(-es)/Procedure(s): Trauma   Code Status: Full Code    Date of Admission: 5/9/2020   Length of Stay: 19 days        Spiritual Care Provider Information:  Name of Spiritual Care Provider: Clare Leos  Title of Spiritual Care Provider: Associate   Phone Number: [phone]  E-mail: [email or web address]  Total time : 10 minutes    Spiritual Screen Results:    Gen Nursing  Spiritual Screen  Is your spiritual health or inner well-being important to you as you cope with your medical condition?: Yes  Would you like to receive a visit from our Spiritual Care team or your own Restoration or spiritual leader?: Yes  Was spiritual care education provided to the patient?: Yes  Cultural/Spiritual Needs During Care: Clinical  Sources of Hope/Gisele: Counseling/Support groups, Companionship     Palliative Care  PC Confucianism/Spiritual Screening  Was spiritual care education provided to the patient?: Yes      Encounter/Request Information  Encounter/Request Type   Visited With: Patient  Nature of the Visit: Follow-up, On shift  Continue Visiting: Yes  Next Follow-up Date: 05/14/20  Crisis Visit: Trauma  Referral From/ Origin of Request: Verbal family  Referral To: Other /SCP    Religous Needs/Values  Confucianism Needs Visit  Confucianism Needs: Prayer  Ritual Needs Visit  Ritual Needs: [place]    Spiritual Assessment     Spiritual Care Encounters    Observations/Symptoms: Accepting    Interaction/Conversation:  prayed with pt.  Today both eyes were open and he seemed to be looking at the  .  Left msg for wife to let her know that prayer had been offered as requested.    Assessment: Need    Need: Seeking Spiritual Assistance and Support    Plan: Daily Visits    Notes:

## 2020-05-28 NOTE — PROGRESS NOTES
Urine bloody in patricio bag onserved, this was a change from previous observation. Trauma team notified. Maintain current plan of care.

## 2020-05-28 NOTE — CARE PLAN
Problem: Safety  Goal: Will remain free from injury  Outcome: PROGRESSING AS EXPECTED     Problem: Bowel/Gastric:  Goal: Normal bowel function is maintained or improved  Outcome: PROGRESSING AS EXPECTED     Problem: Respiratory:  Goal: Respiratory status will improve  Outcome: PROGRESSING AS EXPECTED

## 2020-05-28 NOTE — PROGRESS NOTES
Bedside report received.  Assessment complete.  Soft wrist restraints in place with active order  Unable to assess orientation. Patient able to squeeze hands upon command.  Patient ambulates with one assist. Bed alarm on both strip and actual bed alarm.   Patient appears to be comfortable.  Impact peptide running at goal rate of 55 to PEG tube.   Trache in place and capped. Patient tolerating room air, sating at 98%.  Scattered healing abrasions.  + void via patricio catheter, 5/27 BM.  SCD's in place and on.  Q2 turns in place, patient frequently turning and moving.  Call light and personal belongings with in reach. Hourly rounding in place. All needs met at this time.

## 2020-05-28 NOTE — DISCHARGE PLANNING
"[facility]/ Transitional Care Coordination  2nd request:  Tc to [facility]  Reference [id number]  I called back requesting to speak w/ Maikel Almaraz, RN; not available.  Carla not available either.   I spoke w/ Cedrick \"Javed\"  Jorge RN providing clinical updates .   He has authorized initial 7 days from  5/29 to 6/5 with concurrent review due on 6/4 or 6/5  Auth # is same as refer #[id number]      Tc to Edwina NUNEZ; informing of above.     Tc to pt's spouse Diane Beltran  informing of above.       Plan:  Anticipate transfer to [facility] on 5/28; accepting MD is Dr. Davidson Hyman.            "

## 2020-05-29 ENCOUNTER — HOSPITAL ENCOUNTER (INPATIENT)
Facility: REHABILITATION | Age: 65
LOS: 25 days | DRG: 949 | End: 2020-06-23
Attending: PHYSICAL MEDICINE & REHABILITATION | Admitting: PHYSICAL MEDICINE & REHABILITATION
Payer: COMMERCIAL

## 2020-05-29 ENCOUNTER — APPOINTMENT (OUTPATIENT)
Dept: RADIOLOGY | Facility: MEDICAL CENTER | Age: 65
DRG: 004 | End: 2020-05-29
Attending: NURSE PRACTITIONER
Payer: COMMERCIAL

## 2020-05-29 ENCOUNTER — APPOINTMENT (OUTPATIENT)
Dept: RADIOLOGY | Facility: MEDICAL CENTER | Age: 65
DRG: 004 | End: 2020-05-29
Attending: ORTHOPAEDIC SURGERY
Payer: COMMERCIAL

## 2020-05-29 VITALS
OXYGEN SATURATION: 98 % | BODY MASS INDEX: 21.12 KG/M2 | HEIGHT: 73 IN | HEART RATE: 78 BPM | TEMPERATURE: 97.7 F | SYSTOLIC BLOOD PRESSURE: 131 MMHG | RESPIRATION RATE: 18 BRPM | WEIGHT: 159.39 LBS | DIASTOLIC BLOOD PRESSURE: 73 MMHG

## 2020-05-29 DIAGNOSIS — S06.2X9D DIFFUSE AXONAL BRAIN INJURY WITH LOSS OF CONSCIOUSNESS, SUBSEQUENT ENCOUNTER: ICD-10-CM

## 2020-05-29 DIAGNOSIS — S02.85XA: Primary | ICD-10-CM

## 2020-05-29 DIAGNOSIS — H02.401 PTOSIS, RIGHT EYELID: ICD-10-CM

## 2020-05-29 PROCEDURE — A9270 NON-COVERED ITEM OR SERVICE: HCPCS | Performed by: PHYSICAL MEDICINE & REHABILITATION

## 2020-05-29 PROCEDURE — 770010 HCHG ROOM/CARE - REHAB SEMI PRIVAT*

## 2020-05-29 PROCEDURE — 700102 HCHG RX REV CODE 250 W/ 637 OVERRIDE(OP): Performed by: NURSE PRACTITIONER

## 2020-05-29 PROCEDURE — 700102 HCHG RX REV CODE 250 W/ 637 OVERRIDE(OP): Performed by: PHYSICAL MEDICINE & REHABILITATION

## 2020-05-29 PROCEDURE — A9270 NON-COVERED ITEM OR SERVICE: HCPCS | Performed by: NURSE PRACTITIONER

## 2020-05-29 PROCEDURE — 99223 1ST HOSP IP/OBS HIGH 75: CPT | Performed by: PHYSICAL MEDICINE & REHABILITATION

## 2020-05-29 PROCEDURE — A9270 NON-COVERED ITEM OR SERVICE: HCPCS | Performed by: SURGERY

## 2020-05-29 PROCEDURE — 94760 N-INVAS EAR/PLS OXIMETRY 1: CPT

## 2020-05-29 PROCEDURE — 700102 HCHG RX REV CODE 250 W/ 637 OVERRIDE(OP): Performed by: SURGERY

## 2020-05-29 PROCEDURE — 700111 HCHG RX REV CODE 636 W/ 250 OVERRIDE (IP): Performed by: SURGERY

## 2020-05-29 PROCEDURE — 700111 HCHG RX REV CODE 636 W/ 250 OVERRIDE (IP): Performed by: PHYSICAL MEDICINE & REHABILITATION

## 2020-05-29 RX ORDER — HEPARIN SODIUM 5000 [USP'U]/ML
5000 INJECTION, SOLUTION INTRAVENOUS; SUBCUTANEOUS EVERY 8 HOURS
Refills: 0 | Status: ON HOLD
Start: 2020-05-29 | End: 2020-06-22

## 2020-05-29 RX ORDER — AMOXICILLIN 250 MG
2 CAPSULE ORAL 2 TIMES DAILY
Status: DISCONTINUED | OUTPATIENT
Start: 2020-05-29 | End: 2020-06-05

## 2020-05-29 RX ORDER — FLUOXETINE HYDROCHLORIDE 20 MG/1
20 CAPSULE ORAL DAILY
Status: CANCELLED | OUTPATIENT
Start: 2020-05-30

## 2020-05-29 RX ORDER — FLUOXETINE 20 MG/1
20 TABLET, FILM COATED ORAL DAILY
Status: DISCONTINUED | OUTPATIENT
Start: 2020-05-30 | End: 2020-06-23 | Stop reason: HOSPADM

## 2020-05-29 RX ORDER — ECHINACEA PURPUREA EXTRACT 125 MG
2 TABLET ORAL PRN
Status: DISCONTINUED | OUTPATIENT
Start: 2020-05-29 | End: 2020-06-23 | Stop reason: HOSPADM

## 2020-05-29 RX ORDER — POLYETHYLENE GLYCOL 3350 17 G/17G
1 POWDER, FOR SOLUTION ORAL
Status: DISCONTINUED | OUTPATIENT
Start: 2020-05-29 | End: 2020-06-05

## 2020-05-29 RX ORDER — BISACODYL 10 MG
10 SUPPOSITORY, RECTAL RECTAL
Status: DISCONTINUED | OUTPATIENT
Start: 2020-05-29 | End: 2020-06-05

## 2020-05-29 RX ORDER — ACETAMINOPHEN 325 MG/1
650 TABLET ORAL EVERY 6 HOURS PRN
Status: CANCELLED | OUTPATIENT
Start: 2020-05-29

## 2020-05-29 RX ORDER — ENALAPRIL MALEATE 5 MG/1
5 TABLET ORAL DAILY
Qty: 30 TAB | Status: ON HOLD
Start: 2020-05-30 | End: 2020-06-22

## 2020-05-29 RX ORDER — ACETAMINOPHEN 325 MG/1
650 TABLET ORAL EVERY 6 HOURS PRN
Qty: 30 TAB | Refills: 0
Start: 2020-05-29 | End: 2023-03-30

## 2020-05-29 RX ORDER — HEPARIN SODIUM 5000 [USP'U]/ML
5000 INJECTION, SOLUTION INTRAVENOUS; SUBCUTANEOUS EVERY 8 HOURS
Status: CANCELLED | OUTPATIENT
Start: 2020-05-29

## 2020-05-29 RX ORDER — TRAZODONE HYDROCHLORIDE 50 MG/1
50 TABLET ORAL
Status: DISCONTINUED | OUTPATIENT
Start: 2020-05-29 | End: 2020-06-23 | Stop reason: HOSPADM

## 2020-05-29 RX ORDER — FLUOXETINE HYDROCHLORIDE 20 MG/1
20 CAPSULE ORAL DAILY
Qty: 30 CAP | Status: ON HOLD
Start: 2020-05-30 | End: 2020-06-22 | Stop reason: SDUPTHER

## 2020-05-29 RX ORDER — HYDRALAZINE HYDROCHLORIDE 25 MG/1
25 TABLET, FILM COATED ORAL EVERY 8 HOURS PRN
Status: DISCONTINUED | OUTPATIENT
Start: 2020-05-29 | End: 2020-06-23 | Stop reason: HOSPADM

## 2020-05-29 RX ORDER — ALUMINA, MAGNESIA, AND SIMETHICONE 2400; 2400; 240 MG/30ML; MG/30ML; MG/30ML
20 SUSPENSION ORAL
Status: DISCONTINUED | OUTPATIENT
Start: 2020-05-29 | End: 2020-06-23 | Stop reason: HOSPADM

## 2020-05-29 RX ORDER — ENALAPRIL MALEATE 5 MG/1
5 TABLET ORAL
Status: DISCONTINUED | OUTPATIENT
Start: 2020-05-30 | End: 2020-06-01

## 2020-05-29 RX ORDER — TRAMADOL HYDROCHLORIDE 50 MG/1
50 TABLET ORAL EVERY 4 HOURS PRN
Status: DISCONTINUED | OUTPATIENT
Start: 2020-05-29 | End: 2020-06-23 | Stop reason: HOSPADM

## 2020-05-29 RX ORDER — AMANTADINE HYDROCHLORIDE 100 MG/1
200 CAPSULE, GELATIN COATED ORAL 2 TIMES DAILY
Qty: 60 CAP | Status: ON HOLD
Start: 2020-05-29 | End: 2020-06-22

## 2020-05-29 RX ORDER — ONDANSETRON 2 MG/ML
4 INJECTION INTRAMUSCULAR; INTRAVENOUS 4 TIMES DAILY PRN
Status: DISCONTINUED | OUTPATIENT
Start: 2020-05-29 | End: 2020-06-23 | Stop reason: HOSPADM

## 2020-05-29 RX ORDER — ENALAPRIL MALEATE 5 MG/1
5 TABLET ORAL
Status: CANCELLED | OUTPATIENT
Start: 2020-05-30

## 2020-05-29 RX ORDER — AMANTADINE HYDROCHLORIDE 100 MG/1
200 CAPSULE, GELATIN COATED ORAL 2 TIMES DAILY
Status: CANCELLED | OUTPATIENT
Start: 2020-05-29

## 2020-05-29 RX ORDER — ONDANSETRON 4 MG/1
4 TABLET, ORALLY DISINTEGRATING ORAL 4 TIMES DAILY PRN
Status: DISCONTINUED | OUTPATIENT
Start: 2020-05-29 | End: 2020-06-23 | Stop reason: HOSPADM

## 2020-05-29 RX ORDER — HEPARIN SODIUM 5000 [USP'U]/ML
5000 INJECTION, SOLUTION INTRAVENOUS; SUBCUTANEOUS EVERY 8 HOURS
Status: DISCONTINUED | OUTPATIENT
Start: 2020-05-29 | End: 2020-06-03

## 2020-05-29 RX ORDER — AMANTADINE HYDROCHLORIDE 100 MG/1
200 CAPSULE, GELATIN COATED ORAL 2 TIMES DAILY
Status: DISCONTINUED | OUTPATIENT
Start: 2020-05-29 | End: 2020-06-01

## 2020-05-29 RX ORDER — ACETAMINOPHEN 325 MG/1
650 TABLET ORAL EVERY 4 HOURS PRN
Status: DISCONTINUED | OUTPATIENT
Start: 2020-05-29 | End: 2020-06-23 | Stop reason: HOSPADM

## 2020-05-29 RX ORDER — ACETAMINOPHEN 325 MG/1
650 TABLET ORAL EVERY 6 HOURS PRN
Status: DISCONTINUED | OUTPATIENT
Start: 2020-05-29 | End: 2020-06-23 | Stop reason: HOSPADM

## 2020-05-29 RX ORDER — OXYCODONE HYDROCHLORIDE 5 MG/1
5 TABLET ORAL EVERY 4 HOURS PRN
Qty: 30 TAB | Refills: 0 | Status: ON HOLD
Start: 2020-05-29 | End: 2020-06-22

## 2020-05-29 RX ORDER — POLYVINYL ALCOHOL 14 MG/ML
1 SOLUTION/ DROPS OPHTHALMIC PRN
Status: DISCONTINUED | OUTPATIENT
Start: 2020-05-29 | End: 2020-06-23 | Stop reason: HOSPADM

## 2020-05-29 RX ADMIN — ENALAPRIL MALEATE 5 MG: 5 TABLET ORAL at 05:14

## 2020-05-29 RX ADMIN — DOCUSATE SODIUM 50 MG AND SENNOSIDES 8.6 MG 2 TABLET: 8.6; 5 TABLET, FILM COATED ORAL at 21:46

## 2020-05-29 RX ADMIN — HEPARIN SODIUM 5000 UNITS: 5000 INJECTION, SOLUTION INTRAVENOUS; SUBCUTANEOUS at 05:12

## 2020-05-29 RX ADMIN — ACETAMINOPHEN 650 MG: 325 TABLET, FILM COATED ORAL at 21:52

## 2020-05-29 RX ADMIN — HEPARIN SODIUM 5000 UNITS: 5000 INJECTION, SOLUTION INTRAVENOUS; SUBCUTANEOUS at 15:12

## 2020-05-29 RX ADMIN — OMEPRAZOLE 20 MG: KIT at 17:51

## 2020-05-29 RX ADMIN — DOCUSATE SODIUM 50 MG AND SENNOSIDES 8.6 MG 2 TABLET: 8.6; 5 TABLET, FILM COATED ORAL at 11:45

## 2020-05-29 RX ADMIN — AMANTADINE HYDROCHLORIDE 200 MG: 100 CAPSULE ORAL at 15:11

## 2020-05-29 RX ADMIN — NYSTATIN 500000 UNITS: 100000 SUSPENSION ORAL at 08:10

## 2020-05-29 RX ADMIN — AMANTADINE HYDROCHLORIDE 200 MG: 100 CAPSULE ORAL at 05:12

## 2020-05-29 RX ADMIN — HEPARIN SODIUM 5000 UNITS: 5000 INJECTION, SOLUTION INTRAVENOUS; SUBCUTANEOUS at 21:44

## 2020-05-29 RX ADMIN — FLUOXETINE HYDROCHLORIDE 20 MG: 20 CAPSULE ORAL at 05:13

## 2020-05-29 RX ADMIN — ACETAMINOPHEN 650 MG: 325 TABLET, FILM COATED ORAL at 03:12

## 2020-05-29 ASSESSMENT — PAIN SCALES - WONG BAKER: WONGBAKER_NUMERICALRESPONSE: DOESN'T HURT AT ALL

## 2020-05-29 ASSESSMENT — FIBROSIS 4 INDEX: FIB4 SCORE: 0.83

## 2020-05-29 NOTE — CARE PLAN
Problem: Other Problem (see comments)  Goal: Other Goal  Description: Nutrition Support tolerated and meeting >85% of estimated nutrition needs.  Outcome: NOT MET
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
## 2020-05-29 NOTE — PROGRESS NOTES
Patient transferred to Rehab. Patient left via wheelchair and [facility] transport. Belongings including hearing aides sent with patient. Wife Kerry at bedside. Medications from drawer removed and sent back to pharmacy or disposed of per protocol. Chart given to unit clerk.

## 2020-05-29 NOTE — PROGRESS NOTES
Report received   Assumed care of patient at 1915  Unable to assess orientation - pt able to respond to name and following commands with slow effort  Unable to assess N/V/ - no Diarrhea- pt able to rest comfortably after assessments nonverbal descriptors noted to be pointing to head and rubbing head - tylenol given  NPO with enteral tube feedings - no emesis.  Urine output adequate and yellow from [place] in place - CDI    Discussed care plan with pt's wife  Bed in lowest position and locked.  Pt resting comfortably now.  Call light within reach  Hourly rounds in place  All needs met at this time    Pt able to stand and pivot to bedside commode x2 over night. Teaching patient to use call light before attempting to sit up. Fall risk interventions in place.

## 2020-05-29 NOTE — PROGRESS NOTES
Bedside report received.  Assessment complete.  Soft wrist restraints in place with active order  Unable to assess orientation. Patient able to squeeze hands upon command.  Patient ambulates with one assist. Bed alarm on both strip and actual bed alarm.   Patient appears to be comfortable.  Diabetasource running at goal rate of 55 to PEG tube.   Patient tolerating room air, sating at 99%.  Scattered healing abrasions.  + void via patricio catheter, 5/29BM.  SCD's in place and on.  Q2 turns in place, patient frequently turning and moving.  Call light and personal belongings with in reach. Hourly rounding in place. All needs met at this time.

## 2020-05-29 NOTE — THERAPY
Physical Therapy   Daily Treatment     Patient Name: Reyes Amezcua  Age:  64 y.o., Sex:  male  Medical Record #: [id number]  Today's Date: 5/28/2020     Precautions: Fall Risk, Non Weight Bearing Right Upper Extremity    Assessment    Pt seen for second PT tx today at request of pt's spouse. Pt following simple commands and is appropriate. Pt had trach removed today and is non-verbal but will use thumbs up/down for simple yes/no questions. Provided spouse with CG training on stand-pivot transfer bed>w/c using B HHA. Pt requires B HHA and assist for wt shifting and initiation. Pt able to open both eyes today, and more alert t/o session. Instructed pt's spouse to call nsg to get pt BTB if he becomes fatigued or restless.    Plan    Continue current treatment plan.    Discharge recommendations:  Recommend post-acute placement for continued physical therapy services prior to discharge home.              Objective       05/28/20 1625   Cognition    Speech/ Communication Sign Language / Gestures;Mute   Level of Consciousness Alert   Ability To Follow Commands 1 Step   New Learning Impaired   Initiation Impaired   Comments cooperative and appropriate   Balance   Sitting Balance (Static) Good   Sitting Balance (Dynamic) Fair   Standing Balance (Static) Fair   Standing Balance (Dynamic) Fair -   Weight Shift Sitting Fair   Weight Shift Standing Poor   Skilled Intervention Verbal Cuing;Tactile Cuing;Sequencing;Facilitation;Compensatory Strategies   Comments B HHA   Gait Analysis   Gait Level Of Assist Minimal Assist   Assistive Device None   Distance (Feet) 5   # of Times Distance was Traveled 1   Deviation Decreased Toe Off;Decreased Heel Strike   Weight Bearing Status NWB RUE   Skilled Intervention Verbal Cuing;Tactile Cuing;Sequencing;Facilitation;Compensatory Strategies   Bed Mobility    Supine to Sit Supervised   Scooting Supervised   Skilled Intervention Verbal Cuing   Comments HOB elevated   Functional Mobility   Sit  to Stand Minimal Assist   Bed, Chair, Wheelchair Transfer Minimal Assist   Transfer Method Stand Step   Mobility gait in room, bed>w/c   Skilled Intervention Verbal Cuing;Sequencing;Tactile Cuing;Compensatory Strategies   Activity Tolerance   Sitting Edge of Bed 5   Standing 5   Short Term Goals    Short Term Goal # 1 Pt will sit upright EOB at min A in 6tx to improve upright trunk control.    Goal Outcome # 1 Goal met   Short Term Goal # 2 Pt will perform bed mob at min A in 6tx to improve functional mobiltiy.    Goal Outcome # 2 Goal met   Short Term Goal # 3 Pt will achieve a 6 on the Motor Function Scale portion of the [place] in 6tx to improve functional mobility.    Goal Outcome # 3 Progressing as expected   Short Term Goal # 4 Pt will perform fxnl transfers with mod A within 6 visits.   Goal Outcome # 4 Goal met   Education Group   Role of Physical Therapist Patient Response Patient;Family;Acceptance;Explanation;Demonstration;Action Demonstration;Reinforcement Needed   Traumatic Brain Injury Patient Response Family;Acceptance;Eager;Explanation;Demonstration;Verbal Demonstration   Anticipated Discharge Equipment   DC Equipment Unable To Determine At This Time
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
## 2020-05-29 NOTE — DISCHARGE PLANNING
[facility] to [facility]hab today @ 1030.  Dr. Hyman accepting MD  Spouse aware of time and in agreement w/ transfers.   ENRIQUE Bland aware.       Dc to Rehab today

## 2020-05-29 NOTE — PROGRESS NOTES
Spiritual Care Note    Patient Information     Patient's Name: Reyes Amezcua   MRN: [id number]    YOB: 1955   Age and Gender: 64 y.o. male   Service Area: Inpatient    Room (and Bed): [place] [address]   Ethnicity or Nationality:     Primary Language: English   Hindu/Spiritual preference: Gnosticist   Place of Residence: [place]   Family/Friends/Others Present: n/a   Clinical Team Present: n/a   Medical Diagnosis(-es)/Procedure(s): TBI   Code Status: Full Code    Date of Admission: 5/29/2020   Length of Stay: 0 days        Spiritual Care Provider Information:  Name of Spiritual Care Provider: Clare Leos  Title of Spiritual Care Provider: Associate   Phone Number: [phone]  E-mail: [email or web address]  Total time : 10 minutes    Spiritual Screen Results:    Gen Nursing        Palliative Care         Encounter/Request Information  Encounter/Request Type   Visited With: Family  Nature of the Visit: Follow-up, On shift  Continue Visiting: Yes  Next Follow-up Date: 06/03/20  General Visit: Yes  Referral From/ Origin of Request: Verbal family    Religous Needs/Values       Spiritual Assessment     Spiritual Care Encounters    Interacton/Conversation: This  has been following the pt and speaking daily to his wife since his stay in SICU.  Today the  phoned wife Kerry to ask if she would like visits and prayers to continue when the pt is in rehab, and she said she would. The  will try to visit several times a week.    Interventions: (Active, empathetic listening.)    Plan: Weekly Visits    Notes:

## 2020-05-29 NOTE — CARE PLAN
Problem: Safety  Goal: Will remain free from injury  Outcome: PROGRESSING AS EXPECTED  Note: Safety precautions in place. Bed alarm in place. Hourly rounding in place.    Problem: Respiratory:  Goal: Respiratory status will improve  Outcome: PROGRESSING AS EXPECTED  Note: Patient maintaining O2 saturation, at 99%. Patient tolerating.
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
## 2020-05-29 NOTE — DISCHARGE PLANNING
Anticipated Discharge Disposition: Acute Inpatient Rehab.    Action: MINDI Mcgovern with [facility] confirmed insurance authorization and bed availability.  Per Shaniqua, the transfer is scheduled for today at 10:30am. LSW completed the [employer] Form.  The patient and his bedside RN were notified of the transfer arrangements.    Barriers to Discharge: None    Plan: As Above.

## 2020-05-29 NOTE — DISCHARGE INSTRUCTIONS
Discharge Instructions    Discharged to [facility] by [facility] with escort. Discharged via wheelchair, hospital escort: Yes.  Special equipment needed: Not Applicable    Be sure to schedule a follow-up appointment with your primary care doctor or any specialists as instructed.     Discharge Plan:        I understand that a diet low in cholesterol, fat, and sodium is recommended for good health. Unless I have been given specific instructions below for another diet, I accept this instruction as my diet prescription.   Other diet: Tube feeding    Special Instructions: None    · Is patient discharged on Warfarin / Coumadin?   No       Depression / Suicide Risk    As you are discharged from this [facility], it is important to learn how to keep safe from harming yourself.    Recognize the warning signs:  · Abrupt changes in personality, positive or negative- including increase in energy   · Giving away possessions  · Change in eating patterns- significant weight changes-  positive or negative  · Change in sleeping patterns- unable to sleep or sleeping all the time   · Unwillingness or inability to communicate  · Depression  · Unusual sadness, discouragement and loneliness  · Talk of wanting to die  · Neglect of personal appearance   · Rebelliousness- reckless behavior  · Withdrawal from people/activities they love  · Confusion- inability to concentrate     If you or a loved one observes any of these behaviors or has concerns about self-harm, here's what you can do:  · Talk about it- your feelings and reasons for harming yourself  · Remove any means that you might use to hurt yourself (examples: pills, rope, extension cords, firearm)  · Get professional help from the community (Mental Health, Substance Abuse, psychological counseling)  · Do not be alone:Call your Safe Contact- someone whom you trust who will be there for you.  · Call your local CRISIS HOTLINE 197-6211 or 142-783-6072  · Call your local  Children's Mobile Crisis Response Team [place] (704) 806-8548 or www.Gigturn.Algolux  · Call the toll free National Suicide Prevention Hotlines   · National Suicide Prevention Lifeline 655-021-YCXF (6066)  · National Hope Line Network 800-SUICIDE (194-2298)
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
## 2020-05-29 NOTE — PROGRESS NOTES
Report called to [facility]. All questions answered. Awaiting transport at 1030.

## 2020-05-30 LAB
25(OH)D3 SERPL-MCNC: 25 NG/ML (ref 30–100)
ALBUMIN SERPL BCP-MCNC: 4 G/DL (ref 3.2–4.9)
ALBUMIN/GLOB SERPL: 0.9 G/DL
ALP SERPL-CCNC: 186 U/L (ref 30–99)
ALT SERPL-CCNC: 95 U/L (ref 2–50)
ANION GAP SERPL CALC-SCNC: 15 MMOL/L (ref 7–16)
AST SERPL-CCNC: 39 U/L (ref 12–45)
BASOPHILS # BLD AUTO: 1 % (ref 0–1.8)
BASOPHILS # BLD: 0.1 K/UL (ref 0–0.12)
BILIRUB SERPL-MCNC: 0.8 MG/DL (ref 0.1–1.5)
BUN SERPL-MCNC: 34 MG/DL (ref 8–22)
CALCIUM SERPL-MCNC: 10 MG/DL (ref 8.5–10.5)
CHLORIDE SERPL-SCNC: 103 MMOL/L (ref 96–112)
CO2 SERPL-SCNC: 26 MMOL/L (ref 20–33)
CREAT SERPL-MCNC: 0.72 MG/DL (ref 0.5–1.4)
EOSINOPHIL # BLD AUTO: 0.22 K/UL (ref 0–0.51)
EOSINOPHIL NFR BLD: 2.1 % (ref 0–6.9)
ERYTHROCYTE [DISTWIDTH] IN BLOOD BY AUTOMATED COUNT: 45.1 FL (ref 35.9–50)
EST. AVERAGE GLUCOSE BLD GHB EST-MCNC: 126 MG/DL
GLOBULIN SER CALC-MCNC: 4.6 G/DL (ref 1.9–3.5)
GLUCOSE SERPL-MCNC: 103 MG/DL (ref 65–99)
HBA1C MFR BLD: 6 % (ref 0–5.6)
HCT VFR BLD AUTO: 45.1 % (ref 42–52)
HGB BLD-MCNC: 14.3 G/DL (ref 14–18)
IMM GRANULOCYTES # BLD AUTO: 0.05 K/UL (ref 0–0.11)
IMM GRANULOCYTES NFR BLD AUTO: 0.5 % (ref 0–0.9)
LYMPHOCYTES # BLD AUTO: 2.48 K/UL (ref 1–4.8)
LYMPHOCYTES NFR BLD: 23.6 % (ref 22–41)
MCH RBC QN AUTO: 29.4 PG (ref 27–33)
MCHC RBC AUTO-ENTMCNC: 31.7 G/DL (ref 33.7–35.3)
MCV RBC AUTO: 92.8 FL (ref 81.4–97.8)
MONOCYTES # BLD AUTO: 0.7 K/UL (ref 0–0.85)
MONOCYTES NFR BLD AUTO: 6.7 % (ref 0–13.4)
NEUTROPHILS # BLD AUTO: 6.97 K/UL (ref 1.82–7.42)
NEUTROPHILS NFR BLD: 66.1 % (ref 44–72)
NRBC # BLD AUTO: 0 K/UL
NRBC BLD-RTO: 0 /100 WBC
PLATELET # BLD AUTO: 504 K/UL (ref 164–446)
PMV BLD AUTO: 10.8 FL (ref 9–12.9)
POTASSIUM SERPL-SCNC: 3.9 MMOL/L (ref 3.6–5.5)
PROT SERPL-MCNC: 8.6 G/DL (ref 6–8.2)
RBC # BLD AUTO: 4.86 M/UL (ref 4.7–6.1)
SODIUM SERPL-SCNC: 144 MMOL/L (ref 135–145)
TSH SERPL DL<=0.005 MIU/L-ACNC: 1.01 UIU/ML (ref 0.38–5.33)
WBC # BLD AUTO: 10.5 K/UL (ref 4.8–10.8)

## 2020-05-30 PROCEDURE — A9270 NON-COVERED ITEM OR SERVICE: HCPCS | Performed by: PHYSICAL MEDICINE & REHABILITATION

## 2020-05-30 PROCEDURE — 83036 HEMOGLOBIN GLYCOSYLATED A1C: CPT

## 2020-05-30 PROCEDURE — 700102 HCHG RX REV CODE 250 W/ 637 OVERRIDE(OP): Performed by: PHYSICAL MEDICINE & REHABILITATION

## 2020-05-30 PROCEDURE — 92610 EVALUATE SWALLOWING FUNCTION: CPT | Performed by: SPEECH-LANGUAGE PATHOLOGIST

## 2020-05-30 PROCEDURE — 84443 ASSAY THYROID STIM HORMONE: CPT

## 2020-05-30 PROCEDURE — 97163 PT EVAL HIGH COMPLEX 45 MIN: CPT

## 2020-05-30 PROCEDURE — 92523 SPEECH SOUND LANG COMPREHEN: CPT | Performed by: SPEECH-LANGUAGE PATHOLOGIST

## 2020-05-30 PROCEDURE — 97530 THERAPEUTIC ACTIVITIES: CPT

## 2020-05-30 PROCEDURE — 85025 COMPLETE CBC W/AUTO DIFF WBC: CPT

## 2020-05-30 PROCEDURE — 97167 OT EVAL HIGH COMPLEX 60 MIN: CPT

## 2020-05-30 PROCEDURE — 700111 HCHG RX REV CODE 636 W/ 250 OVERRIDE (IP): Performed by: PHYSICAL MEDICINE & REHABILITATION

## 2020-05-30 PROCEDURE — 770010 HCHG ROOM/CARE - REHAB SEMI PRIVAT*

## 2020-05-30 PROCEDURE — 36415 COLL VENOUS BLD VENIPUNCTURE: CPT

## 2020-05-30 PROCEDURE — 82306 VITAMIN D 25 HYDROXY: CPT

## 2020-05-30 PROCEDURE — 97535 SELF CARE MNGMENT TRAINING: CPT

## 2020-05-30 PROCEDURE — 80053 COMPREHEN METABOLIC PANEL: CPT

## 2020-05-30 PROCEDURE — 99233 SBSQ HOSP IP/OBS HIGH 50: CPT | Performed by: PHYSICAL MEDICINE & REHABILITATION

## 2020-05-30 RX ORDER — VITAMIN B COMPLEX
1000 TABLET ORAL DAILY
Status: DISCONTINUED | OUTPATIENT
Start: 2020-05-31 | End: 2020-06-23 | Stop reason: HOSPADM

## 2020-05-30 RX ADMIN — ENALAPRIL MALEATE 5 MG: 5 TABLET ORAL at 05:39

## 2020-05-30 RX ADMIN — FLUOXETINE 20 MG: 20 TABLET, FILM COATED ORAL at 09:00

## 2020-05-30 RX ADMIN — HEPARIN SODIUM 5000 UNITS: 5000 INJECTION, SOLUTION INTRAVENOUS; SUBCUTANEOUS at 14:39

## 2020-05-30 RX ADMIN — ACETAMINOPHEN 650 MG: 325 TABLET, FILM COATED ORAL at 14:39

## 2020-05-30 RX ADMIN — HEPARIN SODIUM 5000 UNITS: 5000 INJECTION, SOLUTION INTRAVENOUS; SUBCUTANEOUS at 21:46

## 2020-05-30 RX ADMIN — AMANTADINE HYDROCHLORIDE 200 MG: 100 CAPSULE ORAL at 12:17

## 2020-05-30 RX ADMIN — DOCUSATE SODIUM 50 MG AND SENNOSIDES 8.6 MG 2 TABLET: 8.6; 5 TABLET, FILM COATED ORAL at 08:59

## 2020-05-30 RX ADMIN — DOCUSATE SODIUM 50 MG AND SENNOSIDES 8.6 MG 2 TABLET: 8.6; 5 TABLET, FILM COATED ORAL at 21:00

## 2020-05-30 RX ADMIN — HEPARIN SODIUM 5000 UNITS: 5000 INJECTION, SOLUTION INTRAVENOUS; SUBCUTANEOUS at 05:34

## 2020-05-30 RX ADMIN — TRAZODONE HYDROCHLORIDE 50 MG: 50 TABLET ORAL at 21:48

## 2020-05-30 RX ADMIN — AMANTADINE HYDROCHLORIDE 200 MG: 100 CAPSULE ORAL at 05:39

## 2020-05-30 RX ADMIN — TRAMADOL HYDROCHLORIDE 50 MG: 50 TABLET, COATED ORAL at 18:21

## 2020-05-30 RX ADMIN — OMEPRAZOLE 20 MG: KIT at 09:00

## 2020-05-30 ASSESSMENT — BRIEF INTERVIEW FOR MENTAL STATUS (BIMS)
GENERAL MEMORY AND RECALL ABILITY: NONE OF THE GIVEN OPTIONS
GENERAL MEMORY AND RECALL ABILITY: NONE OF THE GIVEN OPTIONS

## 2020-05-30 ASSESSMENT — ACTIVITIES OF DAILY LIVING (ADL)
TOILETING: INDEPENDENT
BED_CHAIR_WHEELCHAIR_TRANSFER_DESCRIPTION: ADAPTIVE EQUIPMENT;INCREASED TIME;VERBAL CUEING
TOILET_TRANSFER_DESCRIPTION: VERBAL CUEING;SUPERVISION FOR SAFETY;SET-UP OF EQUIPMENT;INCREASED TIME
TOILET_TRANSFER_DESCRIPTION: GRAB BAR;INCREASED TIME;INITIAL PREPARATION FOR TASK;VERBAL CUEING

## 2020-05-30 ASSESSMENT — GAIT ASSESSMENTS
DISTANCE (FEET): 40
GAIT LEVEL OF ASSIST: MINIMAL ASSIST
DEVIATION: SHUFFLED GAIT
ASSISTIVE DEVICE: FRONT WHEEL WALKER

## 2020-05-30 NOTE — CARE PLAN
Problem: Bowel/Gastric:  Goal: Normal bowel function is maintained or improved  Outcome: PROGRESSING AS EXPECTED  Note: Patient uses bathroom during bowel elimination. Patient having regular bowel movements; last BM 5/30/20  Denies s/s constipation; bowel meds available if needed.       Problem: Pain Management  Goal: Pain level will decrease to patient's comfort goal  Outcome: PROGRESSING AS EXPECTED  Flowsheets (Taken 5/30/2020 6139)  Non Verbal Scale:   Grimacing   Restlessness  Note: Patient able to perform regular activities this shift.  Pain control with tylenol this shift.  Pain management includes PRN pain meds as well as non-pharmacological measures such as emotional support, rest, and repositioning.
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
## 2020-05-30 NOTE — PROGRESS NOTES
2 RN skin check done with admitting RN As[place]ion and JUNIOR Antonio. Face photo and skin photos documented in media. Appropriate LDAs opened.   Pt with Durga score of 16, RN wound protocol ordered on 5/29/20, orders current. Prevention measures in place including, mepilex on sacrum, pt is able to turn self and skin is intact.
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
## 2020-05-30 NOTE — CARE PLAN
Problem: Communication  Goal: The ability to communicate needs accurately and effectively will improve  Outcome: PROGRESSING AS EXPECTED  Note: Patient opens his eyes when spoken to but has not responded otherwise this shift.      Problem: Safety  Goal: Will remain free from injury  Outcome: PROGRESSING AS EXPECTED  Note: Patient is in an enclosure bed and has a 1:1 sitter at all times.  Room is close to nurses station.

## 2020-05-30 NOTE — PROGRESS NOTES
Patient admitted to facility at 1125 via w/c; accompanied by hospital transport.  Patient assisted to room and positioned in bed for comfort and safety; call light within reach.  Patient assisted with stowing belongings and oriented to room and facility.  Admission assessment performed and documented in computer. Patient received and reviewed education binder. Admission paperwork completed; signed copies placed in chart.  Will continue to monitor.

## 2020-05-30 NOTE — FLOWSHEET NOTE
05/29/20 1714   Patient History   Pulmonary Diagnosis TBI  (pt unable to answer questions)   Procedures Relevant to Respiratory Status Post intubation, CMV, Tracheostomy, decannulation   Protocol Pathways   Protocol Pathways None
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
## 2020-05-30 NOTE — FLOWSHEET NOTE
05/30/20 0907   Events/Summary/Plan   Events/Summary/Plan Trach stoma care, change dressing and clean stoma.  No redness, swelling or abnormal discharge noted..
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
## 2020-05-30 NOTE — FLOWSHEET NOTE
05/29/20 1715   Events/Summary/Plan   Events/Summary/Plan RT Assessment   Skin Inspection Respiratory Device Intact  (Trach stoma site. No redness, swelling or discharge noted.)   Vital Signs   Pulse 74   Respiration 16   Pulse Oximetry 95 %   $ Pulse Oximetry (Spot Check) Yes   Breath Sounds   RUL Breath Sounds Clear   RML Breath Sounds Clear   RLL Breath Sounds Clear   DOMINIK Breath Sounds Clear   LLL Breath Sounds Clear   Secretions   Cough Non Productive   Oxygen   O2 Delivery Device None - Room Air   Smoking History   Have you ever smoked   (Pt unable to respond)

## 2020-05-31 PROCEDURE — 700102 HCHG RX REV CODE 250 W/ 637 OVERRIDE(OP): Performed by: PHYSICAL MEDICINE & REHABILITATION

## 2020-05-31 PROCEDURE — 700111 HCHG RX REV CODE 636 W/ 250 OVERRIDE (IP): Performed by: PHYSICAL MEDICINE & REHABILITATION

## 2020-05-31 PROCEDURE — 92526 ORAL FUNCTION THERAPY: CPT | Performed by: SPEECH-LANGUAGE PATHOLOGIST

## 2020-05-31 PROCEDURE — 94760 N-INVAS EAR/PLS OXIMETRY 1: CPT

## 2020-05-31 PROCEDURE — 92507 TX SP LANG VOICE COMM INDIV: CPT | Performed by: SPEECH-LANGUAGE PATHOLOGIST

## 2020-05-31 PROCEDURE — A9270 NON-COVERED ITEM OR SERVICE: HCPCS | Performed by: PHYSICAL MEDICINE & REHABILITATION

## 2020-05-31 PROCEDURE — 99232 SBSQ HOSP IP/OBS MODERATE 35: CPT | Performed by: PHYSICAL MEDICINE & REHABILITATION

## 2020-05-31 PROCEDURE — 770010 HCHG ROOM/CARE - REHAB SEMI PRIVAT*

## 2020-05-31 RX ADMIN — DOCUSATE SODIUM 50 MG AND SENNOSIDES 8.6 MG 2 TABLET: 8.6; 5 TABLET, FILM COATED ORAL at 08:28

## 2020-05-31 RX ADMIN — MELATONIN 1000 UNITS: at 08:28

## 2020-05-31 RX ADMIN — ONDANSETRON 4 MG: 2 INJECTION INTRAMUSCULAR; INTRAVENOUS at 20:17

## 2020-05-31 RX ADMIN — FLUOXETINE 20 MG: 20 TABLET, FILM COATED ORAL at 08:28

## 2020-05-31 RX ADMIN — TRAZODONE HYDROCHLORIDE 50 MG: 50 TABLET ORAL at 20:56

## 2020-05-31 RX ADMIN — ENALAPRIL MALEATE 5 MG: 5 TABLET ORAL at 06:19

## 2020-05-31 RX ADMIN — HEPARIN SODIUM 5000 UNITS: 5000 INJECTION, SOLUTION INTRAVENOUS; SUBCUTANEOUS at 06:17

## 2020-05-31 RX ADMIN — OMEPRAZOLE 20 MG: KIT at 08:29

## 2020-05-31 RX ADMIN — HEPARIN SODIUM 5000 UNITS: 5000 INJECTION, SOLUTION INTRAVENOUS; SUBCUTANEOUS at 14:52

## 2020-05-31 RX ADMIN — AMANTADINE HYDROCHLORIDE 200 MG: 100 CAPSULE ORAL at 11:28

## 2020-05-31 RX ADMIN — AMANTADINE HYDROCHLORIDE 200 MG: 100 CAPSULE ORAL at 06:19

## 2020-05-31 RX ADMIN — HEPARIN SODIUM 5000 UNITS: 5000 INJECTION, SOLUTION INTRAVENOUS; SUBCUTANEOUS at 20:56

## 2020-05-31 NOTE — FLOWSHEET NOTE
05/31/20 1511   Events/Summary/Plan   Events/Summary/Plan Trach stoma care done: open with small amount of yellow discharge, no redness   Vital Signs   Pulse 80   Respiration 18   Pulse Oximetry 97 %   $ Pulse Oximetry (Spot Check) Yes   Respiratory Assessment   Level of Consciousness   (eyes open, tracking, no verbalization)   Oxygen   O2 Delivery Device None - Room Air
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
## 2020-05-31 NOTE — THERAPY
Speech Language Pathology  Daily Treatment     Patient Name: Reyes Amezcua  Age:  64 y.o., Sex:  male  Medical Record #: [id number]  Today's Date: 5/31/2020     Subjective    Pt was seen for tx, seated in his room. Pt's wife joined the session via Zoom. Pt's wife would like to be called on Zoom for as many sessions as appropriate. SLP discussed that it can be difficult for every session, given the activity and pt's level of distraction. However, the therapy team can attempt, as appropriate. Pt did not appear to be distracted by the video call. Pt's wife observed and then asked questions at the end.     Pt was alert for the initial 45 minutes of session but appeared fatigued and holding head towards the end of the session. Pt also noted with impulsivity X2, removing lap belt and attempting to stand.      Objective       05/31/20 1001   Receptive Language / Auditory Comprehension   Identifies Objects Profound (1)   Identifies Pictures Minimal (4)   Answers Yes / No Personal Questions Minimal (4)   Expressive Language   Repeats Speech Accurately Profound (1)   Automatic Language Appropriate Severe (2)   Verbalizes Wants / Needs Profound (1)   Outcome Measures   Outcome Measures Utilized WAB-R   WAB-R (Western Aphasia Battery)   Comprehension:  Yes/No Questions 48   Interdisciplinary Plan of Care Collaboration   IDT Collaboration with  Family / Caregiver;Nursing   Patient Position at End of Therapy Seated;Other (Comments)  (seated at nurse's station with CNA present)   Collaboration Comments pt's wife joined session vis Zoom   SLP Total Time Spent   SLP Individual Total Time Spent (Mins) 60   Charge Group   SLP Treatment - Individual Speech Language Treatment - Individual   SLP Swallowing Dysfunction Treatment Swallowing Dysfunction Treatment       Assessment    SLP provided pt with ice chip trials. Pt with possible delayed pharyngeal response of up to 5 seconds per bolus, upon visualization of the swallow. Pt with  "cough/throat clear response for 4/20 trials. PO trials limited 2* pt at high risk of asp.     SLP initiated the WAB-R. Pt unable to verbalize for Spontaneous Speech subtest. However, pt was observed to respond \"morning\" when greeted by the physician. Pt scored 48/60 for Auditory Verbal Comprehension-Yes/No questions. Pt pointed to a written yes/no choice board to indicate answers. Pt also identified 0/6 real objects, 5/6 pictures, 5/6 forms, 5/6 letters, 6/6 numbers and 6/6 colors. Pt was unable to complete the Word Recognition subtest d/t fatigue.     Pt noted with improved attention to task and overall ability to participate. However, he also required frequent verbal and tactile cues to sustain attention.     Plan    Finish WAB-R, Cont POT    Speech Therapy Problems     Problem: Comprehension STGs     Dates: Start: 05/30/20       Description: 1) Individualized goal:  identify common objects within the current environment with 80% accuracy and minimal cues  2) Interventions:  SLP Speech Language Treatment        Goal: STG-Within one week, patient will     Dates: Start: 05/30/20       Description: 1) Individualized goal:  identify common objects within the environment with at least 80% accuracy to complete ADLs with minimal verbal cues  2) Interventions:  SLP Speech Language Treatment                    Problem: Expression STGs     Dates: Start: 05/30/20       Description: 1) Individualized goal: use single words/word approximations/photos/icons to indicate basic needs/preferences for 4/5 opportunities  2) Interventions:  SLP Speech Language Treatment        Goal: STG-Within one week, patient will     Dates: Start: 05/30/20                   Problem: Speech/Swallowing LTGs     Dates: Start: 05/30/20       Goal: LTG-By discharge, patient will safely swallow     Dates: Start: 05/30/20       Description: 1) Individualized goal: least restrictive diet without overt s/sx of asp for 100% of PO intake  2) Interventions:  " SLP Swallowing Dysfunction Treatment and SLP Oral Pharyngeal Evaluation              Goal: LTG-By discharge, patient will comprehend     Dates: Start: 05/30/20       Description: 1) Individualized goal:  simple functional language (spoken, pictures, written) to participate with care with at least 80% accuracy and minimal cues  2) Interventions:  SLP Speech Language Treatment and SLP Cognitive Skill Development              Goal: LTG-By discharge, patient will express     Dates: Start: 05/30/20       Description: 1) Individualized goal: basic wants/needs to participate with care for 4/5 opportunities and minimal cues  2) Interventions:  SLP Speech Language Treatment                    Problem: Swallowing STGs     Dates: Start: 05/30/20       Goal: STG-Within one week, patient will     Dates: Start: 05/30/20       Description: 1) Individualized goal:  tolerate PO trials (single ice chips, NTL via half tsp, etc.) without overt s/sx of asp for 80% of trials  2) Interventions:  SLP Swallowing Dysfunction Treatment

## 2020-06-01 LAB
ALBUMIN SERPL BCP-MCNC: 3.6 G/DL (ref 3.2–4.9)
ALP SERPL-CCNC: 141 U/L (ref 30–99)
ALT SERPL-CCNC: 80 U/L (ref 2–50)
AST SERPL-CCNC: 28 U/L (ref 12–45)
BILIRUB CONJ SERPL-MCNC: <0.2 MG/DL (ref 0.1–0.5)
BILIRUB INDIRECT SERPL-MCNC: ABNORMAL MG/DL (ref 0–1)
BILIRUB SERPL-MCNC: 0.3 MG/DL (ref 0.1–1.5)
PROT SERPL-MCNC: 7.2 G/DL (ref 6–8.2)

## 2020-06-01 PROCEDURE — 97535 SELF CARE MNGMENT TRAINING: CPT

## 2020-06-01 PROCEDURE — 94760 N-INVAS EAR/PLS OXIMETRY 1: CPT

## 2020-06-01 PROCEDURE — 97129 THER IVNTJ 1ST 15 MIN: CPT

## 2020-06-01 PROCEDURE — 770010 HCHG ROOM/CARE - REHAB SEMI PRIVAT*

## 2020-06-01 PROCEDURE — 700102 HCHG RX REV CODE 250 W/ 637 OVERRIDE(OP): Performed by: PHYSICAL MEDICINE & REHABILITATION

## 2020-06-01 PROCEDURE — 36415 COLL VENOUS BLD VENIPUNCTURE: CPT

## 2020-06-01 PROCEDURE — A9270 NON-COVERED ITEM OR SERVICE: HCPCS | Performed by: PHYSICAL MEDICINE & REHABILITATION

## 2020-06-01 PROCEDURE — 97530 THERAPEUTIC ACTIVITIES: CPT

## 2020-06-01 PROCEDURE — 92507 TX SP LANG VOICE COMM INDIV: CPT

## 2020-06-01 PROCEDURE — 97112 NEUROMUSCULAR REEDUCATION: CPT

## 2020-06-01 PROCEDURE — 700111 HCHG RX REV CODE 636 W/ 250 OVERRIDE (IP): Performed by: PHYSICAL MEDICINE & REHABILITATION

## 2020-06-01 PROCEDURE — 97116 GAIT TRAINING THERAPY: CPT | Mod: CQ

## 2020-06-01 PROCEDURE — 99233 SBSQ HOSP IP/OBS HIGH 50: CPT | Performed by: PHYSICAL MEDICINE & REHABILITATION

## 2020-06-01 PROCEDURE — 80076 HEPATIC FUNCTION PANEL: CPT

## 2020-06-01 PROCEDURE — 97112 NEUROMUSCULAR REEDUCATION: CPT | Mod: CQ

## 2020-06-01 RX ORDER — ENALAPRIL MALEATE 5 MG/1
2.5 TABLET ORAL
Status: DISCONTINUED | OUTPATIENT
Start: 2020-06-02 | End: 2020-06-05

## 2020-06-01 RX ORDER — AMANTADINE HYDROCHLORIDE 100 MG/1
100 CAPSULE, GELATIN COATED ORAL 2 TIMES DAILY
Status: DISCONTINUED | OUTPATIENT
Start: 2020-06-02 | End: 2020-06-03

## 2020-06-01 RX ADMIN — HEPARIN SODIUM 5000 UNITS: 5000 INJECTION, SOLUTION INTRAVENOUS; SUBCUTANEOUS at 19:39

## 2020-06-01 RX ADMIN — AMANTADINE HYDROCHLORIDE 200 MG: 100 CAPSULE ORAL at 05:48

## 2020-06-01 RX ADMIN — FLUOXETINE 20 MG: 20 TABLET, FILM COATED ORAL at 09:31

## 2020-06-01 RX ADMIN — ENALAPRIL MALEATE 5 MG: 5 TABLET ORAL at 05:48

## 2020-06-01 RX ADMIN — HEPARIN SODIUM 5000 UNITS: 5000 INJECTION, SOLUTION INTRAVENOUS; SUBCUTANEOUS at 15:15

## 2020-06-01 RX ADMIN — TRAZODONE HYDROCHLORIDE 50 MG: 50 TABLET ORAL at 19:41

## 2020-06-01 RX ADMIN — OMEPRAZOLE 20 MG: KIT at 09:31

## 2020-06-01 RX ADMIN — MELATONIN 1000 UNITS: at 09:32

## 2020-06-01 RX ADMIN — HEPARIN SODIUM 5000 UNITS: 5000 INJECTION, SOLUTION INTRAVENOUS; SUBCUTANEOUS at 05:48

## 2020-06-01 RX ADMIN — DOCUSATE SODIUM 50 MG AND SENNOSIDES 8.6 MG 2 TABLET: 8.6; 5 TABLET, FILM COATED ORAL at 19:38

## 2020-06-01 RX ADMIN — AMANTADINE HYDROCHLORIDE 200 MG: 100 CAPSULE ORAL at 12:19

## 2020-06-01 ASSESSMENT — ACTIVITIES OF DAILY LIVING (ADL): BED_CHAIR_WHEELCHAIR_TRANSFER_DESCRIPTION: SQUAT PIVOT TRANSFER TO WHEELCHAIR

## 2020-06-01 ASSESSMENT — GAIT ASSESSMENTS
GAIT LEVEL OF ASSIST: MINIMAL ASSIST
DEVIATION: SHUFFLED GAIT
ASSISTIVE DEVICE: FRONT WHEEL WALKER;HAND HELD ASSIST

## 2020-06-01 NOTE — FLOWSHEET NOTE
06/01/20 1631   Events/Summary/Plan   Events/Summary/Plan trach stoma care done:  open stoma with small amount of yellow discharge, no redness noted

## 2020-06-01 NOTE — PROGRESS NOTES
Change of shift report obtained, care resumed. Patient had an episode of emesis at the start of shift, large amount of tube feed and stooled throughout the night. IM zofran given, vomiting resolved, patient tolerated 300 bolus hs feed and 350 flush throughout the night. Patient woke up in the middle of the night and was restless, he sat by the nursing station for close supervision then returned to bed with nursing staff 1 hour after and slept comfortably from there on.

## 2020-06-01 NOTE — DISCHARGE PLANNING
MINDI rec'd VM from wife to call her back.  MINDI called her and explained she just had a few questions to ask (verify Facesheet info, get name of PCP and discuss DC planning) and did she have a couple of minutes.  She asked that MINDI call back tomorrow at 7:30 as she was on her way to patients room and didn't want to be on the phone.  MINDI explained CM not in until 8:30.  She asked for a call back on Wednesday at noon.  MINDI explained she would pass the message on to MINDI Pinto but did not promise her a call at that time.

## 2020-06-01 NOTE — THERAPY
"Speech Language Pathology  Daily Treatment     Patient Name: Reyes Amezcua  Age:  64 y.o., Sex:  male  Medical Record #: [id number]  Today's Date: 6/1/2020     Subjective    Pt seen immediately following OT, spouse joining session via Zoom on ipad. Pt participatory and cooperative, intermittently closing eyes, however, able to attend with verbal and tactile cues. Fatigued toward end of session, placed back in posey bed.      Objective     06/01/20 1034   Receptive Language / Auditory Comprehension   Identifies Body Parts Minimal (4)   Right / Left Discrimination  Severe (2)   Follows One Unit Commands Moderate (3)   Expressive Language   Gestures Moderate (3)   Repeats Speech Accurately Severe (2)   Automatic Language Appropriate Profound (1)   Verbalizes Wants / Needs Profound (1)   Written Language Expression   Dominant Hand Right   Copying Severe (2)   Interdisciplinary Plan of Care Collaboration   IDT Collaboration with  Family / Caregiver;Physician;Nursing   Patient Position at End of Therapy In Bed;[place] Applied;Bed Alarm On   Collaboration Comments spouse joined session via Zoom. CLOF with physician and nursing   SLP Total Time Spent   SLP Individual Total Time Spent (Mins) 60   Charge Group   SLP Treatment - Individual Speech Language Treatment - Individual       Assessment    Ongoing administration of WAB-R with pt completing auditory word recognition subtest: 42/60, sequential commands subtest: 10/80. Pt is able to consistently follow single step commands given immediate context.   Pt only able to identify body parts on left (pointed to correct body part, however, always on left). Attempted answering Y/N questions using written prompts - spouse reports pt was able to answer questions with fair accuracy with other SLP over the weekend.   Spontaneous writing illegible, pt unable to copy, however was able to trace \"K\" and part of \"A\" in his name.   Pt able to phonate given immediate model for /ah/, " "unable to count 1-2-3, however, was able to say \"hi\" given immediate model from SLP and to spouse. Expressive language subtests will not be administered at this time due to pt's limited verbal expressive language and additional time taken away from therapeutic intervention for testing. SLP discussed with spouse the possibility of re-administering standardized assessment at later date as pt improves.       Plan    1-step commands, CV/CVC productions, automatic language    Speech Therapy Problems     Problem: Comprehension STGs     Dates: Start: 05/30/20       Description: 1) Individualized goal:  identify common objects within the current environment with 80% accuracy and minimal cues  2) Interventions:  SLP Speech Language Treatment        Goal: STG-Within one week, patient will     Dates: Start: 05/30/20       Description: 1) Individualized goal:  identify common objects within the environment with at least 80% accuracy to complete ADLs with minimal verbal cues  2) Interventions:  SLP Speech Language Treatment                    Problem: Expression STGs     Dates: Start: 05/30/20       Description: 1) Individualized goal: use single words/word approximations/photos/icons to indicate basic needs/preferences for 4/5 opportunities  2) Interventions:  SLP Speech Language Treatment        Goal: STG-Within one week, patient will     Dates: Start: 05/30/20       Description: 1) Individualized goal: use single words/word approximations/photos/icons to indicate basic needs/preferences for 4/5 opportunities  2) Interventions:  SLP Speech Language Treatment                Problem: Speech/Swallowing LTGs     Dates: Start: 05/30/20       Goal: LTG-By discharge, patient will safely swallow     Dates: Start: 05/30/20       Description: 1) Individualized goal: least restrictive diet without overt s/sx of asp for 100% of PO intake  2) Interventions:  SLP Swallowing Dysfunction Treatment and SLP Oral Pharyngeal Evaluation           "    Goal: LTG-By discharge, patient will comprehend     Dates: Start: 05/30/20       Description: 1) Individualized goal:  simple functional language (spoken, pictures, written) to participate with care with at least 80% accuracy and minimal cues  2) Interventions:  SLP Speech Language Treatment and SLP Cognitive Skill Development              Goal: LTG-By discharge, patient will express     Dates: Start: 05/30/20       Description: 1) Individualized goal: basic wants/needs to participate with care for 4/5 opportunities and minimal cues  2) Interventions:  SLP Speech Language Treatment                    Problem: Swallowing STGs     Dates: Start: 05/30/20       Goal: STG-Within one week, patient will     Dates: Start: 05/30/20       Description: 1) Individualized goal:  tolerate PO trials (single ice chips, NTL via half tsp, etc.) without overt s/sx of asp for 80% of trials  2) Interventions:  SLP Swallowing Dysfunction Treatment

## 2020-06-01 NOTE — CARE PLAN
Problem: Communication  Goal: The ability to communicate needs accurately and effectively will improve  Description: Pt is unable to effectively communicate needs. Pt rounded on hourly to make sure needs were met. Will continue to monitor.   Outcome: PROGRESSING AS EXPECTED     Problem: Bowel/Gastric:  Goal: Normal bowel function is maintained or improved  Outcome: PROGRESSING AS EXPECTED
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
## 2020-06-01 NOTE — CARE PLAN
Problem: Communication  Goal: The ability to communicate needs accurately and effectively will improve  Description: Pt is unable to effectively communicate needs. Pt rounded on hourly to make sure needs were met. Will continue to monitor.   Outcome: PROGRESSING SLOWER THAN EXPECTED     Problem: Safety  Goal: Will remain free from injury  Outcome: PROGRESSING SLOWER THAN EXPECTED  Note: Pt is impulsive, fails to use call light when needing help. One on one supervision required during the day. Will continue to monitor.

## 2020-06-01 NOTE — THERAPY
Physical Therapy   Daily Treatment     Patient Name: Reyes Amezcua  Age:  64 y.o., Sex:  male  Medical Record #: [id number]  Today's Date: 6/1/2020     Precautions  Precautions: Fall Risk, Weight Bearing As Tolerated Right Upper Extremity  Comments: R orbital Fx, R clavicle Fx, posey bed, 1:1 sitter     Subjective    Pt is resting in bed, he was agreeable to PT     Objective     06/01/20 1301   Vitals   Pulse 82   Blood Pressure 104/61   Cognition    Level of Consciousness Alert   Rancho Scale Level 4   Gait Functional Level of Assist    Gait Level Of Assist Minimal Assist   Assistive Device Front Wheel Walker;Hand Held Assist  (60' and 125' FWW Renaldo additional gait HHA 40' Renaldo)   Deviation Shuffled Gait   Transfer Functional Level of Assist   Bed, Chair, Wheelchair Transfer Minimal Assist   Bed Chair Wheelchair Transfer Description Squat pivot transfer to wheelchair   Bed Mobility    Supine to Sit Contact Guard Assist   Sit to Supine Stand by Assist   Sit to Stand Contact Guard Assist   Neuro-Muscular Treatments   Neuro-Muscular Treatments Verbal Cuing;Postural Changes;Postural Facilitation;Tactile Cuing   Comments stepping over dowles in // B UE support for inc step length/height 10' x 4 reps, Stadnign balloon volley activity L UE on // and racquet in R UE x 1 min and CGA. STS from EOM CGA/SBA x 5 reps   Interdisciplinary Plan of Care Collaboration   IDT Collaboration with  Therapy Tech;Occupational Therapist   Patient Position at End of Therapy Seated;Bed Alarm On  (posey bed)   Collaboration Comments OT: CLOF, Tech: Assist with tx as needed   PT Total Time Spent   PT Individual Total Time Spent (Mins) 60   PT Charge Group   PT Gait Training 2   PT Neuromuscular Re-Education / Balance 2   Supervising Physical Therapist Jolie Renae       Assessment    Pt tolerated treatment session well with emphasis on gait training with and without an AD. Pt does well with automatic activities. Improved bed mobility this  session noted. Pt demonstrates impaired attention requiring redirection to task and impulsivity often standing up on his own and attempting to transfer.    Plan    Gait training, neuro re ed, static/standing balance, proximal strengthening    Physical Therapy Problems     Problem: Mobility     Dates: Start: 05/30/20       Goal: STG-Within one week, patient will propel wheelchair household distances     Dates: Start: 05/30/20       Description: 1) Individualized goal:  50 ft with BLEs and Min A.  2) Interventions:  PT Group Therapy, PT Gait Training, PT Therapeutic Exercises, PT Neuro Re-Ed/Balance, PT Aquatic Therapy, PT Therapeutic Activity, and PT Evaluation            Goal: STG-Within one week, patient will ambulate household distance     Dates: Start: 05/30/20       Description: 1) Individualized goal:  50 ft with LRAD and Min A.  2) Interventions:  PT Group Therapy, PT Gait Training, PT Therapeutic Exercises, PT Neuro Re-Ed/Balance, PT Aquatic Therapy, PT Therapeutic Activity, and PT Evaluation          Goal: STG-Within one week, patient will     Dates: Start: 05/30/20       Description: 1) Individualized goal:  follow one-step commands with 75% accuracy with min multimodal cues and extra time.  2) Interventions:  PT Group Therapy, PT Gait Training, PT Therapeutic Exercises, PT Neuro Re-Ed/Balance, PT Aquatic Therapy, PT Therapeutic Activity, and PT Evaluation                Problem: Mobility Transfers     Dates: Start: 05/30/20       Goal: STG-Within one week, patient will perform bed mobility     Dates: Start: 05/30/20       Description: 1) Individualized goal:  with hospital functions and SBA.  2) Interventions:  PT Group Therapy, PT Gait Training, PT Therapeutic Exercises, PT Neuro Re-Ed/Balance, PT Aquatic Therapy, PT Therapeutic Activity, and PT Evaluation          Goal: STG-Within one week, patient will transfer bed to chair     Dates: Start: 05/30/20       Description: 1) Individualized goal:  with LRAD  and CGA-SBA.  2) Interventions:  PT Group Therapy, PT Gait Training, PT Therapeutic Exercises, PT Neuro Re-Ed/Balance, PT Aquatic Therapy, PT Therapeutic Activity, and PT Evaluation                Problem: PT-Long Term Goals     Dates: Start: 05/30/20       Goal: LTG-By discharge, patient will propel wheelchair     Dates: Start: 05/30/20       Description: 1) Individualized goal:  150 ft with supervision.  2) Interventions:  PT Group Therapy, PT Gait Training, PT Therapeutic Exercises, PT Neuro Re-Ed/Balance, PT Aquatic Therapy, PT Therapeutic Activity, and PT Evaluation          Goal: LTG-By discharge, patient will ambulate     Dates: Start: 05/30/20       Description: 1) Individualized goal:  150 ft with LRAD and supervision.  2) Interventions:  PT Group Therapy, PT Gait Training, PT Therapeutic Exercises, PT Neuro Re-Ed/Balance, PT Aquatic Therapy, PT Therapeutic Activity, and PT Evaluation          Goal: LTG-By discharge, patient will transfer one surface to another     Dates: Start: 05/30/20       Description: 1) Individualized goal:  with LRAD and supervision.  2) Interventions:  PT Group Therapy, PT Gait Training, PT Therapeutic Exercises, PT Neuro Re-Ed/Balance, PT Aquatic Therapy, PT Therapeutic Activity, and PT Evaluation            Goal: LTG-By discharge, patient will ambulate up/down flight of stairs     Dates: Start: 05/30/20       Description: 1) Individualized goal:  with single rail and supervision.  2) Interventions:  PT Group Therapy, PT Gait Training, PT Therapeutic Exercises, PT Neuro Re-Ed/Balance, PT Aquatic Therapy, PT Therapeutic Activity, and PT Evaluation            Goal: LTG-By discharge, patient will transfer in/out of a car     Dates: Start: 05/30/20       Description: 1) Individualized goal:  with LRAD and supervision.  2) Interventions:  PT Group Therapy, PT Gait Training, PT Therapeutic Exercises, PT Neuro Re-Ed/Balance, PT Aquatic Therapy, PT Therapeutic Activity, and PT Evaluation             Goal: LTG-By discharge, patient will     Dates: Start: 05/30/20       Description: 1) Individualized goal:  perform bed mobility independently and no bed functions.  2) Interventions:  PT Group Therapy, PT Gait Training, PT Therapeutic Exercises, PT Neuro Re-Ed/Balance, PT Aquatic Therapy, PT Therapeutic Activity, and PT Evaluation            Goal: LTG-By discharge, patient will     Dates: Start: 05/30/20       Description: 1) Individualized goal:  negotiate single curb with LRAD and supervision.  2) Interventions:  PT Group Therapy, PT Gait Training, PT Therapeutic Exercises, PT Neuro Re-Ed/Balance, PT Aquatic Therapy, PT Therapeutic Activity, and PT Evaluation

## 2020-06-01 NOTE — FLOWSHEET NOTE
06/01/20 0809   Events/Summary/Plan   Events/Summary/Plan 02 spot check, will do trach care when pt is in bed   Vital Signs   Pulse 76   Respiration 18   Pulse Oximetry 99 %   $ Pulse Oximetry (Spot Check) Yes   Oxygen   O2 Delivery Device None - Room Air
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
## 2020-06-01 NOTE — REHAB-DIETARY IDT TEAM NOTE
Dietary   Nutrition  Dietary Problems     Problem: Other Problem (see comments)     Description: Swallowing difficulty related to dysphagia as evidenced by pt NPO with tube feed via G-tube.    Goal: Other Goal     Description: Nutrition Support tolerated and meeting >85% of estimated nutrition needs.                    Patient really unable to give any meaningful answers to questions.  He is rather thin.  Apparently very active prior to admission.   Some issues with TF tolerance with diarrhea last night and N/V at rate of 420mL/hour (one episode).  Appears a little dehydrated based on labs and physical appearance. Poor skin turgor noted.  Patient remains NPO with trials and exercises via SLP only.    Tube FeedinmL Fibersource HN QID + 200mL free water QID  Providing 33311 kcals, 90 grams/protein, 1360mL free water + flushes    Weight: no new weight since admission  Skin: abrasion to knee, trach site healing ; + G-tube  Vitals: WNL, RA   GI: diarrhea/ one episode of emesis   : yellow UOP , catheter   I/Os: +435mL x 24 hours- good UOP    Plan: Will trial nutrient dense formula to decrease overall volume- bolus feedings via gravity method versus utilizing pump.  Adjust feeding to Isosource 1.5, goal 5 per day, increase free water to 250mL q 4 hours for now.  Will adjust when hydration status improves.  TF+ Free water will provide 1875 kcals, 85 grams/protein,  955mL free water + flush= 2455mL free water/day when TF at goal.   Will do 1.5 containers at breakfast and lunch, 1 container at dinner and 1 container at HS.      Section completed by:  Carolina Rodrigues R.D.

## 2020-06-01 NOTE — THERAPY
Occupational Therapy  Daily Treatment     Patient Name: Reyes Amezcua  Age:  64 y.o., Sex:  male  Medical Record #: [id number]  Today's Date: 6/1/2020     Precautions  Precautions: (P) Fall Risk, Weight Bearing As Tolerated Right Upper Extremity  Comments: (P) R orbital Fx, R clavicle Fx, posey bed, 1:1 sitter          Subjective    Pt received seated at nurse's station, agreeable to OT     Objective       06/01/20 0931   Precautions   Precautions Fall Risk;Weight Bearing As Tolerated Right Upper Extremity   Comments R orbital Fx, R clavicle Fx, posey bed, 1:1 sitter    ABS (Agitated Behavior Scale)   Agitated Behavior Scale Performed Yes   Short Attention Span, Easy Distractibility, Inability to Concentrate 3   Impulsive, Impatient, Low Tolerance for Pain or Frustration 2   Uncooperative, Resistant to Care, Demanding 1   Violent and/or Threatening Violence Toward People or Property 1   Explosive and/or Unpredictable Anger 1   Rocking, Rubbing, Moaning, Other Self-Stimulating Behavior 1   Pulling at Tubes, Restraints, etc. 1   Wandering from Treatment Area 3   Restlessness, Pacing, Excessive Movement 2   Repetitive Behaviors, Motor and/or Verbal 1   Rapid, Loud or Excessive Talking 1   Sudden Changes of Mood 1   Easily Initiated - Excessive Crying and/or Laughter 1   Self-Abusiveness, Physical and/or Verbal 1   Agitated Behavior Scale Total Score 20   Level of Severity No Agitation   Sleep/Wake Cycle   Sleep & Rest Awake   Functional Level of Assist   Grooming Contact Guard Assist   Grooming Description Supervision for safety;Verbal cueing  (standing at sink for flossing, oral care)   Outcome Measures   Outcome Measures Utilized Dynavision   Dynavision   Patient Position Standing   Application Visual-motor integration;Visual-perceptual processing;Attention regulation   Mode A   Time per trial (sec) 120   Number of Rings   (5)   Quadrants LUQ;LLQ;RUQ;RLQ   Average Reaction Time 4.6 LUQ, 8.47 LIQ, 6.25 RUQ, 5.77 RLQ    Clinical Observations Avoid crossing midline;Coordination impaired;Standing balance impaired  (impaired attention)   Interdisciplinary Plan of Care Collaboration   IDT Collaboration with  Family / Caregiver   Patient Position at End of Therapy Seated;Other (Comments)   Collaboration Comments pt's wife joined session vis Zoom   OT Total Time Spent   OT Individual Total Time Spent (Mins) 60   OT Charge Group   Charges Yes   OT Self Care / ADL 1   OT Cognitive Skill Development First 15 Minutes 1   OT Neuromuscular Re-education / Balance 1   OT Therapy Activity 1     Pt able to match color/shape pieces on parquetry, able to copy parquetry level I design with increased time and cues for attention to task.     Assessment    Pt tolerated session well, demos impaired attention to task requiring frequent redirection, will often stand up or sit down without warning, restless at times frequently standing up and attempting to walk around room but fatigues quickly. Pt reporting double vision during dynavision task in lower quadrants however when tested with pen did not report double vision, demos dysmetria when reaching for lights in lower quadrants due to motor coordination vs visual deficits. Wife present via Zoom, discussed setting up consistent schedule for increased structure, per wife pt listens to [employer] podcasts, enjoys reading, ping pong, puzzles, cooking, baking sourdough bread.     Plan    Attention to task, functional cog and sequencing during ADLs/IADLs, balance and functional mobility, endurance, TBI education    Occupational Therapy Goals     Problem: Bathing     Dates: Start: 05/30/20       Goal: STG-Within one week, patient will bathe     Dates: Start: 05/30/20       Description: 1) Individualized Goal:  Moderate Assist with AE PRN  2) Interventions:  OT E Stim Attended, OT Group Therapy, OT Self Care/ADL, OT Cognitive Skill Dev, OT Manual Ther Technique, OT Neuro Re-Ed/Balance, OT Sensory Int Techniques, OT  Therapeutic Activity, OT Aquatic Therapy, OT Evaluation, and OT Therapeutic Exercise                Problem: Dressing     Dates: Start: 05/30/20       Goal: STG-Within one week, patient will dress UB     Dates: Start: 05/30/20       Description: 1) Individualized Goal:  Min Assist with AE PRN  2) Interventions:  OT E Stim Attended, OT Group Therapy, OT Self Care/ADL, OT Cognitive Skill Dev, OT Manual Ther Technique, OT Neuro Re-Ed/Balance, OT Sensory Int Techniques, OT Therapeutic Activity, OT Aquatic Therapy, OT Evaluation, and OT Therapeutic Exercise          Goal: STG-Within one week, patient will dress LB     Dates: Start: 05/30/20       Description: 1) Individualized Goal:  Moderate Assist with AE PRN  2) Interventions:  OT E Stim Attended, OT Group Therapy, OT Self Care/ADL, OT Cognitive Skill Dev, OT Manual Ther Technique, OT Neuro Re-Ed/Balance, OT Sensory Int Techniques, OT Therapeutic Activity, OT Aquatic Therapy, OT Evaluation, and OT Therapeutic Exercise                Problem: Eating     Dates: Start: 05/30/20       Goal: STG-Within one week, patient will feed self     Dates: Start: 05/30/20       Description: 1) Individualized Goal:  Moderate Assist with AE PRN  2) Interventions:  OT E Stim Attended, OT Group Therapy, OT Self Care/ADL, OT Cognitive Skill Dev, OT Manual Ther Technique, OT Neuro Re-Ed/Balance, OT Sensory Int Techniques, OT Therapeutic Activity, OT Aquatic Therapy, OT Evaluation, and OT Therapeutic Exercise                  Problem: Functional Cognition     Dates: Start: 05/30/20       Goal: STG-Within one week, patient will attend to     Dates: Start: 05/30/20       Description: 1) Individualized Goal:  A five minute task with no more than 2 cues to attend  2) Interventions:  OT E Stim Attended, OT Group Therapy, OT Self Care/ADL, OT Cognitive Skill Dev, OT Manual Ther Technique, OT Neuro Re-Ed/Balance, OT Sensory Int Techniques, OT Therapeutic Activity, OT Aquatic Therapy, OT  Evaluation, and OT Therapeutic Exercise                Problem: Functional Transfers     Dates: Start: 05/30/20       Goal: STG-Within one week, patient will transfer to toilet     Dates: Start: 05/30/20       Description: 1) Individualized Goal:  SBA with AE/DME PRN  2) Interventions:  OT E Stim Attended, OT Group Therapy, OT Self Care/ADL, OT Cognitive Skill Dev, OT Manual Ther Technique, OT Neuro Re-Ed/Balance, OT Sensory Int Techniques, OT Therapeutic Activity, OT Aquatic Therapy, OT Evaluation, and OT Therapeutic Exercise          Goal: STG-Within one week, patient will transfer to step in shower     Dates: Start: 05/30/20       Description: 1) Individualized Goal:  Supervision level with AE PRN  2) Interventions:  OT E Stim Attended, OT Group Therapy, OT Self Care/ADL, OT Cognitive Skill Dev, OT Manual Ther Technique, OT Neuro Re-Ed/Balance, OT Sensory Int Techniques, OT Therapeutic Activity, OT Aquatic Therapy, OT Evaluation, and OT Therapeutic Exercise                Problem: OT Long Term Goals     Dates: Start: 05/30/20       Goal: LTG-By discharge, patient will complete basic self care tasks     Dates: Start: 05/30/20       Description: 1) Individualized Goal:  Min A - Supervision with AE PRN  2) Interventions:  OT E Stim Attended, OT Group Therapy, OT Self Care/ADL, OT Cognitive Skill Dev, OT Manual Ther Technique, OT Neuro Re-Ed/Balance, OT Sensory Int Techniques, OT Therapeutic Activity, OT Aquatic Therapy, OT Evaluation, and OT Therapeutic Exercise          Goal: LTG-By discharge, patient will perform bathroom transfers     Dates: Start: 05/30/20       Description: 1) Individualized Goal: Supervision with AE/DME PRN   2) Interventions:  OT E Stim Attended, OT Group Therapy, OT Self Care/ADL, OT Cognitive Skill Dev, OT Manual Ther Technique, OT Neuro Re-Ed/Balance, OT Sensory Int Techniques, OT Therapeutic Activity, OT Aquatic Therapy, OT Evaluation, and OT Therapeutic Exercise                Problem:  Toileting     Dates: Start: 05/30/20       Goal: STG-Within one week, patient will complete toileting tasks     Dates: Start: 05/30/20       Description: 1) Individualized Goal:  Moderate Assist with AE PRN  2) Interventions:  OT E Stim Attended, OT Group Therapy, OT Self Care/ADL, OT Cognitive Skill Dev, OT Manual Ther Technique, OT Neuro Re-Ed/Balance, OT Sensory Int Techniques, OT Therapeutic Activity, OT Aquatic Therapy, OT Evaluation, and OT Therapeutic Exercise

## 2020-06-02 PROCEDURE — A9270 NON-COVERED ITEM OR SERVICE: HCPCS | Performed by: PHYSICAL MEDICINE & REHABILITATION

## 2020-06-02 PROCEDURE — 700102 HCHG RX REV CODE 250 W/ 637 OVERRIDE(OP): Performed by: PHYSICAL MEDICINE & REHABILITATION

## 2020-06-02 PROCEDURE — 99233 SBSQ HOSP IP/OBS HIGH 50: CPT | Performed by: PHYSICAL MEDICINE & REHABILITATION

## 2020-06-02 PROCEDURE — 97116 GAIT TRAINING THERAPY: CPT

## 2020-06-02 PROCEDURE — 97530 THERAPEUTIC ACTIVITIES: CPT

## 2020-06-02 PROCEDURE — 97535 SELF CARE MNGMENT TRAINING: CPT

## 2020-06-02 PROCEDURE — 92507 TX SP LANG VOICE COMM INDIV: CPT

## 2020-06-02 PROCEDURE — 700111 HCHG RX REV CODE 636 W/ 250 OVERRIDE (IP): Performed by: PHYSICAL MEDICINE & REHABILITATION

## 2020-06-02 PROCEDURE — 770010 HCHG ROOM/CARE - REHAB SEMI PRIVAT*

## 2020-06-02 RX ADMIN — AMANTADINE HYDROCHLORIDE 100 MG: 100 CAPSULE ORAL at 05:24

## 2020-06-02 RX ADMIN — FLUOXETINE 20 MG: 20 TABLET, FILM COATED ORAL at 07:49

## 2020-06-02 RX ADMIN — HEPARIN SODIUM 5000 UNITS: 5000 INJECTION, SOLUTION INTRAVENOUS; SUBCUTANEOUS at 05:24

## 2020-06-02 RX ADMIN — TRAZODONE HYDROCHLORIDE 50 MG: 50 TABLET ORAL at 20:16

## 2020-06-02 RX ADMIN — MELATONIN 1000 UNITS: at 07:49

## 2020-06-02 RX ADMIN — DOCUSATE SODIUM 50 MG AND SENNOSIDES 8.6 MG 2 TABLET: 8.6; 5 TABLET, FILM COATED ORAL at 09:00

## 2020-06-02 RX ADMIN — HEPARIN SODIUM 5000 UNITS: 5000 INJECTION, SOLUTION INTRAVENOUS; SUBCUTANEOUS at 20:16

## 2020-06-02 RX ADMIN — DOCUSATE SODIUM 50 MG AND SENNOSIDES 8.6 MG 2 TABLET: 8.6; 5 TABLET, FILM COATED ORAL at 20:16

## 2020-06-02 RX ADMIN — ENALAPRIL MALEATE 2.5 MG: 5 TABLET ORAL at 05:24

## 2020-06-02 RX ADMIN — OMEPRAZOLE 20 MG: KIT at 09:00

## 2020-06-02 RX ADMIN — AMANTADINE HYDROCHLORIDE 100 MG: 100 CAPSULE ORAL at 11:57

## 2020-06-02 RX ADMIN — HEPARIN SODIUM 5000 UNITS: 5000 INJECTION, SOLUTION INTRAVENOUS; SUBCUTANEOUS at 14:40

## 2020-06-02 ASSESSMENT — ACTIVITIES OF DAILY LIVING (ADL)
TUB_SHOWER_TRANSFER_DESCRIPTION: GRAB BAR;SHOWER BENCH;INCREASED TIME;VERBAL CUEING;SUPERVISION FOR SAFETY
TOILET_TRANSFER_DESCRIPTION: GRAB BAR;INCREASED TIME
TOILETING_LEVEL_OF_ASSIST_DESCRIPTION: VERBAL CUEING

## 2020-06-02 ASSESSMENT — GAIT ASSESSMENTS
GAIT LEVEL OF ASSIST: TOTAL ASSIST X 2
ASSISTIVE DEVICE: FRONT WHEEL WALKER;HAND HELD ASSIST
DEVIATION: SHUFFLED GAIT

## 2020-06-02 NOTE — CARE PLAN
Problem: Other Problem (see comments)  Goal: Other Goal  Description: Nutrition Support tolerated and meeting >85% of estimated nutrition needs.  Outcome: PROGRESSING AS EXPECTED     Patient remains NPO.  Seems to be tolerating Isosource 1.5 boluses much better than Fibersource at high  volume rate.   RD following.  Continue current regimen + free water.   Weekly weights.

## 2020-06-02 NOTE — CARE PLAN
Problem: Communication  Goal: The ability to communicate needs accurately and effectively will improve  Note: Pt. was able to follow simple commands giving thumbs up /down for simple yes/no questions.     Problem: Urinary Elimination:  Goal: Ability to reestablish a normal urinary elimination pattern will improve  Note: Pt's patricio catheter was removed this morning. Pt. Was able to void moderate amount of urine. PVR 265ml. Will continue to time void and drain bladder as needed.

## 2020-06-02 NOTE — CARE PLAN
Problem: Swallowing STGs  Goal: STG-Within one week, patient will  Description: 1) Individualized goal:  tolerate PO trials (single ice chips, NTL via half tsp, etc.) without overt s/sx of asp for 80% of trials  2) Interventions:  SLP Swallowing Dysfunction Treatment      Outcome: NOT MET  Note: Pt with immediate cough response on ice chip trials during CSE, will continue conservative trials with anticipation of instrumental assessment later this week to further assess CLOF.      Problem: Comprehension STGs  Goal: STG-Within one week, patient will  Description: 1) Individualized goal:  identify common objects within the environment with at least 80% accuracy to complete ADLs with minimal verbal cues  2) Interventions:  SLP Speech Language Treatment      Outcome: NOT MET  Note: Pt able to identify pictures with more success than tactile objects at this time. FO2 for picture ID.      Problem: Expression STGs  Goal: STG-Within one week, patient will  Description: 1) Individualized goal: use single words/word approximations/photos/icons to indicate basic needs/preferences for 4/5 opportunities  2) Interventions:  SLP Speech Language Treatment  Outcome: NOT MET  Note: Pt has started to imitate CV productions, repetition of automatic language (I.e. counting, JERRY, VENKAT)

## 2020-06-02 NOTE — THERAPY
Physical Therapy   Daily Treatment     Patient Name: Reyes Amezcua  Age:  64 y.o., Sex:  male  Medical Record #: [id number]  Today's Date: 6/2/2020     Precautions  Precautions: (P) Fall Risk, Weight Bearing As Tolerated Right Upper Extremity  Comments: (P) R orbital Fx, R clavicle Fx, posey bed     Subjective    Patient asleep in Posey bed, agreeable to therapy with initiation.     Objective       06/02/20 1431   Precautions   Precautions Fall Risk;Weight Bearing As Tolerated Right Upper Extremity   Comments R orbital Fx, R clavicle Fx, posey bed    ABS (Agitated Behavior Scale)   Agitated Behavior Scale Performed Yes   Short Attention Span, Easy Distractibility, Inability to Concentrate 3   Impulsive, Impatient, Low Tolerance for Pain or Frustration 2   Uncooperative, Resistant to Care, Demanding 1   Violent and/or Threatening Violence Toward People or Property 1   Explosive and/or Unpredictable Anger 1   Rocking, Rubbing, Moaning, Other Self-Stimulating Behavior 1   Pulling at Tubes, Restraints, etc. 1   Wandering from Treatment Area 2   Restlessness, Pacing, Excessive Movement 2   Repetitive Behaviors, Motor and/or Verbal 1   Rapid, Loud or Excessive Talking 1   Sudden Changes of Mood 1   Easily Initiated - Excessive Crying and/or Laughter 1   Self-Abusiveness, Physical and/or Verbal 1   Agitated Behavior Scale Total Score 19   Level of Severity No Agitation   Sleep/Wake Cycle   Sleep & Rest Asleep   Gait Functional Level of Assist    Gait Level Of Assist Total Assist X 2  (second person for w/c follow)   Assistive Device Front Wheel Walker;Hand Held Assist   Distance (Feet)   (25 ftx2, 30 ftx1, 15 ftx1)   Deviation Shuffled Gait  (scissoring with RLE, narrow CLAU, tendency for R LOB)   Wheelchair Functional Level of Assist   Wheelchair Assist Moderate Assist   Distance Wheelchair (Feet or Distance) 200   Wheelchair Description Assistance with steering;Extra time;Limited by fatigue;Requires incidental  assist;Safety concerns;Verbal cueing  (BLEs to propel, assist for steering (veers R)/propulsion)   Transfer Functional Level of Assist   Bed, Chair, Wheelchair Transfer Total Assist X 2  (second person due to impulsivity)   Bed Chair Wheelchair Transfer Description   (Squat pivot transfer to wheelchair)   Toilet Transfers Moderate Assist   Toilet Transfer Description Grab bar;Increased time;Initial preparation for task;Requires lift;Verbal cueing   Bed Mobility    Supine to Sit Minimal Assist   Sit to Supine Minimal Assist   Sit to Stand Minimal Assist   Scooting Stand by Assist   Rolling Supervised   Interdisciplinary Plan of Care Collaboration   IDT Collaboration with  Family / Caregiver   Patient Position at End of Therapy In Bed;Family / Friend in Room  (wife in room)   Collaboration Comments wife present for session   PT Total Time Spent   PT Individual Total Time Spent (Mins) 60   PT Charge Group   PT Gait Training 2   PT Therapeutic Activities 2     Education with wife performing hands-on gait training with SBA-CGA from PT and w/c follow. Requires additional trials due to decreased safety with mobility due to patient's balance impairments.    Toilet transfer with HHA and grab bar, dependent pericare and Max A LB dressing. Standing dynamic balance performing hand hygiene with cues for sequencing and CGA.    Donning/doffing of sneakers seated EOB requiring SBA, one episode of Max A due to LOB to R, delayed UE balance responses.    Assessment    Patient tolerated session well, continues to be limited by significantly impaired activity tolerance and endurance. Patient with impaired balance with tendency for LOB to R due to scissoring/narrow CLAU. Requires 2-person assist for safety and w/c follow due to impulsivity.    Plan    Gait training with FWW vs. HHA, standing balance activities, endurance/activity tolerance, BLE strengthening, stair/curb negotiation, yes/no protocols per SLP direction, incorporate  cognition/communication into activities, family training.    Physical Therapy Problems     Problem: Mobility     Dates: Start: 05/30/20       Goal: STG-Within one week, patient will propel wheelchair household distances     Dates: Start: 05/30/20       Description: 1) Individualized goal:  50 ft with BLEs and Min A.  2) Interventions:  PT Group Therapy, PT Gait Training, PT Therapeutic Exercises, PT Neuro Re-Ed/Balance, PT Aquatic Therapy, PT Therapeutic Activity, and PT Evaluation            Goal: STG-Within one week, patient will ambulate household distance     Dates: Start: 05/30/20       Description: 1) Individualized goal:  50 ft with LRAD and Min A.  2) Interventions:  PT Group Therapy, PT Gait Training, PT Therapeutic Exercises, PT Neuro Re-Ed/Balance, PT Aquatic Therapy, PT Therapeutic Activity, and PT Evaluation          Goal: STG-Within one week, patient will     Dates: Start: 05/30/20       Description: 1) Individualized goal:  follow one-step commands with 75% accuracy with min multimodal cues and extra time.  2) Interventions:  PT Group Therapy, PT Gait Training, PT Therapeutic Exercises, PT Neuro Re-Ed/Balance, PT Aquatic Therapy, PT Therapeutic Activity, and PT Evaluation                Problem: Mobility Transfers     Dates: Start: 05/30/20       Goal: STG-Within one week, patient will perform bed mobility     Dates: Start: 05/30/20       Description: 1) Individualized goal:  with hospital functions and SBA.  2) Interventions:  PT Group Therapy, PT Gait Training, PT Therapeutic Exercises, PT Neuro Re-Ed/Balance, PT Aquatic Therapy, PT Therapeutic Activity, and PT Evaluation          Goal: STG-Within one week, patient will transfer bed to chair     Dates: Start: 05/30/20       Description: 1) Individualized goal:  with LRAD and CGA-SBA.  2) Interventions:  PT Group Therapy, PT Gait Training, PT Therapeutic Exercises, PT Neuro Re-Ed/Balance, PT Aquatic Therapy, PT Therapeutic Activity, and PT Evaluation                 Problem: PT-Long Term Goals     Dates: Start: 05/30/20       Goal: LTG-By discharge, patient will propel wheelchair     Dates: Start: 05/30/20       Description: 1) Individualized goal:  150 ft with supervision.  2) Interventions:  PT Group Therapy, PT Gait Training, PT Therapeutic Exercises, PT Neuro Re-Ed/Balance, PT Aquatic Therapy, PT Therapeutic Activity, and PT Evaluation          Goal: LTG-By discharge, patient will ambulate     Dates: Start: 05/30/20       Description: 1) Individualized goal:  150 ft with LRAD and supervision.  2) Interventions:  PT Group Therapy, PT Gait Training, PT Therapeutic Exercises, PT Neuro Re-Ed/Balance, PT Aquatic Therapy, PT Therapeutic Activity, and PT Evaluation          Goal: LTG-By discharge, patient will transfer one surface to another     Dates: Start: 05/30/20       Description: 1) Individualized goal:  with LRAD and supervision.  2) Interventions:  PT Group Therapy, PT Gait Training, PT Therapeutic Exercises, PT Neuro Re-Ed/Balance, PT Aquatic Therapy, PT Therapeutic Activity, and PT Evaluation            Goal: LTG-By discharge, patient will ambulate up/down flight of stairs     Dates: Start: 05/30/20       Description: 1) Individualized goal:  with single rail and supervision.  2) Interventions:  PT Group Therapy, PT Gait Training, PT Therapeutic Exercises, PT Neuro Re-Ed/Balance, PT Aquatic Therapy, PT Therapeutic Activity, and PT Evaluation            Goal: LTG-By discharge, patient will transfer in/out of a car     Dates: Start: 05/30/20       Description: 1) Individualized goal:  with LRAD and supervision.  2) Interventions:  PT Group Therapy, PT Gait Training, PT Therapeutic Exercises, PT Neuro Re-Ed/Balance, PT Aquatic Therapy, PT Therapeutic Activity, and PT Evaluation            Goal: LTG-By discharge, patient will     Dates: Start: 05/30/20       Description: 1) Individualized goal:  perform bed mobility independently and no bed functions.  2)  Interventions:  PT Group Therapy, PT Gait Training, PT Therapeutic Exercises, PT Neuro Re-Ed/Balance, PT Aquatic Therapy, PT Therapeutic Activity, and PT Evaluation            Goal: LTG-By discharge, patient will     Dates: Start: 05/30/20       Description: 1) Individualized goal:  negotiate single curb with LRAD and supervision.  2) Interventions:  PT Group Therapy, PT Gait Training, PT Therapeutic Exercises, PT Neuro Re-Ed/Balance, PT Aquatic Therapy, PT Therapeutic Activity, and PT Evaluation

## 2020-06-02 NOTE — CARE PLAN
Problem: Safety  Goal: Will remain free from falls  Intervention: Implement fall precautions  Note: Pt uses enclosure bed for safety.Fall precautions and frequent rounding in place.Will continue to monitor and assess needs and safety.     Problem: Other Problem (see comments)  Intervention: Initiate TF/TPN/PPN (see comments)  Note: Pt tolerated hs bolus feeding.No n/v and abdominal distention noted.Will continue to monitor.
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
## 2020-06-02 NOTE — CARE PLAN
Problem: Eating  Goal: STG-Within one week, patient will feed self  Description: 1) Individualized Goal:  Moderate Assist with AE PRN  2) Interventions:  OT E Stim Attended, OT Group Therapy, OT Self Care/ADL, OT Cognitive Skill Dev, OT Manual Ther Technique, OT Neuro Re-Ed/Balance, OT Sensory Int Techniques, OT Therapeutic Activity, OT Aquatic Therapy, OT Evaluation, and OT Therapeutic Exercise    Outcome: PROGRESSING AS EXPECTED     Problem: Toileting  Goal: STG-Within one week, patient will complete toileting tasks  Description: 1) Individualized Goal:  Moderate Assist with AE PRN  2) Interventions:  OT E Stim Attended, OT Group Therapy, OT Self Care/ADL, OT Cognitive Skill Dev, OT Manual Ther Technique, OT Neuro Re-Ed/Balance, OT Sensory Int Techniques, OT Therapeutic Activity, OT Aquatic Therapy, OT Evaluation, and OT Therapeutic Exercise  Outcome: PROGRESSING AS EXPECTED     Problem: Functional Transfers  Goal: STG-Within one week, patient will transfer to toilet  Description: 1) Individualized Goal:  SBA with AE/DME PRN  2) Interventions:  OT E Stim Attended, OT Group Therapy, OT Self Care/ADL, OT Cognitive Skill Dev, OT Manual Ther Technique, OT Neuro Re-Ed/Balance, OT Sensory Int Techniques, OT Therapeutic Activity, OT Aquatic Therapy, OT Evaluation, and OT Therapeutic Exercise  Outcome: PROGRESSING AS EXPECTED  Goal: STG-Within one week, patient will transfer to step in shower  Description: 1) Individualized Goal:  Supervision level with AE PRN  2) Interventions:  OT E Stim Attended, OT Group Therapy, OT Self Care/ADL, OT Cognitive Skill Dev, OT Manual Ther Technique, OT Neuro Re-Ed/Balance, OT Sensory Int Techniques, OT Therapeutic Activity, OT Aquatic Therapy, OT Evaluation, and OT Therapeutic Exercise  Outcome: PROGRESSING AS EXPECTED     Problem: Functional Cognition  Goal: STG-Within one week, patient will attend to  Description: 1) Individualized Goal:  A five minute task with no more than 2 cues  to attend  2) Interventions:  OT E Stim Attended, OT Group Therapy, OT Self Care/ADL, OT Cognitive Skill Dev, OT Manual Ther Technique, OT Neuro Re-Ed/Balance, OT Sensory Int Techniques, OT Therapeutic Activity, OT Aquatic Therapy, OT Evaluation, and OT Therapeutic Exercise  Outcome: PROGRESSING AS EXPECTED     Problem: Bathing  Goal: STG-Within one week, patient will bathe  Description: 1) Individualized Goal:  Moderate Assist with AE PRN  2) Interventions:  OT E Stim Attended, OT Group Therapy, OT Self Care/ADL, OT Cognitive Skill Dev, OT Manual Ther Technique, OT Neuro Re-Ed/Balance, OT Sensory Int Techniques, OT Therapeutic Activity, OT Aquatic Therapy, OT Evaluation, and OT Therapeutic Exercise  Outcome: MET     Problem: Dressing  Goal: STG-Within one week, patient will dress UB  Description: 1) Individualized Goal:  Min Assist with AE PRN  2) Interventions:  OT E Stim Attended, OT Group Therapy, OT Self Care/ADL, OT Cognitive Skill Dev, OT Manual Ther Technique, OT Neuro Re-Ed/Balance, OT Sensory Int Techniques, OT Therapeutic Activity, OT Aquatic Therapy, OT Evaluation, and OT Therapeutic Exercise  Outcome: MET  Goal: STG-Within one week, patient will dress LB  Description: 1) Individualized Goal:  Moderate Assist with AE PRN  2) Interventions:  OT E Stim Attended, OT Group Therapy, OT Self Care/ADL, OT Cognitive Skill Dev, OT Manual Ther Technique, OT Neuro Re-Ed/Balance, OT Sensory Int Techniques, OT Therapeutic Activity, OT Aquatic Therapy, OT Evaluation, and OT Therapeutic Exercise  Outcome: MET

## 2020-06-02 NOTE — THERAPY
Speech Language Pathology  Daily Treatment     Patient Name: Reyes Amezcua  Age:  64 y.o., Sex:  male  Medical Record #: [id number]  Today's Date: 6/2/2020     Subjective    Pt's spouse attending therapy session. Pt mobile this session, transferring to standard chair from w/c in ST office, toilet transfer with CGA A. Pt continues to demonstrate impulsivity, standing several times during session      Objective     06/02/20 1034   Receptive Language / Auditory Comprehension   Identifies Objects Profound (1)   Identifies Pictures Moderate (3)   Expressive Language   Gestures Moderate (3)   Naming Profound (1)   Repeats Speech Accurately Moderate (3)   Automatic Language Appropriate Severe (2)   Functional Level of Assist   Eating Total Assist   Eating Description Set-up of equipment or meal/tube feeding;Staff administers tube feed/parenteral nutrition/IVF;Tube feed bolus   Comprehension Maximal Assist   Comprehension Description Glasses;Hearing aids/amplifiers;Verbal cues   Expression Maximal Assist   Expression Description Verbal cueing;Communication board   Social Interaction Maximal Assist   Social Interaction Description Verbal cues;Increased time;Enclosure bed   Problem Solving Maximal Assist   Problem Solving Description Seat belt;Verbal cueing;Bed/chair alarm;Enclosure bed   Memory Total Assist   Memory Description Verbal cueing;Seat belt;Enclosure bed;Bed/chair alarm   Interdisciplinary Plan of Care Collaboration   IDT Collaboration with  Family / Caregiver   Patient Position at End of Therapy Seated  (on toilet in bathroom, spouse present with nursing assist)   Collaboration Comments spouse present for session   SLP Total Time Spent   SLP Individual Total Time Spent (Mins) 60   Charge Group   SLP Treatment - Individual Speech Language Treatment - Individual       Assessment    Picture ID in FO2 using pics and auditory cue on ipad: 3/5 with MOD cues and repetition of target. Object ID FO2: 0%, however, pt was  "able to correctly demonstrate use of each object presented.     Automatic language: Pt able to count 1-12 x3 trials, state JERRY x3 trials with initial cue \"Monday\" to start, VENKAT - pt unable to state with verbal cue, however, given written cue pt able to state x1 trial. Bilabial CV productions with immediate imitation: 100%. Minimal pairs using CV bilabials: pt required additional models to execute x2 trials.     Y/N questions: pt able to nod \"yes\" to indicate preference in immediate context e.g. \"do you want a break?\" however, simple Y/N questions, e.g. \"is this a ball?\" pt unable to respond.     Plan    Continue to target simple receptive/expressive language, automatic language, ice chip trials with eventual instrumental assessment later this week.     Speech Therapy Problems     Problem: Comprehension STGs     Dates: Start: 05/30/20       Description: 1) Individualized goal:  identify common objects within the current environment with 80% accuracy and minimal cues  2) Interventions:  SLP Speech Language Treatment        Goal: STG-Within one week, patient will     Dates: Start: 05/30/20       Description: 1) Individualized goal:  identify common objects within the environment with at least 80% accuracy to complete ADLs with minimal verbal cues  2) Interventions:  SLP Speech Language Treatment                    Problem: Expression STGs     Dates: Start: 05/30/20       Description: 1) Individualized goal: use single words/word approximations/photos/icons to indicate basic needs/preferences for 4/5 opportunities  2) Interventions:  SLP Speech Language Treatment        Goal: STG-Within one week, patient will     Dates: Start: 05/30/20       Description: 1) Individualized goal: use single words/word approximations/photos/icons to indicate basic needs/preferences for 4/5 opportunities  2) Interventions:  SLP Speech Language Treatment                Problem: Speech/Swallowing LTGs     Dates: Start: 05/30/20       Goal: " LTG-By discharge, patient will safely swallow     Dates: Start: 05/30/20       Description: 1) Individualized goal: least restrictive diet without overt s/sx of asp for 100% of PO intake  2) Interventions:  SLP Swallowing Dysfunction Treatment and SLP Oral Pharyngeal Evaluation              Goal: LTG-By discharge, patient will comprehend     Dates: Start: 05/30/20       Description: 1) Individualized goal:  simple functional language (spoken, pictures, written) to participate with care with at least 80% accuracy and minimal cues  2) Interventions:  SLP Speech Language Treatment and SLP Cognitive Skill Development              Goal: LTG-By discharge, patient will express     Dates: Start: 05/30/20       Description: 1) Individualized goal: basic wants/needs to participate with care for 4/5 opportunities and minimal cues  2) Interventions:  SLP Speech Language Treatment                    Problem: Swallowing STGs     Dates: Start: 05/30/20       Goal: STG-Within one week, patient will     Dates: Start: 05/30/20       Description: 1) Individualized goal:  tolerate PO trials (single ice chips, NTL via half tsp, etc.) without overt s/sx of asp for 80% of trials  2) Interventions:  SLP Swallowing Dysfunction Treatment

## 2020-06-02 NOTE — CARE PLAN
Problem: Mobility  Goal: STG-Within one week, patient will propel wheelchair household distances  Description: 1) Individualized goal:  50 ft with BLEs and Min A.  2) Interventions:  PT Group Therapy, PT Gait Training, PT Therapeutic Exercises, PT Neuro Re-Ed/Balance, PT Aquatic Therapy, PT Therapeutic Activity, and PT Evaluation    Outcome: PROGRESSING AS EXPECTED  Goal: STG-Within one week, patient will ambulate household distance  Description: 1) Individualized goal:  50 ft with LRAD and Min A.  2) Interventions:  PT Group Therapy, PT Gait Training, PT Therapeutic Exercises, PT Neuro Re-Ed/Balance, PT Aquatic Therapy, PT Therapeutic Activity, and PT Evaluation  Outcome: PROGRESSING AS EXPECTED  Goal: STG-Within one week, patient will  Description: 1) Individualized goal:  follow one-step commands with 75% accuracy with min multimodal cues and extra time.  2) Interventions:  PT Group Therapy, PT Gait Training, PT Therapeutic Exercises, PT Neuro Re-Ed/Balance, PT Aquatic Therapy, PT Therapeutic Activity, and PT Evaluation  Outcome: PROGRESSING AS EXPECTED     Problem: Mobility Transfers  Goal: STG-Within one week, patient will perform bed mobility  Description: 1) Individualized goal:  with hospital functions and SBA.  2) Interventions:  PT Group Therapy, PT Gait Training, PT Therapeutic Exercises, PT Neuro Re-Ed/Balance, PT Aquatic Therapy, PT Therapeutic Activity, and PT Evaluation  Outcome: PROGRESSING AS EXPECTED  Goal: STG-Within one week, patient will transfer bed to chair  Description: 1) Individualized goal:  with LRAD and CGA-SBA.  2) Interventions:  PT Group Therapy, PT Gait Training, PT Therapeutic Exercises, PT Neuro Re-Ed/Balance, PT Aquatic Therapy, PT Therapeutic Activity, and PT Evaluation  Outcome: PROGRESSING AS EXPECTED     Problem: Mobility  Goal: STG-Within one week, patient will propel wheelchair household distances  Description: 1) Individualized goal:  50 ft with BLEs and Min A.  2)  Interventions:  PT Group Therapy, PT Gait Training, PT Therapeutic Exercises, PT Neuro Re-Ed/Balance, PT Aquatic Therapy, PT Therapeutic Activity, and PT Evaluation    Outcome: PROGRESSING AS EXPECTED  Goal: STG-Within one week, patient will ambulate household distance  Description: 1) Individualized goal:  50 ft with LRAD and Min A.  2) Interventions:  PT Group Therapy, PT Gait Training, PT Therapeutic Exercises, PT Neuro Re-Ed/Balance, PT Aquatic Therapy, PT Therapeutic Activity, and PT Evaluation  Outcome: PROGRESSING AS EXPECTED  Goal: STG-Within one week, patient will  Description: 1) Individualized goal:  follow one-step commands with 75% accuracy with min multimodal cues and extra time.  2) Interventions:  PT Group Therapy, PT Gait Training, PT Therapeutic Exercises, PT Neuro Re-Ed/Balance, PT Aquatic Therapy, PT Therapeutic Activity, and PT Evaluation  Outcome: PROGRESSING AS EXPECTED     Problem: Mobility Transfers  Goal: STG-Within one week, patient will perform bed mobility  Description: 1) Individualized goal:  with hospital functions and SBA.  2) Interventions:  PT Group Therapy, PT Gait Training, PT Therapeutic Exercises, PT Neuro Re-Ed/Balance, PT Aquatic Therapy, PT Therapeutic Activity, and PT Evaluation  Outcome: PROGRESSING AS EXPECTED  Goal: STG-Within one week, patient will transfer bed to chair  Description: 1) Individualized goal:  with LRAD and CGA-SBA.  2) Interventions:  PT Group Therapy, PT Gait Training, PT Therapeutic Exercises, PT Neuro Re-Ed/Balance, PT Aquatic Therapy, PT Therapeutic Activity, and PT Evaluation  Outcome: PROGRESSING AS EXPECTED

## 2020-06-02 NOTE — THERAPY
Occupational Therapy  Daily Treatment     Patient Name: Reyes Amezcua  Age:  64 y.o., Sex:  male  Medical Record #: [id number]  Today's Date: 6/2/2020     Precautions  Precautions: Fall Risk, Weight Bearing As Tolerated Right Upper Extremity  Comments: R orbital Fx, R clavicle Fx, posey bed    Safety   ADL Safety : Requires Supervision for Safety, Requires Cueing for Safety, Requires Physical Assist for Safety  Bathroom Safety: Requires Supervision for Safety, Requires Physical Assist for Safety, Requires Cuing for Safety    Subjective    Pt received seated in chair at RN station, nodded yes when asked if he would like to shower     Objective       06/02/20 0831   Precautions   Precautions Fall Risk;Weight Bearing As Tolerated Right Upper Extremity   Comments R orbital Fx, R clavicle Fx, posey bed   Safety    ADL Safety  Requires Supervision for Safety;Requires Cueing for Safety;Requires Physical Assist for Safety   Bathroom Safety Requires Supervision for Safety;Requires Physical Assist for Safety;Requires Cuing for Safety   Functional Level of Assist   Grooming Minimal Assist   Grooming Description Supervision for safety;Verbal cueing  (hand over hand for toothpaste application, assist for combin)   Bathing Minimal Assist   Bathing Description Grab bar;Tub bench  (min A standing balance, verbal cues for sequencing/attention)   Upper Body Dressing Minimal Assist   Upper Body Dressing Description Other (comment);Increased time  (VCs to maintain attention for buttoning, did 4/6)   Lower Body Dressing Minimal Assist   Lower Body Dressing Description Verbal cueing;Increased time  (min A standing balance)   Toileting Total Assist   Toileting Description Verbal cueing  (per CNA)   Toilet Transfers Minimal Assist   Toilet Transfer Description Grab bar;Increased time  (per PT)   Tub / Shower Transfers Minimal Assist   Tub Shower Transfer Description Grab bar;Shower bench;Increased time;Verbal cueing;Supervision for  safety  (HHA to ambulate in/out of bathroom)   Balance   Comments functional mobility in room holding hands with wife, support from OT at hips for stability, min A   OT Total Time Spent   OT Individual Total Time Spent (Mins) 60   OT Charge Group   OT Self Care / ADL 4       Assessment    Pt tolerated session well, able to complete ADLs and mobility within room at primarily min assist level for balance with consistent verbal cues for attention and sequencing during routine tasks, requires increased time for processing, consistent cues to attend to tasks with consistent difficulty sustaining attention particularly to small tasks (buttons, combing hair) vs larger movement tasks (donning socks, pants). Wife present toward end of session, educated on pt status, noted deficits, healing processing, therapy schedule, role of OT.     Plan    Attention to task, functional cog and sequencing during ADLs/IADLs, balance and functional mobility, endurance, TBI education     Occupational Therapy Goals     Problem: Bathing     Dates: Start: 05/30/20       Goal: STG-Within one week, patient will bathe     Dates: Start: 05/30/20       Description: 1) Individualized Goal:  Moderate Assist with AE PRN  2) Interventions:  OT E Stim Attended, OT Group Therapy, OT Self Care/ADL, OT Cognitive Skill Dev, OT Manual Ther Technique, OT Neuro Re-Ed/Balance, OT Sensory Int Techniques, OT Therapeutic Activity, OT Aquatic Therapy, OT Evaluation, and OT Therapeutic Exercise                Problem: Dressing     Dates: Start: 05/30/20       Goal: STG-Within one week, patient will dress UB     Dates: Start: 05/30/20       Description: 1) Individualized Goal:  Min Assist with AE PRN  2) Interventions:  OT E Stim Attended, OT Group Therapy, OT Self Care/ADL, OT Cognitive Skill Dev, OT Manual Ther Technique, OT Neuro Re-Ed/Balance, OT Sensory Int Techniques, OT Therapeutic Activity, OT Aquatic Therapy, OT Evaluation, and OT Therapeutic Exercise           Goal: STG-Within one week, patient will dress LB     Dates: Start: 05/30/20       Description: 1) Individualized Goal:  Moderate Assist with AE PRN  2) Interventions:  OT E Stim Attended, OT Group Therapy, OT Self Care/ADL, OT Cognitive Skill Dev, OT Manual Ther Technique, OT Neuro Re-Ed/Balance, OT Sensory Int Techniques, OT Therapeutic Activity, OT Aquatic Therapy, OT Evaluation, and OT Therapeutic Exercise                Problem: Eating     Dates: Start: 05/30/20       Goal: STG-Within one week, patient will feed self     Dates: Start: 05/30/20       Description: 1) Individualized Goal:  Moderate Assist with AE PRN  2) Interventions:  OT E Stim Attended, OT Group Therapy, OT Self Care/ADL, OT Cognitive Skill Dev, OT Manual Ther Technique, OT Neuro Re-Ed/Balance, OT Sensory Int Techniques, OT Therapeutic Activity, OT Aquatic Therapy, OT Evaluation, and OT Therapeutic Exercise                  Problem: Functional Cognition     Dates: Start: 05/30/20       Goal: STG-Within one week, patient will attend to     Dates: Start: 05/30/20       Description: 1) Individualized Goal:  A five minute task with no more than 2 cues to attend  2) Interventions:  OT E Stim Attended, OT Group Therapy, OT Self Care/ADL, OT Cognitive Skill Dev, OT Manual Ther Technique, OT Neuro Re-Ed/Balance, OT Sensory Int Techniques, OT Therapeutic Activity, OT Aquatic Therapy, OT Evaluation, and OT Therapeutic Exercise                Problem: Functional Transfers     Dates: Start: 05/30/20       Goal: STG-Within one week, patient will transfer to toilet     Dates: Start: 05/30/20       Description: 1) Individualized Goal:  SBA with AE/DME PRN  2) Interventions:  OT E Stim Attended, OT Group Therapy, OT Self Care/ADL, OT Cognitive Skill Dev, OT Manual Ther Technique, OT Neuro Re-Ed/Balance, OT Sensory Int Techniques, OT Therapeutic Activity, OT Aquatic Therapy, OT Evaluation, and OT Therapeutic Exercise          Goal: STG-Within one week, patient  will transfer to step in shower     Dates: Start: 05/30/20       Description: 1) Individualized Goal:  Supervision level with AE PRN  2) Interventions:  OT E Stim Attended, OT Group Therapy, OT Self Care/ADL, OT Cognitive Skill Dev, OT Manual Ther Technique, OT Neuro Re-Ed/Balance, OT Sensory Int Techniques, OT Therapeutic Activity, OT Aquatic Therapy, OT Evaluation, and OT Therapeutic Exercise                Problem: OT Long Term Goals     Dates: Start: 05/30/20       Goal: LTG-By discharge, patient will complete basic self care tasks     Dates: Start: 05/30/20       Description: 1) Individualized Goal:  Min A - Supervision with AE PRN  2) Interventions:  OT E Stim Attended, OT Group Therapy, OT Self Care/ADL, OT Cognitive Skill Dev, OT Manual Ther Technique, OT Neuro Re-Ed/Balance, OT Sensory Int Techniques, OT Therapeutic Activity, OT Aquatic Therapy, OT Evaluation, and OT Therapeutic Exercise          Goal: LTG-By discharge, patient will perform bathroom transfers     Dates: Start: 05/30/20       Description: 1) Individualized Goal: Supervision with AE/DME PRN   2) Interventions:  OT E Stim Attended, OT Group Therapy, OT Self Care/ADL, OT Cognitive Skill Dev, OT Manual Ther Technique, OT Neuro Re-Ed/Balance, OT Sensory Int Techniques, OT Therapeutic Activity, OT Aquatic Therapy, OT Evaluation, and OT Therapeutic Exercise                Problem: Toileting     Dates: Start: 05/30/20       Goal: STG-Within one week, patient will complete toileting tasks     Dates: Start: 05/30/20       Description: 1) Individualized Goal:  Moderate Assist with AE PRN  2) Interventions:  OT E Stim Attended, OT Group Therapy, OT Self Care/ADL, OT Cognitive Skill Dev, OT Manual Ther Technique, OT Neuro Re-Ed/Balance, OT Sensory Int Techniques, OT Therapeutic Activity, OT Aquatic Therapy, OT Evaluation, and OT Therapeutic Exercise

## 2020-06-03 ENCOUNTER — APPOINTMENT (OUTPATIENT)
Dept: RADIOLOGY | Facility: REHABILITATION | Age: 65
DRG: 949 | End: 2020-06-03
Attending: PHYSICAL MEDICINE & REHABILITATION
Payer: COMMERCIAL

## 2020-06-03 PROCEDURE — 97112 NEUROMUSCULAR REEDUCATION: CPT

## 2020-06-03 PROCEDURE — 97530 THERAPEUTIC ACTIVITIES: CPT | Mod: CO

## 2020-06-03 PROCEDURE — 94760 N-INVAS EAR/PLS OXIMETRY 1: CPT

## 2020-06-03 PROCEDURE — A9270 NON-COVERED ITEM OR SERVICE: HCPCS | Performed by: PHYSICAL MEDICINE & REHABILITATION

## 2020-06-03 PROCEDURE — 73000 X-RAY EXAM OF COLLAR BONE: CPT | Mod: RT

## 2020-06-03 PROCEDURE — 97530 THERAPEUTIC ACTIVITIES: CPT

## 2020-06-03 PROCEDURE — 700102 HCHG RX REV CODE 250 W/ 637 OVERRIDE(OP): Performed by: PHYSICAL MEDICINE & REHABILITATION

## 2020-06-03 PROCEDURE — 92507 TX SP LANG VOICE COMM INDIV: CPT

## 2020-06-03 PROCEDURE — 97116 GAIT TRAINING THERAPY: CPT

## 2020-06-03 PROCEDURE — 97535 SELF CARE MNGMENT TRAINING: CPT | Mod: CO

## 2020-06-03 PROCEDURE — 92526 ORAL FUNCTION THERAPY: CPT

## 2020-06-03 PROCEDURE — 700111 HCHG RX REV CODE 636 W/ 250 OVERRIDE (IP): Performed by: PHYSICAL MEDICINE & REHABILITATION

## 2020-06-03 PROCEDURE — 770010 HCHG ROOM/CARE - REHAB SEMI PRIVAT*

## 2020-06-03 PROCEDURE — 99233 SBSQ HOSP IP/OBS HIGH 50: CPT | Performed by: PHYSICAL MEDICINE & REHABILITATION

## 2020-06-03 RX ORDER — AMANTADINE HYDROCHLORIDE 100 MG/1
100 CAPSULE, GELATIN COATED ORAL 2 TIMES DAILY
Status: COMPLETED | OUTPATIENT
Start: 2020-06-04 | End: 2020-06-04

## 2020-06-03 RX ORDER — MODAFINIL 100 MG/1
100 TABLET ORAL 2 TIMES DAILY
Status: DISCONTINUED | OUTPATIENT
Start: 2020-06-04 | End: 2020-06-15

## 2020-06-03 RX ADMIN — ENALAPRIL MALEATE 2.5 MG: 5 TABLET ORAL at 05:33

## 2020-06-03 RX ADMIN — AMANTADINE HYDROCHLORIDE 100 MG: 100 CAPSULE ORAL at 05:33

## 2020-06-03 RX ADMIN — HEPARIN SODIUM 5000 UNITS: 5000 INJECTION, SOLUTION INTRAVENOUS; SUBCUTANEOUS at 05:33

## 2020-06-03 RX ADMIN — DOCUSATE SODIUM 50 MG AND SENNOSIDES 8.6 MG 2 TABLET: 8.6; 5 TABLET, FILM COATED ORAL at 21:34

## 2020-06-03 RX ADMIN — MELATONIN 1000 UNITS: at 08:18

## 2020-06-03 RX ADMIN — TRAZODONE HYDROCHLORIDE 50 MG: 50 TABLET ORAL at 21:34

## 2020-06-03 RX ADMIN — AMANTADINE HYDROCHLORIDE 100 MG: 100 CAPSULE ORAL at 12:45

## 2020-06-03 RX ADMIN — FLUOXETINE 20 MG: 20 TABLET, FILM COATED ORAL at 08:18

## 2020-06-03 RX ADMIN — DOCUSATE SODIUM 50 MG AND SENNOSIDES 8.6 MG 2 TABLET: 8.6; 5 TABLET, FILM COATED ORAL at 08:17

## 2020-06-03 RX ADMIN — OMEPRAZOLE 20 MG: KIT at 10:12

## 2020-06-03 ASSESSMENT — ACTIVITIES OF DAILY LIVING (ADL)
TOILET_TRANSFER_DESCRIPTION: GRAB BAR
TOILETING_LEVEL_OF_ASSIST_DESCRIPTION: GRAB BAR;SUPERVISION FOR SAFETY

## 2020-06-03 ASSESSMENT — GAIT ASSESSMENTS
DEVIATION: SHUFFLED GAIT
ASSISTIVE DEVICE: HAND HELD ASSIST
GAIT LEVEL OF ASSIST: MINIMAL ASSIST

## 2020-06-03 NOTE — PROGRESS NOTES
"      Spiritual Care Note    Patient Information     Patient's Name: Reyes Amezcua   MRN: [id number]    YOB: 1955   Age and Gender: 64 y.o. male   Service Area: Inpatient Rehab   Room (and Bed): [place] [address]   Ethnicity or Nationality:     Primary Language: English   Scientology/Spiritual preference: Anabaptist   Place of Residence: [place]   Family/Friends/Others Present: Yes (wife/phone)   Clinical Team Present: No   Medical Diagnosis(-es)/Procedure(s): Diffuse axonal brain injury   Code Status: Full Code    Date of Admission: 5/29/2020   Length of Stay: 5 days        Spiritual Care Provider Information:  Name of Spiritual Care Provider: Clare Leos  Title of Spiritual Care Provider: Associate   Phone Number: [phone]  E-mail: [email or web address]  Total time : 15 minutes    Spiritual Screen Results:    Gen Nursing        Palliative Care         Encounter/Request Information  Encounter/Request Type   Visited With: Family, Patient not available  Nature of the Visit: Follow-up, On shift  Continue Visiting: Yes  Next Follow-up Date: 06/03/20  General Visit: Yes  Referral From/ Origin of Request: Verbal family    Religous Needs/Values       Spiritual Assessment     Spiritual Care Encounters    Interaction/Conversation:  attempted to visit pt, but he was in speech therapy.   left card for pt and called  His wife, Kerry, who says that \"he's really doing great over there\" and that she's doing better too.  Yesterday was their 30th wedding anniversary.  Kerry stated that she appreciated the 's call. Visits and calls will continue.    Interventions: Active, empathetic listening.    Plan: Weekly Visits    Notes:            "
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
## 2020-06-03 NOTE — CARE PLAN
Problem: Safety  Goal: Will remain free from injury  Note: Pt uses enclosure bed for safety.     Problem: Other Problem (see comments)  Goal: Other Goal  Description: Nutrition Support tolerated and meeting >85% of estimated nutrition needs.  Note: Pt tolerating bolus feeds, no abdominal distention or n/v noted.

## 2020-06-03 NOTE — THERAPY
Physical Therapy   Daily Treatment     Patient Name: Reyes Amezcua  Age:  64 y.o., Sex:  male  Medical Record #: [id number]  Today's Date: 6/3/2020     Precautions  Precautions: (P) Fall Risk, Weight Bearing As Tolerated Right Upper Extremity  Comments: (P) R orbital Fx, R clavicle Fx, posey bed     Subjective    Patient in w/c with wife present, agreeable to session.     Objective       06/03/20 1331   Precautions   Precautions Fall Risk;Weight Bearing As Tolerated Right Upper Extremity   Comments R orbital Fx, R clavicle Fx, posey bed    ABS (Agitated Behavior Scale)   Agitated Behavior Scale Performed Yes   Short Attention Span, Easy Distractibility, Inability to Concentrate 2   Impulsive, Impatient, Low Tolerance for Pain or Frustration 2   Uncooperative, Resistant to Care, Demanding 1   Violent and/or Threatening Violence Toward People or Property 1   Explosive and/or Unpredictable Anger 1   Rocking, Rubbing, Moaning, Other Self-Stimulating Behavior 1   Pulling at Tubes, Restraints, etc. 1   Wandering from Treatment Area 1   Restlessness, Pacing, Excessive Movement 1   Repetitive Behaviors, Motor and/or Verbal 1   Rapid, Loud or Excessive Talking 1   Sudden Changes of Mood 1   Easily Initiated - Excessive Crying and/or Laughter 1   Self-Abusiveness, Physical and/or Verbal 1   Agitated Behavior Scale Total Score 16   Level of Severity No Agitation   Sleep/Wake Cycle   Sleep & Rest Out of bed   Gait Functional Level of Assist    Gait Level Of Assist Minimal Assist   Assistive Device Hand Held Assist   Distance (Feet)   (125 ftx1 with HHA, 150 ftx1 with no AD)   Deviation Shuffled Gait  (scissoring with RLE, narrow CLAU, tendency for R LOB)   Wheelchair Functional Level of Assist   Wheelchair Assist Stand by Assist   Distance Wheelchair (Feet or Distance) 150   Wheelchair Description   (BLEs to propel, tends to drift to R)   Transfer Functional Level of Assist   Bed, Chair, Wheelchair Transfer Minimal Assist   Bed  "Chair Wheelchair Transfer Description   (HHA)   Bed Mobility    Supine to Sit Minimal Assist   Sit to Supine Minimal Assist   Sit to Stand Minimal Assist   Scooting Stand by Assist   Rolling Supervised   Neuro-Muscular Treatments   Comments Performed with CGA-Min A and intermittent UE support: Lateral stepping 2x10 each leg; alternating toe taps on 6\" block 2x10; tossing objects for ladderball game 2x7; picking up objects from floor with UE support and CGA-Min A x7   Interdisciplinary Plan of Care Collaboration   IDT Collaboration with  Family / Caregiver   Patient Position at End of Therapy In Bed;Call Light within Reach;Family / Friend in Room   Collaboration Comments wife present for session   PT Total Time Spent   PT Individual Total Time Spent (Mins) 60   PT Charge Group   PT Gait Training 1   PT Neuromuscular Re-Education / Balance 2   PT Therapeutic Activities 1       Assessment    Patient tolerated session well, focus of activities on standing balance addressing weight shifting and reducing R scissoring. Requires quiet rest breaks throughout session.     Plan    Gait training with FWW vs. HHA, standing balance activities, endurance/activity tolerance, BLE strengthening, stair/curb negotiation, yes/no protocols per SLP direction, incorporate cognition/communication into activities, family training.    Physical Therapy Problems     Problem: Mobility     Dates: Start: 05/30/20       Goal: STG-Within one week, patient will propel wheelchair household distances     Dates: Start: 05/30/20       Description: 1) Individualized goal:  50 ft with BLEs and Min A.  2) Interventions:  PT Group Therapy, PT Gait Training, PT Therapeutic Exercises, PT Neuro Re-Ed/Balance, PT Aquatic Therapy, PT Therapeutic Activity, and PT Evaluation            Goal: STG-Within one week, patient will ambulate household distance     Dates: Start: 05/30/20       Description: 1) Individualized goal:  50 ft with LRAD and Min A.  2) " Interventions:  PT Group Therapy, PT Gait Training, PT Therapeutic Exercises, PT Neuro Re-Ed/Balance, PT Aquatic Therapy, PT Therapeutic Activity, and PT Evaluation          Goal: STG-Within one week, patient will     Dates: Start: 05/30/20       Description: 1) Individualized goal:  follow one-step commands with 75% accuracy with min multimodal cues and extra time.  2) Interventions:  PT Group Therapy, PT Gait Training, PT Therapeutic Exercises, PT Neuro Re-Ed/Balance, PT Aquatic Therapy, PT Therapeutic Activity, and PT Evaluation                Problem: Mobility Transfers     Dates: Start: 05/30/20       Goal: STG-Within one week, patient will perform bed mobility     Dates: Start: 05/30/20       Description: 1) Individualized goal:  with hospital functions and SBA.  2) Interventions:  PT Group Therapy, PT Gait Training, PT Therapeutic Exercises, PT Neuro Re-Ed/Balance, PT Aquatic Therapy, PT Therapeutic Activity, and PT Evaluation          Goal: STG-Within one week, patient will transfer bed to chair     Dates: Start: 05/30/20       Description: 1) Individualized goal:  with LRAD and CGA-SBA.  2) Interventions:  PT Group Therapy, PT Gait Training, PT Therapeutic Exercises, PT Neuro Re-Ed/Balance, PT Aquatic Therapy, PT Therapeutic Activity, and PT Evaluation                Problem: PT-Long Term Goals     Dates: Start: 05/30/20       Goal: LTG-By discharge, patient will propel wheelchair     Dates: Start: 05/30/20       Description: 1) Individualized goal:  150 ft with supervision.  2) Interventions:  PT Group Therapy, PT Gait Training, PT Therapeutic Exercises, PT Neuro Re-Ed/Balance, PT Aquatic Therapy, PT Therapeutic Activity, and PT Evaluation          Goal: LTG-By discharge, patient will ambulate     Dates: Start: 05/30/20       Description: 1) Individualized goal:  150 ft with LRAD and supervision.  2) Interventions:  PT Group Therapy, PT Gait Training, PT Therapeutic Exercises, PT Neuro Re-Ed/Balance, PT  Aquatic Therapy, PT Therapeutic Activity, and PT Evaluation          Goal: LTG-By discharge, patient will transfer one surface to another     Dates: Start: 05/30/20       Description: 1) Individualized goal:  with LRAD and supervision.  2) Interventions:  PT Group Therapy, PT Gait Training, PT Therapeutic Exercises, PT Neuro Re-Ed/Balance, PT Aquatic Therapy, PT Therapeutic Activity, and PT Evaluation            Goal: LTG-By discharge, patient will ambulate up/down flight of stairs     Dates: Start: 05/30/20       Description: 1) Individualized goal:  with single rail and supervision.  2) Interventions:  PT Group Therapy, PT Gait Training, PT Therapeutic Exercises, PT Neuro Re-Ed/Balance, PT Aquatic Therapy, PT Therapeutic Activity, and PT Evaluation            Goal: LTG-By discharge, patient will transfer in/out of a car     Dates: Start: 05/30/20       Description: 1) Individualized goal:  with LRAD and supervision.  2) Interventions:  PT Group Therapy, PT Gait Training, PT Therapeutic Exercises, PT Neuro Re-Ed/Balance, PT Aquatic Therapy, PT Therapeutic Activity, and PT Evaluation            Goal: LTG-By discharge, patient will     Dates: Start: 05/30/20       Description: 1) Individualized goal:  perform bed mobility independently and no bed functions.  2) Interventions:  PT Group Therapy, PT Gait Training, PT Therapeutic Exercises, PT Neuro Re-Ed/Balance, PT Aquatic Therapy, PT Therapeutic Activity, and PT Evaluation            Goal: LTG-By discharge, patient will     Dates: Start: 05/30/20       Description: 1) Individualized goal:  negotiate single curb with LRAD and supervision.  2) Interventions:  PT Group Therapy, PT Gait Training, PT Therapeutic Exercises, PT Neuro Re-Ed/Balance, PT Aquatic Therapy, PT Therapeutic Activity, and PT Evaluation

## 2020-06-03 NOTE — CARE PLAN
Problem: Safety  Goal: Will remain free from falls  Intervention: Implement fall precautions  Note: Pt uses enclosure bed for safety.Fall precautions and frequent rounding in place for safety.Will continue to monitor and assess needs and safety.     Problem: Urinary Elimination:  Goal: Ability to reestablish a normal urinary elimination pattern will improve  Description: Nickerson catheter was removed this morning. Pt. was able to void moderate amount of urine. PVR was   Intervention: Record post-void residual volumes  Note: Pt is continent of bladder at this time.Voided 100 ml, pvr 233.Time void every 4 hours in place.Will continue to monitor and assess for urinary retention.

## 2020-06-03 NOTE — THERAPY
"Occupational Therapy  Daily Treatment     Patient Name: Reyes Amezcua  Age:  64 y.o., Sex:  male  Medical Record #: [id number]  Today's Date: 6/3/2020     Precautions  Precautions: (P) Fall Risk, Weight Bearing As Tolerated Right Upper Extremity  Comments: (P) R orbital Fx, R clavicle Fx, posey bed     Safety   ADL Safety : Requires Supervision for Safety, Requires Cueing for Safety, Requires Physical Assist for Safety  Bathroom Safety: Requires Supervision for Safety, Requires Physical Assist for Safety, Requires Cuing for Safety    Subjective    \" Yes.\"     Objective       06/03/20 0831   Precautions   Precautions Fall Risk;Weight Bearing As Tolerated Right Upper Extremity   Comments R orbital Fx, R clavicle Fx, posey bed    ABS (Agitated Behavior Scale)   Agitated Behavior Scale Performed Yes   Short Attention Span, Easy Distractibility, Inability to Concentrate 2   Impulsive, Impatient, Low Tolerance for Pain or Frustration 2   Uncooperative, Resistant to Care, Demanding 1   Violent and/or Threatening Violence Toward People or Property 1   Explosive and/or Unpredictable Anger 1   Rocking, Rubbing, Moaning, Other Self-Stimulating Behavior 1   Pulling at Tubes, Restraints, etc. 1   Wandering from Treatment Area 1   Restlessness, Pacing, Excessive Movement 1   Repetitive Behaviors, Motor and/or Verbal 1   Rapid, Loud or Excessive Talking 1   Sudden Changes of Mood 1   Easily Initiated - Excessive Crying and/or Laughter 1   Self-Abusiveness, Physical and/or Verbal 1   Agitated Behavior Scale Total Score 16   Level of Severity No Agitation   Functional Level of Assist   Grooming Stand by Assist  (standing at sink to complete oral care. )   Grooming Description Supervision for safety;Verbal cueing  (for task initiation )   Lower Body Dressing Stand by Assist  (doff don  shoes   x 3 trials )   Toileting Stand by Assist  (close )   Toileting Description Grab bar;Supervision for safety   Bed, Chair, Wheelchair Transfer " Contact Guard Assist  (hand held assist )   Toilet Transfers Contact Guard Assist  (hand held assist )   Toilet Transfer Description Grab bar   Sitting Upper Body Exercises   Other Exercise Nustep  x 5 minutes workload   3  CGA  w/c <-> Nustep .    Balance   Standing Balance (Static) Fair   Standing Balance (Dynamic) Fair   Comments  CGA  standing ball toss followed by reciprocal  step and toss  with CGA / hand held assist   slight LOB  with  CGA  for recovery.    Interdisciplinary Plan of Care Collaboration   IDT Collaboration with  Family / Caregiver   Patient Position at End of Therapy In Bed;Family / Friend in Room;Other (Comments)   Collaboration Comments spouse observed and assisted with  last tx activity performed (  ball toss for balance)    OT Total Time Spent   OT Individual Total Time Spent (Mins) 60   OT Charge Group   OT Self Care / ADL 2   OT Therapy Activity 2     Functional mobility  Bed <-> Bathroom with  Hand held  Min assist       Assessment     limited by  continued impulsivity, decreased balance and cognition.  Mod Cues for task initiation. Patient getting up out of w/c with out locking brakes and attempting to transfer with out assist.        Plan  Attention to task, functional cog and sequencing during ADLs/IADLs, balance and functional mobility, endurance, TBI education          Occupational Therapy Goals     Problem: Eating     Dates: Start: 05/30/20       Goal: STG-Within one week, patient will feed self     Dates: Start: 05/30/20       Description: 1) Individualized Goal:  Moderate Assist with AE PRN  2) Interventions:  OT E Stim Attended, OT Group Therapy, OT Self Care/ADL, OT Cognitive Skill Dev, OT Manual Ther Technique, OT Neuro Re-Ed/Balance, OT Sensory Int Techniques, OT Therapeutic Activity, OT Aquatic Therapy, OT Evaluation, and OT Therapeutic Exercise                  Problem: Functional Cognition     Dates: Start: 05/30/20       Goal: STG-Within one week, patient will attend to      Dates: Start: 05/30/20       Description: 1) Individualized Goal:  A five minute task with no more than 2 cues to attend  2) Interventions:  OT E Stim Attended, OT Group Therapy, OT Self Care/ADL, OT Cognitive Skill Dev, OT Manual Ther Technique, OT Neuro Re-Ed/Balance, OT Sensory Int Techniques, OT Therapeutic Activity, OT Aquatic Therapy, OT Evaluation, and OT Therapeutic Exercise                Problem: Functional Transfers     Dates: Start: 05/30/20       Goal: STG-Within one week, patient will transfer to toilet     Dates: Start: 05/30/20       Description: 1) Individualized Goal:  SBA with AE/DME PRN  2) Interventions:  OT E Stim Attended, OT Group Therapy, OT Self Care/ADL, OT Cognitive Skill Dev, OT Manual Ther Technique, OT Neuro Re-Ed/Balance, OT Sensory Int Techniques, OT Therapeutic Activity, OT Aquatic Therapy, OT Evaluation, and OT Therapeutic Exercise          Goal: STG-Within one week, patient will transfer to step in shower     Dates: Start: 05/30/20       Description: 1) Individualized Goal:  Supervision level with AE PRN  2) Interventions:  OT E Stim Attended, OT Group Therapy, OT Self Care/ADL, OT Cognitive Skill Dev, OT Manual Ther Technique, OT Neuro Re-Ed/Balance, OT Sensory Int Techniques, OT Therapeutic Activity, OT Aquatic Therapy, OT Evaluation, and OT Therapeutic Exercise                Problem: OT Long Term Goals     Dates: Start: 05/30/20       Goal: LTG-By discharge, patient will complete basic self care tasks     Dates: Start: 05/30/20       Description: 1) Individualized Goal:  Min A - Supervision with AE PRN  2) Interventions:  OT E Stim Attended, OT Group Therapy, OT Self Care/ADL, OT Cognitive Skill Dev, OT Manual Ther Technique, OT Neuro Re-Ed/Balance, OT Sensory Int Techniques, OT Therapeutic Activity, OT Aquatic Therapy, OT Evaluation, and OT Therapeutic Exercise          Goal: LTG-By discharge, patient will perform bathroom transfers     Dates: Start: 05/30/20        Description: 1) Individualized Goal: Supervision with AE/DME PRN   2) Interventions:  OT E Stim Attended, OT Group Therapy, OT Self Care/ADL, OT Cognitive Skill Dev, OT Manual Ther Technique, OT Neuro Re-Ed/Balance, OT Sensory Int Techniques, OT Therapeutic Activity, OT Aquatic Therapy, OT Evaluation, and OT Therapeutic Exercise                Problem: Toileting     Dates: Start: 05/30/20       Goal: STG-Within one week, patient will complete toileting tasks     Dates: Start: 05/30/20       Description: 1) Individualized Goal:  Moderate Assist with AE PRN  2) Interventions:  OT E Stim Attended, OT Group Therapy, OT Self Care/ADL, OT Cognitive Skill Dev, OT Manual Ther Technique, OT Neuro Re-Ed/Balance, OT Sensory Int Techniques, OT Therapeutic Activity, OT Aquatic Therapy, OT Evaluation, and OT Therapeutic Exercise

## 2020-06-03 NOTE — THERAPY
"Speech Language Pathology  Daily Treatment     Patient Name: Reyes Amezcua  Age:  64 y.o., Sex:  male  Medical Record #: [id number]  Today's Date: 6/3/2020     Subjective    Pt in bed at time of scheduled therapy, agreeable to participate, nodding head \"yes\" when asked if ready. Pt following commands for bed mobility with and transfer with verbal and gestural cues. Spouse present for session.      Objective     06/03/20 1034   Receptive Language / Auditory Comprehension   Follows One Unit Commands Minimal (4)   Follows Two Unit Commands Minimal (4)   Expressive Language   Automatic Language Appropriate Moderate (3)   Reading Comprehension    Matches Letters Severe (2)   Matches Words Profound (1)   Reading Words Profound (1)   Dysphagia    Other Treatments trials of ice chips/mildly thick   Diet / Liquid Recommendation NPO;Pre-Feeding Trials with SLP Only   Nutritional Liquid Intake Rating Scale Nothing by mouth   Nutritional Food Intake Rating Scale Nothing by mouth   Interdisciplinary Plan of Care Collaboration   IDT Collaboration with  Family / Caregiver   Patient Position at End of Therapy Seated;Family / Friend in Room  (in bathroom with spouse present)   Collaboration Comments spouse present for session   SLP Total Time Spent   SLP Individual Total Time Spent (Mins) 60   Charge Group   SLP Treatment - Individual Speech Language Treatment - Individual   SLP Swallowing Dysfunction Treatment Swallowing Dysfunction Treatment       Assessment    Dysphagia intervention for first 30 minutes of session. Trials of ice chips and mildly thick liquids (5mL and cup sip). Pt tolerating all trials with quick timely initial swallow followed by cued second swallow. Intermittent, non distressful throat clearing. Pt demonstrated one instance of trace wet vocal quality, spontaneously cleared with throat clear. Spouse reports pt can complete oral care at sink with set up A and SPV. Recommend pt ok for ice chips in room with " "supervision from nursing staff or spouse, following oral care seated at 90 degrees. Schedule MBSS for Friday, .     Automatic language task: Pt counting 1-20 x3 trials with initial verbal and finger cue - cues faded as repetitions increased. JERRY: initial cue \"Monday\" pt able to state x2 trials, VENKAT: initial cue \"January\" x2 trials.     Single step auditory commands:  IND, given 1 repetition and cue for attention pt increased to . 2-step auditory commands: . Symbol matching in FO4: 100%, Letter and single word matchin%, pt randomly circling targets. Pt fatiguing towards end of session.     Plan    Continue pre-feeding trials with SLP, ok for ice chips with recs stated above, continue to target simple expressive/receptive language    Speech Therapy Problems     Problem: Comprehension STGs     Dates: Start: 20       Description: 1) Individualized goal:  identify common objects within the current environment with 80% accuracy and minimal cues  2) Interventions:  SLP Speech Language Treatment        Goal: STG-Within one week, patient will     Dates: Start: 20       Description: 1) Individualized goal:  identify common objects within the environment with at least 80% accuracy to complete ADLs with minimal verbal cues  2) Interventions:  SLP Speech Language Treatment        Note:     Goal Note filed on 20 1328 by Trupti Davila MS,[facility]-SLP    Pt able to identify pictures with more success than tactile objects at this time. FO2 for picture ID.                         Problem: Expression STGs     Dates: Start: 20       Description: 1) Individualized goal: use single words/word approximations/photos/icons to indicate basic needs/preferences for 4/5 opportunities  2) Interventions:  SLP Speech Language Treatment        Goal: STG-Within one week, patient will     Dates: Start: 20       Description: 1) Individualized goal: use single words/word approximations/photos/icons to " indicate basic needs/preferences for 4/5 opportunities  2) Interventions:  SLP Speech Language Treatment    Note:     Goal Note filed on 06/02/20 1336 by Trupti Davila MS,[facility]-SLP    Pt has started to imitate CV productions, repetition of automatic language (I.e. counting, JERRY, VENKAT)                        Problem: Speech/Swallowing LTGs     Dates: Start: 05/30/20       Goal: LTG-By discharge, patient will safely swallow     Dates: Start: 05/30/20       Description: 1) Individualized goal: least restrictive diet without overt s/sx of asp for 100% of PO intake  2) Interventions:  SLP Swallowing Dysfunction Treatment and SLP Oral Pharyngeal Evaluation              Goal: LTG-By discharge, patient will comprehend     Dates: Start: 05/30/20       Description: 1) Individualized goal:  simple functional language (spoken, pictures, written) to participate with care with at least 80% accuracy and minimal cues  2) Interventions:  SLP Speech Language Treatment and SLP Cognitive Skill Development              Goal: LTG-By discharge, patient will express     Dates: Start: 05/30/20       Description: 1) Individualized goal: basic wants/needs to participate with care for 4/5 opportunities and minimal cues  2) Interventions:  SLP Speech Language Treatment                    Problem: Swallowing STGs     Dates: Start: 05/30/20       Goal: STG-Within one week, patient will     Dates: Start: 05/30/20       Description: 1) Individualized goal:  tolerate PO trials (single ice chips, NTL via half tsp, etc.) without overt s/sx of asp for 80% of trials  2) Interventions:  SLP Swallowing Dysfunction Treatment        Note:     Goal Note filed on 06/02/20 1328 by Trupti Davila MS,[facility]-SLP    Pt with immediate cough response on ice chip trials during CSE, will continue conservative trials with anticipation of instrumental assessment later this week to further assess CLOF.

## 2020-06-03 NOTE — FLOWSHEET NOTE
06/03/20 1552   Events/Summary/Plan   Events/Summary/Plan 02 spot check, trach stoma care (50% more closed today, small amount of yellow discharge)   Vital Signs   Pulse 82   Respiration 18   Pulse Oximetry 98 %   $ Pulse Oximetry (Spot Check) Yes   Oxygen   O2 Delivery Device None - Room Air

## 2020-06-04 LAB
ALBUMIN SERPL BCP-MCNC: 3.5 G/DL (ref 3.2–4.9)
ALBUMIN/GLOB SERPL: 0.9 G/DL
ALP SERPL-CCNC: 130 U/L (ref 30–99)
ALT SERPL-CCNC: 67 U/L (ref 2–50)
ANION GAP SERPL CALC-SCNC: 10 MMOL/L (ref 7–16)
AST SERPL-CCNC: 28 U/L (ref 12–45)
BILIRUB SERPL-MCNC: 0.6 MG/DL (ref 0.1–1.5)
BUN SERPL-MCNC: 16 MG/DL (ref 8–22)
CALCIUM SERPL-MCNC: 9.4 MG/DL (ref 8.5–10.5)
CHLORIDE SERPL-SCNC: 102 MMOL/L (ref 96–112)
CHOLEST SERPL-MCNC: 177 MG/DL (ref 100–199)
CO2 SERPL-SCNC: 22 MMOL/L (ref 20–33)
CREAT SERPL-MCNC: 0.7 MG/DL (ref 0.5–1.4)
GLOBULIN SER CALC-MCNC: 3.7 G/DL (ref 1.9–3.5)
GLUCOSE SERPL-MCNC: 92 MG/DL (ref 65–99)
HDLC SERPL-MCNC: 35 MG/DL
LDLC SERPL CALC-MCNC: 112 MG/DL
POTASSIUM SERPL-SCNC: 4.1 MMOL/L (ref 3.6–5.5)
PROT SERPL-MCNC: 7.2 G/DL (ref 6–8.2)
SODIUM SERPL-SCNC: 134 MMOL/L (ref 135–145)
TRIGL SERPL-MCNC: 148 MG/DL (ref 0–149)

## 2020-06-04 PROCEDURE — 97530 THERAPEUTIC ACTIVITIES: CPT

## 2020-06-04 PROCEDURE — 80053 COMPREHEN METABOLIC PANEL: CPT

## 2020-06-04 PROCEDURE — 700102 HCHG RX REV CODE 250 W/ 637 OVERRIDE(OP): Performed by: PHYSICAL MEDICINE & REHABILITATION

## 2020-06-04 PROCEDURE — 92526 ORAL FUNCTION THERAPY: CPT

## 2020-06-04 PROCEDURE — 36415 COLL VENOUS BLD VENIPUNCTURE: CPT

## 2020-06-04 PROCEDURE — 97112 NEUROMUSCULAR REEDUCATION: CPT

## 2020-06-04 PROCEDURE — 99233 SBSQ HOSP IP/OBS HIGH 50: CPT | Performed by: PHYSICAL MEDICINE & REHABILITATION

## 2020-06-04 PROCEDURE — 80061 LIPID PANEL: CPT

## 2020-06-04 PROCEDURE — A9270 NON-COVERED ITEM OR SERVICE: HCPCS | Performed by: PHYSICAL MEDICINE & REHABILITATION

## 2020-06-04 PROCEDURE — 700111 HCHG RX REV CODE 636 W/ 250 OVERRIDE (IP): Performed by: PHYSICAL MEDICINE & REHABILITATION

## 2020-06-04 PROCEDURE — 97116 GAIT TRAINING THERAPY: CPT

## 2020-06-04 PROCEDURE — 770010 HCHG ROOM/CARE - REHAB SEMI PRIVAT*

## 2020-06-04 RX ADMIN — MODAFINIL 100 MG: 100 TABLET ORAL at 08:14

## 2020-06-04 RX ADMIN — ENOXAPARIN SODIUM 40 MG: 100 INJECTION SUBCUTANEOUS at 08:15

## 2020-06-04 RX ADMIN — NYSTATIN 500000 UNITS: 100000 SUSPENSION ORAL at 20:03

## 2020-06-04 RX ADMIN — MELATONIN 1000 UNITS: at 08:14

## 2020-06-04 RX ADMIN — AMANTADINE HYDROCHLORIDE 100 MG: 100 CAPSULE ORAL at 11:37

## 2020-06-04 RX ADMIN — TRAZODONE HYDROCHLORIDE 50 MG: 50 TABLET ORAL at 20:02

## 2020-06-04 RX ADMIN — NYSTATIN 500000 UNITS: 100000 SUSPENSION ORAL at 16:53

## 2020-06-04 RX ADMIN — MODAFINIL 100 MG: 100 TABLET ORAL at 11:37

## 2020-06-04 RX ADMIN — AMANTADINE HYDROCHLORIDE 100 MG: 100 CAPSULE ORAL at 05:02

## 2020-06-04 RX ADMIN — OMEPRAZOLE 20 MG: KIT at 08:13

## 2020-06-04 RX ADMIN — FLUOXETINE 20 MG: 20 TABLET, FILM COATED ORAL at 08:14

## 2020-06-04 RX ADMIN — ENALAPRIL MALEATE 2.5 MG: 5 TABLET ORAL at 05:02

## 2020-06-04 ASSESSMENT — GAIT ASSESSMENTS
DEVIATION: SHUFFLED GAIT
ASSISTIVE DEVICE: HAND HELD ASSIST
GAIT LEVEL OF ASSIST: CONTACT GUARD ASSIST

## 2020-06-04 ASSESSMENT — ACTIVITIES OF DAILY LIVING (ADL): TOILETING_LEVEL_OF_ASSIST_DESCRIPTION: GRAB BAR;SUPERVISION FOR SAFETY;VERBAL CUEING

## 2020-06-04 NOTE — THERAPY
Speech Language Pathology  Daily Treatment     Patient Name: Reyes Amezcua  Age:  64 y.o., Sex:  male  Medical Record #: [id number]  Today's Date: 6/4/2020     Subjective    Pt agreeable to participate this session. Spouse present and supportive.      Objective     06/04/20 1034   Receptive Language / Auditory Comprehension   Follows Two Unit Commands Minimal (4)   Expressive Language   Naming Moderate (3)   Automatic Language Appropriate Moderate (3)   Dysphagia    Other Treatments trials of ice chips, 4 oz mildly thick via 5mL bolus   Diet / Liquid Recommendation NPO;Pre-Feeding Trials with SLP Only   Nutritional Liquid Intake Rating Scale Nothing by mouth   Nutritional Food Intake Rating Scale Tube dependent with minimal attempts of oral intake   Interdisciplinary Plan of Care Collaboration   IDT Collaboration with  Family / Caregiver;Nursing   Patient Position at End of Therapy Seated;Family / Friend in Room   Collaboration Comments spouse present for session, transfer of care to nursing   SLP Total Time Spent   SLP Individual Total Time Spent (Mins) 60   Charge Group   SLP Swallowing Dysfunction Treatment Swallowing Dysfunction Treatment       Assessment    Oral care completed set up at the sink prior to PO trials. Pt able to swish and spit, however will continue to require close supervision for oral care as per spouse, pt did drink water following oral care this a.m. Pt would benefit from nystatin for suspected thrush. Pt tolerated ice chip and mildly thick juice via 5mL bolus with intermittent throat clearing noted with larger spoonful ice chips and sequential trials mildly thick. Pt required tactile cues to slow rate with juice and complete serial swallows.     2-step auditory commands: 80% IND, with 1 repetition: 90%    Letter, word, 1-3 digit matching in FO4 completed with 100% accuracy. Pt also able to state each target with SPV.   Pt responding to open ended question x2 occassions during session, when  "unable to answer follow up question (what is something else you put in your smoothie at home?) pt was able to read target \"milk\" when written by SLP.     Using pictures from home, pt was able to verbally state name of 10/16 people in photos. Pt also able to state relationship in 50%.     Plan    Complete MBSS, continue to target receptive/expressive language.     Speech Therapy Problems     Problem: Comprehension STGs     Dates: Start: 05/30/20       Description: 1) Individualized goal:  identify common objects within the current environment with 80% accuracy and minimal cues  2) Interventions:  SLP Speech Language Treatment        Goal: STG-Within one week, patient will     Dates: Start: 05/30/20       Description: 1) Individualized goal:  identify common objects within the environment with at least 80% accuracy to complete ADLs with minimal verbal cues  2) Interventions:  SLP Speech Language Treatment        Note:     Goal Note filed on 06/02/20 1328 by Trupti Davila MS,CCC-SLP    Pt able to identify pictures with more success than tactile objects at this time. FO2 for picture ID.                         Problem: Expression STGs     Dates: Start: 05/30/20       Description: 1) Individualized goal: use single words/word approximations/photos/icons to indicate basic needs/preferences for 4/5 opportunities  2) Interventions:  SLP Speech Language Treatment        Goal: STG-Within one week, patient will     Dates: Start: 05/30/20       Description: 1) Individualized goal: use single words/word approximations/photos/icons to indicate basic needs/preferences for 4/5 opportunities  2) Interventions:  SLP Speech Language Treatment    Note:     Goal Note filed on 06/02/20 1336 by Trupti Davila MS,CCC-SLP    Pt has started to imitate CV productions, repetition of automatic language (I.e. counting, JERRY, VENKAT)                        Problem: Speech/Swallowing LTGs     Dates: Start: 05/30/20       Goal: LTG-By discharge, " patient will safely swallow     Dates: Start: 05/30/20       Description: 1) Individualized goal: least restrictive diet without overt s/sx of asp for 100% of PO intake  2) Interventions:  SLP Swallowing Dysfunction Treatment and SLP Oral Pharyngeal Evaluation              Goal: LTG-By discharge, patient will comprehend     Dates: Start: 05/30/20       Description: 1) Individualized goal:  simple functional language (spoken, pictures, written) to participate with care with at least 80% accuracy and minimal cues  2) Interventions:  SLP Speech Language Treatment and SLP Cognitive Skill Development              Goal: LTG-By discharge, patient will express     Dates: Start: 05/30/20       Description: 1) Individualized goal: basic wants/needs to participate with care for 4/5 opportunities and minimal cues  2) Interventions:  SLP Speech Language Treatment                    Problem: Swallowing STGs     Dates: Start: 05/30/20       Goal: STG-Within one week, patient will     Dates: Start: 05/30/20       Description: 1) Individualized goal:  tolerate PO trials (single ice chips, NTL via half tsp, etc.) without overt s/sx of asp for 80% of trials  2) Interventions:  SLP Swallowing Dysfunction Treatment        Note:     Goal Note filed on 06/02/20 1328 by Trupti Davila MS,CCC-SLP    Pt with immediate cough response on ice chip trials during CSE, will continue conservative trials with anticipation of instrumental assessment later this week to further assess CLOF.

## 2020-06-04 NOTE — PROGRESS NOTES
"      Spiritual Care Note    Patient Information     Patient's Name: Reyes Amezcua   MRN: [id number]    YOB: 1955   Age and Gender: 64 y.o. male   Service Area: Inpatient rehab   Room (and Bed): [place] [address]   Ethnicity or Nationality:     Primary Language: English   Buddhism/Spiritual preference: Alevism   Place of Residence: [place]   Family/Friends/Others Present: Yes, wife   Clinical Team Present: No   Medical Diagnosis(-es)/Procedure(s): Traumatic brain injury   Code Status: Full Code    Date of Admission: 5/29/2020   Length of Stay: 6 days        Spiritual Care Provider Information:  Name of Spiritual Care Provider: Clare Leos  Title of Spiritual Care Provider: Associate   Phone Number: [phone]  E-mail: [email or web address]  Total time : 20 minutes    Spiritual Screen Results:    Gen Nursing        Palliative Care         Encounter/Request Information  Encounter/Request Type   Visited With: Patient and family together  Nature of the Visit: Follow-up, On shift  Continue Visiting: Yes  Next Follow-up Date: 06/03/20  General Visit: Yes  Referral From/ Origin of Request: Verbal family    Religous Needs/Values  Buddhism Needs Visit  Buddhism Needs: Prayer    Spiritual Assessment     Spiritual Care Encounters    Observations/Symptoms: Accepting, Thankfulness    Interaction/Conversation: Patient was sitting up in bed with wife Kerry at side.  He made eye contact and responded to comments by nodding, including indicating that he wanted prayer.  At the end of the prayer he said, \"Amen,\" the first time the  has heard his voice.  When she said she will visit again tomorrow, he gave her a huge grin, and he waved goodbye when she did.  Kerry said, \"He's doing really well.  This place has been good for him.\"    Interventions: Compassionate presence, prayer.    Plan: Daily Visits    Notes:            "
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
## 2020-06-04 NOTE — DISCHARGE PLANNING
"Spoke w/ wife, Diane, via phone to review IDT recommendations & targeted DC date 6.26.2020. States \"pleased with the small improvements I'm seeing every day, but it's hard to watch him struggle with language & basic things.\" Reviewed prolonged recovery period post acute. Discussed ongoing insurance updates & LOS approval. States \"switching to [employer] PPO plan July 1st, so that will open up some provider options for us.\" Discussed HH vs OP therapy referrals. Asking about respite care & private caregiver information. Will provide private hire caregiver brochures for her to begin investigating & interviewing. Discussed adult day care programs as possible respite care. Reviewed family training prior to discharge. Questions answered. Emotional support provided.  "

## 2020-06-04 NOTE — CARE PLAN
Problem: Other Problem (see comments)  Goal: Other Goal  Description: Nutrition Support tolerated and meeting >85% of estimated nutrition needs.  Outcome: MET

## 2020-06-04 NOTE — REHAB-DIETARY IDT TEAM NOTE
Dietary   Nutrition  Dietary Problems     Problem: Other Problem (see comments)     Description: Swallowing difficulty related to dysphagia as evidenced by pt NPO with tube feed via G-tube.    Goal: Other Goal (Resolved)     Description: Nutrition Support tolerated and meeting >85% of estimated nutrition needs.                    Patient tolerating Isosource 1.5, 5 containers per day with no GI issues. Na+ 134- adjusted free water flushes as TF at goal and tolerated well x 48 hours. Nystatin added for thrush.  Patient remains NPO with SLP.    Plan: continue TF regimen. Free water flushes adjusted to 150mL q4 hours. RD following.     Section completed by:  Carolina Rodrigues R.D.
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
## 2020-06-04 NOTE — THERAPY
Physical Therapy   Daily Treatment     Patient Name: Reyes Amezcua  Age:  64 y.o., Sex:  male  Medical Record #: [id number]  Today's Date: 6/4/2020     Precautions  Precautions: (P) Fall Risk, Weight Bearing As Tolerated Right Upper Extremity  Comments: (P) R orbital Fx, R clavicle Fx, posey bed     Subjective    Patient in w/c by nursing station and agreeable to therapy.     Objective       06/04/20 1431   Precautions   Precautions Fall Risk;Weight Bearing As Tolerated Right Upper Extremity   Comments R orbital Fx, R clavicle Fx, posey bed    Pain 0 - 10 Group   Location Throat   Description Sore   ABS (Agitated Behavior Scale)   Agitated Behavior Scale Performed Yes   Short Attention Span, Easy Distractibility, Inability to Concentrate 2   Impulsive, Impatient, Low Tolerance for Pain or Frustration 2   Uncooperative, Resistant to Care, Demanding 1   Violent and/or Threatening Violence Toward People or Property 1   Explosive and/or Unpredictable Anger 1   Rocking, Rubbing, Moaning, Other Self-Stimulating Behavior 1   Pulling at Tubes, Restraints, etc. 1   Wandering from Treatment Area 1   Restlessness, Pacing, Excessive Movement 1   Repetitive Behaviors, Motor and/or Verbal 1   Rapid, Loud or Excessive Talking 1   Sudden Changes of Mood 1   Easily Initiated - Excessive Crying and/or Laughter 1   Self-Abusiveness, Physical and/or Verbal 1   Agitated Behavior Scale Total Score 16   Level of Severity No Agitation   Sleep/Wake Cycle   Sleep & Rest Awake;Out of bed   Gait Functional Level of Assist    Gait Level Of Assist Contact Guard Assist  (to Min A)   Assistive Device Hand Held Assist   Distance (Feet)   (110 ftx1, 150 ftx1, 100 ftx1, 200 ftx1)   Deviation Shuffled Gait  (scissoring with RLE, narrow CLAU, tendency for R LOB )   Transfer Functional Level of Assist   Bed, Chair, Wheelchair Transfer Minimal Assist   Bed Chair Wheelchair Transfer Description   (HHA)   Bed Mobility    Sit to Supine Stand by Assist   Sit  "to Stand Contact Guard Assist   Scooting Stand by Assist   Rolling Supervised   Interdisciplinary Plan of Care Collaboration   IDT Collaboration with  Speech Therapist;Certified Nursing Assistant   Patient Position at End of Therapy In Bed;Call Light within Reach  (in Andrade bed)   Collaboration Comments CLOF   PT Total Time Spent   PT Individual Total Time Spent (Mins) 60   PT Charge Group   PT Gait Training 2   PT Neuromuscular Re-Education / Balance 2     Standing balloon volley with intermittent no AD vs. Single UE support in parallel bars 2x120 seconds with SBA.    Dynamic gait negotiating obstacles and walking to targets (\"walk to something blue\", \"walk to the car\", etc.) as well as walking to food items, reading food labels and returning items to specific grocery areas. Identifying familiar objects with min prompting and extra time. Counting steps while walking back toward room; patient able to count 50 steps, restarted count even with two interruptions in task without prompting.    Assessment    Patient tolerated session well, focus of session on ambulating and incorporating cognitive tasks into mobility. Requires extended seated rest breaks for mental/physical fatigue management.    Plan    Gait training with HHA vs. No AD, standing balance activities, endurance/activity tolerance, BLE strengthening, stair/curb negotiation, cognitive protocols per SLP direction, incorporate cognition/communication into activities, family training. Plan to reduce PT to 30 minutes next week due to increased SLP needs.    Physical Therapy Problems     Problem: Mobility     Dates: Start: 05/30/20       Goal: STG-Within one week, patient will propel wheelchair household distances     Dates: Start: 05/30/20       Description: 1) Individualized goal:  50 ft with BLEs and Min A.  2) Interventions:  PT Group Therapy, PT Gait Training, PT Therapeutic Exercises, PT Neuro Re-Ed/Balance, PT Aquatic Therapy, PT Therapeutic Activity, and " PT Evaluation            Goal: STG-Within one week, patient will ambulate household distance     Dates: Start: 05/30/20       Description: 1) Individualized goal:  50 ft with LRAD and Min A.  2) Interventions:  PT Group Therapy, PT Gait Training, PT Therapeutic Exercises, PT Neuro Re-Ed/Balance, PT Aquatic Therapy, PT Therapeutic Activity, and PT Evaluation          Goal: STG-Within one week, patient will     Dates: Start: 05/30/20       Description: 1) Individualized goal:  follow one-step commands with 75% accuracy with min multimodal cues and extra time.  2) Interventions:  PT Group Therapy, PT Gait Training, PT Therapeutic Exercises, PT Neuro Re-Ed/Balance, PT Aquatic Therapy, PT Therapeutic Activity, and PT Evaluation                Problem: Mobility Transfers     Dates: Start: 05/30/20       Goal: STG-Within one week, patient will perform bed mobility     Dates: Start: 05/30/20       Description: 1) Individualized goal:  with hospital functions and SBA.  2) Interventions:  PT Group Therapy, PT Gait Training, PT Therapeutic Exercises, PT Neuro Re-Ed/Balance, PT Aquatic Therapy, PT Therapeutic Activity, and PT Evaluation          Goal: STG-Within one week, patient will transfer bed to chair     Dates: Start: 05/30/20       Description: 1) Individualized goal:  with LRAD and CGA-SBA.  2) Interventions:  PT Group Therapy, PT Gait Training, PT Therapeutic Exercises, PT Neuro Re-Ed/Balance, PT Aquatic Therapy, PT Therapeutic Activity, and PT Evaluation                Problem: PT-Long Term Goals     Dates: Start: 05/30/20       Goal: LTG-By discharge, patient will propel wheelchair     Dates: Start: 05/30/20       Description: 1) Individualized goal:  150 ft with supervision.  2) Interventions:  PT Group Therapy, PT Gait Training, PT Therapeutic Exercises, PT Neuro Re-Ed/Balance, PT Aquatic Therapy, PT Therapeutic Activity, and PT Evaluation          Goal: LTG-By discharge, patient will ambulate     Dates: Start:  05/30/20       Description: 1) Individualized goal:  150 ft with LRAD and supervision.  2) Interventions:  PT Group Therapy, PT Gait Training, PT Therapeutic Exercises, PT Neuro Re-Ed/Balance, PT Aquatic Therapy, PT Therapeutic Activity, and PT Evaluation          Goal: LTG-By discharge, patient will transfer one surface to another     Dates: Start: 05/30/20       Description: 1) Individualized goal:  with LRAD and supervision.  2) Interventions:  PT Group Therapy, PT Gait Training, PT Therapeutic Exercises, PT Neuro Re-Ed/Balance, PT Aquatic Therapy, PT Therapeutic Activity, and PT Evaluation            Goal: LTG-By discharge, patient will ambulate up/down flight of stairs     Dates: Start: 05/30/20       Description: 1) Individualized goal:  with single rail and supervision.  2) Interventions:  PT Group Therapy, PT Gait Training, PT Therapeutic Exercises, PT Neuro Re-Ed/Balance, PT Aquatic Therapy, PT Therapeutic Activity, and PT Evaluation            Goal: LTG-By discharge, patient will transfer in/out of a car     Dates: Start: 05/30/20       Description: 1) Individualized goal:  with LRAD and supervision.  2) Interventions:  PT Group Therapy, PT Gait Training, PT Therapeutic Exercises, PT Neuro Re-Ed/Balance, PT Aquatic Therapy, PT Therapeutic Activity, and PT Evaluation            Goal: LTG-By discharge, patient will     Dates: Start: 05/30/20       Description: 1) Individualized goal:  perform bed mobility independently and no bed functions.  2) Interventions:  PT Group Therapy, PT Gait Training, PT Therapeutic Exercises, PT Neuro Re-Ed/Balance, PT Aquatic Therapy, PT Therapeutic Activity, and PT Evaluation            Goal: LTG-By discharge, patient will     Dates: Start: 05/30/20       Description: 1) Individualized goal:  negotiate single curb with LRAD and supervision.  2) Interventions:  PT Group Therapy, PT Gait Training, PT Therapeutic Exercises, PT Neuro Re-Ed/Balance, PT Aquatic Therapy, PT  Therapeutic Activity, and PT Evaluation

## 2020-06-05 ENCOUNTER — APPOINTMENT (OUTPATIENT)
Dept: RADIOLOGY | Facility: REHABILITATION | Age: 65
DRG: 949 | End: 2020-06-05
Attending: PHYSICAL MEDICINE & REHABILITATION
Payer: COMMERCIAL

## 2020-06-05 ENCOUNTER — APPOINTMENT (OUTPATIENT)
Dept: PAIN MANAGEMENT | Facility: REHABILITATION | Age: 65
DRG: 949 | End: 2020-06-05
Attending: PHYSICAL MEDICINE & REHABILITATION
Payer: COMMERCIAL

## 2020-06-05 PROCEDURE — 74230 X-RAY XM SWLNG FUNCJ C+: CPT

## 2020-06-05 PROCEDURE — 97530 THERAPEUTIC ACTIVITIES: CPT

## 2020-06-05 PROCEDURE — 770010 HCHG ROOM/CARE - REHAB SEMI PRIVAT*

## 2020-06-05 PROCEDURE — 97116 GAIT TRAINING THERAPY: CPT

## 2020-06-05 PROCEDURE — 99233 SBSQ HOSP IP/OBS HIGH 50: CPT | Performed by: PHYSICAL MEDICINE & REHABILITATION

## 2020-06-05 PROCEDURE — 92611 MOTION FLUOROSCOPY/SWALLOW: CPT

## 2020-06-05 PROCEDURE — A9270 NON-COVERED ITEM OR SERVICE: HCPCS | Performed by: PHYSICAL MEDICINE & REHABILITATION

## 2020-06-05 PROCEDURE — 700102 HCHG RX REV CODE 250 W/ 637 OVERRIDE(OP): Performed by: PHYSICAL MEDICINE & REHABILITATION

## 2020-06-05 PROCEDURE — 700111 HCHG RX REV CODE 636 W/ 250 OVERRIDE (IP): Performed by: PHYSICAL MEDICINE & REHABILITATION

## 2020-06-05 PROCEDURE — 97112 NEUROMUSCULAR REEDUCATION: CPT

## 2020-06-05 PROCEDURE — 92507 TX SP LANG VOICE COMM INDIV: CPT

## 2020-06-05 RX ORDER — POLYETHYLENE GLYCOL 3350 17 G/17G
1 POWDER, FOR SOLUTION ORAL
Status: DISCONTINUED | OUTPATIENT
Start: 2020-06-05 | End: 2020-06-23 | Stop reason: HOSPADM

## 2020-06-05 RX ORDER — BISACODYL 10 MG
10 SUPPOSITORY, RECTAL RECTAL
Status: DISCONTINUED | OUTPATIENT
Start: 2020-06-05 | End: 2020-06-23 | Stop reason: HOSPADM

## 2020-06-05 RX ORDER — AMOXICILLIN 250 MG
2 CAPSULE ORAL 2 TIMES DAILY PRN
Status: DISCONTINUED | OUTPATIENT
Start: 2020-06-05 | End: 2020-06-23 | Stop reason: HOSPADM

## 2020-06-05 RX ADMIN — NYSTATIN 500000 UNITS: 100000 SUSPENSION ORAL at 20:23

## 2020-06-05 RX ADMIN — NYSTATIN 500000 UNITS: 100000 SUSPENSION ORAL at 18:35

## 2020-06-05 RX ADMIN — ENALAPRIL MALEATE 2.5 MG: 5 TABLET ORAL at 06:10

## 2020-06-05 RX ADMIN — MODAFINIL 100 MG: 100 TABLET ORAL at 09:13

## 2020-06-05 RX ADMIN — NYSTATIN 500000 UNITS: 100000 SUSPENSION ORAL at 09:13

## 2020-06-05 RX ADMIN — OMEPRAZOLE 20 MG: KIT at 09:13

## 2020-06-05 RX ADMIN — FLUOXETINE 20 MG: 20 TABLET, FILM COATED ORAL at 09:13

## 2020-06-05 RX ADMIN — DOCUSATE SODIUM 50 MG AND SENNOSIDES 8.6 MG 2 TABLET: 8.6; 5 TABLET, FILM COATED ORAL at 09:13

## 2020-06-05 RX ADMIN — TRAZODONE HYDROCHLORIDE 50 MG: 50 TABLET ORAL at 22:00

## 2020-06-05 RX ADMIN — ENOXAPARIN SODIUM 40 MG: 100 INJECTION SUBCUTANEOUS at 09:13

## 2020-06-05 RX ADMIN — NYSTATIN 500000 UNITS: 100000 SUSPENSION ORAL at 14:14

## 2020-06-05 RX ADMIN — MODAFINIL 100 MG: 100 TABLET ORAL at 13:35

## 2020-06-05 RX ADMIN — MELATONIN 1000 UNITS: at 09:13

## 2020-06-05 ASSESSMENT — GAIT ASSESSMENTS
GAIT LEVEL OF ASSIST: CONTACT GUARD ASSIST
ASSISTIVE DEVICE: NONE;HAND HELD ASSIST

## 2020-06-05 ASSESSMENT — ACTIVITIES OF DAILY LIVING (ADL): TOILET_TRANSFER_DESCRIPTION: GRAB BAR;VERBAL CUEING

## 2020-06-05 NOTE — FLOWSHEET NOTE
06/04/20 1720   Events/Summary/Plan   Events/Summary/Plan Stoma care done. Stoma closed and scabbed over, slight air leak noted. no redness or discharge noted.

## 2020-06-05 NOTE — THERAPY
Speech Language Pathology  Daily Treatment     Patient Name: Reyes Amezcua  Age:  64 y.o., Sex:  male  Medical Record #: [id number]  Today's Date: 2020     Subjective    Spouse reports pt did not have a long rest between therapies, however, pt agreeable to participate this session.      Objective     20 1004   Receptive Language / Auditory Comprehension   Follows One Unit Commands Supervision (5)   Expressive Language   Naming Moderate (3)   Reading Comprehension    Reading Words Supervision (5)   Functional Level of Assist   Eating Total Assist   Eating Description Staff administers tube feed/parenteral nutrition/IVF;Set-up of equipment or meal/tube feeding   Comprehension Moderate Assist   Comprehension Description Glasses;Hearing aids/amplifiers;Verbal cues   Expression Maximal Assist   Expression Description Verbal cueing   Social Interaction Moderate Assist   Social Interaction Description Verbal cues;Increased time;Enclosure bed;Schedule   Problem Solving Maximal Assist   Problem Solving Description Therapy schedule;Verbal cueing;Seat belt;Bed/chair alarm;Enclosure bed   Memory Maximal Assist   Memory Description Verbal cueing;Therapy schedule;Seat belt;Enclosure bed;Bed/chair alarm   Interdisciplinary Plan of Care Collaboration   IDT Collaboration with  Family / Caregiver   Patient Position at End of Therapy Seated;Self Releasing Lap Belt Applied   Collaboration Comments spouse present for session and escorting pt to room   SLP Total Time Spent   SLP Individual Total Time Spent (Mins) 30   Charge Group   SLP Treatment - Individual Speech Language Treatment - Individual       Assessment    Picture ID given verbal cue FO2: 100%, FO3: 90%, FO4: 100%, picture ID given word cue FO2: 90%, picture namin/25 IND, with sentence completion cue pt increased to /. Pt continues to benefit from extra response time.     Plan    Continue to target expressive language (picture naming using personal pictures,  phrase completion, reading at phrase level).     Speech Therapy Problems     Problem: Comprehension STGs     Dates: Start: 05/30/20       Description: 1) Individualized goal:  identify common objects within the current environment with 80% accuracy and minimal cues  2) Interventions:  SLP Speech Language Treatment        Goal: STG-Within one week, patient will     Dates: Start: 05/30/20       Description: 1) Individualized goal:  identify common objects within the environment with at least 80% accuracy to complete ADLs with minimal verbal cues  2) Interventions:  SLP Speech Language Treatment        Note:     Goal Note filed on 06/02/20 1328 by Trupti Davila MS,CCC-SLP    Pt able to identify pictures with more success than tactile objects at this time. FO2 for picture ID.                         Problem: Expression STGs     Dates: Start: 05/30/20       Description: 1) Individualized goal: use single words/word approximations/photos/icons to indicate basic needs/preferences for 4/5 opportunities  2) Interventions:  SLP Speech Language Treatment        Goal: STG-Within one week, patient will     Dates: Start: 05/30/20       Description: 1) Individualized goal: use single words/word approximations/photos/icons to indicate basic needs/preferences for 4/5 opportunities  2) Interventions:  SLP Speech Language Treatment    Note:     Goal Note filed on 06/02/20 1336 by Trupti Davila MS,CCC-SLP    Pt has started to imitate CV productions, repetition of automatic language (I.e. counting, JERRY, VENKAT)                        Problem: Speech/Swallowing LTGs     Dates: Start: 05/30/20       Goal: LTG-By discharge, patient will safely swallow     Dates: Start: 05/30/20       Description: 1) Individualized goal: least restrictive diet without overt s/sx of asp for 100% of PO intake  2) Interventions:  SLP Swallowing Dysfunction Treatment and SLP Oral Pharyngeal Evaluation              Goal: LTG-By discharge, patient will  comprehend     Dates: Start: 05/30/20       Description: 1) Individualized goal:  simple functional language (spoken, pictures, written) to participate with care with at least 80% accuracy and minimal cues  2) Interventions:  SLP Speech Language Treatment and SLP Cognitive Skill Development              Goal: LTG-By discharge, patient will express     Dates: Start: 05/30/20       Description: 1) Individualized goal: basic wants/needs to participate with care for 4/5 opportunities and minimal cues  2) Interventions:  SLP Speech Language Treatment                    Problem: Swallowing STGs     Dates: Start: 05/30/20       Goal: STG-Within one week, patient will     Dates: Start: 05/30/20       Description: 1) Individualized goal:  tolerate PO trials (single ice chips, NTL via half tsp, etc.) without overt s/sx of asp for 80% of trials  2) Interventions:  SLP Swallowing Dysfunction Treatment        Note:     Goal Note filed on 06/02/20 1328 by Trupti Davila MS,CCC-SLP    Pt with immediate cough response on ice chip trials during CSE, will continue conservative trials with anticipation of instrumental assessment later this week to further assess CLOF.

## 2020-06-05 NOTE — THERAPY
Physical Therapy   Daily Treatment     Patient Name: Reeys Amezcua  Age:  64 y.o., Sex:  male  Medical Record #: [id number]  Today's Date: 6/5/2020     Precautions  Precautions: (P) Fall Risk, Weight Bearing As Tolerated Right Upper Extremity  Comments: (P) R orbital Fx, R clavicle Fx, posey bed     Subjective    Patient in w/c and agreeable to therapy.     Objective       06/05/20 1431   Precautions   Precautions Fall Risk;Weight Bearing As Tolerated Right Upper Extremity   Comments R orbital Fx, R clavicle Fx, posey bed    ABS (Agitated Behavior Scale)   Agitated Behavior Scale Performed Yes   Short Attention Span, Easy Distractibility, Inability to Concentrate 2   Impulsive, Impatient, Low Tolerance for Pain or Frustration 2   Uncooperative, Resistant to Care, Demanding 1   Violent and/or Threatening Violence Toward People or Property 1   Explosive and/or Unpredictable Anger 1   Rocking, Rubbing, Moaning, Other Self-Stimulating Behavior 1   Pulling at Tubes, Restraints, etc. 1   Wandering from Treatment Area 1   Restlessness, Pacing, Excessive Movement 1   Repetitive Behaviors, Motor and/or Verbal 1   Rapid, Loud or Excessive Talking 1   Sudden Changes of Mood 1   Easily Initiated - Excessive Crying and/or Laughter 1   Self-Abusiveness, Physical and/or Verbal 1   Agitated Behavior Scale Total Score 16   Level of Severity No Agitation   Sleep/Wake Cycle   Sleep & Rest Awake   Gait Functional Level of Assist    Gait Level Of Assist Contact Guard Assist   Assistive Device None;Hand Held Assist   Distance (Feet)   (short distances in room; see below for treadmill training)   Deviation   (scissoring with RLE, narrow CLAU, tendency for R LOB)   Wheelchair Functional Level of Assist   Wheelchair Assist Supervised   Distance Wheelchair (Feet or Distance) 150   Wheelchair Description   (BLEs to propel, tends to drift to R)   Transfer Functional Level of Assist   Bed, Chair, Wheelchair Transfer Contact Guard Assist   Bed  Chair Wheelchair Transfer Description   (HHA vs. no AD)   Toilet Transfers Contact Guard Assist   Toilet Transfer Description Grab bar;Verbal cueing   Bed Mobility    Supine to Sit Contact Guard Assist   Sit to Supine Contact Guard Assist   Sit to Stand Contact Guard Assist   Scooting Stand by Assist   Rolling Supervised   Interdisciplinary Plan of Care Collaboration   IDT Collaboration with  Nursing;Speech Therapist   Patient Position at End of Therapy In Bed;Call Light within Reach   Collaboration Comments re: diet upgrade, patient position at end of session   PT Total Time Spent   PT Individual Total Time Spent (Mins) 60   PT Charge Group   PT Gait Training 2   PT Therapeutic Activities 2       Treadmill training with BWS harness for safety only: 2x4 minutes at 1.2 mph, 1x4 minutes at 1.5 mph. Total distance 0.24 miles with two seated rest breaks. Extra time for donning harness for safety and negotiating step up/down onto treadmill.    Toilet transfer via stand step with grab bar and CGA; impulsive upon standing without calling for assistance despite cues. Standing balance performing hand hygiene with no UE support and SBA-CGA.    Assessment    Patient tolerated session well, initiated treadmill training with fair tolerance. Patient reporting 7/10 fatigue level, requiring seated rest breaks and pacing during session. Continues to be impulsive with mobility.    Plan    Gait training with HHA vs. No AD, standing balance activities, endurance/activity tolerance, BLE strengthening, stair/curb negotiation, cognitive protocols per SLP direction, incorporate cognition/communication into activities, family training, treadmill training, outdoor ambulation. Plan to reduce PT to 30 minutes next week (after conference on Tuesday?) due to increased SLP needs.    Physical Therapy Problems     Problem: Mobility     Dates: Start: 05/30/20       Goal: STG-Within one week, patient will propel wheelchair household distances      Dates: Start: 05/30/20       Description: 1) Individualized goal:  50 ft with BLEs and Min A.  2) Interventions:  PT Group Therapy, PT Gait Training, PT Therapeutic Exercises, PT Neuro Re-Ed/Balance, PT Aquatic Therapy, PT Therapeutic Activity, and PT Evaluation            Goal: STG-Within one week, patient will ambulate household distance     Dates: Start: 05/30/20       Description: 1) Individualized goal:  50 ft with LRAD and Min A.  2) Interventions:  PT Group Therapy, PT Gait Training, PT Therapeutic Exercises, PT Neuro Re-Ed/Balance, PT Aquatic Therapy, PT Therapeutic Activity, and PT Evaluation          Goal: STG-Within one week, patient will     Dates: Start: 05/30/20       Description: 1) Individualized goal:  follow one-step commands with 75% accuracy with min multimodal cues and extra time.  2) Interventions:  PT Group Therapy, PT Gait Training, PT Therapeutic Exercises, PT Neuro Re-Ed/Balance, PT Aquatic Therapy, PT Therapeutic Activity, and PT Evaluation                Problem: Mobility Transfers     Dates: Start: 05/30/20       Goal: STG-Within one week, patient will perform bed mobility     Dates: Start: 05/30/20       Description: 1) Individualized goal:  with hospital functions and SBA.  2) Interventions:  PT Group Therapy, PT Gait Training, PT Therapeutic Exercises, PT Neuro Re-Ed/Balance, PT Aquatic Therapy, PT Therapeutic Activity, and PT Evaluation          Goal: STG-Within one week, patient will transfer bed to chair     Dates: Start: 05/30/20       Description: 1) Individualized goal:  with LRAD and CGA-SBA.  2) Interventions:  PT Group Therapy, PT Gait Training, PT Therapeutic Exercises, PT Neuro Re-Ed/Balance, PT Aquatic Therapy, PT Therapeutic Activity, and PT Evaluation                Problem: PT-Long Term Goals     Dates: Start: 05/30/20       Goal: LTG-By discharge, patient will propel wheelchair     Dates: Start: 05/30/20       Description: 1) Individualized goal:  150 ft with  supervision.  2) Interventions:  PT Group Therapy, PT Gait Training, PT Therapeutic Exercises, PT Neuro Re-Ed/Balance, PT Aquatic Therapy, PT Therapeutic Activity, and PT Evaluation          Goal: LTG-By discharge, patient will ambulate     Dates: Start: 05/30/20       Description: 1) Individualized goal:  150 ft with LRAD and supervision.  2) Interventions:  PT Group Therapy, PT Gait Training, PT Therapeutic Exercises, PT Neuro Re-Ed/Balance, PT Aquatic Therapy, PT Therapeutic Activity, and PT Evaluation          Goal: LTG-By discharge, patient will transfer one surface to another     Dates: Start: 05/30/20       Description: 1) Individualized goal:  with LRAD and supervision.  2) Interventions:  PT Group Therapy, PT Gait Training, PT Therapeutic Exercises, PT Neuro Re-Ed/Balance, PT Aquatic Therapy, PT Therapeutic Activity, and PT Evaluation            Goal: LTG-By discharge, patient will ambulate up/down flight of stairs     Dates: Start: 05/30/20       Description: 1) Individualized goal:  with single rail and supervision.  2) Interventions:  PT Group Therapy, PT Gait Training, PT Therapeutic Exercises, PT Neuro Re-Ed/Balance, PT Aquatic Therapy, PT Therapeutic Activity, and PT Evaluation            Goal: LTG-By discharge, patient will transfer in/out of a car     Dates: Start: 05/30/20       Description: 1) Individualized goal:  with LRAD and supervision.  2) Interventions:  PT Group Therapy, PT Gait Training, PT Therapeutic Exercises, PT Neuro Re-Ed/Balance, PT Aquatic Therapy, PT Therapeutic Activity, and PT Evaluation            Goal: LTG-By discharge, patient will     Dates: Start: 05/30/20       Description: 1) Individualized goal:  perform bed mobility independently and no bed functions.  2) Interventions:  PT Group Therapy, PT Gait Training, PT Therapeutic Exercises, PT Neuro Re-Ed/Balance, PT Aquatic Therapy, PT Therapeutic Activity, and PT Evaluation            Goal: LTG-By discharge, patient will      Dates: Start: 05/30/20       Description: 1) Individualized goal:  negotiate single curb with LRAD and supervision.  2) Interventions:  PT Group Therapy, PT Gait Training, PT Therapeutic Exercises, PT Neuro Re-Ed/Balance, PT Aquatic Therapy, PT Therapeutic Activity, and PT Evaluation

## 2020-06-05 NOTE — THERAPY
Occupational Therapy  Daily Treatment     Patient Name: Reyes Amezcua  Age:  64 y.o., Sex:  male  Medical Record #: [id number]  Today's Date: 6/5/2020     Precautions  Precautions: (P) Fall Risk, Weight Bearing As Tolerated Right Upper Extremity  Comments: (P) R orbital Fx, R clavicle Fx, posey bed     Safety   ADL Safety : Requires Supervision for Safety, Requires Cueing for Safety, Requires Physical Assist for Safety  Bathroom Safety: Requires Supervision for Safety, Requires Physical Assist for Safety, Requires Cuing for Safety    Subjective    Pt received supine in bed, nodded yes to getting out of bed but required assist for initiation. Declined to shower or change clothing     Objective       06/05/20 0831   Precautions   Precautions Fall Risk;Weight Bearing As Tolerated Right Upper Extremity   Comments R orbital Fx, R clavicle Fx, karin bed    Fine Motor / Dexterity    Fine Motor / Dexterity Yes   Fine Motor / Dexterity Interventions able to replicate pegboard pattern using small pegs with appropriate dexterity, handwriting largely illegible but does not appear to be coordination related, may be vision vs cognition   Sitting Lower Body Exercises   Sitting Lower Body Exercises Yes   Sit to Stand 1 set of 10   OT Total Time Spent   OT Individual Total Time Spent (Mins) 60   OT Charge Group   OT Neuromuscular Re-education / Balance 2   OT Therapy Activity 2     Pt completed functional mobility outside min HH assist on sidewalk ~100 feet x2, up/down curb x6 with min/mod A, tendency to scissor when turning and when fatigued, more effortful to step up with RLE than LLE though able to complete with both.     Assessment    Pt tolerated session well, continues with decreased endurance necessitating consistent rest breaks, continues with RLE motor planning deficits impaired standing balance though improving, regress when fatigued. Pt required min cues to correctly sequence counting to 10 during sit to stands, able to  "state family names while walking with specific prompts though not accurate names, stated wife's name as \"Venus\" and then \"Beltran\". Noted illegibility during writing but does not appear to be coordination related, will continue to monitor.      Plan    Attention to task, functional cog and sequencing during ADLs/IADLs, balance and functional mobility, endurance, TBI education, management of double vision    Occupational Therapy Goals     Problem: Eating     Dates: Start: 05/30/20       Goal: STG-Within one week, patient will feed self     Dates: Start: 05/30/20       Description: 1) Individualized Goal:  Moderate Assist with AE PRN  2) Interventions:  OT E Stim Attended, OT Group Therapy, OT Self Care/ADL, OT Cognitive Skill Dev, OT Manual Ther Technique, OT Neuro Re-Ed/Balance, OT Sensory Int Techniques, OT Therapeutic Activity, OT Aquatic Therapy, OT Evaluation, and OT Therapeutic Exercise                  Problem: Functional Cognition     Dates: Start: 05/30/20       Goal: STG-Within one week, patient will attend to     Dates: Start: 05/30/20       Description: 1) Individualized Goal:  A five minute task with no more than 2 cues to attend  2) Interventions:  OT E Stim Attended, OT Group Therapy, OT Self Care/ADL, OT Cognitive Skill Dev, OT Manual Ther Technique, OT Neuro Re-Ed/Balance, OT Sensory Int Techniques, OT Therapeutic Activity, OT Aquatic Therapy, OT Evaluation, and OT Therapeutic Exercise                Problem: Functional Transfers     Dates: Start: 05/30/20       Goal: STG-Within one week, patient will transfer to toilet     Dates: Start: 05/30/20       Description: 1) Individualized Goal:  SBA with AE/DME PRN  2) Interventions:  OT E Stim Attended, OT Group Therapy, OT Self Care/ADL, OT Cognitive Skill Dev, OT Manual Ther Technique, OT Neuro Re-Ed/Balance, OT Sensory Int Techniques, OT Therapeutic Activity, OT Aquatic Therapy, OT Evaluation, and OT Therapeutic Exercise          Goal: STG-Within one " week, patient will transfer to step in shower     Dates: Start: 05/30/20       Description: 1) Individualized Goal:  Supervision level with AE PRN  2) Interventions:  OT E Stim Attended, OT Group Therapy, OT Self Care/ADL, OT Cognitive Skill Dev, OT Manual Ther Technique, OT Neuro Re-Ed/Balance, OT Sensory Int Techniques, OT Therapeutic Activity, OT Aquatic Therapy, OT Evaluation, and OT Therapeutic Exercise                Problem: OT Long Term Goals     Dates: Start: 05/30/20       Goal: LTG-By discharge, patient will complete basic self care tasks     Dates: Start: 05/30/20       Description: 1) Individualized Goal:  Min A - Supervision with AE PRN  2) Interventions:  OT E Stim Attended, OT Group Therapy, OT Self Care/ADL, OT Cognitive Skill Dev, OT Manual Ther Technique, OT Neuro Re-Ed/Balance, OT Sensory Int Techniques, OT Therapeutic Activity, OT Aquatic Therapy, OT Evaluation, and OT Therapeutic Exercise          Goal: LTG-By discharge, patient will perform bathroom transfers     Dates: Start: 05/30/20       Description: 1) Individualized Goal: Supervision with AE/DME PRN   2) Interventions:  OT E Stim Attended, OT Group Therapy, OT Self Care/ADL, OT Cognitive Skill Dev, OT Manual Ther Technique, OT Neuro Re-Ed/Balance, OT Sensory Int Techniques, OT Therapeutic Activity, OT Aquatic Therapy, OT Evaluation, and OT Therapeutic Exercise                Problem: Toileting     Dates: Start: 05/30/20       Goal: STG-Within one week, patient will complete toileting tasks     Dates: Start: 05/30/20       Description: 1) Individualized Goal:  Moderate Assist with AE PRN  2) Interventions:  OT E Stim Attended, OT Group Therapy, OT Self Care/ADL, OT Cognitive Skill Dev, OT Manual Ther Technique, OT Neuro Re-Ed/Balance, OT Sensory Int Techniques, OT Therapeutic Activity, OT Aquatic Therapy, OT Evaluation, and OT Therapeutic Exercise

## 2020-06-05 NOTE — CARE PLAN
Problem: Communication  Goal: The ability to communicate needs accurately and effectively will improve  Description: Pt is unable to effectively communicate needs. Pt rounded on hourly to make sure needs were met. Will continue to monitor.   Outcome: PROGRESSING AS EXPECTED     Problem: Discharge Barriers/Planning  Goal: Patient's continuum of care needs will be met  Outcome: PROGRESSING AS EXPECTED

## 2020-06-05 NOTE — PROGRESS NOTES
Spiritual Care Note    Patient Information     Patient's Name: Reyes Amezcua   MRN: [id number]    YOB: 1955   Age and Gender: 64 y.o. male   Service Area: Inpatient    Room (and Bed): [place] [address]   Ethnicity or Nationality:     Primary Language: English   Mu-ism/Spiritual preference: Rastafarian   Place of Residence: [place]   Family/Friends/Others Present: Yes, wife and friend   Clinical Team Present: No   Medical Diagnosis(-es)/Procedure(s): TBI   Code Status: Full Code    Date of Admission: 5/29/2020   Length of Stay: 7 days        Spiritual Care Provider Information:  Name of Spiritual Care Provider: Clare Leos  Title of Spiritual Care Provider: Associate   Phone Number: [phone]  E-mail: [email or web address]  Total time : 5 minutes    Spiritual Screen Results:    Gen Nursing        Palliative Care         Encounter/Request Information  Encounter/Request Type   Visited With: Patient and family together  Nature of the Visit: Follow-up, On shift  Continue Visiting: Yes  Next Follow-up Date: 06/03/20  General Visit: Yes  Referral From/ Origin of Request: Verbal family    Religous Needs/Values  Mu-ism Needs Visit  Mu-ism Needs: Prayer    Spiritual Assessment     Spiritual Care Encounters    Observations/Symptoms: Accepting, Thankfulness    Interaction/Conversation:  Pt was in the middle of a window visit from a friend, so the  will return on Monday.    Interventions: Compassionate presence, prayer.    Plan: Daily Visits    Notes:
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
## 2020-06-05 NOTE — CARE PLAN
Problem: Safety  Goal: Will remain free from injury  Outcome: PROGRESSING AS EXPECTED  Very quick to transfer into the w/c but he is using his call light today. Instructed on using the w/c brakes and he demonstrated this the rest of the day. Improving.     Problem: Bowel/Gastric:  Goal: Normal bowel function is maintained or improved  Outcome: PROGRESSING AS EXPECTED  Had a soft formed BM today

## 2020-06-06 PROCEDURE — A9270 NON-COVERED ITEM OR SERVICE: HCPCS | Performed by: PHYSICAL MEDICINE & REHABILITATION

## 2020-06-06 PROCEDURE — 99231 SBSQ HOSP IP/OBS SF/LOW 25: CPT | Performed by: PHYSICAL MEDICINE & REHABILITATION

## 2020-06-06 PROCEDURE — 770010 HCHG ROOM/CARE - REHAB SEMI PRIVAT*

## 2020-06-06 PROCEDURE — 700102 HCHG RX REV CODE 250 W/ 637 OVERRIDE(OP): Performed by: PHYSICAL MEDICINE & REHABILITATION

## 2020-06-06 PROCEDURE — 94760 N-INVAS EAR/PLS OXIMETRY 1: CPT

## 2020-06-06 PROCEDURE — 700111 HCHG RX REV CODE 636 W/ 250 OVERRIDE (IP): Performed by: PHYSICAL MEDICINE & REHABILITATION

## 2020-06-06 PROCEDURE — 97530 THERAPEUTIC ACTIVITIES: CPT

## 2020-06-06 PROCEDURE — 92507 TX SP LANG VOICE COMM INDIV: CPT | Performed by: SPEECH-LANGUAGE PATHOLOGIST

## 2020-06-06 RX ADMIN — OMEPRAZOLE 20 MG: KIT at 08:55

## 2020-06-06 RX ADMIN — NYSTATIN 500000 UNITS: 100000 SUSPENSION ORAL at 08:55

## 2020-06-06 RX ADMIN — MODAFINIL 100 MG: 100 TABLET ORAL at 13:17

## 2020-06-06 RX ADMIN — NYSTATIN 500000 UNITS: 100000 SUSPENSION ORAL at 18:09

## 2020-06-06 RX ADMIN — TRAZODONE HYDROCHLORIDE 50 MG: 50 TABLET ORAL at 20:42

## 2020-06-06 RX ADMIN — NYSTATIN 500000 UNITS: 100000 SUSPENSION ORAL at 13:17

## 2020-06-06 RX ADMIN — FLUOXETINE 20 MG: 20 TABLET, FILM COATED ORAL at 08:55

## 2020-06-06 RX ADMIN — MELATONIN 1000 UNITS: at 08:55

## 2020-06-06 RX ADMIN — MODAFINIL 100 MG: 100 TABLET ORAL at 08:55

## 2020-06-06 RX ADMIN — ENOXAPARIN SODIUM 40 MG: 100 INJECTION SUBCUTANEOUS at 08:55

## 2020-06-06 RX ADMIN — NYSTATIN 500000 UNITS: 100000 SUSPENSION ORAL at 20:43

## 2020-06-06 NOTE — THERAPY
Occupational Therapy  Daily Treatment     Patient Name: Reyes Amezcua  Age:  64 y.o., Sex:  male  Medical Record #: [id number]  Today's Date: 6/6/2020     Precautions  Precautions: (P) Fall Risk, Weight Bearing As Tolerated Right Upper Extremity  Comments: (P) R orbital Fx, R clavicle Fx, posey bed     Safety   ADL Safety : Requires Supervision for Safety, Requires Cueing for Safety, Requires Physical Assist for Safety  Bathroom Safety: Requires Supervision for Safety, Requires Physical Assist for Safety, Requires Cuing for Safety    Subjective    Pt received seated in lobby with wife, agreeable to OT session     Objective       06/06/20 1401   Precautions   Precautions Fall Risk;Weight Bearing As Tolerated Right Upper Extremity   Comments R orbital Fx, R clavicle Fx, posey bed    Balance   Comments CGA to ambulate from lobby to front gym, rest break, and to room at primarily min A increasing to mod A when fatigued with tendency to scissor RLE   OT Total Time Spent   OT Individual Total Time Spent (Mins) 30   OT Charge Group   OT Therapy Activity 2     Trialed reading comprehension with pt's all day glasses with partial reading lense (taped LL quadrant in both lenses) vs reading glasses (taped LL quadrant in both lenses) with improved overall performance using reading glasses and pt reporting it was easier to see the words, pt with tendency to skip words intermittently, does well with cues to slow pace and focus on one word at a time.     Discussed plan for possible cooking activity next week if cleared by SLP, pt able to state 2/3 ingredients needed to make sourdough bread, difficulty articulating process and pan needed, pt stated that he would be able to use a specific type of pan when presented with example, however wife states that he uses something very different. Plan made for wife to bring in pan next week.     Assessment    Pt tolerated session well, demos improvements in communication overall, difficulty  with memory and process articulation when asked to recall familiar cooking process. Balance continues to improve though requires at least min assist with tendency to scissor feet when fatigued. Overall alertness and engagement improving.     Plan    Attention to task, functional cog and sequencing during ADLs/IADLs, balance and functional mobility, endurance, TBI education, management of double vision    Occupational Therapy Goals     Problem: Eating     Dates: Start: 05/30/20       Goal: STG-Within one week, patient will feed self     Dates: Start: 05/30/20       Description: 1) Individualized Goal:  Moderate Assist with AE PRN  2) Interventions:  OT E Stim Attended, OT Group Therapy, OT Self Care/ADL, OT Cognitive Skill Dev, OT Manual Ther Technique, OT Neuro Re-Ed/Balance, OT Sensory Int Techniques, OT Therapeutic Activity, OT Aquatic Therapy, OT Evaluation, and OT Therapeutic Exercise                  Problem: Functional Cognition     Dates: Start: 05/30/20       Goal: STG-Within one week, patient will attend to     Dates: Start: 05/30/20       Description: 1) Individualized Goal:  A five minute task with no more than 2 cues to attend  2) Interventions:  OT E Stim Attended, OT Group Therapy, OT Self Care/ADL, OT Cognitive Skill Dev, OT Manual Ther Technique, OT Neuro Re-Ed/Balance, OT Sensory Int Techniques, OT Therapeutic Activity, OT Aquatic Therapy, OT Evaluation, and OT Therapeutic Exercise                Problem: Functional Transfers     Dates: Start: 05/30/20       Goal: STG-Within one week, patient will transfer to toilet     Dates: Start: 05/30/20       Description: 1) Individualized Goal:  SBA with AE/DME PRN  2) Interventions:  OT E Stim Attended, OT Group Therapy, OT Self Care/ADL, OT Cognitive Skill Dev, OT Manual Ther Technique, OT Neuro Re-Ed/Balance, OT Sensory Int Techniques, OT Therapeutic Activity, OT Aquatic Therapy, OT Evaluation, and OT Therapeutic Exercise          Goal: STG-Within one  week, patient will transfer to step in shower     Dates: Start: 05/30/20       Description: 1) Individualized Goal:  Supervision level with AE PRN  2) Interventions:  OT E Stim Attended, OT Group Therapy, OT Self Care/ADL, OT Cognitive Skill Dev, OT Manual Ther Technique, OT Neuro Re-Ed/Balance, OT Sensory Int Techniques, OT Therapeutic Activity, OT Aquatic Therapy, OT Evaluation, and OT Therapeutic Exercise                Problem: OT Long Term Goals     Dates: Start: 05/30/20       Goal: LTG-By discharge, patient will complete basic self care tasks     Dates: Start: 05/30/20       Description: 1) Individualized Goal:  Min A - Supervision with AE PRN  2) Interventions:  OT E Stim Attended, OT Group Therapy, OT Self Care/ADL, OT Cognitive Skill Dev, OT Manual Ther Technique, OT Neuro Re-Ed/Balance, OT Sensory Int Techniques, OT Therapeutic Activity, OT Aquatic Therapy, OT Evaluation, and OT Therapeutic Exercise          Goal: LTG-By discharge, patient will perform bathroom transfers     Dates: Start: 05/30/20       Description: 1) Individualized Goal: Supervision with AE/DME PRN   2) Interventions:  OT E Stim Attended, OT Group Therapy, OT Self Care/ADL, OT Cognitive Skill Dev, OT Manual Ther Technique, OT Neuro Re-Ed/Balance, OT Sensory Int Techniques, OT Therapeutic Activity, OT Aquatic Therapy, OT Evaluation, and OT Therapeutic Exercise                Problem: Toileting     Dates: Start: 05/30/20       Goal: STG-Within one week, patient will complete toileting tasks     Dates: Start: 05/30/20       Description: 1) Individualized Goal:  Moderate Assist with AE PRN  2) Interventions:  OT E Stim Attended, OT Group Therapy, OT Self Care/ADL, OT Cognitive Skill Dev, OT Manual Ther Technique, OT Neuro Re-Ed/Balance, OT Sensory Int Techniques, OT Therapeutic Activity, OT Aquatic Therapy, OT Evaluation, and OT Therapeutic Exercise

## 2020-06-06 NOTE — THERAPY
Speech Language Pathology  Daily Treatment     Patient Name: Reyes Amezcua  Age:  64 y.o., Sex:  male  Medical Record #: [id number]  Today's Date: 6/6/2020     Subjective    Pt was seen for tx, seated in speech office. Pt was alert and cooperative with tx.      Objective       06/06/20 1031   Expressive Language   Naming Minimal (4)   Reading Comprehension    Matches Words Moderate (3)   Matches Words to Pictures / Objects Moderate (3)   Reading Words Moderate (3)   Interdisciplinary Plan of Care Collaboration   Patient Position at End of Therapy Seated;Chair Alarm On;Self Releasing Lap Belt Applied  (seated at nurse's station)   SLP Total Time Spent   SLP Individual Total Time Spent (Mins) 60   Charge Group   SLP Treatment - Individual Speech Language Treatment - Individual       Assessment    SLP presented tasks to target receptive and expressive language. Pt completed as follows:    ID photos FO6: 100%  ID photos by function/description FO4: 45%  Naming pictures: 83%  Naming opposites from given word: 65%  Matching pictures FO3: 100%  Matching written words FO3: 40%  Matching Word to picture: 30%    Pt also verbally answered 3/5 simple conversational questions.     Plan    Cont POT    Speech Therapy Problems     Problem: Comprehension STGs     Dates: Start: 05/30/20       Description: 1) Individualized goal:  identify common objects within the current environment with 80% accuracy and minimal cues  2) Interventions:  SLP Speech Language Treatment        Goal: STG-Within one week, patient will     Dates: Start: 05/30/20       Description: 1) Individualized goal:  identify common objects within the environment with at least 80% accuracy to complete ADLs with minimal verbal cues  2) Interventions:  SLP Speech Language Treatment        Note:     Goal Note filed on 06/02/20 1328 by Trupti Davila MS,CCC-SLP    Pt able to identify pictures with more success than tactile objects at this time. FO2 for picture ID.                          Problem: Expression STGs     Dates: Start: 05/30/20       Description: 1) Individualized goal: use single words/word approximations/photos/icons to indicate basic needs/preferences for 4/5 opportunities  2) Interventions:  SLP Speech Language Treatment        Goal: STG-Within one week, patient will     Dates: Start: 05/30/20       Description: 1) Individualized goal: use single words/word approximations/photos/icons to indicate basic needs/preferences for 4/5 opportunities  2) Interventions:  SLP Speech Language Treatment    Note:     Goal Note filed on 06/02/20 2896 by Trupti Davila MS,CCC-SLP    Pt has started to imitate CV productions, repetition of automatic language (I.e. counting, JERRY, VENKAT)                        Problem: Speech/Swallowing LTGs     Dates: Start: 05/30/20       Goal: LTG-By discharge, patient will safely swallow     Dates: Start: 05/30/20       Description: 1) Individualized goal: least restrictive diet without overt s/sx of asp for 100% of PO intake  2) Interventions:  SLP Swallowing Dysfunction Treatment and SLP Oral Pharyngeal Evaluation              Goal: LTG-By discharge, patient will comprehend     Dates: Start: 05/30/20       Description: 1) Individualized goal:  simple functional language (spoken, pictures, written) to participate with care with at least 80% accuracy and minimal cues  2) Interventions:  SLP Speech Language Treatment and SLP Cognitive Skill Development              Goal: LTG-By discharge, patient will express     Dates: Start: 05/30/20       Description: 1) Individualized goal: basic wants/needs to participate with care for 4/5 opportunities and minimal cues  2) Interventions:  SLP Speech Language Treatment                    Problem: Swallowing STGs     Dates: Start: 05/30/20       Goal: STG-Within one week, patient will     Dates: Start: 05/30/20       Description: 1) Individualized goal:  tolerate PO trials (single ice chips, NTL via half tsp,  etc.) without overt s/sx of asp for 80% of trials  2) Interventions:  SLP Swallowing Dysfunction Treatment        Note:     Goal Note filed on 06/02/20 1328 by Trupti Davila MS,CCC-SLP    Pt with immediate cough response on ice chip trials during CSE, will continue conservative trials with anticipation of instrumental assessment later this week to further assess CLOF.

## 2020-06-06 NOTE — THERAPY
Speech Language Pathology  Video Swallow     Patient Name:  Reyes Amezcua  AGE:  64 y.o., SEX:  male  Medical Record #:  [id number]  Today's Date: 6/5/2020     Objective       06/05/20 1401   History / Background Information   Prior Level of Function for Eating / Swallowing Reg/thin   Diagnosis TBI; dysphagia   Dysphagia Symptoms Warranting Video Swallow throat clearing with p.o. trials   Procedure   Patient Seated in  WC   Seated at (Degrees) 90   Views Completed Lateral   Fluoroscopy Time 146.8 seconds   Consistencies / Presentation Method   Mildly Thick (2) - (Nectar Thick) Cup;Teaspoon   Thin (0) Cup;Teaspoon   Pureed (4) Teaspoon   Minced & Moist (5) - (Dysphagia II) Teaspoon   Regular - Easy to Chew (7)   (self (cookie) )   Barium Tablet Cup   Oral Phase   Regular-Easy to Chew (7) Delayed Oral Transit   Barium Tablet Impaired Anterior / Posterior Bolus Movement   Pharyngeal Phase   Thin (0) Penetration During Swallow;Reduced Hyo-Laryngeal Elevation   Pureed (4) Residue In Valleculae;Reduced Tongue Base Retraction    Minced & Moist (5) - (Dysphagia II) Reduced Tongue Base Retraction;Residue In Valleculae   Regular (7) Residue In Valleculae   Barium Tablet Other (See Comments)  (tablet removed.)   Compensatory Strategies Attempted   Compensatory Strategies Attempted Yes   Multiple Swallows effective in clearing vallecular residue   Impression   Dysphagia Present Yes   Oral - Pharyngeal Mild Impairment   Prognosis   Prognosis for Improvement Good   Barriers to Improvement Cognition   Positive Indicators for Improvement Limited physical deficits   Recommendations   Diet / Liquid Recommendation Soft & Bite-Sized (6) - (Dysphagia III)   Medication Administration  Crush all Medications in Puree;Via Gastric Tube   Strategies / Precautions Small Bites;Small Sips;Oral Care After Meals;Alternate Solids / Liquids;Multiple Swallows   Interventions Dysphagia Therapy by SLP;Patient / Caregiver Education /  Training;Pharyngeal - Laryngeal Strengthening Exercises;Therapeutic Dining Program for Meals   SLP Contact Information (Name / Extension) MP [phone]   Video Tape Number [id number]   Interdisciplinary Plan of Care Collaboration   IDT Collaboration with  Nursing;Physician;Physical Therapist   Collaboration Comments results of MBS, diet recommendations   SLP Total Time Spent   SLP Individual Total Time Spent (Mins) 30   Charge Group   SLP Video Swallow / FEES Videofluoroscopic Evaluation       Assessment    Modified barium swallow completed this day.  Pt presented with mild oropharyngeal dysphagia.  Oral dysphagia was characterized by tongue pumping, and decreased anterior posterior transfer of bolus.  Pharyngeal dysphagia was characterized by decreased tongue base retraction, and mildly decreased hyolaryngeal excursion.  Pt presented with mild residue in valleculae and pyriforms following thin liquid by spoon.  Pt presented with deep flash penetration of thin liquid by cup x1 of 4 trials.  Pt presented with mild residue in valleculae following puree, and level 5 (dys2 textures).  Residue cleared with second swallow.  Pt presented with prolonged mastication and tongue pumping following regular texture trial.  Barium tablet attempted.  Pt unable to transfer tablet to posterior portion of oral cavity.  Tablet removed.  Recommend initiate level 6 (dys3) and level 0 (thin).  Recommend meds crushed with puree or via gastric tube.      Plan    Recommend initiate level 6 (dys3) and level 0 (thin).  Recommend meds crushed with puree or via gastric tube.

## 2020-06-06 NOTE — FLOWSHEET NOTE
06/05/20 1509   Events/Summary/Plan   Events/Summary/Plan Stoma care done, Stoma closed, no discharge, redness or swelling noted. No leak detected.  Consider leaving uncovered starting tomorrow.

## 2020-06-06 NOTE — REHAB-DIETARY IDT TEAM NOTE
Dietary   Nutrition  Dietary Problems     Problem: Other Problem (see comments)     Description: Swallowing difficulty related to dysphagia as evidenced by pt NPO with tube feed via G-tube.    Goal: Other Goal (Resolved)     Description: Nutrition Support tolerated and meeting >85% of estimated nutrition needs.                  Nutrition Services: Pt now with order for regular, level 6-soft & bite sized diet with level 0-thin liquids. Nurse said that PO intake was good at 90% of his dinner. She is expecting him to eat and drink well. Order for TF bolus to be held if PO is > 50% added to TF order. RD will monitor weekly or PRN and make modifications to diet order if needed.     Section completed by:  Lupe Montenegro R.D.

## 2020-06-06 NOTE — PROGRESS NOTES
"Rehab Progress Note     Encounter Date: 6/6/2020    CC: Feel well.     Interval Events (Subjective)  No acute events overnight.  Vital signs stable.  Last BM 6/6 large formed.  Patient voiding, post void residual to 149, 258 cc.  Patient seen and examined in room, resting comfortably in his bed.  No questions concerns.  Underlying cognitive impairments are improving rapidly.  Discussed with nursing that patient is less impulsive and is starting to use call light more consistently.    14 point ROS reviewed and negative except as stated above.     Objective:  VITAL SIGNS: /80   Pulse 72   Temp 36.6 °C (97.9 °F) (Oral)   Resp 18   Ht 1.854 m (6' 0.99\")   Wt 72.3 kg (159 lb 6.3 oz)   SpO2 98%   BMI 21.03 kg/m²     GEN: No apparent distress, laying comfortably in bed.  HEENT: Sclera nonicteric bilaterally, no ocular discharge appreciated bilaterally.  CV: Extremities warm and well-perfused, no peripheral edema appreciated bilaterally.  PULMONARY: Breathing nonlabored on room air, no respiratory accessory muscle use.  Not requiring supplemental oxygen.  ABD: Soft, nontender.  SKIN: No appreciable skin breakdown on exposed areas of skin.  NEURO: Awake alert.  Moving all extremities spontaneously.  PSYCH: Mood and affect within normal limits.    Recent Results (from the past 72 hour(s))   Comp Metabolic Panel    Collection Time: 06/04/20  5:56 AM   Result Value Ref Range    Sodium 134 (L) 135 - 145 mmol/L    Potassium 4.1 3.6 - 5.5 mmol/L    Chloride 102 96 - 112 mmol/L    Co2 22 20 - 33 mmol/L    Anion Gap 10.0 7.0 - 16.0    Glucose 92 65 - 99 mg/dL    Bun 16 8 - 22 mg/dL    Creatinine 0.70 0.50 - 1.40 mg/dL    Calcium 9.4 8.5 - 10.5 mg/dL    AST(SGOT) 28 12 - 45 U/L    ALT(SGPT) 67 (H) 2 - 50 U/L    Alkaline Phosphatase 130 (H) 30 - 99 U/L    Total Bilirubin 0.6 0.1 - 1.5 mg/dL    Albumin 3.5 3.2 - 4.9 g/dL    Total Protein 7.2 6.0 - 8.2 g/dL    Globulin 3.7 (H) 1.9 - 3.5 g/dL    A-G Ratio 0.9 g/dL   Lipid " Profile    Collection Time: 06/04/20  5:56 AM   Result Value Ref Range    Cholesterol,Tot 177 100 - 199 mg/dL    Triglycerides 148 0 - 149 mg/dL    HDL 35 (A) >=40 mg/dL     (H) <100 mg/dL   ESTIMATED GFR    Collection Time: 06/04/20  5:56 AM   Result Value Ref Range    GFR If African American >60 >60 mL/min/1.73 m 2    GFR If Non African American >60 >60 mL/min/1.73 m 2       Current Facility-Administered Medications   Medication Frequency   • senna-docusate (PERICOLACE or SENOKOT S) 8.6-50 MG per tablet 2 Tab BID PRN    And   • polyethylene glycol/lytes (MIRALAX) PACKET 1 Packet QDAY PRN    And   • magnesium hydroxide (MILK OF MAGNESIA) suspension 30 mL QDAY PRN    And   • bisacodyl (DULCOLAX) suppository 10 mg QDAY PRN   • nystatin (MYCOSTATIN) 968914 UNIT/ML suspension 500,000 Units 4X/DAY   • enoxaparin (LOVENOX) inj 40 mg DAILY   • modafinil (PROVIGIL) tablet 100 mg BID   • vitamin D (cholecalciferol) tablet 1,000 Units DAILY   • Respiratory Therapy Consult Continuous RT   • tramadol (ULTRAM) 50 MG tablet 50 mg Q4HRS PRN   • hydrALAZINE (APRESOLINE) tablet 25 mg Q8HRS PRN   • acetaminophen (TYLENOL) tablet 650 mg Q4HRS PRN   • artificial tears ophthalmic solution 1 Drop PRN   • benzocaine-menthol (CEPACOL) lozenge 1 Lozenge Q2HRS PRN   • mag hydrox-al hydrox-simeth (MAALOX PLUS ES or MYLANTA DS) suspension 20 mL Q2HRS PRN   • ondansetron (ZOFRAN ODT) dispertab 4 mg 4X/DAY PRN    Or   • ondansetron (ZOFRAN) syringe/vial injection 4 mg 4X/DAY PRN   • traZODone (DESYREL) tablet 50 mg QHS PRN   • sodium chloride (OCEAN) 0.65 % nasal spray 2 Spray PRN   • acetaminophen (TYLENOL) tablet 650 mg Q6HRS PRN   • fluoxetine (PROZAC) 20 mg DAILY   • omeprazole (FIRST-OMEPRAZOLE) 2 mg/mL oral susp 20 mg DAILY       Orders Placed This Encounter   Procedures   • Diet Order Regular     Standing Status:   Standing     Number of Occurrences:   1     Order Specific Question:   Diet:     Answer:   Regular [1]     Order  Specific Question:   Texture Modifier     Answer:   Level 6 - Soft & Bite Sized (Dysphagia 3)     Order Specific Question:   Liquid level     Answer:   Level 0 - Thin     Comments:   meds crushed with puree.        Assessment:  Active Hospital Problems    Diagnosis   • *Diffuse axonal brain injury (HCC)   • Urinary retention   • Dysphagia, oropharyngeal   • Closed fracture of vault of skull ([facility])   • Closed nondisplaced fracture of acromial end of right clavicle   • Orbit fracture, closed, initial encounter ([facility])   • Trauma   • Respiratory failure following trauma ([facility])       Medical Decision Making and Plan:  Rehab/neuro:  1. TBI with diffuse axonal injury  -Reevaluated need for continued restraints via Posey bed.  Restraints still required, restraint order renewed.     Continue plan of care per primary team noted in progress note 6/5.    Total time:  16 minutes.  I spent greater than 50% of the time for patient care and coordination on this date, including unit/floor time, and face-to-face time with the patient as per assessment and plan above.    Anne Vergara D.O.

## 2020-06-06 NOTE — CARE PLAN
Problem: Knowledge Deficit  Goal: Knowledge of the prescribed therapeutic regimen will improve  Outcome: PROGRESSING AS EXPECTED     Problem: Psychosocial Needs:  Goal: Level of anxiety will decrease  Outcome: PROGRESSING AS EXPECTED

## 2020-06-06 NOTE — FLOWSHEET NOTE
06/06/20 1620   Events/Summary/Plan   Events/Summary/Plan spot check and stoma care done pt resting well    Skin Inspection Respiratory Device Intact   Vital Signs   Pulse 76   Respiration 16   Pulse Oximetry 97 %   $ Pulse Oximetry (Spot Check) Yes   Respiratory Assessment   Level of Consciousness Alert   Chest Exam   Work Of Breathing / Effort Mild   Oxygen   O2 (LPM) 0   FiO2% 21 %   O2 Delivery Device None - Room Air

## 2020-06-06 NOTE — PROGRESS NOTES
Change of shift report obtained, care resumed, denies pain. HS bolus held due to good appetite at dinner (100%) and hs snack. Patient had shower hs, he rested comfortably with prn sleep aid until 0400 he was restless and wanted to get oob, he transfer in and out of bed a few times throughout the night with nursing staff due to restlessness. Patient used the call bell once, he also called out for help - education provided.

## 2020-06-07 PROCEDURE — 700111 HCHG RX REV CODE 636 W/ 250 OVERRIDE (IP): Performed by: PHYSICAL MEDICINE & REHABILITATION

## 2020-06-07 PROCEDURE — 700102 HCHG RX REV CODE 250 W/ 637 OVERRIDE(OP): Performed by: PHYSICAL MEDICINE & REHABILITATION

## 2020-06-07 PROCEDURE — A9270 NON-COVERED ITEM OR SERVICE: HCPCS | Performed by: PHYSICAL MEDICINE & REHABILITATION

## 2020-06-07 PROCEDURE — 94760 N-INVAS EAR/PLS OXIMETRY 1: CPT

## 2020-06-07 PROCEDURE — 770010 HCHG ROOM/CARE - REHAB SEMI PRIVAT*

## 2020-06-07 PROCEDURE — 99231 SBSQ HOSP IP/OBS SF/LOW 25: CPT | Performed by: PHYSICAL MEDICINE & REHABILITATION

## 2020-06-07 RX ADMIN — NYSTATIN 500000 UNITS: 100000 SUSPENSION ORAL at 17:00

## 2020-06-07 RX ADMIN — NYSTATIN 500000 UNITS: 100000 SUSPENSION ORAL at 14:10

## 2020-06-07 RX ADMIN — MODAFINIL 100 MG: 100 TABLET ORAL at 11:48

## 2020-06-07 RX ADMIN — TRAZODONE HYDROCHLORIDE 50 MG: 50 TABLET ORAL at 21:09

## 2020-06-07 RX ADMIN — OMEPRAZOLE 20 MG: KIT at 08:04

## 2020-06-07 RX ADMIN — MELATONIN 1000 UNITS: at 08:01

## 2020-06-07 RX ADMIN — ENOXAPARIN SODIUM 40 MG: 100 INJECTION SUBCUTANEOUS at 08:00

## 2020-06-07 RX ADMIN — NYSTATIN 500000 UNITS: 100000 SUSPENSION ORAL at 21:09

## 2020-06-07 RX ADMIN — MODAFINIL 100 MG: 100 TABLET ORAL at 08:01

## 2020-06-07 RX ADMIN — NYSTATIN 500000 UNITS: 100000 SUSPENSION ORAL at 08:01

## 2020-06-07 RX ADMIN — FLUOXETINE 20 MG: 20 TABLET, FILM COATED ORAL at 08:01

## 2020-06-07 ASSESSMENT — FIBROSIS 4 INDEX: FIB4 SCORE: 0.43

## 2020-06-07 NOTE — FLOWSHEET NOTE
06/07/20 1347   Events/Summary/Plan   Events/Summary/Plan 02 spot check.  Trach stoma care ( no leak detected, small scab present, no discharge or redness)  Covered with bandaid   Vital Signs   Pulse 79   Respiration 18   Pulse Oximetry 99 %   $ Pulse Oximetry (Spot Check) Yes   Respiratory Assessment   Level of Consciousness Alert   Oxygen   O2 Delivery Device None - Room Air

## 2020-06-07 NOTE — CARE PLAN
Problem: Psychosocial Needs:  Goal: Level of anxiety will decrease  Outcome: PROGRESSING AS EXPECTED  Patient's wife came to visit today for a few hours and patient said that he really enjoyed having her here.       Problem: Pain Management  Goal: Pain level will decrease to patient's comfort goal  Outcome: PROGRESSING AS EXPECTED  Patient is able to report pain on a scale of 1-10.

## 2020-06-07 NOTE — CARE PLAN
Problem: Safety  Goal: Will remain free from injury  Outcome: PROGRESSING AS EXPECTED  Note: Patient is in a room close to the nurses station, he has an enclosure bed with bed and chair alarms in place.       Problem: Bowel/Gastric:  Goal: Normal bowel function is maintained or improved  Outcome: PROGRESSING AS EXPECTED  Note: Bowel sounds are normoactive in all quadrants.

## 2020-06-07 NOTE — PROGRESS NOTES
"Rehab Progress Note     Encounter Date: 6/7/2020    CC: Tired.     Interval Events (Subjective)  No acute events overnight.  Vital signs stable. Med/formed 6/7. Urinates in adult brief occasionally. PVRs all over: 149, 258, 18cc. Patient resting comfortably. Denies pain. No other concerns.     14 point ROS reviewed and negative except as stated above.     Objective:  VITAL SIGNS: /75   Pulse 75   Temp 37.1 °C (98.8 °F) (Oral)   Resp 17   Ht 1.854 m (6' 0.99\")   Wt 68.5 kg (151 lb 0.2 oz)   SpO2 97%   BMI 19.93 kg/m²     GEN: No apparent distress, resting comfortably in bed.   HEENT: Sclera nonicteric bilaterally, no ocular discharge appreciated bilaterally.  CV: Extremities warm and well-perfused, no peripheral edema appreciated bilaterally.  PULMONARY: Breathing nonlabored on room air, no respiratory accessory muscle use.  Not requiring supplemental oxygen.  ABD: Soft, nontender.  SKIN: No appreciable skin breakdown on exposed areas of skin.  NEURO: Awake alert. Minimally conversational.   PSYCH: Mood and affect within normal limits.    No results found for this or any previous visit (from the past 72 hour(s)).    Current Facility-Administered Medications   Medication Frequency   • senna-docusate (PERICOLACE or SENOKOT S) 8.6-50 MG per tablet 2 Tab BID PRN    And   • polyethylene glycol/lytes (MIRALAX) PACKET 1 Packet QDAY PRN    And   • magnesium hydroxide (MILK OF MAGNESIA) suspension 30 mL QDAY PRN    And   • bisacodyl (DULCOLAX) suppository 10 mg QDAY PRN   • nystatin (MYCOSTATIN) 086709 UNIT/ML suspension 500,000 Units 4X/DAY   • enoxaparin (LOVENOX) inj 40 mg DAILY   • modafinil (PROVIGIL) tablet 100 mg BID   • vitamin D (cholecalciferol) tablet 1,000 Units DAILY   • Respiratory Therapy Consult Continuous RT   • tramadol (ULTRAM) 50 MG tablet 50 mg Q4HRS PRN   • hydrALAZINE (APRESOLINE) tablet 25 mg Q8HRS PRN   • acetaminophen (TYLENOL) tablet 650 mg Q4HRS PRN   • artificial tears ophthalmic " solution 1 Drop PRN   • benzocaine-menthol (CEPACOL) lozenge 1 Lozenge Q2HRS PRN   • mag hydrox-al hydrox-simeth (MAALOX PLUS ES or MYLANTA DS) suspension 20 mL Q2HRS PRN   • ondansetron (ZOFRAN ODT) dispertab 4 mg 4X/DAY PRN    Or   • ondansetron (ZOFRAN) syringe/vial injection 4 mg 4X/DAY PRN   • traZODone (DESYREL) tablet 50 mg QHS PRN   • sodium chloride (OCEAN) 0.65 % nasal spray 2 Spray PRN   • acetaminophen (TYLENOL) tablet 650 mg Q6HRS PRN   • fluoxetine (PROZAC) 20 mg DAILY   • omeprazole (FIRST-OMEPRAZOLE) 2 mg/mL oral susp 20 mg DAILY       Orders Placed This Encounter   Procedures   • Diet Order Regular     Standing Status:   Standing     Number of Occurrences:   1     Order Specific Question:   Diet:     Answer:   Regular [1]     Order Specific Question:   Texture Modifier     Answer:   Level 6 - Soft & Bite Sized (Dysphagia 3)     Order Specific Question:   Liquid level     Answer:   Level 0 - Thin     Comments:   meds crushed with puree.        Assessment:  Active Hospital Problems    Diagnosis   • *Diffuse axonal brain injury (HCC)   • Urinary retention   • Dysphagia, oropharyngeal   • Closed fracture of vault of skull (HCC)   • Closed nondisplaced fracture of acromial end of right clavicle   • Orbit fracture, closed, initial encounter (HCC)   • Trauma   • Respiratory failure following trauma (HCC)       Medical Decision Making and Plan:  Rehab/neuro:  1. TBI with diffuse axonal injury  -Reevaluated need for continued restraints via Posey bed.  Restraints still required, restraint order renewed.     Continue plan of care per primary team noted in progress note 6/5.    Total time:  16 minutes.  I spent greater than 50% of the time for patient care and coordination on this date, including unit/floor time, and face-to-face time with the patient as per assessment and plan above.    Anne Vergara D.O.

## 2020-06-08 PROCEDURE — 97116 GAIT TRAINING THERAPY: CPT | Mod: CQ

## 2020-06-08 PROCEDURE — 92526 ORAL FUNCTION THERAPY: CPT

## 2020-06-08 PROCEDURE — 97535 SELF CARE MNGMENT TRAINING: CPT

## 2020-06-08 PROCEDURE — 770005 HCHG ROOM/CARE - REHAB PRIVATE (11*

## 2020-06-08 PROCEDURE — 97530 THERAPEUTIC ACTIVITIES: CPT | Mod: CQ

## 2020-06-08 PROCEDURE — 97110 THERAPEUTIC EXERCISES: CPT | Mod: CQ

## 2020-06-08 PROCEDURE — A9270 NON-COVERED ITEM OR SERVICE: HCPCS | Performed by: PHYSICAL MEDICINE & REHABILITATION

## 2020-06-08 PROCEDURE — 94760 N-INVAS EAR/PLS OXIMETRY 1: CPT

## 2020-06-08 PROCEDURE — 99233 SBSQ HOSP IP/OBS HIGH 50: CPT | Performed by: PHYSICAL MEDICINE & REHABILITATION

## 2020-06-08 PROCEDURE — 700111 HCHG RX REV CODE 636 W/ 250 OVERRIDE (IP): Performed by: PHYSICAL MEDICINE & REHABILITATION

## 2020-06-08 PROCEDURE — 97530 THERAPEUTIC ACTIVITIES: CPT

## 2020-06-08 PROCEDURE — 92507 TX SP LANG VOICE COMM INDIV: CPT

## 2020-06-08 PROCEDURE — 700102 HCHG RX REV CODE 250 W/ 637 OVERRIDE(OP): Performed by: PHYSICAL MEDICINE & REHABILITATION

## 2020-06-08 RX ADMIN — TRAZODONE HYDROCHLORIDE 50 MG: 50 TABLET ORAL at 20:38

## 2020-06-08 RX ADMIN — FLUOXETINE 20 MG: 20 TABLET, FILM COATED ORAL at 08:32

## 2020-06-08 RX ADMIN — MODAFINIL 100 MG: 100 TABLET ORAL at 08:31

## 2020-06-08 RX ADMIN — NYSTATIN 500000 UNITS: 100000 SUSPENSION ORAL at 17:00

## 2020-06-08 RX ADMIN — MODAFINIL 100 MG: 100 TABLET ORAL at 13:40

## 2020-06-08 RX ADMIN — OMEPRAZOLE 20 MG: KIT at 08:31

## 2020-06-08 RX ADMIN — NYSTATIN 500000 UNITS: 100000 SUSPENSION ORAL at 08:31

## 2020-06-08 RX ADMIN — ENOXAPARIN SODIUM 40 MG: 100 INJECTION SUBCUTANEOUS at 08:31

## 2020-06-08 RX ADMIN — NYSTATIN 500000 UNITS: 100000 SUSPENSION ORAL at 20:38

## 2020-06-08 RX ADMIN — ACETAMINOPHEN 650 MG: 325 TABLET, FILM COATED ORAL at 20:38

## 2020-06-08 RX ADMIN — ACETAMINOPHEN 650 MG: 325 TABLET, FILM COATED ORAL at 14:43

## 2020-06-08 RX ADMIN — MELATONIN 1000 UNITS: at 08:32

## 2020-06-08 RX ADMIN — NYSTATIN 500000 UNITS: 100000 SUSPENSION ORAL at 14:43

## 2020-06-08 ASSESSMENT — GAIT ASSESSMENTS
GAIT LEVEL OF ASSIST: CONTACT GUARD ASSIST
ASSISTIVE DEVICE: HAND HELD ASSIST;NONE
DEVIATION: DECREASED BASE OF SUPPORT

## 2020-06-08 ASSESSMENT — ACTIVITIES OF DAILY LIVING (ADL)
TOILET_TRANSFER_DESCRIPTION: GRAB BAR;VERBAL CUEING
TOILETING_LEVEL_OF_ASSIST_DESCRIPTION: GRAB BAR;SUPERVISION FOR SAFETY

## 2020-06-08 NOTE — THERAPY
Occupational Therapy  Daily Treatment     Patient Name: Reyes Amezcua  Age:  64 y.o., Sex:  male  Medical Record #: [id number]  Today's Date: 6/8/2020     Precautions  Precautions: (P) Fall Risk, Weight Bearing As Tolerated Right Upper Extremity  Comments: (P) R orbital Fx, R clavicle Fx, posey bed     Safety   ADL Safety : Requires Supervision for Safety, Requires Cueing for Safety, Requires Physical Assist for Safety  Bathroom Safety: Requires Supervision for Safety, Requires Physical Assist for Safety, Requires Cuing for Safety    Subjective    Pt received up in w/c at breakfast, agreeable to OT session, stated that he needed to use the bathroom     Objective       06/08/20 0831   Precautions   Precautions Fall Risk;Weight Bearing As Tolerated Right Upper Extremity   Comments R orbital Fx, R clavicle Fx, posey bed    Functional Level of Assist   Grooming Stand by Assist  (standing at sink for oral care )   Grooming Description Verbal cueing   Toileting Stand by Assist   Toileting Description Grab bar;Supervision for safety   Toilet Transfers Minimal Assist   Toilet Transfer Description Grab bar;Verbal cueing   OT Total Time Spent   OT Individual Total Time Spent (Mins) 60   OT Charge Group   OT Self Care / ADL 1   OT Therapy Activity 3     Pt required mod cues for organization and recall to write ingredients list and instructions for baking bread, difficulty with initial recall of ingredients able to recall 2/5 ingredients, able to copy ingredients from written list with min cues for attention to detail.    Assessment    Pt tolerated session well, demos continued improvements in initiation and attention during ADLs. Requires mod cues for attention and sequencing during cooking prep task, plan to follow up with hands on task later this week.     Plan    Attention to task, functional cog and sequencing during ADLs/IADLs, balance and functional mobility, endurance, TBI education, management of double vision, bread  making Wednesday, table tennis    Occupational Therapy Goals     Problem: Eating     Dates: Start: 05/30/20       Goal: STG-Within one week, patient will feed self     Dates: Start: 05/30/20       Description: 1) Individualized Goal:  Moderate Assist with AE PRN  2) Interventions:  OT E Stim Attended, OT Group Therapy, OT Self Care/ADL, OT Cognitive Skill Dev, OT Manual Ther Technique, OT Neuro Re-Ed/Balance, OT Sensory Int Techniques, OT Therapeutic Activity, OT Aquatic Therapy, OT Evaluation, and OT Therapeutic Exercise                  Problem: Functional Cognition     Dates: Start: 05/30/20       Goal: STG-Within one week, patient will attend to     Dates: Start: 05/30/20       Description: 1) Individualized Goal:  A five minute task with no more than 2 cues to attend  2) Interventions:  OT E Stim Attended, OT Group Therapy, OT Self Care/ADL, OT Cognitive Skill Dev, OT Manual Ther Technique, OT Neuro Re-Ed/Balance, OT Sensory Int Techniques, OT Therapeutic Activity, OT Aquatic Therapy, OT Evaluation, and OT Therapeutic Exercise                Problem: Functional Transfers     Dates: Start: 05/30/20       Goal: STG-Within one week, patient will transfer to toilet     Dates: Start: 05/30/20       Description: 1) Individualized Goal:  SBA with AE/DME PRN  2) Interventions:  OT E Stim Attended, OT Group Therapy, OT Self Care/ADL, OT Cognitive Skill Dev, OT Manual Ther Technique, OT Neuro Re-Ed/Balance, OT Sensory Int Techniques, OT Therapeutic Activity, OT Aquatic Therapy, OT Evaluation, and OT Therapeutic Exercise          Goal: STG-Within one week, patient will transfer to step in shower     Dates: Start: 05/30/20       Description: 1) Individualized Goal:  Supervision level with AE PRN  2) Interventions:  OT E Stim Attended, OT Group Therapy, OT Self Care/ADL, OT Cognitive Skill Dev, OT Manual Ther Technique, OT Neuro Re-Ed/Balance, OT Sensory Int Techniques, OT Therapeutic Activity, OT Aquatic Therapy, OT  Evaluation, and OT Therapeutic Exercise                Problem: OT Long Term Goals     Dates: Start: 05/30/20       Goal: LTG-By discharge, patient will complete basic self care tasks     Dates: Start: 05/30/20       Description: 1) Individualized Goal:  Min A - Supervision with AE PRN  2) Interventions:  OT E Stim Attended, OT Group Therapy, OT Self Care/ADL, OT Cognitive Skill Dev, OT Manual Ther Technique, OT Neuro Re-Ed/Balance, OT Sensory Int Techniques, OT Therapeutic Activity, OT Aquatic Therapy, OT Evaluation, and OT Therapeutic Exercise          Goal: LTG-By discharge, patient will perform bathroom transfers     Dates: Start: 05/30/20       Description: 1) Individualized Goal: Supervision with AE/DME PRN   2) Interventions:  OT E Stim Attended, OT Group Therapy, OT Self Care/ADL, OT Cognitive Skill Dev, OT Manual Ther Technique, OT Neuro Re-Ed/Balance, OT Sensory Int Techniques, OT Therapeutic Activity, OT Aquatic Therapy, OT Evaluation, and OT Therapeutic Exercise                Problem: Toileting     Dates: Start: 05/30/20       Goal: STG-Within one week, patient will complete toileting tasks     Dates: Start: 05/30/20       Description: 1) Individualized Goal:  Moderate Assist with AE PRN  2) Interventions:  OT E Stim Attended, OT Group Therapy, OT Self Care/ADL, OT Cognitive Skill Dev, OT Manual Ther Technique, OT Neuro Re-Ed/Balance, OT Sensory Int Techniques, OT Therapeutic Activity, OT Aquatic Therapy, OT Evaluation, and OT Therapeutic Exercise

## 2020-06-08 NOTE — THERAPY
Speech Language Pathology  Daily Treatment     Patient Name: Reyes Amezcua  Age:  64 y.o., Sex:  male  Medical Record #: [id number]  Today's Date: 6/8/2020     Subjective    Pt agreeable to therapy, pleasant and cooperative. Spouse attending session.      Objective     06/08/20 1034   Expressive Language   Automatic Language Appropriate Minimal (4)   Sustain Dialogue Within Given Topic Minimal (4)   Dysphagia    Other Treatments review of MBSS and dysphagia education provided   Interdisciplinary Plan of Care Collaboration   IDT Collaboration with  Therapy Tech   Patient Position at End of Therapy Seated   Collaboration Comments transfer of care to therapy tech to assist in bathroom   SLP Total Time Spent   SLP Individual Total Time Spent (Mins) 60   Charge Group   SLP Treatment - Individual Speech Language Treatment - Individual   SLP Swallowing Dysfunction Treatment Swallowing Dysfunction Treatment       Assessment    Review of MBSS with patient and spouse. Education provided on results, use of compensatory strategies (I.e. small, single sips, serial swallows to clear minimal residue) as well as POC for regular texture trials. Pt stating he would like a cheeseburger for lunch tomorrow when given choice of 2 by SLP.     wh- questions targeted this date to increase pt's expressive language using highly personal information (bio sheet completed by spouse). Pt answering questions with 70% accuracy IND, mild paraphasias noted as well as word finding deficits, pt able to identify target with initial phoneme or sound cue.     Plan    Regular texture trials for lunch, continue to reinforce use of compensatory strategies, continue to target receptive/expressive language. Initiate [facility] PTA    Speech Therapy Problems     Problem: Comprehension STGs     Dates: Start: 05/30/20       Description: 1) Individualized goal:  identify common objects within the current environment with 80% accuracy and minimal cues  2)  Interventions:  SLP Speech Language Treatment        Goal: STG-Within one week, patient will     Dates: Start: 05/30/20       Description: 1) Individualized goal:  identify common objects within the environment with at least 80% accuracy to complete ADLs with minimal verbal cues  2) Interventions:  SLP Speech Language Treatment        Note:     Goal Note filed on 06/02/20 1328 by Trupti Davila MS,CCC-SLP    Pt able to identify pictures with more success than tactile objects at this time. FO2 for picture ID.                         Problem: Expression STGs     Dates: Start: 05/30/20       Description: 1) Individualized goal: use single words/word approximations/photos/icons to indicate basic needs/preferences for 4/5 opportunities  2) Interventions:  SLP Speech Language Treatment        Goal: STG-Within one week, patient will     Dates: Start: 05/30/20       Description: 1) Individualized goal: use single words/word approximations/photos/icons to indicate basic needs/preferences for 4/5 opportunities  2) Interventions:  SLP Speech Language Treatment    Note:     Goal Note filed on 06/02/20 1336 by Trupti Davila MS,CCC-SLP    Pt has started to imitate CV productions, repetition of automatic language (I.e. counting, JERRY, VENKAT)                        Problem: Speech/Swallowing LTGs     Dates: Start: 05/30/20       Goal: LTG-By discharge, patient will safely swallow     Dates: Start: 05/30/20       Description: 1) Individualized goal: least restrictive diet without overt s/sx of asp for 100% of PO intake  2) Interventions:  SLP Swallowing Dysfunction Treatment and SLP Oral Pharyngeal Evaluation              Goal: LTG-By discharge, patient will comprehend     Dates: Start: 05/30/20       Description: 1) Individualized goal:  simple functional language (spoken, pictures, written) to participate with care with at least 80% accuracy and minimal cues  2) Interventions:  SLP Speech Language Treatment and SLP Cognitive Skill  Development              Goal: LTG-By discharge, patient will express     Dates: Start: 05/30/20       Description: 1) Individualized goal: basic wants/needs to participate with care for 4/5 opportunities and minimal cues  2) Interventions:  SLP Speech Language Treatment                    Problem: Swallowing STGs     Dates: Start: 05/30/20       Goal: STG-Within one week, patient will     Dates: Start: 05/30/20       Description: 1) Individualized goal:  tolerate PO trials (single ice chips, NTL via half tsp, etc.) without overt s/sx of asp for 80% of trials  2) Interventions:  SLP Swallowing Dysfunction Treatment        Note:     Goal Note filed on 06/02/20 1328 by Trupti Davila MS,CCC-SLP    Pt with immediate cough response on ice chip trials during CSE, will continue conservative trials with anticipation of instrumental assessment later this week to further assess CLOF.

## 2020-06-08 NOTE — THERAPY
Physical Therapy   Daily Treatment     Patient Name: Reyes Amezcua  Age:  64 y.o., Sex:  male  Medical Record #: [id number]  Today's Date: 6/8/2020     Precautions  Precautions: Fall Risk, Weight Bearing As Tolerated Right Upper Extremity  Comments: R orbital Fx, R clavicle Fx, posey bed     Subjective    Pt finishing bathroom with CNA, he and his spouse were agreeable to training session focusing on outdoor ambulation     Objective       06/08/20 1501   Gait Functional Level of Assist    Gait Level Of Assist Contact Guard Assist   Assistive Device Hand Held Assist;None   Distance (Feet)   (800' + 250' out doors and 150' indoors)   Deviation Decreased Base Of Support  (occ R LOB)   Sitting Lower Body Exercises   Nustep Resistance Level 4;Time (See Comments)  (B LE/UE x 17' for cv endurance/strengthening)   Interdisciplinary Plan of Care Collaboration   IDT Collaboration with  Family / Caregiver   Patient Position at End of Therapy Seated;Self Releasing Lap Belt Applied;Family / Friend in Room   Collaboration Comments gait training with spouse, Kerry   PT Total Time Spent   PT Individual Total Time Spent (Mins) 60   PT Charge Group   PT Gait Training 2   PT Therapeutic Exercise 1   PT Therapeutic Activities 1   Supervising Physical Therapist Jolie Renae       Instructions provided on appropriate level of assist, HHA and walking with patient in the right side and reason for this    Assessment    FT initiated with patients spouse, Kerry for outdoor gait training(sidewalk,curb,curb cutouts,thick grass) at HHA/CGA level. Spouse completed all outdoor amb with appropriate verbal cuing and safety.     Plan    Gait training with HHA vs. No AD, standing balance activities, endurance/activity tolerance, BLE strengthening, stair/curb negotiation, cognitive protocols per SLP direction, incorporate cognition/communication into activities, family training, treadmill training, outdoor ambulation. Plan to reduce PT to 30 minutes next  week (after conference on Tuesday?) due to increased SLP needs.    Physical Therapy Problems     Problem: Mobility     Dates: Start: 05/30/20       Goal: STG-Within one week, patient will propel wheelchair household distances     Dates: Start: 05/30/20       Description: 1) Individualized goal:  50 ft with BLEs and Min A.  2) Interventions:  PT Group Therapy, PT Gait Training, PT Therapeutic Exercises, PT Neuro Re-Ed/Balance, PT Aquatic Therapy, PT Therapeutic Activity, and PT Evaluation            Goal: STG-Within one week, patient will ambulate household distance     Dates: Start: 05/30/20       Description: 1) Individualized goal:  50 ft with LRAD and Min A.  2) Interventions:  PT Group Therapy, PT Gait Training, PT Therapeutic Exercises, PT Neuro Re-Ed/Balance, PT Aquatic Therapy, PT Therapeutic Activity, and PT Evaluation          Goal: STG-Within one week, patient will     Dates: Start: 05/30/20       Description: 1) Individualized goal:  follow one-step commands with 75% accuracy with min multimodal cues and extra time.  2) Interventions:  PT Group Therapy, PT Gait Training, PT Therapeutic Exercises, PT Neuro Re-Ed/Balance, PT Aquatic Therapy, PT Therapeutic Activity, and PT Evaluation                Problem: Mobility Transfers     Dates: Start: 05/30/20       Goal: STG-Within one week, patient will perform bed mobility     Dates: Start: 05/30/20       Description: 1) Individualized goal:  with hospital functions and SBA.  2) Interventions:  PT Group Therapy, PT Gait Training, PT Therapeutic Exercises, PT Neuro Re-Ed/Balance, PT Aquatic Therapy, PT Therapeutic Activity, and PT Evaluation          Goal: STG-Within one week, patient will transfer bed to chair     Dates: Start: 05/30/20       Description: 1) Individualized goal:  with LRAD and CGA-SBA.  2) Interventions:  PT Group Therapy, PT Gait Training, PT Therapeutic Exercises, PT Neuro Re-Ed/Balance, PT Aquatic Therapy, PT Therapeutic Activity, and PT  Evaluation                Problem: PT-Long Term Goals     Dates: Start: 05/30/20       Goal: LTG-By discharge, patient will propel wheelchair     Dates: Start: 05/30/20       Description: 1) Individualized goal:  150 ft with supervision.  2) Interventions:  PT Group Therapy, PT Gait Training, PT Therapeutic Exercises, PT Neuro Re-Ed/Balance, PT Aquatic Therapy, PT Therapeutic Activity, and PT Evaluation          Goal: LTG-By discharge, patient will ambulate     Dates: Start: 05/30/20       Description: 1) Individualized goal:  150 ft with LRAD and supervision.  2) Interventions:  PT Group Therapy, PT Gait Training, PT Therapeutic Exercises, PT Neuro Re-Ed/Balance, PT Aquatic Therapy, PT Therapeutic Activity, and PT Evaluation          Goal: LTG-By discharge, patient will transfer one surface to another     Dates: Start: 05/30/20       Description: 1) Individualized goal:  with LRAD and supervision.  2) Interventions:  PT Group Therapy, PT Gait Training, PT Therapeutic Exercises, PT Neuro Re-Ed/Balance, PT Aquatic Therapy, PT Therapeutic Activity, and PT Evaluation            Goal: LTG-By discharge, patient will ambulate up/down flight of stairs     Dates: Start: 05/30/20       Description: 1) Individualized goal:  with single rail and supervision.  2) Interventions:  PT Group Therapy, PT Gait Training, PT Therapeutic Exercises, PT Neuro Re-Ed/Balance, PT Aquatic Therapy, PT Therapeutic Activity, and PT Evaluation            Goal: LTG-By discharge, patient will transfer in/out of a car     Dates: Start: 05/30/20       Description: 1) Individualized goal:  with LRAD and supervision.  2) Interventions:  PT Group Therapy, PT Gait Training, PT Therapeutic Exercises, PT Neuro Re-Ed/Balance, PT Aquatic Therapy, PT Therapeutic Activity, and PT Evaluation            Goal: LTG-By discharge, patient will     Dates: Start: 05/30/20       Description: 1) Individualized goal:  perform bed mobility independently and no bed  functions.  2) Interventions:  PT Group Therapy, PT Gait Training, PT Therapeutic Exercises, PT Neuro Re-Ed/Balance, PT Aquatic Therapy, PT Therapeutic Activity, and PT Evaluation            Goal: LTG-By discharge, patient will     Dates: Start: 05/30/20       Description: 1) Individualized goal:  negotiate single curb with LRAD and supervision.  2) Interventions:  PT Group Therapy, PT Gait Training, PT Therapeutic Exercises, PT Neuro Re-Ed/Balance, PT Aquatic Therapy, PT Therapeutic Activity, and PT Evaluation
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
## 2020-06-08 NOTE — CARE PLAN
Posey bed discontinued. Moved to room 127W camera bed and close to nurses station for safety. Bed alarms on. Instructed on the use of the call light for all transfers.  Problem: Safety  Goal: Will remain free from injury  Outcome: PROGRESSING AS EXPECTED  Goal: Will remain free from falls  Outcome: PROGRESSING AS EXPECTED

## 2020-06-08 NOTE — THERAPY
"Recreational Therapy   Initial Evaluation     Patient Name: Reyes Amezcua  Age:  64 y.o., Sex:  male  Medical Record #: [id number]  Today's Date: 6/8/2020     Subjective    Pt reporting that he would like to work on \"tennis\" while he is here. Pt seemed to be repeating the words from this writer.     Objective       06/08/20 1301   Leisure History   Leisure Interests Sports;Other (Comments)   Leisure Comments Table tennis, biking   Pre-Morbid Leisure Lifestyle Individual;Group;Active   Prior Living Arrangements   Lives with - Patient's Self Care Capacity Spouse   Steps Into Home 0   Steps In Home 0   Ambulation Independent   Assistive Devices Used None   Driving / Transportation Driving Independent   Functional Ability Status - Physical   Endurance Good    Right  Strong   Left  Strong   Right Arm Strong   Left Arm Strong   Right Leg Weak   Left Leg Weak   Upper Extremity Gross Motor Uses Both Arms / Hands   Lower Extremetiy Gross Motor Uses Both Legs   Fine Motor Manipulates Small Objects   Functional Ability Status - Cognitive   Attention Span Remains on Task   Comprehension Follows One Step Commands   Judgment Impaired   Cognitive Comments extra time to answer questions. often would repeat words previously stated by this writer.    Functional Ability Status - Emotional    Affect Flat   Mood Appropriate   Behavior Appropriate   Leisure Competence Measure   Leisure Awareness Independent   Leisure Attitude Independent   Leisure Skills Moderate Assist   Cultural / Social Behaviors Independent   Interpersonal Skills Moderate Assist   Community Integration Skills Minimal Assist   Social Contact Minimal Assist   Community Participation Minimal Assist   Clinical Impression   Clinical Impression Impaired Gross Motor Leisure Functioning;Impaired Cognitive Leisure Functioning;Impaired Communication Skills;Impaired Attention Span;Impaired Short Term Memory;Impaired Long Term Memory;Impaired Community Skills;Impaired " Relaxation and Coping Skills;Impaired Leisure Skills   Current Discharge Plan   Current Discharge Plan Return to Prior Living Situation   Benefit    Benefit Patient would Benefit from Inpatient Recreational Therapy to Maximize Independent Leisure Functioning   (Rec Tx from CTRS 1x a week/ 2x therapy tech led by CTRS )   Interdisciplinary Plan of Care Collaboration   IDT Collaboration with  Occupational Therapist;Physician   Collaboration Comments PLOF from OT, CLOF to physician   Treatment Time   Total Time Spent (mins) 30   Procedural Tracking   Procedural Tracking Community Re-Integration;Leisure Skills Awareness;Cognitive Skills Training;Fine Motor Functional Leisure Skills;Gross Motor Functional Leisure Skills       Assessment  Patient is 64 y.o. male with a diagnosis of Diffuse axonal brain injury .  Additional factors influencing patient status / progress (ie: cognitive factors, co-morbidities, social support, etc): no known past medical history.        Plan  Recommend Recreational Therapy 30-60 minutes per day 1 days per week for 3   weeks for the following treatments:  Community Re-Integration, Community Skills Development, Leisure Skills Awareness, Leisure Skills Development and Gross Motor Functional Leisure Skills    Pt will also receive recreational opportunities from the therapy tech under the direction of the CTRS.   Goals:  Long term and short term goals have been discussed with patient and they are in agreement.    Recreation Therapy Problems     Problem: Recreation Therapy     Dates: Start: 06/08/20       Goal: STG-Within one week, patient will     Dates: Start: 06/08/20       Description: Meet with Recreation Therapist x1 a week and share on leisure interests he would like participate in during sessions.           Goal: STG-Within one week, patient will     Dates: Start: 06/08/20       Description: Pt will meet with a therapy tech to work on recreation participation under the supervision of the  Recreation Therapist.           Goal: LTG-By discharge, patient will     Dates: Start: 06/08/20       Description: Meet with Recreation Therapist x1 a week and share on leisure interests he would like participate following discharge.          Goal: LTG-By discharge, patient will     Dates: Start: 06/08/20       Description: Pt will meet with a therapy tech to work on recreation participation following discharge under the supervision of the Recreation Therapist.

## 2020-06-08 NOTE — PROGRESS NOTES
Received bedside shift report from Cassidy GREGORY RN regarding patient and assumed care. Patient awake, calm and stable, currently positioned in bed for comfort and safety; call light within reach. Denies pain or discomfort at this time. Will continue to monitor.

## 2020-06-08 NOTE — CARE PLAN
Problem: Communication  Goal: The ability to communicate needs accurately and effectively will improve  Description: Pt is unable to effectively communicate needs. Pt rounded on hourly to make sure needs were met. Will continue to monitor.   Note: Makes needs known to staff.  Encouraged to use call light for staff assist.      Problem: Safety  Goal: Will remain free from falls  Note: Call light kept within reach and encouraged to use for assistance and with toileting needs. Encouraged to call staff and to wait for staff before transfers.  Seat belt in place when up in chair and aware of how to un buckle seat belt.  Has chair alarm when up in wheelchair.  Bed alarm in place when in bed.  Pt is TV monitored and close to nurse's station.

## 2020-06-08 NOTE — FLOWSHEET NOTE
06/08/20 0955   Events/Summary/Plan   Events/Summary/Plan Trach stoma care done:  closed with small scab and no discharge.  Re-covered with bandaid.  )2 spot check   Vital Signs   Pulse 72   Respiration 16   Pulse Oximetry 99 %   $ Pulse Oximetry (Spot Check) Yes   Respiratory Assessment   Level of Consciousness Alert   Oxygen   O2 Delivery Device None - Room Air

## 2020-06-09 LAB
ALBUMIN SERPL BCP-MCNC: 3.4 G/DL (ref 3.2–4.9)
ALBUMIN/GLOB SERPL: 1.1 G/DL
ALP SERPL-CCNC: 111 U/L (ref 30–99)
ALT SERPL-CCNC: 40 U/L (ref 2–50)
ANION GAP SERPL CALC-SCNC: 9 MMOL/L (ref 7–16)
AST SERPL-CCNC: 20 U/L (ref 12–45)
BASOPHILS # BLD AUTO: 0.9 % (ref 0–1.8)
BASOPHILS # BLD: 0.05 K/UL (ref 0–0.12)
BILIRUB SERPL-MCNC: 0.4 MG/DL (ref 0.1–1.5)
BUN SERPL-MCNC: 14 MG/DL (ref 8–22)
CALCIUM SERPL-MCNC: 9.1 MG/DL (ref 8.5–10.5)
CHLORIDE SERPL-SCNC: 103 MMOL/L (ref 96–112)
CO2 SERPL-SCNC: 23 MMOL/L (ref 20–33)
CREAT SERPL-MCNC: 0.58 MG/DL (ref 0.5–1.4)
EOSINOPHIL # BLD AUTO: 0.24 K/UL (ref 0–0.51)
EOSINOPHIL NFR BLD: 4.3 % (ref 0–6.9)
ERYTHROCYTE [DISTWIDTH] IN BLOOD BY AUTOMATED COUNT: 46.5 FL (ref 35.9–50)
GLOBULIN SER CALC-MCNC: 3.1 G/DL (ref 1.9–3.5)
GLUCOSE SERPL-MCNC: 90 MG/DL (ref 65–99)
HCT VFR BLD AUTO: 40.6 % (ref 42–52)
HGB BLD-MCNC: 12.9 G/DL (ref 14–18)
IMM GRANULOCYTES # BLD AUTO: 0.02 K/UL (ref 0–0.11)
IMM GRANULOCYTES NFR BLD AUTO: 0.4 % (ref 0–0.9)
LYMPHOCYTES # BLD AUTO: 1.95 K/UL (ref 1–4.8)
LYMPHOCYTES NFR BLD: 34.6 % (ref 22–41)
MCH RBC QN AUTO: 29.7 PG (ref 27–33)
MCHC RBC AUTO-ENTMCNC: 31.8 G/DL (ref 33.7–35.3)
MCV RBC AUTO: 93.3 FL (ref 81.4–97.8)
MONOCYTES # BLD AUTO: 0.61 K/UL (ref 0–0.85)
MONOCYTES NFR BLD AUTO: 10.8 % (ref 0–13.4)
NEUTROPHILS # BLD AUTO: 2.76 K/UL (ref 1.82–7.42)
NEUTROPHILS NFR BLD: 49 % (ref 44–72)
NRBC # BLD AUTO: 0 K/UL
NRBC BLD-RTO: 0 /100 WBC
PLATELET # BLD AUTO: 196 K/UL (ref 164–446)
PMV BLD AUTO: 12.1 FL (ref 9–12.9)
POTASSIUM SERPL-SCNC: 3.5 MMOL/L (ref 3.6–5.5)
PROT SERPL-MCNC: 6.5 G/DL (ref 6–8.2)
RBC # BLD AUTO: 4.35 M/UL (ref 4.7–6.1)
SODIUM SERPL-SCNC: 135 MMOL/L (ref 135–145)
WBC # BLD AUTO: 5.6 K/UL (ref 4.8–10.8)

## 2020-06-09 PROCEDURE — 36415 COLL VENOUS BLD VENIPUNCTURE: CPT

## 2020-06-09 PROCEDURE — 97112 NEUROMUSCULAR REEDUCATION: CPT

## 2020-06-09 PROCEDURE — 85025 COMPLETE CBC W/AUTO DIFF WBC: CPT

## 2020-06-09 PROCEDURE — A9270 NON-COVERED ITEM OR SERVICE: HCPCS | Performed by: PHYSICAL MEDICINE & REHABILITATION

## 2020-06-09 PROCEDURE — 97535 SELF CARE MNGMENT TRAINING: CPT

## 2020-06-09 PROCEDURE — 97116 GAIT TRAINING THERAPY: CPT

## 2020-06-09 PROCEDURE — 99233 SBSQ HOSP IP/OBS HIGH 50: CPT | Performed by: PHYSICAL MEDICINE & REHABILITATION

## 2020-06-09 PROCEDURE — 700111 HCHG RX REV CODE 636 W/ 250 OVERRIDE (IP): Performed by: PHYSICAL MEDICINE & REHABILITATION

## 2020-06-09 PROCEDURE — 97530 THERAPEUTIC ACTIVITIES: CPT

## 2020-06-09 PROCEDURE — 92507 TX SP LANG VOICE COMM INDIV: CPT

## 2020-06-09 PROCEDURE — 94760 N-INVAS EAR/PLS OXIMETRY 1: CPT

## 2020-06-09 PROCEDURE — 80053 COMPREHEN METABOLIC PANEL: CPT

## 2020-06-09 PROCEDURE — 770005 HCHG ROOM/CARE - REHAB PRIVATE (11*

## 2020-06-09 PROCEDURE — 700102 HCHG RX REV CODE 250 W/ 637 OVERRIDE(OP): Performed by: PHYSICAL MEDICINE & REHABILITATION

## 2020-06-09 PROCEDURE — 92526 ORAL FUNCTION THERAPY: CPT

## 2020-06-09 RX ORDER — POTASSIUM CHLORIDE 20 MEQ/1
20 TABLET, EXTENDED RELEASE ORAL DAILY
Status: COMPLETED | OUTPATIENT
Start: 2020-06-10 | End: 2020-06-12

## 2020-06-09 RX ADMIN — MELATONIN 1000 UNITS: at 07:50

## 2020-06-09 RX ADMIN — NYSTATIN 500000 UNITS: 100000 SUSPENSION ORAL at 07:49

## 2020-06-09 RX ADMIN — MODAFINIL 100 MG: 100 TABLET ORAL at 07:50

## 2020-06-09 RX ADMIN — TRAZODONE HYDROCHLORIDE 50 MG: 50 TABLET ORAL at 21:28

## 2020-06-09 RX ADMIN — NYSTATIN 500000 UNITS: 100000 SUSPENSION ORAL at 17:31

## 2020-06-09 RX ADMIN — FLUOXETINE 20 MG: 20 TABLET, FILM COATED ORAL at 07:50

## 2020-06-09 RX ADMIN — MODAFINIL 100 MG: 100 TABLET ORAL at 13:13

## 2020-06-09 RX ADMIN — ACETAMINOPHEN 650 MG: 325 TABLET, FILM COATED ORAL at 21:28

## 2020-06-09 RX ADMIN — NYSTATIN 500000 UNITS: 100000 SUSPENSION ORAL at 13:13

## 2020-06-09 RX ADMIN — NYSTATIN 500000 UNITS: 100000 SUSPENSION ORAL at 21:28

## 2020-06-09 RX ADMIN — OMEPRAZOLE 20 MG: KIT at 07:50

## 2020-06-09 RX ADMIN — ENOXAPARIN SODIUM 40 MG: 100 INJECTION SUBCUTANEOUS at 07:49

## 2020-06-09 ASSESSMENT — GAIT ASSESSMENTS
GAIT LEVEL OF ASSIST: CONTACT GUARD ASSIST
DISTANCE (FEET): 1800
DEVIATION: DECREASED BASE OF SUPPORT

## 2020-06-09 ASSESSMENT — PATIENT HEALTH QUESTIONNAIRE - PHQ9
1. LITTLE INTEREST OR PLEASURE IN DOING THINGS: NOT AT ALL
SUM OF ALL RESPONSES TO PHQ9 QUESTIONS 1 AND 2: 0
2. FEELING DOWN, DEPRESSED, IRRITABLE, OR HOPELESS: NOT AT ALL

## 2020-06-09 ASSESSMENT — ACTIVITIES OF DAILY LIVING (ADL)
TUB_SHOWER_TRANSFER_DESCRIPTION: GRAB BAR;VERBAL CUEING
TOILET_TRANSFER_DESCRIPTION: GRAB BAR;VERBAL CUEING
TOILETING_LEVEL_OF_ASSIST_DESCRIPTION: GRAB BAR;SUPERVISION FOR SAFETY

## 2020-06-09 NOTE — CARE PLAN
Problem: Functional Transfers  Goal: STG-Within one week, patient will transfer to toilet  Description: 1) Individualized Goal:  SBA with AE/DME PRN  2) Interventions:  OT E Stim Attended, OT Group Therapy, OT Self Care/ADL, OT Cognitive Skill Dev, OT Manual Ther Technique, OT Neuro Re-Ed/Balance, OT Sensory Int Techniques, OT Therapeutic Activity, OT Aquatic Therapy, OT Evaluation, and OT Therapeutic Exercise  Outcome: NOT MET  Goal: STG-Within one week, patient will transfer to step in shower  Description: 1) Individualized Goal:  Supervision level with AE PRN  2) Interventions:  OT E Stim Attended, OT Group Therapy, OT Self Care/ADL, OT Cognitive Skill Dev, OT Manual Ther Technique, OT Neuro Re-Ed/Balance, OT Sensory Int Techniques, OT Therapeutic Activity, OT Aquatic Therapy, OT Evaluation, and OT Therapeutic Exercise  Outcome: NOT MET     Problem: Eating  Goal: STG-Within one week, patient will feed self  Description: 1) Individualized Goal:  Moderate Assist with AE PRN  2) Interventions:  OT E Stim Attended, OT Group Therapy, OT Self Care/ADL, OT Cognitive Skill Dev, OT Manual Ther Technique, OT Neuro Re-Ed/Balance, OT Sensory Int Techniques, OT Therapeutic Activity, OT Aquatic Therapy, OT Evaluation, and OT Therapeutic Exercise    Outcome: MET     Problem: Toileting  Goal: STG-Within one week, patient will complete toileting tasks  Description: 1) Individualized Goal:  Moderate Assist with AE PRN  2) Interventions:  OT E Stim Attended, OT Group Therapy, OT Self Care/ADL, OT Cognitive Skill Dev, OT Manual Ther Technique, OT Neuro Re-Ed/Balance, OT Sensory Int Techniques, OT Therapeutic Activity, OT Aquatic Therapy, OT Evaluation, and OT Therapeutic Exercise  Outcome: MET     Problem: Functional Cognition  Goal: STG-Within one week, patient will attend to  Description: 1) Individualized Goal:  A five minute task with no more than 2 cues to attend  2) Interventions:  OT E Stim Attended, OT Group Therapy, OT  Self Care/ADL, OT Cognitive Skill Dev, OT Manual Ther Technique, OT Neuro Re-Ed/Balance, OT Sensory Int Techniques, OT Therapeutic Activity, OT Aquatic Therapy, OT Evaluation, and OT Therapeutic Exercise  Outcome: MET

## 2020-06-09 NOTE — PROGRESS NOTES
Received bedside shift report from Estella SALEH RN regarding patient and assumed care. Patient awake, calm and stable, currently positioned in bed for comfort and safety; call light within reach. Denies pain or discomfort at this time. Will continue to monitor.

## 2020-06-09 NOTE — FLOWSHEET NOTE
06/09/20 0956   Events/Summary/Plan   Events/Summary/Plan 02 spot check, trach stoma care (no discharge and small scab still covering)  Stoma cleaned and bandaid placed   Vital Signs   Pulse 71   Respiration 18   Pulse Oximetry 99 %   $ Pulse Oximetry (Spot Check) Yes   Respiratory Assessment   Level of Consciousness Alert   Oxygen   O2 Delivery Device None - Room Air

## 2020-06-09 NOTE — CARE PLAN
Problem: Swallowing STGs  Goal: STG-Within one week, patient will  Description: 1) Individualized goal:  tolerate PO trials (single ice chips, NTL via half tsp, etc.) without overt s/sx of asp for 80% of trials  2) Interventions:  SLP Swallowing Dysfunction Treatment      Outcome: MET  Note: MBSS completed, advanced to thins and soft/bite sized textures. Trials of regular textures initiated today, will be ongoing this week with advancement likely.      Problem: Comprehension STGs  Goal: STG-Within one week, patient will  Description: 1) Individualized goal:  identify common objects within the environment with at least 80% accuracy to complete ADLs with minimal verbal cues  2) Interventions:  SLP Speech Language Treatment      Outcome: MET  Note: Pt able to identify single and 2 objects.      Problem: Expression STGs  Goal: STG-Within one week, patient will  Description: 1) Individualized goal: use single words/word approximations/photos/icons to indicate basic needs/preferences for 4/5 opportunities  2) Interventions:  SLP Speech Language Treatment  Outcome: MET  Note: Pt continues to demonstrate word finding deficits, however, benefits from use of phonemic and sentence completion cues. Is able to communicate consistently at single word to short phrase level.

## 2020-06-09 NOTE — THERAPY
Physical Therapy   Daily Treatment     Patient Name: Reyes Amezcua  Age:  64 y.o., Sex:  male  Medical Record #: [id number]  Today's Date: 6/9/2020     Precautions  Precautions: Fall Risk, Weight Bearing As Tolerated Right Upper Extremity  Comments: R orbital Fx, R clavicle Fx, posey bed     Subjective    Pt and wife ready for session     Objective       06/09/20 1431   Gait Functional Level of Assist    Gait Level Of Assist Contact Guard Assist  (wife providing CGA and HHA varying throughout)   Assistive Device None   Distance (Feet) 1800  (in and outdoors, over grass, HHA and no AD with CGA)   Deviation Decreased Base Of Support   Interdisciplinary Plan of Care Collaboration   Patient Position at End of Therapy Other (Comments)  (Pt walked back to room with wife)   PT Total Time Spent   PT Individual Total Time Spent (Mins) 60   PT Charge Group   PT Gait Training 2   PT Neuromuscular Re-Education / Balance 2       Cleared wife for walking with pt inside and outside of facility with HHA or CGA. Wife provides safe guarding throughout and pt has no major LOB that required any assist throughout.     SLS standing balance activity in // bars B with occasional reaching for UE support to steady, CGA throughout, no need for assist to prevent fall. On flat and foam surfaces.     Balloon volley while ambulating around room 3 x 125 ft, CGA throughout no major LOB.     Assessment    Pt cont to demo improving balance and walking no AD. Pt's wife cleared for walking with pt around facility. Pt wife agreeable to continue to help pt work on balance and walking needs between therapies - may benefit from further SLP time dt wifes ability to continue working on PT goals with pt.     Plan    Gait training with HHA vs. No AD, standing balance activities, endurance/activity tolerance, BLE strengthening, stair/curb negotiation, cognitive protocols per SLP direction, incorporate cognition/communication into activities, family training,  "treadmill training, outdoor ambulation. Plan to reduce PT to 30 minutes next week (after conference on Tuesday?) due to increased SLP needs.    Physical Therapy Problems     Problem: Mobility     Dates: Start: 06/09/20       Goal: STG-Within one week, patient will ambulate up/down a curb     Dates: Start: 06/09/20       Description: 1) Individualized goal:  Patient will amb with no AD SBA up/down curb  2) Interventions:  PT Group Therapy, PT Gait Training, PT Therapeutic Exercises, PT Neuro Re-Ed/Balance, PT Therapeutic Activity, and PT Manual Therapy            Goal: STG-Within one week, patient will ascend and descend four to six stairs     Dates: Start: 06/09/20       Description: 1) Individualized goal:  Amb up/ down 4 6\" stairs with HR SBA  2) Interventions:  PT Group Therapy, PT Gait Training, PT Therapeutic Exercises, PT Neuro Re-Ed/Balance, PT Therapeutic Activity, and PT Manual Therapy                  Problem: Mobility Transfers     Dates: Start: 05/30/20       Goal: STG-Within one week, patient will perform bed mobility     Dates: Start: 05/30/20       Description: 1) Individualized goal:  with hospital functions and SBA.  2) Interventions:  PT Group Therapy, PT Gait Training, PT Therapeutic Exercises, PT Neuro Re-Ed/Balance, PT Aquatic Therapy, PT Therapeutic Activity, and PT Evaluation    Note:     Goal Note filed on 06/09/20 1102 by Dinora Catalan DPT    CGA  Impulsive, R clavical fx/ WBAT                  Goal: STG-Within one week, patient will sit to stand     Dates: Start: 06/09/20       Description: 1) Individualized goal:  Transfer SBA no AD STS/SPT, with set up as needed  2) Interventions:  PT Group Therapy, PT Gait Training, PT Therapeutic Exercises, PT Neuro Re-Ed/Balance, PT Therapeutic Activity, and PT Manual Therapy                  Problem: PT-Long Term Goals     Dates: Start: 05/30/20       Goal: LTG-By discharge, patient will ambulate     Dates: Start: 05/30/20       Description: 1) " Individualized goal:  150 ft with LRAD and supervision.  2) Interventions:  PT Group Therapy, PT Gait Training, PT Therapeutic Exercises, PT Neuro Re-Ed/Balance, PT Aquatic Therapy, PT Therapeutic Activity, and PT Evaluation          Goal: LTG-By discharge, patient will transfer one surface to another     Dates: Start: 05/30/20       Description: 1) Individualized goal:  with LRAD and supervision.  2) Interventions:  PT Group Therapy, PT Gait Training, PT Therapeutic Exercises, PT Neuro Re-Ed/Balance, PT Aquatic Therapy, PT Therapeutic Activity, and PT Evaluation            Goal: LTG-By discharge, patient will ambulate up/down flight of stairs     Dates: Start: 05/30/20       Description: 1) Individualized goal:  with single rail and supervision.  2) Interventions:  PT Group Therapy, PT Gait Training, PT Therapeutic Exercises, PT Neuro Re-Ed/Balance, PT Aquatic Therapy, PT Therapeutic Activity, and PT Evaluation            Goal: LTG-By discharge, patient will transfer in/out of a car     Dates: Start: 05/30/20       Description: 1) Individualized goal:  with LRAD and supervision.  2) Interventions:  PT Group Therapy, PT Gait Training, PT Therapeutic Exercises, PT Neuro Re-Ed/Balance, PT Aquatic Therapy, PT Therapeutic Activity, and PT Evaluation            Goal: LTG-By discharge, patient will     Dates: Start: 05/30/20       Description: 1) Individualized goal:  perform bed mobility independently and no bed functions.  2) Interventions:  PT Group Therapy, PT Gait Training, PT Therapeutic Exercises, PT Neuro Re-Ed/Balance, PT Aquatic Therapy, PT Therapeutic Activity, and PT Evaluation            Goal: LTG-By discharge, patient will     Dates: Start: 05/30/20       Description: 1) Individualized goal:  negotiate single curb with LRAD and supervision.  2) Interventions:  PT Group Therapy, PT Gait Training, PT Therapeutic Exercises, PT Neuro Re-Ed/Balance, PT Aquatic Therapy, PT Therapeutic Activity, and PT Evaluation

## 2020-06-09 NOTE — CARE PLAN
Problem: PT-Long Term Goals  Goal: LTG-By discharge, patient will propel wheelchair  Description: 1) Individualized goal:  150 ft with supervision.  2) Interventions:  PT Group Therapy, PT Gait Training, PT Therapeutic Exercises, PT Neuro Re-Ed/Balance, PT Aquatic Therapy, PT Therapeutic Activity, and PT Evaluation  Outcome: MET

## 2020-06-09 NOTE — THERAPY
Speech Language Pathology  Daily Treatment     Patient Name: Reyes Amezcua  Age:  64 y.o., Sex:  male  Medical Record #: [id number]  Today's Date: 6/9/2020     Subjective    Pt seen in ST office prior to dysphagia intervention in therapeutic dining. Pt appeared in good spirits.      Objective     06/09/20 1104   Receptive Language / Auditory Comprehension   Answers Yes / No Simple / Contextual Questions Minimal (4)   Follows Two Unit Commands Minimal (4)   Cognition   Orientation  Severe (2)   Dysphagia    Other Treatments trials of regular textures   Diet / Liquid Recommendation Thin (0);Soft & Bite-Sized (6) - (Dysphagia III)   Nutritional Liquid Intake Rating Scale Non thickened beverages   Nutritional Food Intake Rating Scale Total oral diet with multiple consistencies without special preparation but with specific food limitations   Functional Level of Assist   Eating Supervision   Eating Description Verbal cueing;Modified diet   Comprehension Moderate Assist   Comprehension Description Verbal cues;Glasses   Expression Moderate Assist   Expression Description Verbal cueing   Social Interaction Minimal Assist   Social Interaction Description Increased time;Verbal cues   Problem Solving Moderate Assist   Problem Solving Description Verbal cueing;Therapy schedule;Seat belt;Inceased time   Memory Maximal Assist   Memory Description Verbal cueing;Therapy schedule;Seat belt;Bed/chair alarm   Interdisciplinary Plan of Care Collaboration   IDT Collaboration with  Family / Caregiver   Patient Position at End of Therapy Seated;Family / Friend in Room   Collaboration Comments spouse present in dining room   SLP Total Time Spent   SLP Individual Total Time Spent (Mins) 60   Charge Group   SLP Treatment - Individual Speech Language Treatment - Individual   SLP Swallowing Dysfunction Treatment Swallowing Dysfunction Treatment       Assessment    Initiated Donna PTA Scale, no score given this date due to introduction to  picture cards (cup, fork, keys) and face cards (Trupti S., Jesse, Stephanie).  2-step auditory commands: 80%  Simple Y/N questions: 87%      Plan    Continue Donna expressive language, receptive language    Speech Therapy Problems     Problem: Speech/Swallowing LTGs     Dates: Start: 05/30/20       Goal: LTG-By discharge, patient will safely swallow     Dates: Start: 05/30/20       Description: 1) Individualized goal: least restrictive diet without overt s/sx of asp for 100% of PO intake  2) Interventions:  SLP Swallowing Dysfunction Treatment and SLP Oral Pharyngeal Evaluation              Goal: LTG-By discharge, patient will comprehend     Dates: Start: 05/30/20       Description: 1) Individualized goal:  simple functional language (spoken, pictures, written) to participate with care with at least 80% accuracy and minimal cues  2) Interventions:  SLP Speech Language Treatment and SLP Cognitive Skill Development              Goal: LTG-By discharge, patient will express     Dates: Start: 05/30/20       Description: 1) Individualized goal: basic wants/needs to participate with care for 4/5 opportunities and minimal cues  2) Interventions:  SLP Speech Language Treatment

## 2020-06-09 NOTE — CARE PLAN
Problem: Mobility Transfers  Goal: STG-Within one week, patient will perform bed mobility  Description: 1) Individualized goal:  with hospital functions and SBA.  2) Interventions:  PT Group Therapy, PT Gait Training, PT Therapeutic Exercises, PT Neuro Re-Ed/Balance, PT Aquatic Therapy, PT Therapeutic Activity, and PT Evaluation  Outcome: NOT MET  Note: CGA  Impulsive, R clavical fx/ WBAT     Problem: Mobility  Goal: STG-Within one week, patient will propel wheelchair household distances  Description: 1) Individualized goal:  50 ft with BLEs and Min A.  2) Interventions:  PT Group Therapy, PT Gait Training, PT Therapeutic Exercises, PT Neuro Re-Ed/Balance, PT Aquatic Therapy, PT Therapeutic Activity, and PT Evaluation    Outcome: MET  Goal: STG-Within one week, patient will ambulate household distance  Description: 1) Individualized goal:  50 ft with LRAD and Min A.  2) Interventions:  PT Group Therapy, PT Gait Training, PT Therapeutic Exercises, PT Neuro Re-Ed/Balance, PT Aquatic Therapy, PT Therapeutic Activity, and PT Evaluation  Outcome: MET  Goal: STG-Within one week, patient will  Description: 1) Individualized goal:  follow one-step commands with 75% accuracy with min multimodal cues and extra time.  2) Interventions:  PT Group Therapy, PT Gait Training, PT Therapeutic Exercises, PT Neuro Re-Ed/Balance, PT Aquatic Therapy, PT Therapeutic Activity, and PT Evaluation  Outcome: MET     Problem: Mobility Transfers  Goal: STG-Within one week, patient will transfer bed to chair  Description: 1) Individualized goal:  with LRAD and CGA-SBA.  2) Interventions:  PT Group Therapy, PT Gait Training, PT Therapeutic Exercises, PT Neuro Re-Ed/Balance, PT Aquatic Therapy, PT Therapeutic Activity, and PT Evaluation  Outcome: MET

## 2020-06-09 NOTE — PROGRESS NOTES
0715: Bedside report received, assumed care for this patient.  Patient is A&O x 2.  VSS.  Communication board updated, call light and belongings are within reach.  Bed is in low position. Patient appears in no distress, and has no complaints of SOB and 0/10 of pain.  Will be medicated per MAR.  Plan of care discussed and agreed upon with patient. PEG flushed.
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
## 2020-06-09 NOTE — THERAPY
Occupational Therapy  Daily Treatment     Patient Name: Reyes Amezcua  Age:  64 y.o., Sex:  male  Medical Record #: [id number]  Today's Date: 6/9/2020     Precautions  Precautions: Fall Risk, Weight Bearing As Tolerated Right Upper Extremity  Comments: R orbital Fx, R clavicle Fx, posey bed     Safety   ADL Safety : Requires Cueing for Safety  Bathroom Safety: Requires Cuing for Safety    Subjective    Pt received up in w/c in dining room, agreeable to starting OT session with shower     Objective       06/09/20 0831   Precautions   Precautions Fall Risk;Weight Bearing As Tolerated Right Upper Extremity   Comments R orbital Fx, R clavicle Fx, posey bed    Safety    ADL Safety  Requires Cueing for Safety   Bathroom Safety Requires Cuing for Safety   Functional Level of Assist   Eating Supervision   Eating Description Modified diet   Grooming Stand by Assist;Standing   Grooming Description Verbal cueing  (standing at sink for oral care, deodorant, hair combing)   Bathing Supervision   Bathing Description Grab bar;Supervision for safety  (standing using overhead shower)   Upper Body Dressing Supervision   Upper Body Dressing Description Increased time  (standing)   Lower Body Dressing Minimal Assist   Lower Body Dressing Description Verbal cueing;Supervision for safety  (vc's and min Afor safety attempting to don pants in standing)   Toileting Stand by Assist   Toileting Description Grab bar;Supervision for safety   Toilet Transfers Minimal Assist  (ambulated in/out of bathroom no device)   Toilet Transfer Description Grab bar;Verbal cueing   Tub / Shower Transfers Minimal Assist  (ambulated in/out of bathroom no device)   Tub Shower Transfer Description Grab bar;Verbal cueing   OT Total Time Spent   OT Individual Total Time Spent (Mins) 60   OT Charge Group   OT Self Care / ADL 3   OT Therapy Activity 1     Pt played ping pong both against wall and with wife on other side of table, required CGA to SBA for stability  and assist for ball retrieval, use of taped glasses to alleviate double vision though does demo some visual motor integration impairment likely from lack of depth perception.     Assessment    Pt tolerated session well, demos significant improvements in standing/mobiltiy tolerance during ADL routine as well as improvement in attention to task requiring primarily SBA for most ADL tasks with intermittent verbal cues, min A for lower body dressing due to impaired single leg stance and poor safety awareness, discussed safe positioning for LB dressing (sitting or using unilateral UE support), reinforced safety precautions with wife who was present later in session. Pt enjoyed [employer] game and demos improved sustained attention to task and improving balance though continues to require close SBA to CGA during dynamic standing tasks for safety.     Plan    Attention to task, functional cog and sequencing during ADLs/IADLs, balance and functional mobility, endurance, TBI education, management of double vision, bread making Wednesday, table tennis    Occupational Therapy Goals     Problem: Eating     Dates: Start: 05/30/20       Goal: STG-Within one week, patient will feed self     Dates: Start: 05/30/20       Description: 1) Individualized Goal:  Moderate Assist with AE PRN  2) Interventions:  OT E Stim Attended, OT Group Therapy, OT Self Care/ADL, OT Cognitive Skill Dev, OT Manual Ther Technique, OT Neuro Re-Ed/Balance, OT Sensory Int Techniques, OT Therapeutic Activity, OT Aquatic Therapy, OT Evaluation, and OT Therapeutic Exercise                  Problem: Functional Cognition     Dates: Start: 05/30/20       Goal: STG-Within one week, patient will attend to     Dates: Start: 05/30/20       Description: 1) Individualized Goal:  A five minute task with no more than 2 cues to attend  2) Interventions:  OT E Stim Attended, OT Group Therapy, OT Self Care/ADL, OT Cognitive Skill Dev, OT Manual Ther Technique, OT Neuro  Re-Ed/Balance, OT Sensory Int Techniques, OT Therapeutic Activity, OT Aquatic Therapy, OT Evaluation, and OT Therapeutic Exercise                Problem: Functional Transfers     Dates: Start: 05/30/20       Goal: STG-Within one week, patient will transfer to toilet     Dates: Start: 05/30/20       Description: 1) Individualized Goal:  SBA with AE/DME PRN  2) Interventions:  OT E Stim Attended, OT Group Therapy, OT Self Care/ADL, OT Cognitive Skill Dev, OT Manual Ther Technique, OT Neuro Re-Ed/Balance, OT Sensory Int Techniques, OT Therapeutic Activity, OT Aquatic Therapy, OT Evaluation, and OT Therapeutic Exercise          Goal: STG-Within one week, patient will transfer to step in shower     Dates: Start: 05/30/20       Description: 1) Individualized Goal:  Supervision level with AE PRN  2) Interventions:  OT E Stim Attended, OT Group Therapy, OT Self Care/ADL, OT Cognitive Skill Dev, OT Manual Ther Technique, OT Neuro Re-Ed/Balance, OT Sensory Int Techniques, OT Therapeutic Activity, OT Aquatic Therapy, OT Evaluation, and OT Therapeutic Exercise                Problem: OT Long Term Goals     Dates: Start: 05/30/20       Goal: LTG-By discharge, patient will complete basic self care tasks     Dates: Start: 05/30/20       Description: 1) Individualized Goal:  Min A - Supervision with AE PRN  2) Interventions:  OT E Stim Attended, OT Group Therapy, OT Self Care/ADL, OT Cognitive Skill Dev, OT Manual Ther Technique, OT Neuro Re-Ed/Balance, OT Sensory Int Techniques, OT Therapeutic Activity, OT Aquatic Therapy, OT Evaluation, and OT Therapeutic Exercise          Goal: LTG-By discharge, patient will perform bathroom transfers     Dates: Start: 05/30/20       Description: 1) Individualized Goal: Supervision with AE/DME PRN   2) Interventions:  OT E Stim Attended, OT Group Therapy, OT Self Care/ADL, OT Cognitive Skill Dev, OT Manual Ther Technique, OT Neuro Re-Ed/Balance, OT Sensory Int Techniques, OT Therapeutic  Activity, OT Aquatic Therapy, OT Evaluation, and OT Therapeutic Exercise                Problem: Toileting     Dates: Start: 05/30/20       Goal: STG-Within one week, patient will complete toileting tasks     Dates: Start: 05/30/20       Description: 1) Individualized Goal:  Moderate Assist with AE PRN  2) Interventions:  OT E Stim Attended, OT Group Therapy, OT Self Care/ADL, OT Cognitive Skill Dev, OT Manual Ther Technique, OT Neuro Re-Ed/Balance, OT Sensory Int Techniques, OT Therapeutic Activity, OT Aquatic Therapy, OT Evaluation, and OT Therapeutic Exercise

## 2020-06-09 NOTE — CARE PLAN
Problem: Recreation Therapy  Goal: STG-Within one week, patient will  Description: Meet with Recreation Therapist x1 a week and share on leisure interests he would like participate in during sessions.   Outcome: PROGRESSING AS EXPECTED  Goal: STG-Within one week, patient will  Description: Pt will meet with a therapy tech to work on recreation participation under the supervision of the Recreation Therapist.   Outcome: PROGRESSING AS EXPECTED  Goal: LTG-By discharge, patient will  Description: Meet with Recreation Therapist x1 a week and share on leisure interests he would like participate following discharge.  Outcome: PROGRESSING AS EXPECTED  Goal: LTG-By discharge, patient will  Description: Pt will meet with a therapy tech to work on recreation participation following discharge under the supervision of the Recreation Therapist.   Outcome: PROGRESSING AS EXPECTED

## 2020-06-10 PROCEDURE — 97129 THER IVNTJ 1ST 15 MIN: CPT

## 2020-06-10 PROCEDURE — 99233 SBSQ HOSP IP/OBS HIGH 50: CPT | Performed by: PHYSICAL MEDICINE & REHABILITATION

## 2020-06-10 PROCEDURE — 97116 GAIT TRAINING THERAPY: CPT

## 2020-06-10 PROCEDURE — 92526 ORAL FUNCTION THERAPY: CPT

## 2020-06-10 PROCEDURE — A9270 NON-COVERED ITEM OR SERVICE: HCPCS | Performed by: PHYSICAL MEDICINE & REHABILITATION

## 2020-06-10 PROCEDURE — 700102 HCHG RX REV CODE 250 W/ 637 OVERRIDE(OP): Performed by: PHYSICAL MEDICINE & REHABILITATION

## 2020-06-10 PROCEDURE — 92507 TX SP LANG VOICE COMM INDIV: CPT

## 2020-06-10 PROCEDURE — 770005 HCHG ROOM/CARE - REHAB PRIVATE (11*

## 2020-06-10 PROCEDURE — 97530 THERAPEUTIC ACTIVITIES: CPT

## 2020-06-10 PROCEDURE — 700111 HCHG RX REV CODE 636 W/ 250 OVERRIDE (IP): Performed by: PHYSICAL MEDICINE & REHABILITATION

## 2020-06-10 PROCEDURE — 97112 NEUROMUSCULAR REEDUCATION: CPT

## 2020-06-10 RX ADMIN — MODAFINIL 100 MG: 100 TABLET ORAL at 07:37

## 2020-06-10 RX ADMIN — MELATONIN 1000 UNITS: at 07:38

## 2020-06-10 RX ADMIN — NYSTATIN 500000 UNITS: 100000 SUSPENSION ORAL at 19:44

## 2020-06-10 RX ADMIN — MODAFINIL 100 MG: 100 TABLET ORAL at 14:20

## 2020-06-10 RX ADMIN — ACETAMINOPHEN 650 MG: 325 TABLET, FILM COATED ORAL at 22:20

## 2020-06-10 RX ADMIN — TRAMADOL HYDROCHLORIDE 50 MG: 50 TABLET, COATED ORAL at 19:46

## 2020-06-10 RX ADMIN — POTASSIUM CHLORIDE 20 MEQ: 1500 TABLET, EXTENDED RELEASE ORAL at 07:38

## 2020-06-10 RX ADMIN — NYSTATIN 500000 UNITS: 100000 SUSPENSION ORAL at 14:20

## 2020-06-10 RX ADMIN — OMEPRAZOLE 20 MG: KIT at 07:38

## 2020-06-10 RX ADMIN — ENOXAPARIN SODIUM 40 MG: 100 INJECTION SUBCUTANEOUS at 09:00

## 2020-06-10 RX ADMIN — FLUOXETINE 20 MG: 20 TABLET, FILM COATED ORAL at 07:38

## 2020-06-10 RX ADMIN — NYSTATIN 500000 UNITS: 100000 SUSPENSION ORAL at 07:37

## 2020-06-10 RX ADMIN — NYSTATIN 500000 UNITS: 100000 SUSPENSION ORAL at 17:10

## 2020-06-10 ASSESSMENT — GAIT ASSESSMENTS: GAIT LEVEL OF ASSIST: CONTACT GUARD ASSIST

## 2020-06-10 NOTE — THERAPY
Occupational Therapy  Daily Treatment     Patient Name: Reyes Amezcua  Age:  64 y.o., Sex:  male  Medical Record #: [id number]  Today's Date: 6/10/2020     Precautions  Precautions: Fall Risk, Weight Bearing As Tolerated Right Upper Extremity  Comments: R orbital Fx, R clavicle Fx, posey bed     Safety   ADL Safety : Requires Cueing for Safety  Bathroom Safety: Requires Cuing for Safety    Subjective    Pt received up in w/c in dining room, requested to use bathroom prior to cooking activity     Objective       06/10/20 0831   Precautions   Precautions Fall Risk;Weight Bearing As Tolerated Right Upper Extremity   Comments R orbital Fx, R clavicle Fx, posey bed    Functional Level of Assist   Toileting Stand by Assist   Toileting Description Grab bar;Supervision for safety   Toilet Transfers Stand by Assist   Toilet Transfer Description Grab bar;Verbal cueing   IADL Treatments   IADL Treatments Meal preparation   Meal Preparation Pt completed phase one of sourdough bread making using written recipe, required significantly increased time and mod verbal/visual cues for attention and reading comprehension for written ingredients and instructions, required increased time for mixing and needed to complete partially in sitting for energy conservation with assist from OT to finish due to fatigue.   OT Total Time Spent   OT Individual Total Time Spent (Mins) 60   OT Charge Group   OT Therapy Activity 2       Assessment    Pt tolerated session well, fatigued quickly with cooking activity necessitating consistent rest breaks and completed some tasks in seated for energy conservation. Pt required moderate verbal cues for processing and sequencing written instructions of recipe, appeared to be partially due to impaired attention and difficulty with reading comprehension.     Plan    Attention to task, functional cog and sequencing during ADLs/IADLs, balance and functional mobility, endurance, TBI education, management of double  vision, bread making Wednesday/Thursday, table tennis    Occupational Therapy Goals     Problem: Functional Transfers     Dates: Start: 05/30/20       Goal: STG-Within one week, patient will transfer to toilet     Dates: Start: 05/30/20       Description: 1) Individualized Goal:  SBA with AE/DME PRN  2) Interventions:  OT E Stim Attended, OT Group Therapy, OT Self Care/ADL, OT Cognitive Skill Dev, OT Manual Ther Technique, OT Neuro Re-Ed/Balance, OT Sensory Int Techniques, OT Therapeutic Activity, OT Aquatic Therapy, OT Evaluation, and OT Therapeutic Exercise          Goal: STG-Within one week, patient will transfer to step in shower     Dates: Start: 05/30/20       Description: 1) Individualized Goal:  Supervision level with AE PRN  2) Interventions:  OT E Stim Attended, OT Group Therapy, OT Self Care/ADL, OT Cognitive Skill Dev, OT Manual Ther Technique, OT Neuro Re-Ed/Balance, OT Sensory Int Techniques, OT Therapeutic Activity, OT Aquatic Therapy, OT Evaluation, and OT Therapeutic Exercise                Problem: OT Long Term Goals     Dates: Start: 05/30/20       Goal: LTG-By discharge, patient will complete basic self care tasks     Dates: Start: 05/30/20       Description: 1) Individualized Goal:  Min A - Supervision with AE PRN  2) Interventions:  OT E Stim Attended, OT Group Therapy, OT Self Care/ADL, OT Cognitive Skill Dev, OT Manual Ther Technique, OT Neuro Re-Ed/Balance, OT Sensory Int Techniques, OT Therapeutic Activity, OT Aquatic Therapy, OT Evaluation, and OT Therapeutic Exercise          Goal: LTG-By discharge, patient will perform bathroom transfers     Dates: Start: 05/30/20       Description: 1) Individualized Goal: Supervision with AE/DME PRN   2) Interventions:  OT E Stim Attended, OT Group Therapy, OT Self Care/ADL, OT Cognitive Skill Dev, OT Manual Ther Technique, OT Neuro Re-Ed/Balance, OT Sensory Int Techniques, OT Therapeutic Activity, OT Aquatic Therapy, OT Evaluation, and OT Therapeutic  Exercise

## 2020-06-10 NOTE — DISCHARGE PLANNING
Met w/ patient & wife, Diane, for introduction to Rehab [facility] will. Provided wife w/ brochures for private hire caregivers. [employer] handicap placard form completed & faxed.

## 2020-06-10 NOTE — THERAPY
Speech Language Pathology  Daily Treatment     Patient Name: Reyes Amezcua  Age:  64 y.o., Sex:  male  Medical Record #: [id number]  Today's Date: 6/10/2020     Subjective    Pt in room reviewing with spouse timeline of places lived (spouse created with pictures and initial phoneme cues). Pt agreeable to therapy.      Objective     06/10/20 1034   Expressive Language   Automatic Language Appropriate Minimal (4)   Reading Comprehension    Reading Phrases Minimal (4)   Reading Sentences Minimal (4)   Cognition   Orientation  Severe (2)   Visual Short Term Memory Supervision (5)   Outcome Measures   Outcome Measures Utilized WPTAS   WPTAS ([place] Post-traumatic Amnesia Scale)   How old are you? 0   What is your date of birth? 1   What month are we in? 1   What time of day is it? (morning, noon, or night) 0   What day of the week is it? 0   What year are we in? 0   What is the name of this place 0   Do you remember me? 1   What is my name? 1   Target Picture 1 1   Target Picture 2 1   Target Picture 3 1   Orientation Score 2   Recall Score 5   Total Score 7   Still in post-traumatic amnesia? Yes   Interdisciplinary Plan of Care Collaboration   IDT Collaboration with  Occupational Therapist;Family / Caregiver   Patient Position at End of Therapy Seated;Family / Friend in Room   Collaboration Comments CLOF/POC   SLP Total Time Spent   SLP Individual Total Time Spent (Mins) 30   Charge Group   SLP Treatment - Individual Speech Language Treatment - Individual   SLP Cognitive Skill Development First 15 Minutes 1       Assessment    Initiated Set 1 of WPTA - pt received overall score of 7, able to correctly respond to month, . Identified age as 36, time of day: , Year: , Name of Place: [facility]. Pt was able to identify all visual memory components with 100% accuracy including 3 picture cards and face card.   Reading comprehension targeted this date - phrase completion in FO2: 80%, sentence completion:  70%.     Plan    Continue WTPA, receptive/expressive language    Speech Therapy Problems     Problem: Speech/Swallowing LTGs     Dates: Start: 05/30/20       Goal: LTG-By discharge, patient will safely swallow     Dates: Start: 05/30/20       Description: 1) Individualized goal: least restrictive diet without overt s/sx of asp for 100% of PO intake  2) Interventions:  SLP Swallowing Dysfunction Treatment and SLP Oral Pharyngeal Evaluation              Goal: LTG-By discharge, patient will comprehend     Dates: Start: 05/30/20       Description: 1) Individualized goal:  simple functional language (spoken, pictures, written) to participate with care with at least 80% accuracy and minimal cues  2) Interventions:  SLP Speech Language Treatment and SLP Cognitive Skill Development              Goal: LTG-By discharge, patient will express     Dates: Start: 05/30/20       Description: 1) Individualized goal: basic wants/needs to participate with care for 4/5 opportunities and minimal cues  2) Interventions:  SLP Speech Language Treatment

## 2020-06-10 NOTE — THERAPY
Speech Language Pathology  Daily Treatment     Patient Name: Reyes Amezcua  Age:  64 y.o., Sex:  male  Medical Record #: [id number]  Today's Date: 6/10/2020     Subjective    Pt seen in therapeutic dining setting. Appeared in good spirits.      Objective     06/10/20 0804   Dysphagia    Positioning / Behavior Modification Self Monitoring;Effortful Swallow;Modulate Rate or Bite Size;Multiple Swallows;Alternate Solids and Liquids   Other Treatments trials of regular textures   Diet / Liquid Recommendation Thin (0);Regular (7)   Nutritional Liquid Intake Rating Scale Non thickened beverages   Nutritional Food Intake Rating Scale Total oral diet with no restrictions   Interdisciplinary Plan of Care Collaboration   IDT Collaboration with  Therapy Tech   Patient Position at End of Therapy Seated;Chair Alarm On;Self Releasing Lap Belt Applied   Collaboration Comments transfer of care to therapy tech   SLP Total Time Spent   SLP Individual Total Time Spent (Mins) 30   Charge Group   SLP Swallowing Dysfunction Treatment Swallowing Dysfunction Treatment       Assessment    Pt assessed with trials of regular textures (bagel with cream cheese, richardson, fresh fruit). Pt tolerated with intermittent, non distressful throat clearing. Pt required MIN to MOD cues for serial swallows and to slow rate of intake. Pt able to state items on tray with 80% accuracy.     Plan    Advance to regular textures, continue in therapeutic dining for supervision and ongoing training in carryover of compensatory strategies.     Speech Therapy Problems     Problem: Speech/Swallowing LTGs     Dates: Start: 05/30/20       Goal: LTG-By discharge, patient will safely swallow     Dates: Start: 05/30/20       Description: 1) Individualized goal: least restrictive diet without overt s/sx of asp for 100% of PO intake  2) Interventions:  SLP Swallowing Dysfunction Treatment and SLP Oral Pharyngeal Evaluation              Goal: LTG-By discharge, patient will  comprehend     Dates: Start: 05/30/20       Description: 1) Individualized goal:  simple functional language (spoken, pictures, written) to participate with care with at least 80% accuracy and minimal cues  2) Interventions:  SLP Speech Language Treatment and SLP Cognitive Skill Development              Goal: LTG-By discharge, patient will express     Dates: Start: 05/30/20       Description: 1) Individualized goal: basic wants/needs to participate with care for 4/5 opportunities and minimal cues  2) Interventions:  SLP Speech Language Treatment

## 2020-06-10 NOTE — THERAPY
Physical Therapy   Daily Treatment     Patient Name: Reyes Amezcua  Age:  64 y.o., Sex:  male  Medical Record #: [id number]  Today's Date: 6/10/2020     Precautions  Precautions: (P) Fall Risk, Non Weight Bearing Right Upper Extremity  Comments: (P) R orbital Fx, R clavicle Fx    Subjective    Patient in bed with wife present, agreeable to therapy. Used family tree/timeline to participate in discussion about [place].     Objective       06/10/20 1431   Precautions   Precautions Fall Risk;Non Weight Bearing Right Upper Extremity   Comments R orbital Fx, R clavicle Fx   ABS (Agitated Behavior Scale)   Agitated Behavior Scale Performed Yes   Short Attention Span, Easy Distractibility, Inability to Concentrate 2   Impulsive, Impatient, Low Tolerance for Pain or Frustration 2   Uncooperative, Resistant to Care, Demanding 1   Violent and/or Threatening Violence Toward People or Property 1   Explosive and/or Unpredictable Anger 1   Rocking, Rubbing, Moaning, Other Self-Stimulating Behavior 1   Pulling at Tubes, Restraints, etc. 1   Wandering from Treatment Area 1   Restlessness, Pacing, Excessive Movement 1   Repetitive Behaviors, Motor and/or Verbal 1   Rapid, Loud or Excessive Talking 1   Sudden Changes of Mood 1   Easily Initiated - Excessive Crying and/or Laughter 1   Self-Abusiveness, Physical and/or Verbal 1   Agitated Behavior Scale Total Score 16   Level of Severity No Agitation   Gait Functional Level of Assist    Gait Level Of Assist Contact Guard Assist  (to SBA)   Assistive Device None   Distance (Feet)   (150 ftx1, 400 ftx1)   # of Times Distance was Traveled 2   Deviation   (decreased stephanie, tendency LOB R, decr. R arm swing)   Wheelchair Functional Level of Assist   Wheelchair Assist Supervised   Distance Wheelchair (Feet or Distance) 150   Wheelchair Description   (BLEs to propel)   Transfer Functional Level of Assist   Bed, Chair, Wheelchair Transfer Contact Guard Assist  (to SBA)   Bed Chair Wheelchair  Transfer Description   (no AD)   Bed Mobility    Supine to Sit Stand by Assist   Sit to Supine Stand by Assist   Sit to Stand Stand by Assist   Scooting Stand by Assist   Rolling Supervised   Neuro-Muscular Treatments   Comments Quadruped single leg lifts 1x10 each leg with SBA; half tall kneeling performing lateral ball toss for oblique activation and added dynamic balance/motor planning/cognitive component with patient counting reps 2x15 (second set performing ball tap on opposite side as well)   Interdisciplinary Plan of Care Collaboration   IDT Collaboration with  Family / Caregiver   Patient Position at End of Therapy In Bed;Call Light within Reach;Tray Table within Reach;Phone within Reach;Family / Friend in Room   Collaboration Comments CLOF   PT Total Time Spent   PT Individual Total Time Spent (Mins) 60   PT Charge Group   PT Gait Training 1   PT Neuromuscular Re-Education / Balance 2   PT Therapeutic Activities 1       Assessment    Patient tolerated session well, requiring extended supine/prone rest breaks throughout activity. Wife present for session, asking appropriate questions and participating in session appropriately. Patient with improved dynamic balance with ambulation and functional tasks, improved verbalization and more accurate participation in conversations.    Plan    Gait training with HHA vs. No AD, standing balance activities, endurance/activity tolerance, BLE strengthening, stair/curb negotiation, cognitive protocols per SLP direction, incorporate cognition/communication into activities, family training, treadmill training, outdoor ambulation.    Physical Therapy Problems     Problem: Mobility     Dates: Start: 06/09/20       Goal: STG-Within one week, patient will ambulate up/down a curb     Dates: Start: 06/09/20       Description: 1) Individualized goal:  Patient will amb with no AD SBA up/down curb  2) Interventions:  PT Group Therapy, PT Gait Training, PT Therapeutic Exercises, PT  "Neuro Re-Ed/Balance, PT Therapeutic Activity, and PT Manual Therapy            Goal: STG-Within one week, patient will ascend and descend four to six stairs     Dates: Start: 06/09/20       Description: 1) Individualized goal:  Amb up/ down 4 6\" stairs with HR SBA  2) Interventions:  PT Group Therapy, PT Gait Training, PT Therapeutic Exercises, PT Neuro Re-Ed/Balance, PT Therapeutic Activity, and PT Manual Therapy                  Problem: Mobility Transfers     Dates: Start: 05/30/20       Goal: STG-Within one week, patient will perform bed mobility     Dates: Start: 05/30/20       Description: 1) Individualized goal:  with hospital functions and SBA.  2) Interventions:  PT Group Therapy, PT Gait Training, PT Therapeutic Exercises, PT Neuro Re-Ed/Balance, PT Aquatic Therapy, PT Therapeutic Activity, and PT Evaluation    Note:     Goal Note filed on 06/09/20 1102 by Dinora Catalan, DPT    CGA  Impulsive, R clavical fx/ WBAT                  Goal: STG-Within one week, patient will sit to stand     Dates: Start: 06/09/20       Description: 1) Individualized goal:  Transfer SBA no AD STS/SPT, with set up as needed  2) Interventions:  PT Group Therapy, PT Gait Training, PT Therapeutic Exercises, PT Neuro Re-Ed/Balance, PT Therapeutic Activity, and PT Manual Therapy                  Problem: PT-Long Term Goals     Dates: Start: 05/30/20       Goal: LTG-By discharge, patient will ambulate     Dates: Start: 05/30/20       Description: 1) Individualized goal:  150 ft with LRAD and supervision.  2) Interventions:  PT Group Therapy, PT Gait Training, PT Therapeutic Exercises, PT Neuro Re-Ed/Balance, PT Aquatic Therapy, PT Therapeutic Activity, and PT Evaluation          Goal: LTG-By discharge, patient will transfer one surface to another     Dates: Start: 05/30/20       Description: 1) Individualized goal:  with LRAD and supervision.  2) Interventions:  PT Group Therapy, PT Gait Training, PT Therapeutic Exercises, PT Neuro " Re-Ed/Balance, PT Aquatic Therapy, PT Therapeutic Activity, and PT Evaluation            Goal: LTG-By discharge, patient will ambulate up/down flight of stairs     Dates: Start: 05/30/20       Description: 1) Individualized goal:  with single rail and supervision.  2) Interventions:  PT Group Therapy, PT Gait Training, PT Therapeutic Exercises, PT Neuro Re-Ed/Balance, PT Aquatic Therapy, PT Therapeutic Activity, and PT Evaluation            Goal: LTG-By discharge, patient will transfer in/out of a car     Dates: Start: 05/30/20       Description: 1) Individualized goal:  with LRAD and supervision.  2) Interventions:  PT Group Therapy, PT Gait Training, PT Therapeutic Exercises, PT Neuro Re-Ed/Balance, PT Aquatic Therapy, PT Therapeutic Activity, and PT Evaluation            Goal: LTG-By discharge, patient will     Dates: Start: 05/30/20       Description: 1) Individualized goal:  perform bed mobility independently and no bed functions.  2) Interventions:  PT Group Therapy, PT Gait Training, PT Therapeutic Exercises, PT Neuro Re-Ed/Balance, PT Aquatic Therapy, PT Therapeutic Activity, and PT Evaluation            Goal: LTG-By discharge, patient will     Dates: Start: 05/30/20       Description: 1) Individualized goal:  negotiate single curb with LRAD and supervision.  2) Interventions:  PT Group Therapy, PT Gait Training, PT Therapeutic Exercises, PT Neuro Re-Ed/Balance, PT Aquatic Therapy, PT Therapeutic Activity, and PT Evaluation

## 2020-06-10 NOTE — PROGRESS NOTES
Received bedside shift report from Sangeeta SALEH RN regarding patient and assumed care. Patient awake, calm and stable, currently positioned in bed for comfort and safety; call light within reach. Denies pain or discomfort at this time. Will continue to monitor.

## 2020-06-10 NOTE — FLOWSHEET NOTE
06/10/20 1604   Events/Summary/Plan   Events/Summary/Plan Trach stoma care done.  Left uncovered so small scab can dry out; no discharge    Respiratory Assessment   Level of Consciousness Alert   Oxygen   O2 Delivery Device None - Room Air

## 2020-06-10 NOTE — PROGRESS NOTES
"      Spiritual Care Note    Patient Information     Patient's Name: Reyes Amezcua   MRN: [id number]    YOB: 1955   Age and Gender: 64 y.o. male   Service Area: Inpatient    Room (and Bed): [place] [address]   Ethnicity or Nationality:     Primary Language: English   Gnosticist/Spiritual preference: Denominational   Place of Residence: [place]   Family/Friends/Others Present: No   Clinical Team Present: No   Medical Diagnosis(-es)/Procedure(s): TBI   Code Status: Full Code    Date of Admission: 5/29/2020   Length of Stay: 12 days        Spiritual Care Provider Information:  Name of Spiritual Care Provider: Clare Leos  Title of Spiritual Care Provider: Associate   Phone Number: [phone]  E-mail: [email or web address]  Total time : 20 minutes    Spiritual Screen Results:    Gen Nursing        Palliative Care         Encounter/Request Information  Encounter/Request Type   Visited With: Patient  Nature of the Visit: Follow-up, On shift  Continue Visiting: Yes  Next Follow-up Date: 06/03/20  General Visit: Yes  Referral From/ Origin of Request: Verbal family    Religous Needs/Values  Gnosticist Needs Visit  Gnosticist Needs: Prayer    Spiritual Assessment     Spiritual Care Encounters    Observations/Symptoms: Accepting, Confusion, Thankfulness    Interaction/Conversation: Pt once again markedly better than during previous visits; was able to answer questions about how he felt and about his timeline, with photos, on the wall.  He accepted prayer and rsponded appropriately with an \"Amen.\"  However, he often seemed to struggle for words and repeat himself.  The  will visit again later this week, schedule permitting.    Interventions: Compassionate presence, active listening, prayer.    Plan: Daily Visits    Notes:            "

## 2020-06-10 NOTE — DISCHARGE PLANNING
"Spoke w/ wife, Diane, via phone to review IDT conference recommendations & on track for DC 6.26.2020. Discussed Rehab w/o Eric referral eval in process & scheduled interview w/ patient & wife Wednesday at 1pm. States \"I've been cleared to walk him w/o therapies, so I can bring him to the lobby tomorrow for the interview.\" Updated insurance approved additional 7d LOS, total 14d at present time & I will update insurance tomorrow.  Wife asked about therapy time frames. Updated no changes to therapy at this time, will continue to eval & update as indicated. States \"it's a little bit scary about his language & thought processes. It's hard to understand what he is taking about at times. When he can't think of something, he uses his step sister's name to fill in the unknown word.\" Questions answered. Emotional support provided. Discussed temporary handicap placard from [employer]. Will complete application.  "

## 2020-06-10 NOTE — THERAPY
Occupational Therapy  Daily Treatment     Patient Name: Reyes Amezcua  Age:  64 y.o., Sex:  male  Medical Record #: [id number]  Today's Date: 6/10/2020     Precautions  Precautions: (P) Fall Risk, Weight Bearing As Tolerated Right Upper Extremity  Comments: (P) R orbital Fx, R clavicle Fx, posey bed     Safety   ADL Safety : Requires Cueing for Safety  Bathroom Safety: Requires Cuing for Safety    Subjective    Pt received supine in bed, agreeable to continuing bread making activity     Objective       06/10/20 1001   Precautions   Precautions Fall Risk;Weight Bearing As Tolerated Right Upper Extremity   Comments R orbital Fx, R clavicle Fx, posey bed    IADL Treatments   IADL Treatments Meal preparation   Meal Preparation Pt completed phase two of sourdough bread making, demonstrated increased recall of at home strategies, stated he preferred to use his method rather than follow recipe. Able to cut dough in half, form into ball, place in riser baskets, and cover, required min cues for sequencing and attention.   OT Total Time Spent   OT Individual Total Time Spent (Mins) 30   OT Charge Group   OT Therapy Activity 2       Assessment    Pt tolerated session well, reporting fatigue from earlier session but able to complete most of cooking task in standing. Pt with increased recall of prior bread making strategies this session after exposure to activity this morning, stated he preferred to use his methods, was able to complete bread prep with min cues for sequencing, plan to bake tomorrow.     Plan    Attention to task, functional cog and sequencing during ADLs/IADLs, balance and functional mobility, endurance, TBI education, management of double vision, bread making Wednesday/Thursday, table tennis    Occupational Therapy Goals     Problem: Functional Transfers     Dates: Start: 05/30/20       Goal: STG-Within one week, patient will transfer to toilet     Dates: Start: 05/30/20       Description: 1) Individualized  Goal:  SBA with AE/DME PRN  2) Interventions:  OT E Stim Attended, OT Group Therapy, OT Self Care/ADL, OT Cognitive Skill Dev, OT Manual Ther Technique, OT Neuro Re-Ed/Balance, OT Sensory Int Techniques, OT Therapeutic Activity, OT Aquatic Therapy, OT Evaluation, and OT Therapeutic Exercise          Goal: STG-Within one week, patient will transfer to step in shower     Dates: Start: 05/30/20       Description: 1) Individualized Goal:  Supervision level with AE PRN  2) Interventions:  OT E Stim Attended, OT Group Therapy, OT Self Care/ADL, OT Cognitive Skill Dev, OT Manual Ther Technique, OT Neuro Re-Ed/Balance, OT Sensory Int Techniques, OT Therapeutic Activity, OT Aquatic Therapy, OT Evaluation, and OT Therapeutic Exercise                Problem: OT Long Term Goals     Dates: Start: 05/30/20       Goal: LTG-By discharge, patient will complete basic self care tasks     Dates: Start: 05/30/20       Description: 1) Individualized Goal:  Min A - Supervision with AE PRN  2) Interventions:  OT E Stim Attended, OT Group Therapy, OT Self Care/ADL, OT Cognitive Skill Dev, OT Manual Ther Technique, OT Neuro Re-Ed/Balance, OT Sensory Int Techniques, OT Therapeutic Activity, OT Aquatic Therapy, OT Evaluation, and OT Therapeutic Exercise          Goal: LTG-By discharge, patient will perform bathroom transfers     Dates: Start: 05/30/20       Description: 1) Individualized Goal: Supervision with AE/DME PRN   2) Interventions:  OT E Stim Attended, OT Group Therapy, OT Self Care/ADL, OT Cognitive Skill Dev, OT Manual Ther Technique, OT Neuro Re-Ed/Balance, OT Sensory Int Techniques, OT Therapeutic Activity, OT Aquatic Therapy, OT Evaluation, and OT Therapeutic Exercise

## 2020-06-10 NOTE — PROGRESS NOTES
0715: Bedside report received, assumed care for this patient.  Patient is A&O x 4.  VSS.  Communication board updated, call light and belongings are within reach.  Bed is in low position. Patient appears in no distress, and has no complaints of SOB and 0/10 of pain.  Will be medicated per MAR.  Plan of care discussed and agreed upon with patient.  Pulled peg out last night, bandaid covering site.
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
## 2020-06-11 PROCEDURE — 770005 HCHG ROOM/CARE - REHAB PRIVATE (11*

## 2020-06-11 PROCEDURE — 92507 TX SP LANG VOICE COMM INDIV: CPT

## 2020-06-11 PROCEDURE — 97530 THERAPEUTIC ACTIVITIES: CPT

## 2020-06-11 PROCEDURE — 700102 HCHG RX REV CODE 250 W/ 637 OVERRIDE(OP): Performed by: PHYSICAL MEDICINE & REHABILITATION

## 2020-06-11 PROCEDURE — A9270 NON-COVERED ITEM OR SERVICE: HCPCS | Performed by: PHYSICAL MEDICINE & REHABILITATION

## 2020-06-11 PROCEDURE — 97116 GAIT TRAINING THERAPY: CPT

## 2020-06-11 PROCEDURE — 99232 SBSQ HOSP IP/OBS MODERATE 35: CPT | Performed by: PHYSICAL MEDICINE & REHABILITATION

## 2020-06-11 PROCEDURE — 700111 HCHG RX REV CODE 636 W/ 250 OVERRIDE (IP): Performed by: PHYSICAL MEDICINE & REHABILITATION

## 2020-06-11 PROCEDURE — 97129 THER IVNTJ 1ST 15 MIN: CPT

## 2020-06-11 RX ADMIN — FLUOXETINE 20 MG: 20 TABLET, FILM COATED ORAL at 08:16

## 2020-06-11 RX ADMIN — TRAZODONE HYDROCHLORIDE 50 MG: 50 TABLET ORAL at 23:50

## 2020-06-11 RX ADMIN — MODAFINIL 100 MG: 100 TABLET ORAL at 08:16

## 2020-06-11 RX ADMIN — MELATONIN 1000 UNITS: at 08:16

## 2020-06-11 RX ADMIN — NYSTATIN 500000 UNITS: 100000 SUSPENSION ORAL at 13:00

## 2020-06-11 RX ADMIN — OMEPRAZOLE 20 MG: KIT at 08:17

## 2020-06-11 RX ADMIN — MODAFINIL 100 MG: 100 TABLET ORAL at 12:00

## 2020-06-11 RX ADMIN — POTASSIUM CHLORIDE 20 MEQ: 1500 TABLET, EXTENDED RELEASE ORAL at 08:16

## 2020-06-11 RX ADMIN — NYSTATIN 500000 UNITS: 100000 SUSPENSION ORAL at 08:15

## 2020-06-11 RX ADMIN — ACETAMINOPHEN 650 MG: 325 TABLET, FILM COATED ORAL at 08:16

## 2020-06-11 RX ADMIN — ENOXAPARIN SODIUM 40 MG: 100 INJECTION SUBCUTANEOUS at 09:00

## 2020-06-11 ASSESSMENT — GAIT ASSESSMENTS
DISTANCE (FEET): 125
GAIT LEVEL OF ASSIST: STAND BY ASSIST

## 2020-06-11 ASSESSMENT — ACTIVITIES OF DAILY LIVING (ADL)
TOILET_TRANSFER_DESCRIPTION: GRAB BAR;INCREASED TIME
TOILET_TRANSFER_DESCRIPTION: GRAB BAR;INCREASED TIME;VERBAL CUEING
TOILETING_LEVEL_OF_ASSIST_DESCRIPTION: GRAB BAR;INCREASED TIME;SUPERVISION FOR SAFETY
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
## 2020-06-11 NOTE — THERAPY
Speech Language Pathology  Daily Treatment     Patient Name: Reyes Amezcua  Age:  64 y.o., Sex:  male  Medical Record #: [id number]  Today's Date: 6/11/2020     Subjective    Pt reports being tired from therapies this week, however, was agreeable to participate. Spouse present.      Objective     06/11/20 1034   Expressive Language   Automatic Language Appropriate Minimal (4)   Word Finding Deficits Moderate (3)   Cognition   Orientation  Severe (2)   WPTAS ([place] Post-traumatic Amnesia Scale)   How old are you? 0   What is your date of birth? 1   What month are we in? 1   What time of day is it? (morning, noon, or night) 0   What day of the week is it? 0   What year are we in? 0   What is the name of this place 0   Do you remember me? 1   What is my name? 0   Target Picture 1 1   Target Picture 2 1   Target Picture 3 1   Orientation Score 2   Recall Score 4   Total Score 6   Still in post-traumatic amnesia? Yes   Interdisciplinary Plan of Care Collaboration   IDT Collaboration with  Family / Caregiver;Physician   Patient Position at End of Therapy Seated;Family / Friend in Room   Collaboration Comments spouse present for session, CLOF with physician   SLP Total Time Spent   SLP Individual Total Time Spent (Mins) 60   Charge Group   SLP Treatment - Individual Speech Language Treatment - Individual       Assessment    The [place] Post-traumatic Amnesia Scale (WPTAS) is a brief bedside standardized test that measures length of post-traumatic amnesia (PTA) in people with traumatic brain injury. It consists of twelve questions that assess orientation to person, place and time, and ability to consistently retain new information from one day to another. It is administered once a day, each and every day, until the patient achieves a perfect score across three consecutive days, after which the individual is deemed to have emerged from post-traumatic amnesia.     WPTAS ([place] Post-traumatic Amnesia Scale)  How old  are you?: Incorrect  What is your date of birth?: Correct  What month are we in?: Correct  What time of day is it? (morning, noon, or night): Incorrect  What day of the week is it?: Incorrect  What year are we in?: Incorrect  What is the name of this place: Incorrect  Do you remember me?: Correct  What is my name?: Incorrect  Target Picture 1: Correct  Target Picture 2: Correct  Target Picture 3: Correct  Orientation Score: 2  Recall Score: 4  Total Score: 6  Still in post-traumatic amnesia?: Yes    Wh- questions using timeline of personal history created by spouse. Pt able to answer wh questions with MOD cues (use of initial phoneme or sound helpful to identify target). Pt perseverative on previous target, requiring visual cue and initial phoneme to produce correct target. Pt requiring frequent breaks, putting head in hand, closing eyes.     Plan    Continue to target receptive/expressive language.     Speech Therapy Problems     Problem: Comprehension STGs     Dates: Start: 06/10/20       Goal: STG-Within one week, patient will     Dates: Start: 06/10/20       Description: 1) Individualized goal:  follow 2-step auditory commands with 80% accuracy provided no more than 1 repetition.   2) Interventions:  SLP Speech Language Treatment              Goal: STG-Within one week, patient will     Dates: Start: 06/10/20       Description: 1) Individualized goal:  complete sentence completion given FO4 choices with 80% accuracy provided SPV  2) Interventions:  SLP Speech Language Treatment                    Problem: Expression STGs     Dates: Start: 06/10/20       Goal: STG-Within one week, patient will     Dates: Start: 06/10/20       Description: 1) Individualized goal:  answer wh- questions related to highly personal information in 8/10 trials with use of initial phoneme cue (verbal or written)  2) Interventions:  SLP Speech Language Treatment              Goal: STG-Within one week, patient will     Dates: Start:  06/10/20       Description: 1) Individualized goal:  complete object/picture naming with 80% accuracy provided MIN cues.   2) Interventions:  SLP Speech Language Treatment                    Problem: Problem Solving STGs     Dates: Start: 06/10/20       Description: 1) Individualized goal: see goal under STG  2) Interventions:  SLP Cognitive Skill Development        Goal: STG-Within one week, patient will     Dates: Start: 06/10/20       Description: 1) Individualized goal:  score 12/12 on the WPTA for 3 consecutive days.   2) Interventions:  SLP Speech Language Treatment and SLP Cognitive Skill Development                    Problem: Speech/Swallowing LTGs     Dates: Start: 05/30/20       Goal: LTG-By discharge, patient will safely swallow     Dates: Start: 05/30/20       Description: 1) Individualized goal: least restrictive diet without overt s/sx of asp for 100% of PO intake  2) Interventions:  SLP Swallowing Dysfunction Treatment and SLP Oral Pharyngeal Evaluation              Goal: LTG-By discharge, patient will comprehend     Dates: Start: 05/30/20       Description: 1) Individualized goal:  simple functional language (spoken, pictures, written) to participate with care with at least 80% accuracy and minimal cues  2) Interventions:  SLP Speech Language Treatment and SLP Cognitive Skill Development              Goal: LTG-By discharge, patient will express     Dates: Start: 05/30/20       Description: 1) Individualized goal: basic wants/needs to participate with care for 4/5 opportunities and minimal cues  2) Interventions:  SLP Speech Language Treatment                    Problem: Swallowing STGs     Dates: Start: 06/10/20       Goal: STG-Within one week, patient will     Dates: Start: 06/10/20       Description: 1) Individualized goal:  safely swallow regular textures/thin liquids with no overt s/sx of pen/asp, MIN cues for use of compensatory strategies (monitoring rate, bolus size, serial swallows to clear  residue)  2) Interventions:  SLP Swallowing Dysfunction Treatment

## 2020-06-11 NOTE — CARE PLAN
Problem: Communication  Goal: The ability to communicate needs accurately and effectively will improve  Description: Pt is unable to effectively communicate needs. Pt rounded on hourly to make sure needs were met. Will continue to monitor.   Outcome: PROGRESSING SLOWER THAN EXPECTED  Note: Pt is aphasic and will need extra time to communicate his needs to staff effectively     Problem: Infection  Goal: Will remain free from infection  Outcome: PROGRESSING AS EXPECTED  Note: No s/s of infection present      Problem: Venous Thromboembolism (VTW)/Deep Vein Thrombosis (DVT) Prevention:  Goal: Patient will participate in Venous Thrombosis (VTE)/Deep Vein Thrombosis (DVT)Prevention Measures  Outcome: PROGRESSING AS EXPECTED  Note: Pt receives Lovenox SC injections for DVT prophylaxis

## 2020-06-11 NOTE — THERAPY
Occupational Therapy  Daily Treatment     Patient Name: Reyes Amezcua  Age:  64 y.o., Sex:  male  Medical Record #: [id number]  Today's Date: 6/11/2020     Precautions  Precautions: (P) Fall Risk, Weight Bearing As Tolerated Right Upper Extremity  Comments: (P) R orbital Fx, R clavicle Fx     Safety   ADL Safety : Requires Cueing for Safety  Bathroom Safety: Requires Cuing for Safety    Subjective    Pt received up in w/c, requested to use bathroom prior to planned baking activity     Objective       06/11/20 0831   Precautions   Precautions Fall Risk;Weight Bearing As Tolerated Right Upper Extremity   Comments R orbital Fx, R clavicle Fx    Functional Level of Assist   Grooming Stand by Assist   Grooming Description Verbal cueing  (standing at sink for oral care, deodorant, hair combing)   Toileting Stand by Assist   Toileting Description Grab bar;Increased time;Supervision for safety   Toilet Transfers Stand by Assist   Toilet Transfer Description Grab bar;Increased time;Verbal cueing   IADL Treatments   IADL Treatments Meal preparation   Meal Preparation Pt completed phase three of sourdough breadmaking, able to state sequence of activities with increased time and min cues, CGA to place cast iron pot in oven to pre-heat, OT assist to remove from oven while pt placed dough in pot, OT assist to replace in oven and cover and to remove from oven once completed for safety due to weight/heat of pot.    Balance   Comments SBA for table tennis activity playing against wife, no over LOB, tolerated 15 min on his feet before needing rest break. Consistent cues to keep score   OT Total Time Spent   OT Individual Total Time Spent (Mins) 75   OT Charge Group   OT Cognitive Skill Development First 15 Minutes 2   OT Therapy Activity 3       Assessment    Pt tolerated session fair, more fatigued this session than earlier this week requiring frequent rest breaks and increased assist for physical components of cooking task.  "Demonstrated improved recall of home break baking strategies and able to direct sequence of activity well with increased time for word finding and min cues. Initially able to keep score well during ping pong activity but attention to task and number retention faded with fatigue. Pt requested bathroom twice during session, reported feeling cold and \"out of sorts\", returned to bed with wife present, RN notified.     Plan    Attention to task, functional cog and sequencing during ADLs/IADLs, balance and functional mobility, endurance, TBI education, management of double vision, shaving Friday, additional dough available for baking    Occupational Therapy Goals     Problem: Functional Transfers     Dates: Start: 05/30/20       Goal: STG-Within one week, patient will transfer to toilet     Dates: Start: 05/30/20       Description: 1) Individualized Goal:  SBA with AE/DME PRN  2) Interventions:  OT E Stim Attended, OT Group Therapy, OT Self Care/ADL, OT Cognitive Skill Dev, OT Manual Ther Technique, OT Neuro Re-Ed/Balance, OT Sensory Int Techniques, OT Therapeutic Activity, OT Aquatic Therapy, OT Evaluation, and OT Therapeutic Exercise          Goal: STG-Within one week, patient will transfer to step in shower     Dates: Start: 05/30/20       Description: 1) Individualized Goal:  Supervision level with AE PRN  2) Interventions:  OT E Stim Attended, OT Group Therapy, OT Self Care/ADL, OT Cognitive Skill Dev, OT Manual Ther Technique, OT Neuro Re-Ed/Balance, OT Sensory Int Techniques, OT Therapeutic Activity, OT Aquatic Therapy, OT Evaluation, and OT Therapeutic Exercise                Problem: OT Long Term Goals     Dates: Start: 05/30/20       Goal: LTG-By discharge, patient will complete basic self care tasks     Dates: Start: 05/30/20       Description: 1) Individualized Goal:  Min A - Supervision with AE PRN  2) Interventions:  OT E Stim Attended, OT Group Therapy, OT Self Care/ADL, OT Cognitive Skill Dev, OT Manual " Ther Technique, OT Neuro Re-Ed/Balance, OT Sensory Int Techniques, OT Therapeutic Activity, OT Aquatic Therapy, OT Evaluation, and OT Therapeutic Exercise          Goal: LTG-By discharge, patient will perform bathroom transfers     Dates: Start: 05/30/20       Description: 1) Individualized Goal: Supervision with AE/DME PRN   2) Interventions:  OT E Stim Attended, OT Group Therapy, OT Self Care/ADL, OT Cognitive Skill Dev, OT Manual Ther Technique, OT Neuro Re-Ed/Balance, OT Sensory Int Techniques, OT Therapeutic Activity, OT Aquatic Therapy, OT Evaluation, and OT Therapeutic Exercise

## 2020-06-11 NOTE — PROGRESS NOTES
Spiritual Care Note    Patient Information     Patient's Name: Reyes Amezcua   MRN: [id number]    YOB: 1955   Age and Gender: 64 y.o. male   Service Area: Inpatient    Room (and Bed): [place] [address]   Ethnicity or Nationality:     Primary Language: English   Jainism/Spiritual preference: Gnosticism   Place of Residence: [place]   Family/Friends/Others Present: Yes, wife   Clinical Team Present: No   Medical Diagnosis(-es)/Procedure(s): TBI   Code Status: Full Code    Date of Admission: 5/29/2020   Length of Stay: 13 days        Spiritual Care Provider Information:  Name of Spiritual Care Provider: Clare Leos  Title of Spiritual Care Provider: Associate   Phone Number: [phone]  E-mail: [email or web address]  Total time : 5 minutes    Spiritual Screen Results:    Gen Nursing        Palliative Care         Encounter/Request Information  Encounter/Request Type   Visited With: Patient and family together  Nature of the Visit: Follow-up, On shift  Continue Visiting: Yes  Next Follow-up Date: 06/03/20  General Visit: Yes  Referral From/ Origin of Request: Verbal family    Religous Needs/Values  Jainism Needs Visit  Jainism Needs: Prayer    Spiritual Assessment     Spiritual Care Encounters    Interaction/Conversation: Pt was not in his room;  found him and  his wife heading back to the room so he could take a nap before his next therapy session.  chatted with pt and wife and assured them that she will try to visit again tomorrow.    Interventions: Compassionate presence, active listening, prayer.    Plan: Daily Visits    Notes:

## 2020-06-12 LAB
ALBUMIN SERPL BCP-MCNC: 3.5 G/DL (ref 3.2–4.9)
ALBUMIN/GLOB SERPL: 1.2 G/DL
ALP SERPL-CCNC: 102 U/L (ref 30–99)
ALT SERPL-CCNC: 36 U/L (ref 2–50)
ANION GAP SERPL CALC-SCNC: 11 MMOL/L (ref 7–16)
AST SERPL-CCNC: 20 U/L (ref 12–45)
BASOPHILS # BLD AUTO: 1.1 % (ref 0–1.8)
BASOPHILS # BLD: 0.07 K/UL (ref 0–0.12)
BILIRUB SERPL-MCNC: 0.3 MG/DL (ref 0.1–1.5)
BUN SERPL-MCNC: 11 MG/DL (ref 8–22)
CALCIUM SERPL-MCNC: 9.3 MG/DL (ref 8.5–10.5)
CHLORIDE SERPL-SCNC: 104 MMOL/L (ref 96–112)
CO2 SERPL-SCNC: 23 MMOL/L (ref 20–33)
CREAT SERPL-MCNC: 0.64 MG/DL (ref 0.5–1.4)
EOSINOPHIL # BLD AUTO: 0.24 K/UL (ref 0–0.51)
EOSINOPHIL NFR BLD: 3.7 % (ref 0–6.9)
ERYTHROCYTE [DISTWIDTH] IN BLOOD BY AUTOMATED COUNT: 47 FL (ref 35.9–50)
GLOBULIN SER CALC-MCNC: 3 G/DL (ref 1.9–3.5)
GLUCOSE SERPL-MCNC: 95 MG/DL (ref 65–99)
HCT VFR BLD AUTO: 39.9 % (ref 42–52)
HGB BLD-MCNC: 13 G/DL (ref 14–18)
IMM GRANULOCYTES # BLD AUTO: 0.01 K/UL (ref 0–0.11)
IMM GRANULOCYTES NFR BLD AUTO: 0.2 % (ref 0–0.9)
LYMPHOCYTES # BLD AUTO: 2.42 K/UL (ref 1–4.8)
LYMPHOCYTES NFR BLD: 36.9 % (ref 22–41)
MCH RBC QN AUTO: 29.8 PG (ref 27–33)
MCHC RBC AUTO-ENTMCNC: 32.6 G/DL (ref 33.7–35.3)
MCV RBC AUTO: 91.5 FL (ref 81.4–97.8)
MONOCYTES # BLD AUTO: 0.71 K/UL (ref 0–0.85)
MONOCYTES NFR BLD AUTO: 10.8 % (ref 0–13.4)
NEUTROPHILS # BLD AUTO: 3.1 K/UL (ref 1.82–7.42)
NEUTROPHILS NFR BLD: 47.3 % (ref 44–72)
NRBC # BLD AUTO: 0 K/UL
NRBC BLD-RTO: 0 /100 WBC
PLATELET # BLD AUTO: 216 K/UL (ref 164–446)
PMV BLD AUTO: 11.1 FL (ref 9–12.9)
POTASSIUM SERPL-SCNC: 3.9 MMOL/L (ref 3.6–5.5)
PROT SERPL-MCNC: 6.5 G/DL (ref 6–8.2)
RBC # BLD AUTO: 4.36 M/UL (ref 4.7–6.1)
SODIUM SERPL-SCNC: 138 MMOL/L (ref 135–145)
WBC # BLD AUTO: 6.6 K/UL (ref 4.8–10.8)

## 2020-06-12 PROCEDURE — 92526 ORAL FUNCTION THERAPY: CPT

## 2020-06-12 PROCEDURE — 97116 GAIT TRAINING THERAPY: CPT

## 2020-06-12 PROCEDURE — 92507 TX SP LANG VOICE COMM INDIV: CPT

## 2020-06-12 PROCEDURE — 36415 COLL VENOUS BLD VENIPUNCTURE: CPT

## 2020-06-12 PROCEDURE — A9270 NON-COVERED ITEM OR SERVICE: HCPCS | Performed by: PHYSICAL MEDICINE & REHABILITATION

## 2020-06-12 PROCEDURE — 770005 HCHG ROOM/CARE - REHAB PRIVATE (11*

## 2020-06-12 PROCEDURE — 700102 HCHG RX REV CODE 250 W/ 637 OVERRIDE(OP): Performed by: PHYSICAL MEDICINE & REHABILITATION

## 2020-06-12 PROCEDURE — 85025 COMPLETE CBC W/AUTO DIFF WBC: CPT

## 2020-06-12 PROCEDURE — 97112 NEUROMUSCULAR REEDUCATION: CPT

## 2020-06-12 PROCEDURE — 99233 SBSQ HOSP IP/OBS HIGH 50: CPT | Performed by: PHYSICAL MEDICINE & REHABILITATION

## 2020-06-12 PROCEDURE — 80053 COMPREHEN METABOLIC PANEL: CPT

## 2020-06-12 PROCEDURE — 97530 THERAPEUTIC ACTIVITIES: CPT

## 2020-06-12 PROCEDURE — 97535 SELF CARE MNGMENT TRAINING: CPT

## 2020-06-12 PROCEDURE — 97129 THER IVNTJ 1ST 15 MIN: CPT

## 2020-06-12 RX ORDER — BUTALBITAL, ACETAMINOPHEN AND CAFFEINE 50; 325; 40 MG/1; MG/1; MG/1
1 TABLET ORAL EVERY 6 HOURS PRN
Status: DISCONTINUED | OUTPATIENT
Start: 2020-06-12 | End: 2020-06-15

## 2020-06-12 RX ADMIN — BUTALBITAL, ACETAMINOPHEN AND CAFFEINE 1 TABLET: 50; 325; 40 TABLET ORAL at 12:28

## 2020-06-12 RX ADMIN — OMEPRAZOLE 20 MG: KIT at 08:34

## 2020-06-12 RX ADMIN — MODAFINIL 100 MG: 100 TABLET ORAL at 11:38

## 2020-06-12 RX ADMIN — MODAFINIL 100 MG: 100 TABLET ORAL at 08:31

## 2020-06-12 RX ADMIN — TRAMADOL HYDROCHLORIDE 50 MG: 50 TABLET, COATED ORAL at 13:56

## 2020-06-12 RX ADMIN — MELATONIN 1000 UNITS: at 08:31

## 2020-06-12 RX ADMIN — FLUOXETINE 20 MG: 20 TABLET, FILM COATED ORAL at 08:31

## 2020-06-12 RX ADMIN — POTASSIUM CHLORIDE 20 MEQ: 1500 TABLET, EXTENDED RELEASE ORAL at 08:31

## 2020-06-12 RX ADMIN — TRAMADOL HYDROCHLORIDE 50 MG: 50 TABLET, COATED ORAL at 19:58

## 2020-06-12 ASSESSMENT — ACTIVITIES OF DAILY LIVING (ADL): TUB_SHOWER_TRANSFER_DESCRIPTION: GRAB BAR

## 2020-06-12 ASSESSMENT — GAIT ASSESSMENTS
GAIT LEVEL OF ASSIST: STAND BY ASSIST
DISTANCE (FEET): 120

## 2020-06-12 NOTE — THERAPY
Physical Therapy   Daily Treatment     Patient Name: Reyes Amezcua  Age:  64 y.o., Sex:  male  Medical Record #: [id number]  Today's Date: 6/12/2020     Precautions  Precautions: (P) Fall Risk, Weight Bearing As Tolerated Right Upper Extremity  Comments: (P) R orbital Fx, R clavicle Fx     Subjective    Patient in bed reporting headache and tingling/pain in BUE forearms, agreeable to session.     Objective       06/12/20 1401   Precautions   Precautions Fall Risk;Weight Bearing As Tolerated Right Upper Extremity   Comments R orbital Fx, R clavicle Fx    Pain 0 - 10 Group   Location Head   Location Orientation Anterior  (superior)   Pain Rating Scale (NPRS) 6   Description Aching   Sleep/Wake Cycle   Sleep & Rest Awake   Gait Functional Level of Assist    Gait Level Of Assist Stand by Assist   Assistive Device None   Distance (Feet) 120   # of Times Distance was Traveled 2   Deviation   (decr. stephanie, mild LOB R, decr. R arm swing, R inattention)   Transfer Functional Level of Assist   Bed, Chair, Wheelchair Transfer Stand by Assist   Bed Chair Wheelchair Transfer Description   (no AD)   Bed Mobility    Supine to Sit Stand by Assist   Sit to Supine Stand by Assist   Sit to Stand Stand by Assist   Scooting Stand by Assist   Rolling Supervised   Neuro-Muscular Treatments   Comments Dynamic gait activities with SBA-CGA: side stepping 2x20 ft each direction, tandem walking 2x15 ft, backwards walking 2x15 ft. With tandem walking patient intermittently using RUE for steadying however remainder of time required no AD.   Interdisciplinary Plan of Care Collaboration   IDT Collaboration with  Family / Caregiver   Patient Position at End of Therapy Seated;Family / Friend in Room  (wife assisting patient to bathroom)   Collaboration Comments wife present for session   PT Total Time Spent   PT Individual Total Time Spent (Mins) 60   PT Charge Group   PT Gait Training 1   PT Neuromuscular Re-Education / Balance 1   PT  "Therapeutic Activities 2     Heating pad applied to superior aspect of head to manage headache; suboccipital release, upper trap stretch in supine. Patient reporting little to no change in symptoms post-intervention.    BUE radial nerve glides in supine ~3-4 minutes each UE, patient reporting improvement in symptoms post-intervention. Seated wrist extensor stretch intermittently throughout session during seated rest breaks.    Assessment    Patient tolerated session fairly, limited by headache which remained constant throughout session. Also reported BUE pain/tingling, with improvements in symptoms after assessment and radial nerve glides. Demonstrating steady improvements in dynamic balance during reps of activity, however improvements are slow to carry over with between-session mobility. Continues to present with mild R inattention during ambulation, with patient cutting close to objects on R side requiring verbal cues.    Plan    Gait training with no AD, standing balance activities, endurance/activity tolerance, BLE strengthening, stair/curb negotiation, cognitive protocols per SLP direction, incorporate cognition/communication into activities, family training, treadmill training, outdoor ambulation.       Physical Therapy Problems     Problem: Mobility     Dates: Start: 06/09/20       Goal: STG-Within one week, patient will ambulate up/down a curb     Dates: Start: 06/09/20       Description: 1) Individualized goal:  Patient will amb with no AD SBA up/down curb  2) Interventions:  PT Group Therapy, PT Gait Training, PT Therapeutic Exercises, PT Neuro Re-Ed/Balance, PT Therapeutic Activity, and PT Manual Therapy            Goal: STG-Within one week, patient will ascend and descend four to six stairs     Dates: Start: 06/09/20       Description: 1) Individualized goal:  Amb up/ down 4 6\" stairs with HR SBA  2) Interventions:  PT Group Therapy, PT Gait Training, PT Therapeutic Exercises, PT Neuro Re-Ed/Balance, PT " Therapeutic Activity, and PT Manual Therapy                  Problem: Mobility Transfers     Dates: Start: 05/30/20       Goal: STG-Within one week, patient will perform bed mobility     Dates: Start: 05/30/20       Description: 1) Individualized goal:  with hospital functions and SBA.  2) Interventions:  PT Group Therapy, PT Gait Training, PT Therapeutic Exercises, PT Neuro Re-Ed/Balance, PT Aquatic Therapy, PT Therapeutic Activity, and PT Evaluation    Note:     Goal Note filed on 06/09/20 1102 by Dinora Catalan, JOSET    CGA  Impulsive, R clavical fx/ WBAT                  Goal: STG-Within one week, patient will sit to stand     Dates: Start: 06/09/20       Description: 1) Individualized goal:  Transfer SBA no AD STS/SPT, with set up as needed  2) Interventions:  PT Group Therapy, PT Gait Training, PT Therapeutic Exercises, PT Neuro Re-Ed/Balance, PT Therapeutic Activity, and PT Manual Therapy                  Problem: PT-Long Term Goals     Dates: Start: 05/30/20       Goal: LTG-By discharge, patient will ambulate     Dates: Start: 05/30/20       Description: 1) Individualized goal:  150 ft with LRAD and supervision.  2) Interventions:  PT Group Therapy, PT Gait Training, PT Therapeutic Exercises, PT Neuro Re-Ed/Balance, PT Aquatic Therapy, PT Therapeutic Activity, and PT Evaluation          Goal: LTG-By discharge, patient will transfer one surface to another     Dates: Start: 05/30/20       Description: 1) Individualized goal:  with LRAD and supervision.  2) Interventions:  PT Group Therapy, PT Gait Training, PT Therapeutic Exercises, PT Neuro Re-Ed/Balance, PT Aquatic Therapy, PT Therapeutic Activity, and PT Evaluation            Goal: LTG-By discharge, patient will ambulate up/down flight of stairs     Dates: Start: 05/30/20       Description: 1) Individualized goal:  with single rail and supervision.  2) Interventions:  PT Group Therapy, PT Gait Training, PT Therapeutic Exercises, PT Neuro Re-Ed/Balance, PT  Aquatic Therapy, PT Therapeutic Activity, and PT Evaluation            Goal: LTG-By discharge, patient will transfer in/out of a car     Dates: Start: 05/30/20       Description: 1) Individualized goal:  with LRAD and supervision.  2) Interventions:  PT Group Therapy, PT Gait Training, PT Therapeutic Exercises, PT Neuro Re-Ed/Balance, PT Aquatic Therapy, PT Therapeutic Activity, and PT Evaluation            Goal: LTG-By discharge, patient will     Dates: Start: 05/30/20       Description: 1) Individualized goal:  perform bed mobility independently and no bed functions.  2) Interventions:  PT Group Therapy, PT Gait Training, PT Therapeutic Exercises, PT Neuro Re-Ed/Balance, PT Aquatic Therapy, PT Therapeutic Activity, and PT Evaluation            Goal: LTG-By discharge, patient will     Dates: Start: 05/30/20       Description: 1) Individualized goal:  negotiate single curb with LRAD and supervision.  2) Interventions:  PT Group Therapy, PT Gait Training, PT Therapeutic Exercises, PT Neuro Re-Ed/Balance, PT Aquatic Therapy, PT Therapeutic Activity, and PT Evaluation

## 2020-06-12 NOTE — THERAPY
Occupational Therapy  Daily Treatment     Patient Name: Reyes Amezcua  Age:  64 y.o., Sex:  male  Medical Record #: [id number]  Today's Date: 6/12/2020     Precautions  Precautions: Fall Risk, Weight Bearing As Tolerated Right Upper Extremity  Comments: R orbital Fx, R clavicle Fx     Safety   ADL Safety : Requires Cueing for Safety  Bathroom Safety: Requires Cuing for Safety    Subjective    Patient agreeable to participate in OT. Per patient and spouse he previously enjoyed jigsaw puzzles, thus this cog activity was incorporated into OT session (in standing).      Objective     06/12/20 0831   Precautions   Precautions Fall Risk;Weight Bearing As Tolerated Right Upper Extremity   Comments R orbital Fx, R clavicle Fx    Cognition    Level of Consciousness Alert   Attention Impaired   Functional Level of Assist   Grooming Minimal Assist  (Min assist for shaving back of head, SBA for shaving beard )   Grooming Description Supervision for safety;Other (comment)  (while standing at sink )   Bathing Supervision  (while incorporating sitting and standing (with GB))   Bathing Description Grab bar;Increased time   Upper Body Dressing Supervision   Upper Body Dressing Description Increased time;Supervision for safety;Other (comment)  (in standing )   Lower Body Dressing Supervision  (for underwear and elastic waist pants)   Lower Body Dressing Description Increased time   Tub / Shower Transfers Supervised   Tub Shower Transfer Description Grab bar   Interdisciplinary Plan of Care Collaboration   Patient Position at End of Therapy Seated;Self Releasing Lap Belt Applied   OT Total Time Spent   OT Individual Total Time Spent (Mins) 60   OT Charge Group   OT Self Care / ADL 3   OT Cognitive Skill Development First 15 Minutes 1     Patient completed 2/3 of 24 jumbo-piece puzzle while standing. Required VCs for attention to task and problem solving.     Assessment    Patient tolerated OT session well with focus on ADLs and  functional cognition. Patient requires increased time to perform tasks due to fatigue and cues for functional problem solving.    Plan    Attention to task, functional cog and sequencing during ADLs/IADLs, balance and functional mobility, endurance, TBI education, management of double vision, additional dough available for baking    Occupational Therapy Goals     Problem: Functional Transfers     Dates: Start: 05/30/20       Goal: STG-Within one week, patient will transfer to toilet     Dates: Start: 05/30/20       Description: 1) Individualized Goal:  SBA with AE/DME PRN  2) Interventions:  OT E Stim Attended, OT Group Therapy, OT Self Care/ADL, OT Cognitive Skill Dev, OT Manual Ther Technique, OT Neuro Re-Ed/Balance, OT Sensory Int Techniques, OT Therapeutic Activity, OT Aquatic Therapy, OT Evaluation, and OT Therapeutic Exercise          Goal: STG-Within one week, patient will transfer to step in shower     Dates: Start: 05/30/20       Description: 1) Individualized Goal:  Supervision level with AE PRN  2) Interventions:  OT E Stim Attended, OT Group Therapy, OT Self Care/ADL, OT Cognitive Skill Dev, OT Manual Ther Technique, OT Neuro Re-Ed/Balance, OT Sensory Int Techniques, OT Therapeutic Activity, OT Aquatic Therapy, OT Evaluation, and OT Therapeutic Exercise                Problem: OT Long Term Goals     Dates: Start: 05/30/20       Goal: LTG-By discharge, patient will complete basic self care tasks     Dates: Start: 05/30/20       Description: 1) Individualized Goal:  Min A - Supervision with AE PRN  2) Interventions:  OT E Stim Attended, OT Group Therapy, OT Self Care/ADL, OT Cognitive Skill Dev, OT Manual Ther Technique, OT Neuro Re-Ed/Balance, OT Sensory Int Techniques, OT Therapeutic Activity, OT Aquatic Therapy, OT Evaluation, and OT Therapeutic Exercise          Goal: LTG-By discharge, patient will perform bathroom transfers     Dates: Start: 05/30/20       Description: 1) Individualized Goal:  Supervision with AE/DME PRN   2) Interventions:  OT E Stim Attended, OT Group Therapy, OT Self Care/ADL, OT Cognitive Skill Dev, OT Manual Ther Technique, OT Neuro Re-Ed/Balance, OT Sensory Int Techniques, OT Therapeutic Activity, OT Aquatic Therapy, OT Evaluation, and OT Therapeutic Exercise
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
## 2020-06-12 NOTE — THERAPY
Speech Language Pathology  Daily Treatment     Patient Name: Reyes Amezcua  Age:  64 y.o., Sex:  male  Medical Record #: [id number]  Today's Date: 6/12/2020     Subjective    Pt pleasant and cooperative, laughing with wife during session.      Objective     06/12/20 1104   Expressive Language   Naming Minimal (4)   Dysphagia    Positioning / Behavior Modification Self Monitoring   Diet / Liquid Recommendation Thin (0);Regular (7)   Nutritional Liquid Intake Rating Scale Non thickened beverages   Nutritional Food Intake Rating Scale Total oral diet with no restrictions   WPTAS ([place] Post-traumatic Amnesia Scale)   How old are you? 0   What is your date of birth? 1   What month are we in? 1   What time of day is it? (morning, noon, or night) 1   What day of the week is it? 0   What year are we in? 0   What is the name of this place 0   Do you remember me? 1   What is my name? 1   Target Picture 1 1   Target Picture 2 1   Target Picture 3 1   Orientation Score 3   Recall Score 5   Total Score 8   Still in post-traumatic amnesia? Yes   Interdisciplinary Plan of Care Collaboration   IDT Collaboration with  Family / Caregiver   Patient Position at End of Therapy Seated;Self Releasing Lap Belt Applied;Family / Friend in Room   Collaboration Comments spouse present for session   SLP Total Time Spent   SLP Individual Total Time Spent (Mins) 60   Charge Group   SLP Treatment - Individual Speech Language Treatment - Individual   SLP Swallowing Dysfunction Treatment Swallowing Dysfunction Treatment       Assessment    The [place] Post-traumatic Amnesia Scale (WPTAS) is a brief bedside standardized test that measures length of post-traumatic amnesia (PTA) in people with traumatic brain injury. It consists of twelve questions that assess orientation to person, place and time, and ability to consistently retain new information from one day to another. It is administered once a day, each and every day, until the patient  achieves a perfect score across three consecutive days, after which the individual is deemed to have emerged from post-traumatic amnesia.     WPTAS ([place] Post-traumatic Amnesia Scale)  How old are you?: Incorrect  What is your date of birth?: Correct  What month are we in?: Correct  What time of day is it? (morning, noon, or night): Correct  What day of the week is it?: Incorrect  What year are we in?: Incorrect  What is the name of this place: Incorrect  Do you remember me?: Correct  What is my name?: Correct  Target Picture 1: Correct  Target Picture 2: Correct  Target Picture 3: Correct  Orientation Score: 3  Recall Score: 5  Total Score: 8  Still in post-traumatic amnesia?: Yes    With cues pt was able to identify correct age and JERRY.   Picture namin% IND, pt with appropriate use of gestures or description on those targets unable to identify independently. With sentence completion cues, 100%.    Pt seen during functional meal setting, continues to demonstrate rapid rate of consumption, however, is able to tolerate without issue. Recommend d/c dysphagia intervention, no further swallow therapy recommended. Pt to remain on regular textures/thin liquids. Spouse requesting pt in dining room due to cognitive impairment, will discuss with physician.     Plan    D/c dysphagia intervention, continue [place] PTA, expressive/receptive language.     Speech Therapy Problems     Problem: Comprehension STGs     Dates: Start: 06/10/20       Goal: STG-Within one week, patient will     Dates: Start: 06/10/20       Description: 1) Individualized goal:  follow 2-step auditory commands with 80% accuracy provided no more than 1 repetition.   2) Interventions:  SLP Speech Language Treatment              Goal: STG-Within one week, patient will     Dates: Start: 06/10/20       Description: 1) Individualized goal:  complete sentence completion given FO4 choices with 80% accuracy provided SPV  2) Interventions:  SLP Speech  Language Treatment                    Problem: Expression STGs     Dates: Start: 06/10/20       Goal: STG-Within one week, patient will     Dates: Start: 06/10/20       Description: 1) Individualized goal:  answer wh- questions related to highly personal information in 8/10 trials with use of initial phoneme cue (verbal or written)  2) Interventions:  SLP Speech Language Treatment              Goal: STG-Within one week, patient will     Dates: Start: 06/10/20       Description: 1) Individualized goal:  complete object/picture naming with 80% accuracy provided MIN cues.   2) Interventions:  SLP Speech Language Treatment                    Problem: Problem Solving STGs     Dates: Start: 06/10/20       Description: 1) Individualized goal: see goal under STG  2) Interventions:  SLP Cognitive Skill Development        Goal: STG-Within one week, patient will     Dates: Start: 06/10/20       Description: 1) Individualized goal:  score 12/12 on the WPTA for 3 consecutive days.   2) Interventions:  SLP Speech Language Treatment and SLP Cognitive Skill Development                    Problem: Speech/Swallowing LTGs     Dates: Start: 05/30/20       Goal: LTG-By discharge, patient will safely swallow     Dates: Start: 05/30/20       Description: 1) Individualized goal: least restrictive diet without overt s/sx of asp for 100% of PO intake  2) Interventions:  SLP Swallowing Dysfunction Treatment and SLP Oral Pharyngeal Evaluation              Goal: LTG-By discharge, patient will comprehend     Dates: Start: 05/30/20       Description: 1) Individualized goal:  simple functional language (spoken, pictures, written) to participate with care with at least 80% accuracy and minimal cues  2) Interventions:  SLP Speech Language Treatment and SLP Cognitive Skill Development              Goal: LTG-By discharge, patient will express     Dates: Start: 05/30/20       Description: 1) Individualized goal: basic wants/needs to participate with  care for 4/5 opportunities and minimal cues  2) Interventions:  SLP Speech Language Treatment                    Problem: Swallowing STGs     Dates: Start: 06/10/20       Goal: STG-Within one week, patient will     Dates: Start: 06/10/20       Description: 1) Individualized goal:  safely swallow regular textures/thin liquids with no overt s/sx of pen/asp, MIN cues for use of compensatory strategies (monitoring rate, bolus size, serial swallows to clear residue)  2) Interventions:  SLP Swallowing Dysfunction Treatment

## 2020-06-12 NOTE — CARE PLAN
Problem: Safety  Goal: Will remain free from injury  Outcome: PROGRESSING AS EXPECTED  Note: Per speech therapy pt is able to take pills one at a time with thin liquids, pt tolerated well with no s/s of aspiration.      Problem: Pain Management  Goal: Pain level will decrease to patient's comfort goal  Outcome: PROGRESSING AS EXPECTED  Note: Patient able to perform regular activities this shift.  Pain control Fiorecet for HA this shift.  Pain management includes PRN pain meds as well as non-pharmacological measures such as emotional support, rest, and repositioning.

## 2020-06-12 NOTE — CARE PLAN
Problem: Communication  Goal: The ability to communicate needs accurately and effectively will improve  Description: Pt is unable to effectively communicate needs. Pt rounded on hourly to make sure needs were met. Will continue to monitor.   Outcome: PROGRESSING SLOWER THAN EXPECTED  Note: Pt is aphasic and will need extra time to communicate his needs to staff effectively     Problem: Safety  Goal: Will remain free from injury  Outcome: PROGRESSING SLOWER THAN EXPECTED  Note: Pt is impulsive with alarms in place for safety     Problem: Infection  Goal: Will remain free from infection  Outcome: PROGRESSING AS EXPECTED  Note: No s/s of infection present

## 2020-06-12 NOTE — PROGRESS NOTES
Spiritual Care Note    Patient Information     Patient's Name: Reyes Amezcua   MRN: [id number]    YOB: 1955   Age and Gender: 64 y.o. male   Service Area: Inpatient    Room (and Bed): [place] [address]   Ethnicity or Nationality:     Primary Language: English   Presybeterian/Spiritual preference: Faith   Place of Residence: [place]   Family/Friends/Others Present: Yes, wife   Clinical Team Present: Yes   Medical Diagnosis(-es)/Procedure(s): TBI   Code Status: Full Code    Date of Admission: 5/29/2020   Length of Stay: 14 days        Spiritual Care Provider Information:  Name of Spiritual Care Provider: Clare Leos  Title of Spiritual Care Provider: Associate   Phone Number: [phone]  E-mail: [email or web address]  Total time : 5 minutes    Spiritual Screen Results:    Gen Nursing        Palliative Care         Encounter/Request Information  Encounter/Request Type   Visited With: Patient and family together  Nature of the Visit: Follow-up, On shift  Continue Visiting: Yes  Next Follow-up Date: 06/03/20  General Visit: Yes  Referral From/ Origin of Request: Verbal family    Religous Needs/Values  Presybeterian Needs Visit  Presybeterian Needs: Prayer    Spiritual Assessment     Spiritual Care Encounters    Observations/Symptoms: Accepting, Confusion, Thankfulness    Interaction/Conversation: Pt wasn't in room;  found him and his wife in the gym with a provider.   said hello and promised to visit next week.    Interventions: Compassionate presence, active listening, prayer.    Plan: Weekly Visits    Notes:

## 2020-06-13 PROCEDURE — 700102 HCHG RX REV CODE 250 W/ 637 OVERRIDE(OP): Performed by: PHYSICAL MEDICINE & REHABILITATION

## 2020-06-13 PROCEDURE — A9270 NON-COVERED ITEM OR SERVICE: HCPCS | Performed by: PHYSICAL MEDICINE & REHABILITATION

## 2020-06-13 PROCEDURE — 97129 THER IVNTJ 1ST 15 MIN: CPT

## 2020-06-13 PROCEDURE — 770005 HCHG ROOM/CARE - REHAB PRIVATE (11*

## 2020-06-13 PROCEDURE — 97130 THER IVNTJ EA ADDL 15 MIN: CPT

## 2020-06-13 PROCEDURE — 92507 TX SP LANG VOICE COMM INDIV: CPT

## 2020-06-13 RX ADMIN — MELATONIN 1000 UNITS: at 08:54

## 2020-06-13 RX ADMIN — TRAMADOL HYDROCHLORIDE 50 MG: 50 TABLET, COATED ORAL at 12:43

## 2020-06-13 RX ADMIN — ACETAMINOPHEN 650 MG: 325 TABLET, FILM COATED ORAL at 22:28

## 2020-06-13 RX ADMIN — MODAFINIL 100 MG: 100 TABLET ORAL at 11:51

## 2020-06-13 RX ADMIN — ACETAMINOPHEN 650 MG: 325 TABLET, FILM COATED ORAL at 08:54

## 2020-06-13 RX ADMIN — OMEPRAZOLE 20 MG: KIT at 08:54

## 2020-06-13 RX ADMIN — MODAFINIL 100 MG: 100 TABLET ORAL at 08:55

## 2020-06-13 RX ADMIN — FLUOXETINE 20 MG: 20 TABLET, FILM COATED ORAL at 08:54

## 2020-06-13 RX ADMIN — TRAMADOL HYDROCHLORIDE 50 MG: 50 TABLET, COATED ORAL at 17:56

## 2020-06-13 NOTE — THERAPY
Speech Language Pathology  Daily Treatment     Patient Name: Reyes Amezcua  Age:  64 y.o., Sex:  male  Medical Record #: [id number]  Today's Date: 6/13/2020     Subjective    Pt pleasant and cooperative during ST session. Pt's spouse present and supportive at beginning and end of session.      Objective       06/13/20 1031   Receptive Language / Auditory Comprehension   Answers Yes / No Personal Questions Minimal (4)   Reading Comprehension    Matches Letters Moderate (3)   Matches Words to Pictures / Objects Minimal (4)   Reading Phrases Minimal (4)   Reading Sentences Minimal (4)   Sleep/Wake Cycle   Sleep & Rest Awake   WPTAS ([place] Post-traumatic Amnesia Scale)   How old are you? 0   What is your date of birth? 1   What month are we in? 1   What time of day is it? (morning, noon, or night) 0   What day of the week is it? 1   What year are we in? 1   What is the name of this place 0   Do you remember me? 0   What is my name? 1   Target Picture 1 1   Target Picture 2 1   Target Picture 3 1   Orientation Score 4   Recall Score 4   Total Score 8   Still in post-traumatic amnesia? Yes   Interdisciplinary Plan of Care Collaboration   IDT Collaboration with  Family / Caregiver   Patient Position at End of Therapy In Bed;Bed Alarm On;Tray Table within Reach;Call Light within Reach;Phone within Reach;Family / Friend in Room   Collaboration Comments Session targets and carryover ideas.    SLP Total Time Spent   SLP Individual Total Time Spent (Mins) 60   Charge Group   SLP Treatment - Individual Speech Language Treatment - Individual   SLP Cognitive Skill Development First 15 Minutes 1   SLP Cognitive Skill Development Additional 15 Minutes 1       Assessment    Pt achieved a score of 8/12 on WPTAS and continues to be in post-traumatic amnesia.   2step directives- 71% acc IND, 100% w/ MIN repetitions.   Phrase level comprehension w/ FO4 pictures 100% acc; Sentence level comprehension w/ words- 75% acc and MIN A.      Plan    Continue POC.     Speech Therapy Problems     Problem: Comprehension STGs     Dates: Start: 06/10/20       Goal: STG-Within one week, patient will     Dates: Start: 06/10/20       Description: 1) Individualized goal:  follow 2-step auditory commands with 80% accuracy provided no more than 1 repetition.   2) Interventions:  SLP Speech Language Treatment              Goal: STG-Within one week, patient will     Dates: Start: 06/10/20       Description: 1) Individualized goal:  complete sentence completion given FO4 choices with 80% accuracy provided SPV  2) Interventions:  SLP Speech Language Treatment                    Problem: Expression STGs     Dates: Start: 06/10/20       Goal: STG-Within one week, patient will     Dates: Start: 06/10/20       Description: 1) Individualized goal:  answer wh- questions related to highly personal information in 8/10 trials with use of initial phoneme cue (verbal or written)  2) Interventions:  SLP Speech Language Treatment              Goal: STG-Within one week, patient will     Dates: Start: 06/10/20       Description: 1) Individualized goal:  complete object/picture naming with 80% accuracy provided MIN cues.   2) Interventions:  SLP Speech Language Treatment                    Problem: Problem Solving STGs     Dates: Start: 06/10/20       Description: 1) Individualized goal: see goal under STG  2) Interventions:  SLP Cognitive Skill Development        Goal: STG-Within one week, patient will     Dates: Start: 06/10/20       Description: 1) Individualized goal:  score 12/12 on the WPTA for 3 consecutive days.   2) Interventions:  SLP Speech Language Treatment and SLP Cognitive Skill Development                    Problem: Speech/Swallowing LTGs     Dates: Start: 05/30/20       Goal: LTG-By discharge, patient will safely swallow     Dates: Start: 05/30/20       Description: 1) Individualized goal: least restrictive diet without overt s/sx of asp for 100% of PO  intake  2) Interventions:  SLP Swallowing Dysfunction Treatment and SLP Oral Pharyngeal Evaluation              Goal: LTG-By discharge, patient will comprehend     Dates: Start: 05/30/20       Description: 1) Individualized goal:  simple functional language (spoken, pictures, written) to participate with care with at least 80% accuracy and minimal cues  2) Interventions:  SLP Speech Language Treatment and SLP Cognitive Skill Development              Goal: LTG-By discharge, patient will express     Dates: Start: 05/30/20       Description: 1) Individualized goal: basic wants/needs to participate with care for 4/5 opportunities and minimal cues  2) Interventions:  SLP Speech Language Treatment                    Problem: Swallowing STGs     Dates: Start: 06/10/20       Goal: STG-Within one week, patient will     Dates: Start: 06/10/20       Description: 1) Individualized goal:  safely swallow regular textures/thin liquids with no overt s/sx of pen/asp, MIN cues for use of compensatory strategies (monitoring rate, bolus size, serial swallows to clear residue)  2) Interventions:  SLP Swallowing Dysfunction Treatment

## 2020-06-13 NOTE — CARE PLAN
Problem: Safety  Goal: Will remain free from injury  Outcome: PROGRESSING AS EXPECTED  Note: Patient demonstrates good safety technique this shift.  Asks for assistance when needed and does not attempt self transfer.  Able to verbalize needs.       Problem: Skin Integrity  Goal: Risk for impaired skin integrity will decrease  Outcome: PROGRESSING AS EXPECTED  Note: Patient's skin remains intact and free from new or accidental injury this shift.

## 2020-06-13 NOTE — PROGRESS NOTES
Received shift report and assumed care of patient.  Patient awake, calm and stable, currently positioned in bed for comfort and safety; call light within reach.  4/10 right shoulder and arm pain at this time,see mar for medication.  Will continue to monitor.

## 2020-06-14 PROCEDURE — 770005 HCHG ROOM/CARE - REHAB PRIVATE (11*

## 2020-06-14 PROCEDURE — 92507 TX SP LANG VOICE COMM INDIV: CPT

## 2020-06-14 PROCEDURE — A9270 NON-COVERED ITEM OR SERVICE: HCPCS | Performed by: PHYSICAL MEDICINE & REHABILITATION

## 2020-06-14 PROCEDURE — 700102 HCHG RX REV CODE 250 W/ 637 OVERRIDE(OP): Performed by: PHYSICAL MEDICINE & REHABILITATION

## 2020-06-14 RX ADMIN — FLUOXETINE 20 MG: 20 TABLET, FILM COATED ORAL at 09:16

## 2020-06-14 RX ADMIN — BUTALBITAL, ACETAMINOPHEN AND CAFFEINE 1 TABLET: 50; 325; 40 TABLET ORAL at 09:18

## 2020-06-14 RX ADMIN — MODAFINIL 100 MG: 100 TABLET ORAL at 11:32

## 2020-06-14 RX ADMIN — MELATONIN 1000 UNITS: at 09:16

## 2020-06-14 RX ADMIN — TRAZODONE HYDROCHLORIDE 50 MG: 50 TABLET ORAL at 23:47

## 2020-06-14 RX ADMIN — MODAFINIL 100 MG: 100 TABLET ORAL at 09:16

## 2020-06-14 RX ADMIN — OMEPRAZOLE 20 MG: KIT at 09:15

## 2020-06-14 RX ADMIN — TRAMADOL HYDROCHLORIDE 50 MG: 50 TABLET, COATED ORAL at 23:47

## 2020-06-14 RX ADMIN — BUTALBITAL, ACETAMINOPHEN AND CAFFEINE 1 TABLET: 50; 325; 40 TABLET ORAL at 17:28

## 2020-06-14 RX ADMIN — ACETAMINOPHEN 650 MG: 325 TABLET, FILM COATED ORAL at 21:52

## 2020-06-14 ASSESSMENT — FIBROSIS 4 INDEX: FIB4 SCORE: .987654320987654321

## 2020-06-14 NOTE — CARE PLAN
Problem: Safety  Goal: Will remain free from injury  Outcome: PROGRESSING AS EXPECTED  Note: Call light with in reach. Redirection to use call light for assistance. Non skid socks. Upper siderails up x2 while in bed. Bed alarm for safety awareness.     Problem: Pain Management  Goal: Pain level will decrease to patient's comfort goal  Outcome: PROGRESSING AS EXPECTED  Note: Educate patient of non-pharmacological comfort measures: repositioning, relaxation/breathing technique, cold compress and activities. HS snacks given.

## 2020-06-14 NOTE — CARE PLAN
Problem: Safety  Goal: Will remain free from injury  Description: Pt uses call light consistently and appropriately. Waits for assistance does not attempt self transfer this shift. Able to verbalize needs.   Outcome: PROGRESSING AS EXPECTED  Goal: Will remain free from falls  Outcome: PROGRESSING AS EXPECTED   Pt education for fall risk and precautions AND pt safety reinforced throughout shift, pt shows poor understanding at times, pt has attempted to self transfer during shift, encouraged spouse to stay with pt, pt appears to have good family support, will continue reinforce education, will continue to monitor. Fall precautions in place as per order.

## 2020-06-14 NOTE — THERAPY
Speech Language Pathology  Daily Treatment     Patient Name: Reyes Amezcua  Age:  64 y.o., Sex:  male  Medical Record #: [id number]  Today's Date: 6/14/2020     Subjective    Pt pleasant and cooperative during tx.  Wife present and supportive.      Objective       06/14/20 1031   Receptive Language / Auditory Comprehension   Follows Two Unit Commands Within Functional Limits (6-7)   Follows Three Unit Commands Minimal (4)   Expressive Language   Naming Minimal (4)   Word Finding Deficits Moderate (3)   WPTAS ([place] Post-traumatic Amnesia Scale)   How old are you? 0   What is your date of birth? 1   What month are we in? 1   What time of day is it? (morning, noon, or night) 0   What day of the week is it? 1   What year are we in? 1   What is the name of this place 0   Do you remember me? 1   What is my name? 0   Target Picture 1 1   Target Picture 2 1   Target Picture 3 1   Orientation Score 4   Recall Score 4   Total Score 8   Still in post-traumatic amnesia? Yes   Level of Severity Moderate   Interdisciplinary Plan of Care Collaboration   IDT Collaboration with  Family / Caregiver   Collaboration Comments Wife present during session.    SLP Total Time Spent   SLP Individual Total Time Spent (Mins) 60   Charge Group   SLP Treatment - Individual Speech Language Treatment - Individual       Assessment    Sentence completion (VF=4): 92% independent.  Naming (IPAD): 92% independent.  Naming (objects in room): 80% independent.  Spelling single words: 5/8 independent, 100% min-modA.  2 step directives: 100% independent.  3 step directives: 70% independent; 100% Renaldo.      Plan    Target expressive/receptive language, WPTA scale    Speech Therapy Problems     Problem: Comprehension STGs     Dates: Start: 06/10/20       Goal: STG-Within one week, patient will     Dates: Start: 06/10/20       Description: 1) Individualized goal:  follow 2-step auditory commands with 80% accuracy provided no more than 1 repetition.   2)  Interventions:  SLP Speech Language Treatment              Goal: STG-Within one week, patient will     Dates: Start: 06/10/20       Description: 1) Individualized goal:  complete sentence completion given FO4 choices with 80% accuracy provided SPV  2) Interventions:  SLP Speech Language Treatment                    Problem: Expression STGs     Dates: Start: 06/10/20       Goal: STG-Within one week, patient will     Dates: Start: 06/10/20       Description: 1) Individualized goal:  answer wh- questions related to highly personal information in 8/10 trials with use of initial phoneme cue (verbal or written)  2) Interventions:  SLP Speech Language Treatment              Goal: STG-Within one week, patient will     Dates: Start: 06/10/20       Description: 1) Individualized goal:  complete object/picture naming with 80% accuracy provided MIN cues.   2) Interventions:  SLP Speech Language Treatment                    Problem: Problem Solving STGs     Dates: Start: 06/10/20       Description: 1) Individualized goal: see goal under STG  2) Interventions:  SLP Cognitive Skill Development        Goal: STG-Within one week, patient will     Dates: Start: 06/10/20       Description: 1) Individualized goal:  score 12/12 on the WPTA for 3 consecutive days.   2) Interventions:  SLP Speech Language Treatment and SLP Cognitive Skill Development                    Problem: Speech/Swallowing LTGs     Dates: Start: 05/30/20       Goal: LTG-By discharge, patient will safely swallow     Dates: Start: 05/30/20       Description: 1) Individualized goal: least restrictive diet without overt s/sx of asp for 100% of PO intake  2) Interventions:  SLP Swallowing Dysfunction Treatment and SLP Oral Pharyngeal Evaluation              Goal: LTG-By discharge, patient will comprehend     Dates: Start: 05/30/20       Description: 1) Individualized goal:  simple functional language (spoken, pictures, written) to participate with care with at least 80%  accuracy and minimal cues  2) Interventions:  SLP Speech Language Treatment and SLP Cognitive Skill Development              Goal: LTG-By discharge, patient will express     Dates: Start: 05/30/20       Description: 1) Individualized goal: basic wants/needs to participate with care for 4/5 opportunities and minimal cues  2) Interventions:  SLP Speech Language Treatment                    Problem: Swallowing STGs     Dates: Start: 06/10/20       Goal: STG-Within one week, patient will     Dates: Start: 06/10/20       Description: 1) Individualized goal:  safely swallow regular textures/thin liquids with no overt s/sx of pen/asp, MIN cues for use of compensatory strategies (monitoring rate, bolus size, serial swallows to clear residue)  2) Interventions:  SLP Swallowing Dysfunction Treatment

## 2020-06-15 PROCEDURE — 99233 SBSQ HOSP IP/OBS HIGH 50: CPT | Performed by: PHYSICAL MEDICINE & REHABILITATION

## 2020-06-15 PROCEDURE — 700102 HCHG RX REV CODE 250 W/ 637 OVERRIDE(OP): Performed by: PHYSICAL MEDICINE & REHABILITATION

## 2020-06-15 PROCEDURE — A9270 NON-COVERED ITEM OR SERVICE: HCPCS | Performed by: PHYSICAL MEDICINE & REHABILITATION

## 2020-06-15 PROCEDURE — 770005 HCHG ROOM/CARE - REHAB PRIVATE (11*

## 2020-06-15 PROCEDURE — 97530 THERAPEUTIC ACTIVITIES: CPT

## 2020-06-15 PROCEDURE — 97129 THER IVNTJ 1ST 15 MIN: CPT

## 2020-06-15 PROCEDURE — 97112 NEUROMUSCULAR REEDUCATION: CPT

## 2020-06-15 PROCEDURE — 92507 TX SP LANG VOICE COMM INDIV: CPT | Performed by: SPEECH-LANGUAGE PATHOLOGIST

## 2020-06-15 PROCEDURE — 97116 GAIT TRAINING THERAPY: CPT

## 2020-06-15 RX ORDER — MODAFINIL 100 MG/1
100 TABLET ORAL EVERY MORNING
Status: DISCONTINUED | OUTPATIENT
Start: 2020-06-16 | End: 2020-06-17

## 2020-06-15 RX ORDER — OMEPRAZOLE 20 MG/1
20 CAPSULE, DELAYED RELEASE ORAL DAILY
Status: DISCONTINUED | OUTPATIENT
Start: 2020-06-15 | End: 2020-06-15

## 2020-06-15 RX ORDER — BUTALBITAL, ACETAMINOPHEN AND CAFFEINE 50; 325; 40 MG/1; MG/1; MG/1
2 TABLET ORAL EVERY 6 HOURS PRN
Status: DISCONTINUED | OUTPATIENT
Start: 2020-06-15 | End: 2020-06-23 | Stop reason: HOSPADM

## 2020-06-15 RX ORDER — OMEPRAZOLE 20 MG/1
20 CAPSULE, DELAYED RELEASE ORAL DAILY
Status: DISCONTINUED | OUTPATIENT
Start: 2020-06-16 | End: 2020-06-23 | Stop reason: HOSPADM

## 2020-06-15 RX ADMIN — MODAFINIL 100 MG: 100 TABLET ORAL at 11:30

## 2020-06-15 RX ADMIN — MELATONIN 1000 UNITS: at 08:02

## 2020-06-15 RX ADMIN — MODAFINIL 100 MG: 100 TABLET ORAL at 08:02

## 2020-06-15 RX ADMIN — TRAMADOL HYDROCHLORIDE 50 MG: 50 TABLET, COATED ORAL at 13:20

## 2020-06-15 RX ADMIN — FLUOXETINE 20 MG: 20 TABLET, FILM COATED ORAL at 08:02

## 2020-06-15 RX ADMIN — OMEPRAZOLE 20 MG: KIT at 08:02

## 2020-06-15 RX ADMIN — BUTALBITAL, ACETAMINOPHEN AND CAFFEINE 2 TABLET: 50; 325; 40 TABLET ORAL at 11:30

## 2020-06-15 ASSESSMENT — BALANCE ASSESSMENTS
REACHING FORWARD WITH OUTSTRETCHED ARM WHILE STANDING: 4
STANDING UNSUPPORTED: 3
STANDING UNSUPPORTED WITH FEET TOGETHER: 3
SITTING TO STANDING: 3
STANDING ON ONE LEG: 0
LOOK OVER LEFT AND RIGHT SHOULDERS WHILE STANDING: 1
LONG VERSION TOTAL SCORE (MAX 56): 36
PLACE ALTERNATE FOOT ON STEP OR STOOL WHILE STANDING UNSUPPORTED: 2
STANDING TO SITTING: 4
STANDING UNSUPPORTED WITH EYES CLOSED: 3
STANDING UNSUPPORTED ONE FOOT IN FRONT: 0
TRANSFERS: 4
SITTING UNSUPPORTED: 4
TURN 360 DEGREES: 2
PICK UP OBJECT FROM THE FLOOR FROM A STANDING POSITION: 3
LONG VERSION TOTAL SCORE (MAX 56): 36

## 2020-06-15 ASSESSMENT — GAIT ASSESSMENTS
GAIT LEVEL OF ASSIST: CONTACT GUARD ASSIST
DISTANCE (FEET): 1000
DEVIATION: BRADYKINETIC

## 2020-06-15 ASSESSMENT — ACTIVITIES OF DAILY LIVING (ADL): TOILETING_LEVEL_OF_ASSIST_DESCRIPTION: GRAB BAR;INCREASED TIME

## 2020-06-15 NOTE — CARE PLAN
Headache relieved with prn tramadol. All wounds healed. No new skin injury observed. Remains continent. Participated in all therapies and appetite is good. Wife involved and supportive, keeps detailed record of events during hospital stay in notebook which aids patient's memory when asking them specific questions today regarding headaches and when they started. Otherwise patient was only sure they had been having headaches and couldn't remember on own when they started or what makes them worse or better.     Problem: Bowel/Gastric:  Goal: Normal bowel function is maintained or improved  Outcome: PROGRESSING AS EXPECTED  Goal: Will not experience complications related to bowel motility  Outcome: PROGRESSING AS EXPECTED     Problem: Skin Integrity  Goal: Risk for impaired skin integrity will decrease  Outcome: PROGRESSING AS EXPECTED
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
## 2020-06-15 NOTE — WOUND TEAM
Wound Team assessed patient during Skin Prevalence. Neck, abdomen, and right knee wounds are resolved. Completed the wound LDAs. All bony prominences are intact.

## 2020-06-15 NOTE — PROGRESS NOTES
Notified MD of patient's continued rating of headache 8/10. Cold pack tried and tramadol just given. Also complaining of hands feeling cold. Do not feel cold to touch, there is a slight temp difference from pt's arm and hand though.

## 2020-06-15 NOTE — CARE PLAN
Problem: Safety  Goal: Will remain free from injury  Outcome: PROGRESSING AS EXPECTED  Note: Call light with in reach. Redirection to use call light for assistance. Non skid socks. Upper siderails up x2 while in bed. Bed alarm for safety awareness.     Problem: Pain Management  Goal: Pain level will decrease to patient's comfort goal  Outcome: PROGRESSING AS EXPECTED  Note: Educate patient of non-pharmacological comfort measures: repositioning, relaxation/breathing technique, cold compress and activities.

## 2020-06-15 NOTE — THERAPY
Speech Language Pathology  Daily Treatment     Patient Name: Reyes Amezcua  Age:  64 y.o., Sex:  male  Medical Record #: [id number]  Today's Date: 6/15/2020     Subjective    Pt was seen for tx, seated in the speech office. Pt was alert and cooperative. However, pt with s/sx of fatigue at end of session.      Objective       06/15/20 0931   Expressive Language   Naming Moderate (3)   Word Finding Deficits Moderate (3)   Written Language Expression   Spontaneous Word Level Minimal (4)   Cognition   Orientation  Supervision (5)   WPTAS ([place] Post-traumatic Amnesia Scale)   How old are you? 1   What is your date of birth? 1   What month are we in? 1   What time of day is it? (morning, noon, or night) 1   What day of the week is it? 1   What year are we in? 0   What is the name of this place 1   Do you remember me? 1   What is my name? 1   Target Picture 1 1   Target Picture 2 1   Target Picture 3 1   Orientation Score 6   Recall Score 5   Total Score 11   SLP Total Time Spent   SLP Individual Total Time Spent (Mins) 60   Charge Group   SLP Treatment - Individual Speech Language Treatment - Individual       Assessment    SLP administered the WPTAS. Pt scored 11/12, with the only error being the JERRY. SLP presented tasks to target word finding. Pt completed as follows:    Naming pictures: 20/20 independently  General information namin/20 independently, increased to 15/20 with sentence completion or phonemic cue  Category Members (Naming items in a category, starting with a given letter): 4/16 independently, increased to 15/16 when given a sentence completion cue    Plan    Cont POC    Speech Therapy Problems     Problem: Comprehension STGs     Dates: Start: 06/10/20       Goal: STG-Within one week, patient will     Dates: Start: 06/10/20       Description: 1) Individualized goal:  follow 2-step auditory commands with 80% accuracy provided no more than 1 repetition.   2) Interventions:  SLP Speech Language  Treatment              Goal: STG-Within one week, patient will     Dates: Start: 06/10/20       Description: 1) Individualized goal:  complete sentence completion given FO4 choices with 80% accuracy provided SPV  2) Interventions:  SLP Speech Language Treatment                    Problem: Expression STGs     Dates: Start: 06/10/20       Goal: STG-Within one week, patient will     Dates: Start: 06/10/20       Description: 1) Individualized goal:  answer wh- questions related to highly personal information in 8/10 trials with use of initial phoneme cue (verbal or written)  2) Interventions:  SLP Speech Language Treatment              Goal: STG-Within one week, patient will     Dates: Start: 06/10/20       Description: 1) Individualized goal:  complete object/picture naming with 80% accuracy provided MIN cues.   2) Interventions:  SLP Speech Language Treatment                    Problem: Problem Solving STGs     Dates: Start: 06/10/20       Description: 1) Individualized goal: see goal under STG  2) Interventions:  SLP Cognitive Skill Development        Goal: STG-Within one week, patient will     Dates: Start: 06/10/20       Description: 1) Individualized goal:  score 12/12 on the WPTA for 3 consecutive days.   2) Interventions:  SLP Speech Language Treatment and SLP Cognitive Skill Development                    Problem: Speech/Swallowing LTGs     Dates: Start: 05/30/20       Goal: LTG-By discharge, patient will safely swallow     Dates: Start: 05/30/20       Description: 1) Individualized goal: least restrictive diet without overt s/sx of asp for 100% of PO intake  2) Interventions:  SLP Swallowing Dysfunction Treatment and SLP Oral Pharyngeal Evaluation              Goal: LTG-By discharge, patient will comprehend     Dates: Start: 05/30/20       Description: 1) Individualized goal:  simple functional language (spoken, pictures, written) to participate with care with at least 80% accuracy and minimal cues  2)  Interventions:  SLP Speech Language Treatment and SLP Cognitive Skill Development              Goal: LTG-By discharge, patient will express     Dates: Start: 05/30/20       Description: 1) Individualized goal: basic wants/needs to participate with care for 4/5 opportunities and minimal cues  2) Interventions:  SLP Speech Language Treatment                    Problem: Swallowing STGs     Dates: Start: 06/10/20       Goal: STG-Within one week, patient will     Dates: Start: 06/10/20       Description: 1) Individualized goal:  safely swallow regular textures/thin liquids with no overt s/sx of pen/asp, MIN cues for use of compensatory strategies (monitoring rate, bolus size, serial swallows to clear residue)  2) Interventions:  SLP Swallowing Dysfunction Treatment

## 2020-06-15 NOTE — PROGRESS NOTES
Alerted by therapy patient was feeling off. Vital signs stable, patient reported dizziness and double vision. Tried to assess when symptoms started and patient stated when they put their glasses on about ten minutes ago. Stated it does not feel different with them off, though. Reported they are starting to feel better. Eating some fruit and drinking water, will continue to monitor.
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
## 2020-06-15 NOTE — THERAPY
Physical Therapy   Daily Treatment     Patient Name: Reyes Amezcua  Age:  64 y.o., Sex:  male  Medical Record #: [id number]  Today's Date: 6/15/2020     Precautions  Precautions: (P) Fall Risk, Weight Bearing As Tolerated Right Upper Extremity  Comments: (P) R orbital Fx, R clavicle Fx     Subjective    Patient and spouse were receptive to POC, fall risk assessment, home safety recommendations, and safety with outdoor mobility.      Objective       06/15/20 1401   Precautions   Precautions Fall Risk;Weight Bearing As Tolerated Right Upper Extremity   Comments R orbital Fx, R clavicle Fx    Gait Functional Level of Assist    Gait Level Of Assist Contact Guard Assist  (Outside, up/down curb- family training with spouse )   Assistive Device None   Distance (Feet) 1000   # of Times Distance was Traveled 1   Deviation Bradykinetic  (Dec arm swing)   Bed Mobility    Supine to Sit Supervised   Sit to Supine Supervised   Sit to Stand Supervised   Scooting Supervised   Rolling Supervised   Neuro-Muscular Treatments   Neuro-Muscular Treatments Weight Shift Left;Weight Shift Right;Verbal Cuing;Sequencing   Comments Fall recovery training CGA/SBA 2x EOM <> floor using prone progression. Home safety/ fall prevention handouts reviewed/ provided.    Outcome Measures   Outcome Measures Utilized Escobedo Balance Scale   [place]edo Balance Scale   Sitting Unsupported (Score 0-4) 4   Change Of Positon: Sitting To Standing (Score 0-4) 3   Change Of Positon: Standing To Sitting (Score 0-4) 4   Transfers (Score 0-4) 4   Standing Unsupported (Score 0-4) 3   Standing With Eyes Closed (Score 0-4) 3   Standing With Feet Together (Score 0-4) 3   Tandem Standing (Score 0-4) 0   Standing On One Leg (Score 0-4) 0   Turning Trunk (Feet Fixed) (Score 0-4) 1   Retrieving Objects From Floor (Score 0-4) 3   Turning 360 Degrees (Score 0-4) 2   Stool Stepping (Score 0-4) 2   Reaching Forward While Standing (Score 0-4) 4   Esc[place]edo Balance Total Score (0-56) 36  "  Interdisciplinary Plan of Care Collaboration   IDT Collaboration with  Family / Caregiver   Patient Position at End of Therapy Seated;Family / Friend in Room   Collaboration Comments Education on home safety/ fall prevention, fall recovery sequencing, POC   Strengths & Barriers   Strengths Supportive family;Willingly participates in therapeutic activities;Independent prior level of function;Motivated for self care and independence   Barriers Impaired balance;Impaired activity tolerance   PT Total Time Spent   PT Individual Total Time Spent (Mins) 60   PT Charge Group   PT Gait Training 1   PT Neuromuscular Re-Education / Balance 2   PT Therapeutic Activities 1       Assessment    Patient scored 36/56 on HUBER Balance Scale= continued risk for falls. Patient was able to perform fall recovery with CGA/SBA.  Spouse demonstrated safe patient guarding outdoors, including curb mobility with use of gait belt.   Strengths: (P) Supportive family, Willingly participates in therapeutic activities, Independent prior level of function, Motivated for self care and independence  Barriers: (P) Impaired balance, Impaired activity tolerance    Plan    Ongoing dynamic balance, ongoing family training, Treadmill training, dual task, safety with mobility    Physical Therapy Problems     Problem: Mobility     Dates: Start: 06/09/20       Goal: STG-Within one week, patient will ambulate up/down a curb     Dates: Start: 06/09/20       Description: 1) Individualized goal:  Patient will amb with no AD SBA up/down curb  2) Interventions:  PT Group Therapy, PT Gait Training, PT Therapeutic Exercises, PT Neuro Re-Ed/Balance, PT Therapeutic Activity, and PT Manual Therapy            Goal: STG-Within one week, patient will ascend and descend four to six stairs     Dates: Start: 06/09/20       Description: 1) Individualized goal:  Amb up/ down 4 6\" stairs with HR SBA  2) Interventions:  PT Group Therapy, PT Gait Training, PT Therapeutic " Exercises, PT Neuro Re-Ed/Balance, PT Therapeutic Activity, and PT Manual Therapy                  Problem: Mobility Transfers     Dates: Start: 05/30/20       Goal: STG-Within one week, patient will perform bed mobility     Dates: Start: 05/30/20       Description: 1) Individualized goal:  with hospital functions and SBA.  2) Interventions:  PT Group Therapy, PT Gait Training, PT Therapeutic Exercises, PT Neuro Re-Ed/Balance, PT Aquatic Therapy, PT Therapeutic Activity, and PT Evaluation    Note:     Goal Note filed on 06/09/20 1102 by Dinora Catalan, DPT    CGA  Impulsive, R clavical fx/ WBAT                  Goal: STG-Within one week, patient will sit to stand     Dates: Start: 06/09/20       Description: 1) Individualized goal:  Transfer SBA no AD STS/SPT, with set up as needed  2) Interventions:  PT Group Therapy, PT Gait Training, PT Therapeutic Exercises, PT Neuro Re-Ed/Balance, PT Therapeutic Activity, and PT Manual Therapy                  Problem: PT-Long Term Goals     Dates: Start: 05/30/20       Goal: LTG-By discharge, patient will ambulate     Dates: Start: 05/30/20       Description: 1) Individualized goal:  150 ft with LRAD and supervision.  2) Interventions:  PT Group Therapy, PT Gait Training, PT Therapeutic Exercises, PT Neuro Re-Ed/Balance, PT Aquatic Therapy, PT Therapeutic Activity, and PT Evaluation          Goal: LTG-By discharge, patient will transfer one surface to another     Dates: Start: 05/30/20       Description: 1) Individualized goal:  with LRAD and supervision.  2) Interventions:  PT Group Therapy, PT Gait Training, PT Therapeutic Exercises, PT Neuro Re-Ed/Balance, PT Aquatic Therapy, PT Therapeutic Activity, and PT Evaluation            Goal: LTG-By discharge, patient will ambulate up/down flight of stairs     Dates: Start: 05/30/20       Description: 1) Individualized goal:  with single rail and supervision.  2) Interventions:  PT Group Therapy, PT Gait Training, PT Therapeutic  Exercises, PT Neuro Re-Ed/Balance, PT Aquatic Therapy, PT Therapeutic Activity, and PT Evaluation            Goal: LTG-By discharge, patient will transfer in/out of a car     Dates: Start: 05/30/20       Description: 1) Individualized goal:  with LRAD and supervision.  2) Interventions:  PT Group Therapy, PT Gait Training, PT Therapeutic Exercises, PT Neuro Re-Ed/Balance, PT Aquatic Therapy, PT Therapeutic Activity, and PT Evaluation            Goal: LTG-By discharge, patient will     Dates: Start: 05/30/20       Description: 1) Individualized goal:  perform bed mobility independently and no bed functions.  2) Interventions:  PT Group Therapy, PT Gait Training, PT Therapeutic Exercises, PT Neuro Re-Ed/Balance, PT Aquatic Therapy, PT Therapeutic Activity, and PT Evaluation            Goal: LTG-By discharge, patient will     Dates: Start: 05/30/20       Description: 1) Individualized goal:  negotiate single curb with LRAD and supervision.  2) Interventions:  PT Group Therapy, PT Gait Training, PT Therapeutic Exercises, PT Neuro Re-Ed/Balance, PT Aquatic Therapy, PT Therapeutic Activity, and PT Evaluation

## 2020-06-16 PROCEDURE — 92507 TX SP LANG VOICE COMM INDIV: CPT

## 2020-06-16 PROCEDURE — A9270 NON-COVERED ITEM OR SERVICE: HCPCS | Performed by: PHYSICAL MEDICINE & REHABILITATION

## 2020-06-16 PROCEDURE — 97112 NEUROMUSCULAR REEDUCATION: CPT

## 2020-06-16 PROCEDURE — 97530 THERAPEUTIC ACTIVITIES: CPT

## 2020-06-16 PROCEDURE — 99233 SBSQ HOSP IP/OBS HIGH 50: CPT | Performed by: PHYSICAL MEDICINE & REHABILITATION

## 2020-06-16 PROCEDURE — 700102 HCHG RX REV CODE 250 W/ 637 OVERRIDE(OP): Performed by: PHYSICAL MEDICINE & REHABILITATION

## 2020-06-16 PROCEDURE — 97116 GAIT TRAINING THERAPY: CPT

## 2020-06-16 PROCEDURE — 770005 HCHG ROOM/CARE - REHAB PRIVATE (11*

## 2020-06-16 PROCEDURE — 97535 SELF CARE MNGMENT TRAINING: CPT

## 2020-06-16 RX ORDER — GABAPENTIN 100 MG/1
100 CAPSULE ORAL 3 TIMES DAILY
Status: DISCONTINUED | OUTPATIENT
Start: 2020-06-16 | End: 2020-06-18

## 2020-06-16 RX ADMIN — TRAMADOL HYDROCHLORIDE 50 MG: 50 TABLET, COATED ORAL at 11:06

## 2020-06-16 RX ADMIN — TRAZODONE HYDROCHLORIDE 50 MG: 50 TABLET ORAL at 21:46

## 2020-06-16 RX ADMIN — FLUOXETINE 20 MG: 20 TABLET, FILM COATED ORAL at 08:19

## 2020-06-16 RX ADMIN — TRAZODONE HYDROCHLORIDE 50 MG: 50 TABLET ORAL at 00:28

## 2020-06-16 RX ADMIN — GABAPENTIN 100 MG: 100 CAPSULE ORAL at 21:46

## 2020-06-16 RX ADMIN — GABAPENTIN 100 MG: 100 CAPSULE ORAL at 14:19

## 2020-06-16 RX ADMIN — MODAFINIL 100 MG: 100 TABLET ORAL at 08:19

## 2020-06-16 RX ADMIN — OMEPRAZOLE 20 MG: 20 CAPSULE, DELAYED RELEASE ORAL at 08:19

## 2020-06-16 RX ADMIN — MELATONIN 1000 UNITS: at 08:19

## 2020-06-16 ASSESSMENT — GAIT ASSESSMENTS
DISTANCE (FEET): 200
GAIT LEVEL OF ASSIST: STAND BY ASSIST

## 2020-06-16 ASSESSMENT — ACTIVITIES OF DAILY LIVING (ADL)
TOILET_TRANSFER_DESCRIPTION: GRAB BAR;SUPERVISION FOR SAFETY
TUB_SHOWER_TRANSFER_DESCRIPTION: GRAB BAR;SHOWER BENCH
TOILETING_LEVEL_OF_ASSIST_DESCRIPTION: GRAB BAR;INCREASED TIME
BED_CHAIR_WHEELCHAIR_TRANSFER_DESCRIPTION: SUPERVISION FOR SAFETY

## 2020-06-16 NOTE — CARE PLAN
Problem: Functional Transfers  Goal: STG-Within one week, patient will transfer to toilet  Description: 1) Individualized Goal:  SBA with AE/DME PRN  2) Interventions:  OT E Stim Attended, OT Group Therapy, OT Self Care/ADL, OT Cognitive Skill Dev, OT Manual Ther Technique, OT Neuro Re-Ed/Balance, OT Sensory Int Techniques, OT Therapeutic Activity, OT Aquatic Therapy, OT Evaluation, and OT Therapeutic Exercise  Outcome: MET  Goal: STG-Within one week, patient will transfer to step in shower  Description: 1) Individualized Goal:  Supervision level with AE PRN  2) Interventions:  OT E Stim Attended, OT Group Therapy, OT Self Care/ADL, OT Cognitive Skill Dev, OT Manual Ther Technique, OT Neuro Re-Ed/Balance, OT Sensory Int Techniques, OT Therapeutic Activity, OT Aquatic Therapy, OT Evaluation, and OT Therapeutic Exercise  Outcome: MET     Problem: IADL's  Goal: STG-Within one week, patient will prepare a meal  Description: 1) Individualized Goal: at supervision level with min verbal cues for sequencing   2) Interventions: OT E Stim Attended, OT Group Therapy, OT Self Care/ADL, OT Cognitive Skill Dev, OT Manual Ther Technique, OT Neuro Re-Ed/Balance, OT Sensory Int Techniques, OT Therapeutic Activity, OT Aquatic Therapy, OT Evaluation, and OT Therapeutic Exercise    Outcome: MET

## 2020-06-16 NOTE — THERAPY
Occupational Therapy  Daily Treatment     Patient Name: Reyes Amezcua  Age:  64 y.o., Sex:  male  Medical Record #: [id number]  Today's Date: 6/16/2020     Precautions  Precautions: (P) Fall Risk, Weight Bearing As Tolerated Right Upper Extremity  Comments: (P) R orbital Fx, R clavicle Fx     Safety   ADL Safety : (P) Requires Supervision for Safety  Bathroom Safety: (P) Requires Supervision for Safety    Subjective    Pt received up in w/c finishing breakfast, agreeable to OT session     Objective       06/16/20 0831   Precautions   Precautions Fall Risk;Weight Bearing As Tolerated Right Upper Extremity   Comments R orbital Fx, R clavicle Fx    Safety    ADL Safety  Requires Supervision for Safety   Bathroom Safety Requires Supervision for Safety   Functional Level of Assist   Grooming Stand by Assist;Standing   Grooming Description Verbal cueing  (verbal prompting for oral care standing at sink)   Bathing Supervision   Bathing Description Grab bar;Supervision for safety   Upper Body Dressing Supervision   Upper Body Dressing Description Increased time;Supervision for safety  (gathered own clothing from closet)   Lower Body Dressing Supervision   Lower Body Dressing Description Increased time;Supervision for safety  (gathered own clothing from closet, sat for LB dressing)   Toileting Supervision   Toileting Description Grab bar;Increased time   Toilet Transfers Supervised   Toilet Transfer Description Grab bar;Supervision for safety   Tub / Shower Transfers Supervised   Tub Shower Transfer Description Grab bar;Shower bench   IADL Treatments   IADL Treatments Meal preparation   Meal Preparation Pt completed phase one of bread making tast, improved ability to identify needed ingredients and to sequence steps relative to completion of activity last week, required only intermittent prompting and stand by assist for balance safety, completed partially in sitting for energy conservation. Min word finding difficulties  when naming ingredients.    OT Total Time Spent   OT Individual Total Time Spent (Mins) 60   OT Charge Group   OT Self Care / ADL 2   OT Therapy Activity 2       Assessment    Pt tolerated session well, improved safety awareness during ADL routine requiring no cues to sit for LB dressing, good use of UE support as needed for standing tasks, required intermittent verbal prompting for sequencing, SBA for mobility for safety though no LOB, did require some tasks to be completed in sitting for energy conservation. Demonstrated improved self-direction and ability to sequence bread making task, did not require use of recipe, intermittent prompting for OT to recall specifics such as starter to bread ratio. Wife present toward end of session, discussed observed progress.        Plan    Attention to task, functional cog and sequencing during ADLs/IADLs, balance and functional mobility, endurance, TBI education, management of double vision, repeat bread making task this week, repeat shaving Friday, promote functional communication and word finding, dual tasking    Occupational Therapy Goals     Problem: Functional Transfers     Dates: Start: 05/30/20       Goal: STG-Within one week, patient will transfer to toilet     Dates: Start: 05/30/20       Description: 1) Individualized Goal:  SBA with AE/DME PRN  2) Interventions:  OT E Stim Attended, OT Group Therapy, OT Self Care/ADL, OT Cognitive Skill Dev, OT Manual Ther Technique, OT Neuro Re-Ed/Balance, OT Sensory Int Techniques, OT Therapeutic Activity, OT Aquatic Therapy, OT Evaluation, and OT Therapeutic Exercise          Goal: STG-Within one week, patient will transfer to step in shower     Dates: Start: 05/30/20       Description: 1) Individualized Goal:  Supervision level with AE PRN  2) Interventions:  OT E Stim Attended, OT Group Therapy, OT Self Care/ADL, OT Cognitive Skill Dev, OT Manual Ther Technique, OT Neuro Re-Ed/Balance, OT Sensory Int Techniques, OT Therapeutic  Activity, OT Aquatic Therapy, OT Evaluation, and OT Therapeutic Exercise                Problem: OT Long Term Goals     Dates: Start: 05/30/20       Goal: LTG-By discharge, patient will complete basic self care tasks     Dates: Start: 05/30/20       Description: 1) Individualized Goal:  Min A - Supervision with AE PRN  2) Interventions:  OT E Stim Attended, OT Group Therapy, OT Self Care/ADL, OT Cognitive Skill Dev, OT Manual Ther Technique, OT Neuro Re-Ed/Balance, OT Sensory Int Techniques, OT Therapeutic Activity, OT Aquatic Therapy, OT Evaluation, and OT Therapeutic Exercise          Goal: LTG-By discharge, patient will perform bathroom transfers     Dates: Start: 05/30/20       Description: 1) Individualized Goal: Supervision with AE/DME PRN   2) Interventions:  OT E Stim Attended, OT Group Therapy, OT Self Care/ADL, OT Cognitive Skill Dev, OT Manual Ther Technique, OT Neuro Re-Ed/Balance, OT Sensory Int Techniques, OT Therapeutic Activity, OT Aquatic Therapy, OT Evaluation, and OT Therapeutic Exercise

## 2020-06-16 NOTE — CARE PLAN
Problem: Mobility Transfers  Goal: STG-Within one week, patient will perform bed mobility  Description: 1) Individualized goal:  with hospital functions and SBA.  2) Interventions:  PT Group Therapy, PT Gait Training, PT Therapeutic Exercises, PT Neuro Re-Ed/Balance, PT Aquatic Therapy, PT Therapeutic Activity, and PT Evaluation  Outcome: MET  Goal: STG-Within one week, patient will sit to stand  Description: 1) Individualized goal:  Transfer SBA no AD STS/SPT, with set up as needed  2) Interventions:  PT Group Therapy, PT Gait Training, PT Therapeutic Exercises, PT Neuro Re-Ed/Balance, PT Therapeutic Activity, and PT Manual Therapy    Outcome: MET

## 2020-06-16 NOTE — CARE PLAN
Problem: Communication  Goal: The ability to communicate needs accurately and effectively will improve  Problem: Safety  Goal: Will remain free from injury  Outcome: PROGRESSING AS EXPECTED     Problem: Venous Thromboembolism (VTW)/Deep Vein Thrombosis (DVT) Prevention:  Goal: Patient will participate in Venous Thrombosis (VTE)/Deep Vein Thrombosis (DVT)Prevention Measures  Outcome: PROGRESSING AS EXPECTED     Problem: Bowel/Gastric:  Goal: Normal bowel function is maintained or improved  Outcome: PROGRESSING AS EXPECTED     Problem: Psychosocial Needs:  Goal: Level of anxiety will decrease  Outcome: PROGRESSING AS EXPECTED     Problem: Pain Management  Goal: Pain level will decrease to patient's comfort goal  Outcome: PROGRESSING AS EXPECTED

## 2020-06-16 NOTE — THERAPY
Speech Language Pathology  Daily Treatment     Patient Name: Reyes Amezcua  Age:  64 y.o., Sex:  male  Medical Record #: [id number]  Today's Date: 6/16/2020     Precautions  Precautions: Fall Risk, Weight Bearing As Tolerated Right Upper Extremity  Comments: R orbital Fx, R clavicle Fx     Subjective    Pt c/o 8/10 head pain prior to and throughout session. Nursing informed and to provide pain meds and ice pack. Pt agreeable to cooperative to therapy with lights dimmed.      Objective     06/16/20 1004   Expressive Language   Naming Moderate (3)   Automatic Language Appropriate Minimal (4)   Verbalizes Wants / Needs Minimal (4)   Sustain Dialogue Within Given Topic Minimal (4)   Word Finding Deficits Moderate (3)   Reading Comprehension    Reading Words Supervision (5)   Written Language Expression   Spontaneous Word Level Minimal (4)   WPTAS ([place] Post-traumatic Amnesia Scale)   How old are you? 1   What is your date of birth? 1   What month are we in? 1   What time of day is it? (morning, noon, or night) 1   What day of the week is it? 1   What year are we in? 0   What is the name of this place 1   Do you remember me? 1   What is my name? 1   Target Picture 1 1   Target Picture 2 1   Target Picture 3 1   Orientation Score 6   Recall Score 5   Total Score 11   Still in post-traumatic amnesia? Yes   Functional Level of Assist   Eating Independent   Comprehension Supervision   Comprehension Description Verbal cues;Glasses   Expression Minimal Assist   Expression Description Verbal cueing   Social Interaction Supervision   Social Interaction Description Verbal cues;Increased time   Problem Solving Minimal Assist   Problem Solving Description Verbal cueing;Therapy schedule;Seat belt;Increased time   Memory Moderate Assist   Memory Description Verbal cueing;Therapy schedule;Seat belt;Increased time;Bed/chair alarm   Interdisciplinary Plan of Care Collaboration   IDT Collaboration with  Family / Caregiver;Nursing    Patient Position at End of Therapy Seated   Collaboration Comments CLOF, head pain, request for pain meds   SLP Total Time Spent   SLP Individual Total Time Spent (Mins) 60   Charge Group   SLP Treatment - Individual Speech Language Treatment - Individual       Assessment    The [place] Post-traumatic Amnesia Scale (WPTAS) is a brief bedside standardized test that measures length of post-traumatic amnesia (PTA) in people with traumatic brain injury. It consists of twelve questions that assess orientation to person, place and time, and ability to consistently retain new information from one day to another. It is administered once a day, each and every day, until the patient achieves a perfect score across three consecutive days, after which the individual is deemed to have emerged from post-traumatic amnesia.     WPTAS ([place] Post-traumatic Amnesia Scale)  How old are you?: Correct  What is your date of birth?: Correct  What month are we in?: Correct  What time of day is it? (morning, noon, or night): Correct  What day of the week is it?: Correct  What year are we in?: Incorrect  What is the name of this place: Correct  Do you remember me?: Correct  What is my name?: Correct  Target Picture 1: Correct  Target Picture 2: Correct  Target Picture 3: Correct  Orientation Score: 6  Recall Score: 5  Total Score: 11  Still in post-traumatic amnesia?: Yes    Pt reported year as 2002, however, with a cue this was incorrect, he corrected to 2020. [place] category naming given specific letter, pt independently generated target in 50% of opportunities, semantic cues to achieve 100%. Pt's writing with minor errors on 2 trials, pt unable to self correct, requiring cues. Answering wh- open ended questions: 70% IND, with semantic cues: 85%.         Plan    Continue to target orientation, expressive and receptive language.     Speech Therapy Problems     Problem: Expression STGs     Dates: Start: 06/10/20       Goal:  STG-Within one week, patient will     Dates: Start: 06/10/20       Description: 1) Individualized goal:  answer wh- questions related to highly personal information in 8/10 trials with use of initial phoneme cue (verbal or written)  2) Interventions:  SLP Speech Language Treatment              Goal: STG-Within one week, patient will     Dates: Start: 06/10/20       Description: 1) Individualized goal:  complete object/picture naming with 80% accuracy provided MIN cues.   2) Interventions:  SLP Speech Language Treatment                    Problem: Problem Solving STGs     Dates: Start: 06/10/20       Description: 1) Individualized goal: see goal under STG  2) Interventions:  SLP Cognitive Skill Development        Goal: STG-Within one week, patient will     Dates: Start: 06/10/20       Description: 1) Individualized goal:  score 12/12 on the WPTA for 3 consecutive days.   2) Interventions:  SLP Speech Language Treatment and SLP Cognitive Skill Development                    Problem: Speech/Swallowing LTGs     Dates: Start: 05/30/20       Goal: LTG-By discharge, patient will comprehend     Dates: Start: 05/30/20       Description: 1) Individualized goal:  simple functional language (spoken, pictures, written) to participate with care with at least 80% accuracy and minimal cues  2) Interventions:  SLP Speech Language Treatment and SLP Cognitive Skill Development              Goal: LTG-By discharge, patient will express     Dates: Start: 05/30/20       Description: 1) Individualized goal: basic wants/needs to participate with care for 4/5 opportunities and minimal cues  2) Interventions:  SLP Speech Language Treatment

## 2020-06-16 NOTE — DISCHARGE PLANNING
"Spoke w/ wife, Diane, via phone to update IDT conference recommendations & targeted DC date 6.26.2020. Patient making slow progress, but slower than expected with PLOF & activity pre hospitalization, ongoing fatigue & endurance mgmt. Wife very supportive & participatory w/ therapies. States \"I think he would stay more engaged & active at home.\" Reiterated importance of rest periods to support recovery & benefit of ongoing inpatient therapy mgmt. Discussed [facility] referral remains in process. Questions answered. Emotional support provided.    Will check w/ [facility] to verify auth process & update wife.  "

## 2020-06-16 NOTE — CARE PLAN
Problem: Expression STGs  Goal: STG-Within one week, patient will  Description: 1) Individualized goal:  answer wh- questions related to highly personal information in 8/10 trials with use of initial phoneme cue (verbal or written)  2) Interventions:  SLP Speech Language Treatment      Outcome: NOT MET  Goal: STG-Within one week, patient will  Description: 1) Individualized goal:  complete object/picture naming with 80% accuracy provided MIN cues.   2) Interventions:  SLP Speech Language Treatment      Outcome: NOT MET     Problem: Speech/Swallowing LTGs  Goal: LTG-By discharge, patient will safely swallow  Description: 1) Individualized goal: least restrictive diet without overt s/sx of asp for 100% of PO intake  2) Interventions:  SLP Swallowing Dysfunction Treatment and SLP Oral Pharyngeal Evaluation      Outcome: MET     Problem: Swallowing STGs  Goal: STG-Within one week, patient will  Description: 1) Individualized goal:  safely swallow regular textures/thin liquids with no overt s/sx of pen/asp, MIN cues for use of compensatory strategies (monitoring rate, bolus size, serial swallows to clear residue)  2) Interventions:  SLP Swallowing Dysfunction Treatment      Outcome: MET     Problem: Comprehension STGs  Goal: STG-Within one week, patient will  Description: 1) Individualized goal:  follow 2-step auditory commands with 80% accuracy provided no more than 1 repetition.   2) Interventions:  SLP Speech Language Treatment      Outcome: MET  Goal: STG-Within one week, patient will  Description: 1) Individualized goal:  complete sentence completion given FO4 choices with 80% accuracy provided SPV  2) Interventions:  SLP Speech Language Treatment      Outcome: MET
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
## 2020-06-16 NOTE — THERAPY
Physical Therapy   Daily Treatment     Patient Name: Reyes Amezcua  Age:  64 y.o., Sex:  male  Medical Record #: [id number]  Today's Date: 6/16/2020     Precautions  Precautions: (P) Fall Risk, Weight Bearing As Tolerated Right Upper Extremity  Comments: (P) R orbital Fx, R clavicle Fx     Subjective    Patient in bed and agreeable to therapy. Reporting mild head pain however received Gabapentin 20 minutes prior per wife.     Objective       06/16/20 1431   Precautions   Precautions Fall Risk;Weight Bearing As Tolerated Right Upper Extremity   Comments R orbital Fx, R clavicle Fx    Gait Functional Level of Assist    Gait Level Of Assist Stand by Assist   Assistive Device None   Distance (Feet) 200   # of Times Distance was Traveled 2   Deviation   (decr arm swing, tendency for mild R LOB)   Transfer Functional Level of Assist   Bed, Chair, Wheelchair Transfer Stand by Assist   Bed Chair Wheelchair Transfer Description Supervision for safety  (no AD)   Standing Lower Body Exercises   Other Exercises Modified split squat with intermittent UE support on elevated therapy mat 1x10 each leg leading.   Bed Mobility    Sit to Stand Supervised   Neuro-Muscular Treatments   Comments Dynamic balance activities, no UE support with SBA and occasional Min A for LOBs to R: chosen CLAU on airex pad performing vertical head turns 1x10, horizontal head turns 1x10, EC stating female/male names in alphabetical order A through L; seated on physioball with single leg support statically progressing to performing oblique rotation with 3# weight 1x10 each leg; weaving through cones 2x10, alternating LE cone taps 2x10, LE double cone taps 2x10, sidestepping and backward/forward stepping around cones 1x10 each direction, picking up cones from floor 1x10.   Interdisciplinary Plan of Care Collaboration   IDT Collaboration with  Family / Caregiver   Patient Position at End of Therapy Family / Friend in Room   Collaboration Comments PARISH  recommendations   PT Total Time Spent   PT Individual Total Time Spent (Mins) 60   PT Charge Group   PT Gait Training 1   PT Neuromuscular Re-Education / Balance 2   PT Therapeutic Activities 1       Assessment    Patient tolerated session well, focus of session on dynamic balance activities emphasizing single leg stance. Patient with impaired balance when relying on RLE stance with tendency for exaggerated weight shift to R causing LOB. Discussed with wife recommendations for discharge planning.    Strengths: Supportive family, Willingly participates in therapeutic activities, Independent prior level of function, Motivated for self care and independence  Barriers: Impaired balance, Impaired activity tolerance    Plan    Gait training with no AD, standing balance activities focusing on SLS activities, endurance/activity tolerance, BLE strengthening, stair/curb negotiation, incorporate cognition/communication into activities, floor recovery, treadmill training, outdoor ambulation.    Physical Therapy Problems     Problem: Balance     Dates: Start: 06/16/20       Goal: STG-Within one week, patient will     Dates: Start: 06/16/20       Description: 1) Individualized goal:  score >-45/56 on the [place] Balance Scale  2) Interventions:  PT Group Therapy, PT Gait Training, PT Therapeutic Exercises, PT Neuro Re-Ed/Balance, PT Therapeutic Activity, and PT Evaluation                Problem: Mobility     Dates: Start: 06/09/20       Goal: STG-Within one week, patient will ambulate up/down a curb     Dates: Start: 06/09/20       Description: 1) Individualized goal:  Patient will amb with no AD SBA up/down curb  2) Interventions:  PT Group Therapy, PT Gait Training, PT Therapeutic Exercises, PT Neuro Re-Ed/Balance, PT Therapeutic Activity, and PT Manual Therapy            Goal: STG-Within one week, patient will ascend and descend four to six stairs     Dates: Start: 06/09/20       Description: 1) Individualized goal:  Amb up/ down  "4 6\" stairs with HR SBA  2) Interventions:  PT Group Therapy, PT Gait Training, PT Therapeutic Exercises, PT Neuro Re-Ed/Balance, PT Therapeutic Activity, and PT Manual Therapy            Goal: STG-Within one week, patient will ambulate up/down flight of stairs     Dates: Start: 06/16/20       Description: 1) Individualized goal:  with single rail and supervision.  2) Interventions:  PT Group Therapy, PT Gait Training, PT Therapeutic Exercises, PT Neuro Re-Ed/Balance, PT Therapeutic Activity, and PT Evaluation                  Problem: PT-Long Term Goals     Dates: Start: 05/30/20       Goal: LTG-By discharge, patient will ambulate     Dates: Start: 05/30/20       Description: 1) Individualized goal:  150 ft with LRAD and supervision.  2) Interventions:  PT Group Therapy, PT Gait Training, PT Therapeutic Exercises, PT Neuro Re-Ed/Balance, PT Aquatic Therapy, PT Therapeutic Activity, and PT Evaluation          Goal: LTG-By discharge, patient will transfer one surface to another     Dates: Start: 05/30/20       Description: 1) Individualized goal:  with LRAD and supervision.  2) Interventions:  PT Group Therapy, PT Gait Training, PT Therapeutic Exercises, PT Neuro Re-Ed/Balance, PT Aquatic Therapy, PT Therapeutic Activity, and PT Evaluation            Goal: LTG-By discharge, patient will ambulate up/down flight of stairs     Dates: Start: 05/30/20       Description: 1) Individualized goal:  with single rail and supervision.  2) Interventions:  PT Group Therapy, PT Gait Training, PT Therapeutic Exercises, PT Neuro Re-Ed/Balance, PT Aquatic Therapy, PT Therapeutic Activity, and PT Evaluation            Goal: LTG-By discharge, patient will transfer in/out of a car     Dates: Start: 05/30/20       Description: 1) Individualized goal:  with LRAD and supervision.  2) Interventions:  PT Group Therapy, PT Gait Training, PT Therapeutic Exercises, PT Neuro Re-Ed/Balance, PT Aquatic Therapy, PT Therapeutic Activity, and PT " Evaluation            Goal: LTG-By discharge, patient will     Dates: Start: 05/30/20       Description: 1) Individualized goal:  perform bed mobility independently and no bed functions.  2) Interventions:  PT Group Therapy, PT Gait Training, PT Therapeutic Exercises, PT Neuro Re-Ed/Balance, PT Aquatic Therapy, PT Therapeutic Activity, and PT Evaluation            Goal: LTG-By discharge, patient will     Dates: Start: 05/30/20       Description: 1) Individualized goal:  negotiate single curb with LRAD and supervision.  2) Interventions:  PT Group Therapy, PT Gait Training, PT Therapeutic Exercises, PT Neuro Re-Ed/Balance, PT Aquatic Therapy, PT Therapeutic Activity, and PT Evaluation

## 2020-06-16 NOTE — CARE PLAN
Problem: Communication  Goal: The ability to communicate needs accurately and effectively will improve  Description: Pt is unable to effectively communicate needs. Pt rounded on hourly to make sure needs were met. Will continue to monitor.   Outcome: PROGRESSING AS EXPECTED  Note: Patient speaks clearly.  Oriented to self and place only.       Problem: Safety  Goal: Will remain free from injury  Outcome: PROGRESSING AS EXPECTED  Note: Patient is in a room close to the nurses station.   Bed and chair alarms in place.       Problem: Bowel/Gastric:  Goal: Normal bowel function is maintained or improved  Outcome: PROGRESSING AS EXPECTED  Note: Bowel sounds normoactive in all quadrants.   Goal: Will not experience complications related to bowel motility  Outcome: PROGRESSING AS EXPECTED

## 2020-06-16 NOTE — CARE PLAN
Problem: Expression STGs  Goal: STG-Within one week, patient will  Description: 1) Individualized goal:  answer wh- questions related to highly personal information in 8/10 trials with use of initial phoneme cue (verbal or written)  2) Interventions:  SLP Speech Language Treatment      Outcome: NOT MET  Goal: STG-Within one week, patient will  Description: 1) Individualized goal:  complete object/picture naming with 80% accuracy provided MIN cues.   2) Interventions:  SLP Speech Language Treatment      Outcome: NOT MET     Problem: Problem Solving STGs  Goal: STG-Within one week, patient will  Description: 1) Individualized goal:  score 12/12 on the WPTA for 3 consecutive days.   2) Interventions:  SLP Speech Language Treatment and SLP Cognitive Skill Development      Outcome: NOT MET     Problem: Speech/Swallowing LTGs  Goal: LTG-By discharge, patient will safely swallow  Description: 1) Individualized goal: least restrictive diet without overt s/sx of asp for 100% of PO intake  2) Interventions:  SLP Swallowing Dysfunction Treatment and SLP Oral Pharyngeal Evaluation      Outcome: MET     Problem: Swallowing STGs  Goal: STG-Within one week, patient will  Description: 1) Individualized goal:  safely swallow regular textures/thin liquids with no overt s/sx of pen/asp, MIN cues for use of compensatory strategies (monitoring rate, bolus size, serial swallows to clear residue)  2) Interventions:  SLP Swallowing Dysfunction Treatment      Outcome: MET     Problem: Comprehension STGs  Goal: STG-Within one week, patient will  Description: 1) Individualized goal:  follow 2-step auditory commands with 80% accuracy provided no more than 1 repetition.   2) Interventions:  SLP Speech Language Treatment      Outcome: MET  Goal: STG-Within one week, patient will  Description: 1) Individualized goal:  complete sentence completion given FO4 choices with 80% accuracy provided SPV  2) Interventions:  SLP Speech Language  Treatment      Outcome: MET

## 2020-06-17 PROCEDURE — 97129 THER IVNTJ 1ST 15 MIN: CPT

## 2020-06-17 PROCEDURE — 97116 GAIT TRAINING THERAPY: CPT

## 2020-06-17 PROCEDURE — 97530 THERAPEUTIC ACTIVITIES: CPT

## 2020-06-17 PROCEDURE — 700102 HCHG RX REV CODE 250 W/ 637 OVERRIDE(OP): Performed by: PHYSICAL MEDICINE & REHABILITATION

## 2020-06-17 PROCEDURE — 97112 NEUROMUSCULAR REEDUCATION: CPT

## 2020-06-17 PROCEDURE — 770005 HCHG ROOM/CARE - REHAB PRIVATE (11*

## 2020-06-17 PROCEDURE — A9270 NON-COVERED ITEM OR SERVICE: HCPCS | Performed by: PHYSICAL MEDICINE & REHABILITATION

## 2020-06-17 PROCEDURE — 99232 SBSQ HOSP IP/OBS MODERATE 35: CPT | Performed by: PHYSICAL MEDICINE & REHABILITATION

## 2020-06-17 PROCEDURE — 92507 TX SP LANG VOICE COMM INDIV: CPT

## 2020-06-17 RX ADMIN — FLUOXETINE 20 MG: 20 TABLET, FILM COATED ORAL at 09:20

## 2020-06-17 RX ADMIN — GABAPENTIN 100 MG: 100 CAPSULE ORAL at 09:19

## 2020-06-17 RX ADMIN — OMEPRAZOLE 20 MG: 20 CAPSULE, DELAYED RELEASE ORAL at 09:20

## 2020-06-17 RX ADMIN — TRAZODONE HYDROCHLORIDE 50 MG: 50 TABLET ORAL at 20:47

## 2020-06-17 RX ADMIN — MELATONIN 1000 UNITS: at 09:20

## 2020-06-17 RX ADMIN — TRAMADOL HYDROCHLORIDE 50 MG: 50 TABLET, COATED ORAL at 13:16

## 2020-06-17 RX ADMIN — GABAPENTIN 100 MG: 100 CAPSULE ORAL at 14:26

## 2020-06-17 RX ADMIN — GABAPENTIN 100 MG: 100 CAPSULE ORAL at 20:47

## 2020-06-17 RX ADMIN — BUTALBITAL, ACETAMINOPHEN AND CAFFEINE 2 TABLET: 50; 325; 40 TABLET ORAL at 19:32

## 2020-06-17 RX ADMIN — MODAFINIL 100 MG: 100 TABLET ORAL at 09:19

## 2020-06-17 ASSESSMENT — ACTIVITIES OF DAILY LIVING (ADL)
BED_CHAIR_WHEELCHAIR_TRANSFER_DESCRIPTION: SUPERVISION FOR SAFETY;VERBAL CUEING
TOILET_TRANSFER_DESCRIPTION: INCREASED TIME;GRAB BAR
TOILETING_LEVEL_OF_ASSIST_DESCRIPTION: GRAB BAR;INCREASED TIME

## 2020-06-17 ASSESSMENT — GAIT ASSESSMENTS
GAIT LEVEL OF ASSIST: STAND BY ASSIST
DISTANCE (FEET): 200

## 2020-06-17 NOTE — DISCHARGE PLANNING
Spoke w/ wife, Diane, via phone. Updated insurance approved LOS through 6.22.2020 at this time, requesting update 6.23.2020. Reviewed possible update to DC date for 6.23.2020. Rehab w/o walls referral remains in process. Discussed HH referral initially if Rehab w/o walls remains in process. Questions answered. Emotional support provided.

## 2020-06-17 NOTE — THERAPY
"Speech Language Pathology  Daily Treatment     Patient Name: Reyes Amezcua  Age:  64 y.o., Sex:  male  Medical Record #: [id number]  Today's Date: 6/17/2020     Precautions  Precautions: Fall Risk, Weight Bearing As Tolerated Right Upper Extremity  Comments: R orbital Fx, R clavicle Fx     Subjective    Pt pleasant and cooperative. Reports throat pain since hospitalization. Pt reports pain is not \"severe\" however, \"is always there\"     Objective     06/17/20 1034   Expressive Language   Naming Minimal (4)   Reading Comprehension    Reading Sentences Minimal (4)   Written Language Expression   Spontaneous Word Level Minimal (4)   WPTAS ([place] Post-traumatic Amnesia Scale)   How old are you? 1   What is your date of birth? 1   What month are we in? 1   What time of day is it? (morning, noon, or night) 0   What day of the week is it? 0   What year are we in? 1   What is the name of this place 1   Do you remember me? 1   What is my name? 1   Target Picture 1 1   Target Picture 2 1   Target Picture 3 1   Orientation Score 5   Recall Score 5   Total Score 10   Still in post-traumatic amnesia? Yes   Level of Severity Moderate   Interdisciplinary Plan of Care Collaboration   IDT Collaboration with  Family / Caregiver   Patient Position at End of Therapy Seated;Family / Friend in Room   Collaboration Comments spouse present for session.    SLP Total Time Spent   SLP Individual Total Time Spent (Mins) 60   Charge Group   SLP Treatment - Individual Speech Language Treatment - Individual       Assessment    The [place] Post-traumatic Amnesia Scale (WPTAS) is a brief bedside standardized test that measures length of post-traumatic amnesia (PTA) in people with traumatic brain injury. It consists of twelve questions that assess orientation to person, place and time, and ability to consistently retain new information from one day to another. It is administered once a day, each and every day, until the patient achieves a " perfect score across three consecutive days, after which the individual is deemed to have emerged from post-traumatic amnesia.     WPTAS ([place] Post-traumatic Amnesia Scale)  How old are you?: Correct  What is your date of birth?: Correct  What month are we in?: Correct  What time of day is it? (morning, noon, or night): Incorrect  What day of the week is it?: Incorrect  What year are we in?: Correct  What is the name of this place: Correct  Do you remember me?: Correct  What is my name?: Correct  Target Picture 1: Correct  Target Picture 2: Correct  Target Picture 3: Correct  Orientation Score: 5  Recall Score: 5  Total Score: 10  Still in post-traumatic amnesia?: Yes  Level of Severity: Moderate    Moran category matching: >95%, name category from description: Pt able to read at sentence level with MIN substitutions noted, with cues from SLP to re-read, slow rate, pt was able to read sentence correctly, generating target word 56% IND, semantic cues for word finding to achieve 100%. Pt with noted fatigue towards end of session.        Plan    Continue to target receptive/expressive language.     Speech Therapy Problems     Problem: Expression STGs     Dates: Start: 06/10/20       Goal: STG-Within one week, patient will     Dates: Start: 06/10/20       Description: 1) Individualized goal:  answer wh- questions related to highly personal information in 8/10 trials with use of initial phoneme cue (verbal or written)  2) Interventions:  SLP Speech Language Treatment              Goal: STG-Within one week, patient will     Dates: Start: 06/10/20       Description: 1) Individualized goal:  complete object/picture naming with 80% accuracy provided MIN cues.   2) Interventions:  SLP Speech Language Treatment                    Problem: Problem Solving STGs     Dates: Start: 06/10/20       Description: 1) Individualized goal: see goal under STG  2) Interventions:  SLP Cognitive Skill Development        Goal:  STG-Within one week, patient will     Dates: Start: 06/10/20       Description: 1) Individualized goal:  score 12/12 on the WPTA for 3 consecutive days.   2) Interventions:  SLP Speech Language Treatment and SLP Cognitive Skill Development                    Problem: Speech/Swallowing LTGs     Dates: Start: 05/30/20       Goal: LTG-By discharge, patient will comprehend     Dates: Start: 05/30/20       Description: 1) Individualized goal:  simple functional language (spoken, pictures, written) to participate with care with at least 80% accuracy and minimal cues  2) Interventions:  SLP Speech Language Treatment and SLP Cognitive Skill Development              Goal: LTG-By discharge, patient will express     Dates: Start: 05/30/20       Description: 1) Individualized goal: basic wants/needs to participate with care for 4/5 opportunities and minimal cues  2) Interventions:  SLP Speech Language Treatment

## 2020-06-17 NOTE — THERAPY
Occupational Therapy  Daily Treatment     Patient Name: Reyes Amezcua  Age:  64 y.o., Sex:  male  Medical Record #: [id number]  Today's Date: 6/17/2020     Precautions  Precautions: (P) Fall Risk, Weight Bearing As Tolerated Right Upper Extremity  Comments: (P) R orbital Fx, R clavicle Fx     Safety   ADL Safety : Requires Supervision for Safety  Bathroom Safety: Requires Supervision for Safety    Subjective    Pt received up in w/c finishing breakfast, able to recall plans for today (break baking) with 2 verbal prompts     Objective       06/17/20 0831   Precautions   Precautions Fall Risk;Weight Bearing As Tolerated Right Upper Extremity   Comments R orbital Fx, R clavicle Fx    Cognition    Level of Consciousness Alert   Ability To Follow Commands 2 Step   Comments Pt listened to [employer] news podcast while walking outside, able to summarize 3 news stories (pause between each story) with increased time for word finding and articulation. Pt with most difficulty summarizing last of 3 news stories likely due to fatigue. Pt able to repeat summary of new 2/3 new stories to wife at end of session, unable to recall 3rd story with max verbal cues   Functional Level of Assist   Toileting Modified Independent   Toileting Description Grab bar;Increased time   Toilet Transfers Modified Independent   Toilet Transfer Description Increased time;Grab bar   IADL Treatments   Meal Preparation Pt completed phase 2 of bread making activity, baking bread, able to identify desired oven temp and timeline, chose to pre-heat cast iron for 30 min prior to baking, SBA to place in oven, OT assist to remove from oven for safety, pt loaded dough onto cast iron, OT returned to oven and replaced cover. One verbal cues for safety, no cues for sequencing but does require increased time   Balance   Comments pt completed functional mobility outside on sidewalk 3000 feet total with rest breaks x3 due to fatigue, pt able to listen to [employer] news podcast and  verbalize summary (see cog section) while walking, more difficulty with word finding and articulation when fatigued. Noted pt with tendency to walk on right edge of sidewalk when therapist standing on pt's left side vs right   OT Total Time Spent   OT Individual Total Time Spent (Mins) 60   OT Charge Group   OT Cognitive Skill Development First 15 Minutes 1   OT Neuromuscular Re-education / Balance 1   OT Therapy Activity 2       Assessment    Pt tolerated session well, continues with decreased endurance relative to baseline but tolerating household distances well before needing rest break, noted right inattention during outdoor mobility this session with tendency to walk on right edge of sidewalk with OT on left side, did not demo tendency to walk on edge of left sidewalk when OT was positioned on R. Demos improved ability to sequence and direct bread baking relative to activity completion last week, OT assist for management of hot cast iron for safety. Pt with good attention while listening to news podcast, no change in gait pattern or speed when listening or verbalizing summary, demos more difficulty with word finding and articulation when fatigued.     Plan    Attention to task, functional cog and sequencing during ADLs/IADLs, balance and functional mobility, endurance, TBI education, management of double vision, repeat bread making task this week, repeat shaving Friday, promote functional communication and word finding, dual tasking    Occupational Therapy Goals     Problem: OT Long Term Goals     Dates: Start: 05/30/20       Goal: LTG-By discharge, patient will complete basic self care tasks     Dates: Start: 05/30/20       Description: 1) Individualized Goal:  Min A - Supervision with AE PRN  2) Interventions:  OT E Stim Attended, OT Group Therapy, OT Self Care/ADL, OT Cognitive Skill Dev, OT Manual Ther Technique, OT Neuro Re-Ed/Balance, OT Sensory Int Techniques, OT Therapeutic Activity, OT Aquatic Therapy,  OT Evaluation, and OT Therapeutic Exercise          Goal: LTG-By discharge, patient will perform bathroom transfers     Dates: Start: 05/30/20       Description: 1) Individualized Goal: Supervision with AE/DME PRN   2) Interventions:  OT E Stim Attended, OT Group Therapy, OT Self Care/ADL, OT Cognitive Skill Dev, OT Manual Ther Technique, OT Neuro Re-Ed/Balance, OT Sensory Int Techniques, OT Therapeutic Activity, OT Aquatic Therapy, OT Evaluation, and OT Therapeutic Exercise
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
## 2020-06-17 NOTE — CARE PLAN
Problem: Safety  Goal: Will remain free from injury  Outcome: PROGRESSING AS EXPECTED  Note: Patient remains in a room close to the nurses station with bed and chair alarms in place for safety.       Problem: Pain Management  Goal: Pain level will decrease to patient's comfort goal  Outcome: PROGRESSING AS EXPECTED  Note: Patient has denied pain this shift.

## 2020-06-17 NOTE — THERAPY
Physical Therapy   Daily Treatment     Patient Name: Reyes Amezcua  Age:  64 y.o., Sex:  male  Medical Record #: [id number]  Today's Date: 6/17/2020     Precautions  Precautions: (P) Fall Risk, Weight Bearing As Tolerated Right Upper Extremity  Comments: (P) R orbital Fx, R clavicle Fx     Subjective    Patient in bed with wife present, agreeable to therapy.     Objective       06/17/20 1431   Precautions   Precautions Fall Risk;Weight Bearing As Tolerated Right Upper Extremity   Comments R orbital Fx, R clavicle Fx    Gait Functional Level of Assist    Gait Level Of Assist Stand by Assist   Assistive Device None   Distance (Feet) 200   # of Times Distance was Traveled 2   Deviation   (decr arm swing, tendency for mild R LOB, inattention)   Transfer Functional Level of Assist   Bed, Chair, Wheelchair Transfer Stand by Assist   Bed Chair Wheelchair Transfer Description Supervision for safety;Verbal cueing  (no AD)   Bed Mobility    Supine to Sit Supervised   Sit to Supine Supervised   Sit to Stand Supervised   Scooting Supervised   Rolling Supervised   Neuro-Muscular Treatments   Comments Prone BUE serratus press 2x10. Alternating UE/LE lifts in quadruped 6x15-20 second holds each side; half tall kneeling performing BUE bicep curls 2x10 each leg leading with 3# weights. Standing BUE flexion with single leg hip extension toe taps 2x10 each leg leading. Balance activities performed with SBA-CGA and seated rest breaks   Interdisciplinary Plan of Care Collaboration   Patient Position at End of Therapy In Bed;Bed Alarm On;Call Light within Reach;Tray Table within Reach;Phone within Reach   PT Total Time Spent   PT Individual Total Time Spent (Mins) 60   PT Charge Group   PT Gait Training 1   PT Neuromuscular Re-Education / Balance 2   PT Therapeutic Activities 1     Supine RUE shoulder flexion, scaption, ABD PROM and stretching 2x60-90 seconds each plane. Grade III-IV AP RUE shoulder mobilizations to increase joint  "mobility and improve ROM 2x60 seconds.    Assessment    Patient tolerated session well, dynamic balance activities focusing on single leg stance. Observed deficits in RUE shoulder ROM/strength; addressed and discussed with OT. Patient able to recount three news stories from OT session earlier in morning with extra time and fair accuracy.    Strengths: Supportive family, Willingly participates in therapeutic activities, Independent prior level of function, Motivated for self care and independence  Barriers: Impaired balance, Impaired activity tolerance    Plan    Gait training with no AD, standing balance activities focusing on SLS activities, endurance/activity tolerance, BLE strengthening, stair/curb negotiation, incorporate cognition/communication into activities, floor recovery, treadmill training, outdoor ambulation.    Physical Therapy Problems     Problem: Balance     Dates: Start: 06/16/20       Goal: STG-Within one week, patient will     Dates: Start: 06/16/20       Description: 1) Individualized goal:  score >-45/56 on the Escobedo Balance Scale  2) Interventions:  PT Group Therapy, PT Gait Training, PT Therapeutic Exercises, PT Neuro Re-Ed/Balance, PT Therapeutic Activity, and PT Evaluation                Problem: Mobility     Dates: Start: 06/09/20       Goal: STG-Within one week, patient will ambulate up/down a curb     Dates: Start: 06/09/20       Description: 1) Individualized goal:  Patient will amb with no AD SBA up/down curb  2) Interventions:  PT Group Therapy, PT Gait Training, PT Therapeutic Exercises, PT Neuro Re-Ed/Balance, PT Therapeutic Activity, and PT Manual Therapy            Goal: STG-Within one week, patient will ascend and descend four to six stairs     Dates: Start: 06/09/20       Description: 1) Individualized goal:  Amb up/ down 4 6\" stairs with HR SBA  2) Interventions:  PT Group Therapy, PT Gait Training, PT Therapeutic Exercises, PT Neuro Re-Ed/Balance, PT Therapeutic Activity, and PT " Manual Therapy            Goal: STG-Within one week, patient will ambulate up/down flight of stairs     Dates: Start: 06/16/20       Description: 1) Individualized goal:  with single rail and supervision.  2) Interventions:  PT Group Therapy, PT Gait Training, PT Therapeutic Exercises, PT Neuro Re-Ed/Balance, PT Therapeutic Activity, and PT Evaluation                  Problem: PT-Long Term Goals     Dates: Start: 05/30/20       Goal: LTG-By discharge, patient will ambulate     Dates: Start: 05/30/20       Description: 1) Individualized goal:  150 ft with LRAD and supervision.  2) Interventions:  PT Group Therapy, PT Gait Training, PT Therapeutic Exercises, PT Neuro Re-Ed/Balance, PT Aquatic Therapy, PT Therapeutic Activity, and PT Evaluation          Goal: LTG-By discharge, patient will transfer one surface to another     Dates: Start: 05/30/20       Description: 1) Individualized goal:  with LRAD and supervision.  2) Interventions:  PT Group Therapy, PT Gait Training, PT Therapeutic Exercises, PT Neuro Re-Ed/Balance, PT Aquatic Therapy, PT Therapeutic Activity, and PT Evaluation            Goal: LTG-By discharge, patient will ambulate up/down flight of stairs     Dates: Start: 05/30/20       Description: 1) Individualized goal:  with single rail and supervision.  2) Interventions:  PT Group Therapy, PT Gait Training, PT Therapeutic Exercises, PT Neuro Re-Ed/Balance, PT Aquatic Therapy, PT Therapeutic Activity, and PT Evaluation            Goal: LTG-By discharge, patient will transfer in/out of a car     Dates: Start: 05/30/20       Description: 1) Individualized goal:  with LRAD and supervision.  2) Interventions:  PT Group Therapy, PT Gait Training, PT Therapeutic Exercises, PT Neuro Re-Ed/Balance, PT Aquatic Therapy, PT Therapeutic Activity, and PT Evaluation            Goal: LTG-By discharge, patient will     Dates: Start: 05/30/20       Description: 1) Individualized goal:  perform bed mobility independently  and no bed functions.  2) Interventions:  PT Group Therapy, PT Gait Training, PT Therapeutic Exercises, PT Neuro Re-Ed/Balance, PT Aquatic Therapy, PT Therapeutic Activity, and PT Evaluation            Goal: LTG-By discharge, patient will     Dates: Start: 05/30/20       Description: 1) Individualized goal:  negotiate single curb with LRAD and supervision.  2) Interventions:  PT Group Therapy, PT Gait Training, PT Therapeutic Exercises, PT Neuro Re-Ed/Balance, PT Aquatic Therapy, PT Therapeutic Activity, and PT Evaluation

## 2020-06-17 NOTE — PROGRESS NOTES
Spiritual Care Note    Patient Information     Patient's Name: Reyes Amezcua   MRN: [id number]    YOB: 1955   Age and Gender: 64 y.o. male   Service Area: Inpatient    Room (and Bed): [place] [address]   Ethnicity or Nationality:     Primary Language: English   Yazidi/Spiritual preference: Protestant   Place of Residence: [place]   Family/Friends/Others Present: Yes, wife   Clinical Team Present: No   Medical Diagnosis(-es)/Procedure(s): TBI   Code Status: Full Code    Date of Admission: 5/29/2020   Length of Stay: 19 days        Spiritual Care Provider Information:  Name of Spiritual Care Provider: Clare Leos  Title of Spiritual Care Provider: Associate   Phone Number: [phone]  E-mail: [email or web address]  Total time : 15 minutes    Spiritual Screen Results:    Gen Nursing  Spiritual Screen  Is your spiritual health or inner well-being important to you as you cope with your medical condition?: Yes  Would you like to receive a visit from our Spiritual Care team or your own Tenriism or spiritual leader?: No  Was spiritual care education provided to the patient?: Declined     Palliative Care  PC Yazidi/Spiritual Screening  Was spiritual care education provided to the patient?: Declined      Encounter/Request Information  Encounter/Request Type   Visited With: Patient and family together  Nature of the Visit: Follow-up, On shift  Continue Visiting: Yes  Next Follow-up Date: 06/03/20  General Visit: Yes  Referral From/ Origin of Request: Verbal family    Religous Needs/Values  Yazidi Needs Visit  Yazidi Needs: Prayer    Spiritual Assessment     Spiritual Care Encounters    Observations/Symptoms: Accepting, Thankfulness    Interaction/Conversation: Wife Kerry was at bedside; the couple stated that they are grateful for Reyes's remarkable recovery but sad he won't be going home this week.  He requested prayer, and when the  admired the [place] scene on his  jonathan, was able to remember -- with some prompting from Kerry -- many of the places where the couple has snorkeled.  Visits will continue.    Interventions: Compassionate presence, active listening, prayer.    Plan: Weekly Visits    Notes:

## 2020-06-18 PROCEDURE — 700102 HCHG RX REV CODE 250 W/ 637 OVERRIDE(OP): Performed by: PHYSICAL MEDICINE & REHABILITATION

## 2020-06-18 PROCEDURE — 92507 TX SP LANG VOICE COMM INDIV: CPT

## 2020-06-18 PROCEDURE — 97535 SELF CARE MNGMENT TRAINING: CPT

## 2020-06-18 PROCEDURE — 99232 SBSQ HOSP IP/OBS MODERATE 35: CPT | Performed by: PHYSICAL MEDICINE & REHABILITATION

## 2020-06-18 PROCEDURE — A9270 NON-COVERED ITEM OR SERVICE: HCPCS | Performed by: PHYSICAL MEDICINE & REHABILITATION

## 2020-06-18 PROCEDURE — 97530 THERAPEUTIC ACTIVITIES: CPT

## 2020-06-18 PROCEDURE — 97116 GAIT TRAINING THERAPY: CPT

## 2020-06-18 PROCEDURE — 770005 HCHG ROOM/CARE - REHAB PRIVATE (11*

## 2020-06-18 PROCEDURE — 97112 NEUROMUSCULAR REEDUCATION: CPT

## 2020-06-18 RX ORDER — GABAPENTIN 300 MG/1
300 CAPSULE ORAL 3 TIMES DAILY
Status: DISCONTINUED | OUTPATIENT
Start: 2020-06-18 | End: 2020-06-23 | Stop reason: HOSPADM

## 2020-06-18 RX ADMIN — MAGNESIUM HYDROXIDE 30 ML: 400 SUSPENSION ORAL at 17:55

## 2020-06-18 RX ADMIN — GABAPENTIN 300 MG: 300 CAPSULE ORAL at 20:30

## 2020-06-18 RX ADMIN — MELATONIN 1000 UNITS: at 08:30

## 2020-06-18 RX ADMIN — BUTALBITAL, ACETAMINOPHEN AND CAFFEINE 2 TABLET: 50; 325; 40 TABLET ORAL at 13:34

## 2020-06-18 RX ADMIN — TRAZODONE HYDROCHLORIDE 50 MG: 50 TABLET ORAL at 23:56

## 2020-06-18 RX ADMIN — OMEPRAZOLE 20 MG: 20 CAPSULE, DELAYED RELEASE ORAL at 08:30

## 2020-06-18 RX ADMIN — GABAPENTIN 100 MG: 100 CAPSULE ORAL at 15:43

## 2020-06-18 RX ADMIN — GABAPENTIN 100 MG: 100 CAPSULE ORAL at 08:30

## 2020-06-18 RX ADMIN — FLUOXETINE 20 MG: 20 TABLET, FILM COATED ORAL at 08:29

## 2020-06-18 ASSESSMENT — PATIENT HEALTH QUESTIONNAIRE - PHQ9
1. LITTLE INTEREST OR PLEASURE IN DOING THINGS: NOT AT ALL
2. FEELING DOWN, DEPRESSED, IRRITABLE, OR HOPELESS: NOT AT ALL
SUM OF ALL RESPONSES TO PHQ9 QUESTIONS 1 AND 2: 0

## 2020-06-18 ASSESSMENT — ACTIVITIES OF DAILY LIVING (ADL)
TOILETING_LEVEL_OF_ASSIST_DESCRIPTION: GRAB BAR;INCREASED TIME
BED_CHAIR_WHEELCHAIR_TRANSFER_DESCRIPTION: INCREASED TIME
TOILET_TRANSFER_DESCRIPTION: GRAB BAR;INCREASED TIME

## 2020-06-18 ASSESSMENT — GAIT ASSESSMENTS
DISTANCE (FEET): 150
GAIT LEVEL OF ASSIST: MODIFIED INDEPENDENT

## 2020-06-18 NOTE — THERAPY
Speech Language Pathology  Daily Treatment     Patient Name: Reyes Amezcua  Age:  64 y.o., Sex:  male  Medical Record #: [id number]  Today's Date: 6/18/2020     Precautions  Precautions: Fall Risk, Weight Bearing As Tolerated Right Upper Extremity  Comments: R orbital Fx, R clavicle Fx     Subjective    Pt ambulating with no device to ST. Spouse present. Pt cooperative throughout session, towards end of session with fatigue increase in processing speed and word finding deficits.      Objective     06/18/20 1034   Expressive Language   Verbalizes Wants / Needs Supervision (5)   Word Finding Deficits Minimal (4)   Reading Comprehension    Reading Sentences Supervision (5)   WPTAS ([place] Post-traumatic Amnesia Scale)   How old are you? 1   What is your date of birth? 1   What month are we in? 1   What time of day is it? (morning, noon, or night) 0   What day of the week is it? 0   What year are we in? 1   What is the name of this place 1   Do you remember me? 1   What is my name? 1   Target Picture 1 1   Target Picture 2 1   Target Picture 3 1   Orientation Score 5   Recall Score 5   Total Score 10   Still in post-traumatic amnesia? Yes   Interdisciplinary Plan of Care Collaboration   IDT Collaboration with  Family / Caregiver   Patient Position at End of Therapy Seated;Family / Friend in Room   Collaboration Comments spouse present for session   SLP Total Time Spent   SLP Individual Total Time Spent (Mins) 60   Charge Group   SLP Treatment - Individual Speech Language Treatment - Individual       Assessment    The [place] Post-traumatic Amnesia Scale (WPTAS) is a brief bedside standardized test that measures length of post-traumatic amnesia (PTA) in people with traumatic brain injury. It consists of twelve questions that assess orientation to person, place and time, and ability to consistently retain new information from one day to another. It is administered once a day, each and every day, until the patient  achieves a perfect score across three consecutive days, after which the individual is deemed to have emerged from post-traumatic amnesia.     WPTAS ([place] Post-traumatic Amnesia Scale)  How old are you?: Correct  What is your date of birth?: Correct  What month are we in?: Correct  What time of day is it? (morning, noon, or night): Incorrect  What day of the week is it?: Incorrect  What year are we in?: Correct  What is the name of this place: Correct  Do you remember me?: Correct  What is my name?: Correct  Target Picture 1: Correct  Target Picture 2: Correct  Target Picture 3: Correct  Orientation Score: 5  Recall Score: 5  Total Score: 10  Still in post-traumatic amnesia?: Yes  Level of Severity: Moderate    Category naming given specific letter: 81% IND, with MIN cues 100%. Category identification given FO3: pt tasked with adding 1 additional target to category and identification of category - 67% IND on initial trial with MIN cues pt achieved 100%. 2nd trial: pt identified the category with 100% accuracy, adding to category: 87%.          Plan    Complete follow up outcome assessment testing    Speech Therapy Problems     Problem: Expression STGs     Dates: Start: 06/10/20       Goal: STG-Within one week, patient will     Dates: Start: 06/10/20       Description: 1) Individualized goal:  answer wh- questions related to highly personal information in 8/10 trials with use of initial phoneme cue (verbal or written)  2) Interventions:  SLP Speech Language Treatment              Goal: STG-Within one week, patient will     Dates: Start: 06/10/20       Description: 1) Individualized goal:  complete object/picture naming with 80% accuracy provided MIN cues.   2) Interventions:  SLP Speech Language Treatment                    Problem: Problem Solving STGs     Dates: Start: 06/10/20       Description: 1) Individualized goal: see goal under STG  2) Interventions:  SLP Cognitive Skill Development        Goal:  STG-Within one week, patient will     Dates: Start: 06/10/20       Description: 1) Individualized goal:  score 12/12 on the WPTA for 3 consecutive days.   2) Interventions:  SLP Speech Language Treatment and SLP Cognitive Skill Development                    Problem: Speech/Swallowing LTGs     Dates: Start: 05/30/20       Goal: LTG-By discharge, patient will comprehend     Dates: Start: 05/30/20       Description: 1) Individualized goal:  simple functional language (spoken, pictures, written) to participate with care with at least 80% accuracy and minimal cues  2) Interventions:  SLP Speech Language Treatment and SLP Cognitive Skill Development              Goal: LTG-By discharge, patient will express     Dates: Start: 05/30/20       Description: 1) Individualized goal: basic wants/needs to participate with care for 4/5 opportunities and minimal cues  2) Interventions:  SLP Speech Language Treatment

## 2020-06-18 NOTE — CARE PLAN
Problem: Communication  Goal: The ability to communicate needs accurately and effectively will improve  Description: Pt is unable to effectively communicate needs. Pt rounded on hourly to make sure needs were met. Will continue to monitor.   Outcome: PROGRESSING AS EXPECTED  Note: Patient is slow to respond.  Oriented to self and place.       Problem: Pain Management  Goal: Pain level will decrease to patient's comfort goal  Note: 1945 Complained of headache.  Fioricet given per prn order.      2100 Patient denies pain.
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
## 2020-06-18 NOTE — THERAPY
Physical Therapy   Daily Treatment     Patient Name: Reyes Amezcua  Age:  64 y.o., Sex:  male  Medical Record #: [id number]  Today's Date: 6/18/2020     Precautions  Precautions: (P) Fall Risk, Weight Bearing As Tolerated Right Upper Extremity  Comments: (P) R orbital Fx, R clavicle Fx     Subjective    Patient in bed with wife present, agreeable to therapy.     Objective       06/18/20 1431   Precautions   Precautions Fall Risk;Weight Bearing As Tolerated Right Upper Extremity   Comments R orbital Fx, R clavicle Fx    Gait Functional Level of Assist    Gait Level Of Assist Modified Independent   Assistive Device None   Distance (Feet) 150   # of Times Distance was Traveled 2   Deviation   (decr arm swing, tendency for mild R LOB, inattention )   Transfer Functional Level of Assist   Bed, Chair, Wheelchair Transfer Modified Independent   Bed Chair Wheelchair Transfer Description Increased time  (no AD)   Bed Mobility    Supine to Sit Modified Independent   Sit to Supine Modified Independent   Sit to Stand Modified Independent   Scooting Modified Independent   Rolling Modified Independent   Neuro-Muscular Treatments   Comments Dynamic balance activities in Vector with SBA-CGA, 10# offloading with min active body control: lateral hopping/side stepping x10 each direction, ABD/ADD hopping 2x10, bouncing on Bosu with BLEs vs. alternating BLEs with intermittent UE vs. BUE support, forward and backwards walking.   Interdisciplinary Plan of Care Collaboration   IDT Collaboration with  Family / Caregiver;Occupational Therapist   Patient Position at End of Therapy Seated;Family / Friend in Room   Collaboration Comments mod I; wife present for session   PT Total Time Spent   PT Individual Total Time Spent (Mins) 60   PT Charge Group   PT Gait Training 1   PT Neuromuscular Re-Education / Balance 2   PT Therapeutic Activities 1       Assessment    Patient tolerated session well, trialed Vector for dynamic pre-jumping/jumping  "activities. Patient demonstrating improved BLE coordination post-Bosu activities. Demonstrated good safety and cleared to be mod I in room with no AD.    Strengths: Supportive family, Willingly participates in therapeutic activities, Independent prior level of function, Motivated for self care and independence  Barriers: Impaired balance, Impaired activity tolerance    Plan    Gait training with no AD, standing balance activities focusing on SLS activities, endurance/activity tolerance, BLE strengthening, stair/curb negotiation, incorporate cognition/communication into activities, floor recovery, treadmill training, outdoor ambulation. Aquatic therapy.    Physical Therapy Problems     Problem: Balance     Dates: Start: 06/16/20       Goal: STG-Within one week, patient will     Dates: Start: 06/16/20       Description: 1) Individualized goal:  score >-45/56 on the [place] Balance Scale  2) Interventions:  PT Group Therapy, PT Gait Training, PT Therapeutic Exercises, PT Neuro Re-Ed/Balance, PT Therapeutic Activity, and PT Evaluation                Problem: Mobility     Dates: Start: 06/09/20       Goal: STG-Within one week, patient will ambulate up/down a curb     Dates: Start: 06/09/20       Description: 1) Individualized goal:  Patient will amb with no AD SBA up/down curb  2) Interventions:  PT Group Therapy, PT Gait Training, PT Therapeutic Exercises, PT Neuro Re-Ed/Balance, PT Therapeutic Activity, and PT Manual Therapy            Goal: STG-Within one week, patient will ascend and descend four to six stairs     Dates: Start: 06/09/20       Description: 1) Individualized goal:  Amb up/ down 4 6\" stairs with HR SBA  2) Interventions:  PT Group Therapy, PT Gait Training, PT Therapeutic Exercises, PT Neuro Re-Ed/Balance, PT Therapeutic Activity, and PT Manual Therapy            Goal: STG-Within one week, patient will ambulate up/down flight of stairs     Dates: Start: 06/16/20       Description: 1) Individualized goal:  " with single rail and supervision.  2) Interventions:  PT Group Therapy, PT Gait Training, PT Therapeutic Exercises, PT Neuro Re-Ed/Balance, PT Therapeutic Activity, and PT Evaluation                  Problem: PT-Long Term Goals     Dates: Start: 05/30/20       Goal: LTG-By discharge, patient will ambulate     Dates: Start: 05/30/20       Description: 1) Individualized goal:  150 ft with LRAD and supervision.  2) Interventions:  PT Group Therapy, PT Gait Training, PT Therapeutic Exercises, PT Neuro Re-Ed/Balance, PT Aquatic Therapy, PT Therapeutic Activity, and PT Evaluation          Goal: LTG-By discharge, patient will transfer one surface to another     Dates: Start: 05/30/20       Description: 1) Individualized goal:  with LRAD and supervision.  2) Interventions:  PT Group Therapy, PT Gait Training, PT Therapeutic Exercises, PT Neuro Re-Ed/Balance, PT Aquatic Therapy, PT Therapeutic Activity, and PT Evaluation            Goal: LTG-By discharge, patient will ambulate up/down flight of stairs     Dates: Start: 05/30/20       Description: 1) Individualized goal:  with single rail and supervision.  2) Interventions:  PT Group Therapy, PT Gait Training, PT Therapeutic Exercises, PT Neuro Re-Ed/Balance, PT Aquatic Therapy, PT Therapeutic Activity, and PT Evaluation            Goal: LTG-By discharge, patient will transfer in/out of a car     Dates: Start: 05/30/20       Description: 1) Individualized goal:  with LRAD and supervision.  2) Interventions:  PT Group Therapy, PT Gait Training, PT Therapeutic Exercises, PT Neuro Re-Ed/Balance, PT Aquatic Therapy, PT Therapeutic Activity, and PT Evaluation            Goal: LTG-By discharge, patient will     Dates: Start: 05/30/20       Description: 1) Individualized goal:  perform bed mobility independently and no bed functions.  2) Interventions:  PT Group Therapy, PT Gait Training, PT Therapeutic Exercises, PT Neuro Re-Ed/Balance, PT Aquatic Therapy, PT Therapeutic Activity,  and PT Evaluation            Goal: LTG-By discharge, patient will     Dates: Start: 05/30/20       Description: 1) Individualized goal:  negotiate single curb with LRAD and supervision.  2) Interventions:  PT Group Therapy, PT Gait Training, PT Therapeutic Exercises, PT Neuro Re-Ed/Balance, PT Aquatic Therapy, PT Therapeutic Activity, and PT Evaluation

## 2020-06-18 NOTE — CARE PLAN
Problem: Safety  Goal: Will remain free from injury  Outcome: PROGRESSING AS EXPECTED     Problem: Pain Management  Goal: Pain level will decrease to patient's comfort goal  Outcome: PROGRESSING AS EXPECTED  Patient medicated with Fioricet for HA with good relief, will continue to monitor.

## 2020-06-18 NOTE — THERAPY
Occupational Therapy  Daily Treatment     Patient Name: Reyes Amezcua  Age:  64 y.o., Sex:  male  Medical Record #: [id number]  Today's Date: 6/18/2020     Precautions  Precautions: (P) Fall Risk, Weight Bearing As Tolerated Right Upper Extremity  Comments: (P) R orbital Fx, R clavicle Fx     Safety   ADL Safety : Requires Supervision for Safety  Bathroom Safety: Requires Supervision for Safety    Subjective    Pt received supine in bed, agreeable to OT session     Objective       06/18/20 0831   Precautions   Precautions Fall Risk;Weight Bearing As Tolerated Right Upper Extremity   Comments R orbital Fx, R clavicle Fx    Functional Level of Assist   Toileting Modified Independent   Toileting Description Grab bar;Increased time   Toilet Transfers Modified Independent   Toilet Transfer Description Grab bar;Increased time   Standing Upper Body Exercises   Other Exercises standing at table with washcloth under RUE; 1x15 each exercise; standing facing table: shoulder flex/ext to neutral, tabletop circles clockwise and counterclockwise, standing with R hip to table: shoulder adduction/abduction to 90, semi circles fwd/back   Comments c/o mild stiffness in R clavicle area but no sharp pain   OT Total Time Spent   OT Individual Total Time Spent (Mins) 60   OT Charge Group   OT Self Care / ADL 1   OT Therapy Activity 3     Vision re-assessment: able to track in all 4 quadrants, reports continued double vision in lower visual fields though improved from previous assessment, now only seeing double in bottom quarter of visual field vs bottom half of visual field prior. Tolerating taped glasses well, tape adjusted on reading glasses to occlude left inferior and superior quadrants to eliminate double vision while reading.     Assessment    Pt tolerated session well, demos improvements in double vision though does persist in lower 1/4 of visual field, patch adjusted on reading glasses to eliminate double vision, may consider  decreasing patch on distance glasses due to improvement but pt declined this today. Pt demos functional ROM in R shoulder however c/o mild stiffness in clavicle, tolerated supportive AROM therex with no c/o pain, reports decreased stiffness but still present. Wife present for end of session, discussed progression of vision and adjustments made, went over UE therex for maintaining function, discussed overall plan of care and goals prior to d/c as well as strategies for continued progression at home.     Plan    Attention to task, functional cog and sequencing during ADLs/IADLs, balance and functional mobility, endurance, management of double vision, promote functional communication and word finding, dual tasking, plan for managing knife usage tomorrow 6/19    Occupational Therapy Goals     Problem: OT Long Term Goals     Dates: Start: 05/30/20       Goal: LTG-By discharge, patient will complete basic self care tasks     Dates: Start: 05/30/20       Description: 1) Individualized Goal:  Min A - Supervision with AE PRN  2) Interventions:  OT E Stim Attended, OT Group Therapy, OT Self Care/ADL, OT Cognitive Skill Dev, OT Manual Ther Technique, OT Neuro Re-Ed/Balance, OT Sensory Int Techniques, OT Therapeutic Activity, OT Aquatic Therapy, OT Evaluation, and OT Therapeutic Exercise          Goal: LTG-By discharge, patient will perform bathroom transfers     Dates: Start: 05/30/20       Description: 1) Individualized Goal: Supervision with AE/DME PRN   2) Interventions:  OT E Stim Attended, OT Group Therapy, OT Self Care/ADL, OT Cognitive Skill Dev, OT Manual Ther Technique, OT Neuro Re-Ed/Balance, OT Sensory Int Techniques, OT Therapeutic Activity, OT Aquatic Therapy, OT Evaluation, and OT Therapeutic Exercise

## 2020-06-18 NOTE — CARE PLAN
Problem: Skin Integrity  Goal: Risk for impaired skin integrity will decrease  Outcome: PROGRESSING AS EXPECTED  Patient's skin is free of any signs of infection.           Problem: Pain Management  Goal: Pain level will decrease to patient's comfort goal  Outcome: PROGRESSING AS EXPECTED  Patient is able to rate pain on a scale of 1-10.

## 2020-06-19 ENCOUNTER — TELEPHONE (OUTPATIENT)
Dept: SCHEDULING | Facility: IMAGING CENTER | Age: 65
End: 2020-06-19

## 2020-06-19 PROCEDURE — 97113 AQUATIC THERAPY/EXERCISES: CPT

## 2020-06-19 PROCEDURE — 700102 HCHG RX REV CODE 250 W/ 637 OVERRIDE(OP): Performed by: PHYSICAL MEDICINE & REHABILITATION

## 2020-06-19 PROCEDURE — A9270 NON-COVERED ITEM OR SERVICE: HCPCS | Performed by: PHYSICAL MEDICINE & REHABILITATION

## 2020-06-19 PROCEDURE — 97535 SELF CARE MNGMENT TRAINING: CPT

## 2020-06-19 PROCEDURE — 97530 THERAPEUTIC ACTIVITIES: CPT

## 2020-06-19 PROCEDURE — 770005 HCHG ROOM/CARE - REHAB PRIVATE (11*

## 2020-06-19 PROCEDURE — 99232 SBSQ HOSP IP/OBS MODERATE 35: CPT | Performed by: PHYSICAL MEDICINE & REHABILITATION

## 2020-06-19 PROCEDURE — 92507 TX SP LANG VOICE COMM INDIV: CPT

## 2020-06-19 RX ADMIN — GABAPENTIN 300 MG: 300 CAPSULE ORAL at 21:44

## 2020-06-19 RX ADMIN — MELATONIN 1000 UNITS: at 08:11

## 2020-06-19 RX ADMIN — ALUMINUM HYDROXIDE, MAGNESIUM HYDROXIDE, AND DIMETHICONE 20 ML: 400; 400; 40 SUSPENSION ORAL at 09:49

## 2020-06-19 RX ADMIN — GABAPENTIN 300 MG: 300 CAPSULE ORAL at 14:21

## 2020-06-19 RX ADMIN — FLUOXETINE 20 MG: 20 TABLET, FILM COATED ORAL at 08:11

## 2020-06-19 RX ADMIN — TRAZODONE HYDROCHLORIDE 50 MG: 50 TABLET ORAL at 21:44

## 2020-06-19 RX ADMIN — OMEPRAZOLE 20 MG: 20 CAPSULE, DELAYED RELEASE ORAL at 08:11

## 2020-06-19 RX ADMIN — GABAPENTIN 300 MG: 300 CAPSULE ORAL at 08:11

## 2020-06-19 ASSESSMENT — ACTIVITIES OF DAILY LIVING (ADL)
TUB_SHOWER_TRANSFER_DESCRIPTION: GRAB BAR
TOILETING_LEVEL_OF_ASSIST_DESCRIPTION: GRAB BAR

## 2020-06-19 NOTE — CARE PLAN
Problem: Safety  Goal: Will remain free from injury  Outcome: PROGRESSING AS EXPECTED  Note: Patient Modified independent in the room, demonstrates good safety awareness when up to bathroom.       Problem: Pain Management  Goal: Pain level will decrease to patient's comfort goal  Outcome: PROGRESSING AS EXPECTED  Note: Patient up to bathroom.  Denies pain, requested sleep medication.  Trazodone given per prn order.

## 2020-06-19 NOTE — CARE PLAN
Problem: Safety  Goal: Will remain free from injury  Outcome: PROGRESSING AS EXPECTED  Patient cleared for Mod I and exhibits good safety awareness, will continue to monitor.     Problem: Bowel/Gastric:  Goal: Normal bowel function is maintained or improved  Outcome: PROGRESSING AS EXPECTED  Patient given Mylanta 20 cc po for upset stomach with god relief.
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
## 2020-06-19 NOTE — THERAPY
"Physical Therapy   Daily Treatment     Patient Name: Reyes Amezcua  Age:  64 y.o., Sex:  male  Medical Record #: [id number]  Today's Date: 6/19/2020     Precautions  Precautions: (P) Fall Risk, Weight Bearing As Tolerated Right Upper Extremity  Comments: (P) R orbital Fx, R clavicle Fx    Subjective    Patient at pool with wife and therapy tech present, excited for aquatic therapy.     Objective       06/19/20 1431   Precautions   Precautions Fall Risk;Weight Bearing As Tolerated Right Upper Extremity   Comments R orbital Fx, R clavicle Fx   Interdisciplinary Plan of Care Collaboration   IDT Collaboration with  Family / Caregiver   Patient Position at End of Therapy Family / Friend in Room  (wife assisting patient back to room)   Collaboration Comments wife present for aquatic therapy session   PT Total Time Spent   PT Individual Total Time Spent (Mins) 60   PT Charge Group   PT Aquatic Therapy  4     Aquatic Therapy:  Descending and ascending x8 stairs with single rail in and out of pool with SBA. Flutter kicking in prone with BUE support on rail 2x60 seconds for coordination and endurance training. Supine floating, sculling with BUEs with Min A 2x~30 ft, supine flutter kicking ~30 ft with Min A. Seated core activation exercises isometric ab activation with BUEs pressing down on kickboard 2x15 and BUE push/pull with kickboard 2x15. Standing ABD/ADD jumping 2x10, lateral single leg hopping 2x10 each direction. Forward and backward lunges 2x~15 ft each direction. Balance training straddling green pool noodle maintaining upright posture without BLE support, using BUEs to cici and BLEs to propel using bicycle technique 2x~15 ft forward and backwards. Flutter kicking with BUE support on green pool noodle 3x~15 ft and Min A to for hip/trunk extension. Single leg stance tolerating circular \"vortex\" perturbations generated by clinician running around patient 2x30 seconds each leg in CW and CCW directions, then having " patient run around clinician for endurance and coordination training. Modified running in pool 2x~30 ft.    Assessment    Patient tolerated session well, trialed aquatic therapy today with wife observing session. Patient with good tolerance to activities, with biggest barrier being patient's height and depth of pool, with patient having to modify stance to submerge more fully in water to attain appropriate buoyancy. Focus of aquatic interventions on gross motor coordination, cardiovascular endurance, and dynamic balance.    Strengths: Supportive family, Willingly participates in therapeutic activities, Independent prior level of function, Motivated for self care and independence  Barriers: Impaired balance, Impaired activity tolerance    Plan    Gait training with no AD, high-level standing balance activities focusing on SLS activities, endurance/activity tolerance, BLE strengthening, stair/curb negotiation, incorporate cognition/communication into activities, floor recovery, treadmill training, outdoor ambulation. Aquatic therapy performed on 6/19/2020.    Physical Therapy Problems     Problem: Balance     Dates: Start: 06/16/20       Goal: STG-Within one week, patient will     Dates: Start: 06/16/20       Description: 1) Individualized goal:  score >-45/56 on the Escobedo Balance Scale  2) Interventions:  PT Group Therapy, PT Gait Training, PT Therapeutic Exercises, PT Neuro Re-Ed/Balance, PT Therapeutic Activity, and PT Evaluation                Problem: Mobility     Dates: Start: 06/09/20       Goal: STG-Within one week, patient will ambulate up/down a curb     Dates: Start: 06/09/20       Description: 1) Individualized goal:  Patient will amb with no AD SBA up/down curb  2) Interventions:  PT Group Therapy, PT Gait Training, PT Therapeutic Exercises, PT Neuro Re-Ed/Balance, PT Therapeutic Activity, and PT Manual Therapy            Goal: STG-Within one week, patient will ascend and descend four to six stairs      "Dates: Start: 06/09/20       Description: 1) Individualized goal:  Amb up/ down 4 6\" stairs with HR SBA  2) Interventions:  PT Group Therapy, PT Gait Training, PT Therapeutic Exercises, PT Neuro Re-Ed/Balance, PT Therapeutic Activity, and PT Manual Therapy            Goal: STG-Within one week, patient will ambulate up/down flight of stairs     Dates: Start: 06/16/20       Description: 1) Individualized goal:  with single rail and supervision.  2) Interventions:  PT Group Therapy, PT Gait Training, PT Therapeutic Exercises, PT Neuro Re-Ed/Balance, PT Therapeutic Activity, and PT Evaluation                  Problem: PT-Long Term Goals     Dates: Start: 05/30/20       Goal: LTG-By discharge, patient will ambulate     Dates: Start: 05/30/20       Description: 1) Individualized goal:  150 ft with LRAD and supervision.  2) Interventions:  PT Group Therapy, PT Gait Training, PT Therapeutic Exercises, PT Neuro Re-Ed/Balance, PT Aquatic Therapy, PT Therapeutic Activity, and PT Evaluation          Goal: LTG-By discharge, patient will transfer one surface to another     Dates: Start: 05/30/20       Description: 1) Individualized goal:  with LRAD and supervision.  2) Interventions:  PT Group Therapy, PT Gait Training, PT Therapeutic Exercises, PT Neuro Re-Ed/Balance, PT Aquatic Therapy, PT Therapeutic Activity, and PT Evaluation            Goal: LTG-By discharge, patient will ambulate up/down flight of stairs     Dates: Start: 05/30/20       Description: 1) Individualized goal:  with single rail and supervision.  2) Interventions:  PT Group Therapy, PT Gait Training, PT Therapeutic Exercises, PT Neuro Re-Ed/Balance, PT Aquatic Therapy, PT Therapeutic Activity, and PT Evaluation            Goal: LTG-By discharge, patient will transfer in/out of a car     Dates: Start: 05/30/20       Description: 1) Individualized goal:  with LRAD and supervision.  2) Interventions:  PT Group Therapy, PT Gait Training, PT Therapeutic Exercises, " PT Neuro Re-Ed/Balance, PT Aquatic Therapy, PT Therapeutic Activity, and PT Evaluation            Goal: LTG-By discharge, patient will     Dates: Start: 05/30/20       Description: 1) Individualized goal:  perform bed mobility independently and no bed functions.  2) Interventions:  PT Group Therapy, PT Gait Training, PT Therapeutic Exercises, PT Neuro Re-Ed/Balance, PT Aquatic Therapy, PT Therapeutic Activity, and PT Evaluation            Goal: LTG-By discharge, patient will     Dates: Start: 05/30/20       Description: 1) Individualized goal:  negotiate single curb with LRAD and supervision.  2) Interventions:  PT Group Therapy, PT Gait Training, PT Therapeutic Exercises, PT Neuro Re-Ed/Balance, PT Aquatic Therapy, PT Therapeutic Activity, and PT Evaluation

## 2020-06-19 NOTE — THERAPY
Occupational Therapy  Daily Treatment     Patient Name: Reyes Amezcua  Age:  64 y.o., Sex:  male  Medical Record #: [id number]  Today's Date: 6/19/2020     Precautions  Precautions: (P) Fall Risk, Weight Bearing As Tolerated Right Upper Extremity  Comments: (P) R orbital Fx, R clavicle Fx     Safety   ADL Safety : (P) Modified Independent  Bathroom Safety: (P) Modified Independent    Subjective    Pt received up in chair, agreeable to OT session     Objective       06/19/20 0831   Precautions   Precautions Fall Risk;Weight Bearing As Tolerated Right Upper Extremity   Comments R orbital Fx, R clavicle Fx    Safety    ADL Safety  Modified Independent   Bathroom Safety Modified Independent   Functional Level of Assist   Eating Independent   Grooming Modified Independent   Grooming Description Increased time   Bathing Supervision   Bathing Description Grab bar;Hand held shower;Tub bench   Upper Body Dressing Modified Independent   Upper Body Dressing Description Increased time   Lower Body Dressing Modified Independent   Lower Body Dressing Description Increased time   Toileting Modified Independent   Toileting Description Grab bar   Toilet Transfers Modified Independent   Tub / Shower Transfers Modified Independent   Tub Shower Transfer Description Grab bar   IADL Treatments   IADL Treatments Meal preparation   Meal Preparation Pt SBA for meal prep activity (salsa making); use of reading glasses with partial occlusion to eliminate double vision for cutting onion with knife, supervision and min cues for slow pace with good carryover within session. Pt able to self-direct activity well, appropriate attention to stove safety, requires seated rest breaks for energy conservation.   OT Total Time Spent   OT Individual Total Time Spent (Mins) 60   OT Charge Group   OT Self Care / ADL 2   OT Therapy Activity 2       Assessment    Pt tolerated session well, has progressed to modified independent with ADL routine with use of  grab bar and shower chair for safety, use of UE support or sitting for lower body dressing. Continues with improvements in self-directing complex cooking tasks, min cues for safety attention with knife use with good carryover within session. Wife present and supported, plans to obtain shower chair, grab bar in place in shower already, discussed use of reading glasses for cooking to eliminate double vision for knife safety.       Plan    Attention to task, functional cog, higher level balance and functional mobility, endurance, management of double vision, promote functional communication and word finding, management of clavicle fracture, dual tasking, enjoys playing [employer] with wife in family lounge (therapy assist for guarding pt for balance), prep for d/c Tuesday 6/23    Occupational Therapy Goals     Problem: OT Long Term Goals     Dates: Start: 05/30/20       Goal: LTG-By discharge, patient will complete basic self care tasks     Dates: Start: 05/30/20       Description: 1) Individualized Goal:  Min A - Supervision with AE PRN  2) Interventions:  OT E Stim Attended, OT Group Therapy, OT Self Care/ADL, OT Cognitive Skill Dev, OT Manual Ther Technique, OT Neuro Re-Ed/Balance, OT Sensory Int Techniques, OT Therapeutic Activity, OT Aquatic Therapy, OT Evaluation, and OT Therapeutic Exercise          Goal: LTG-By discharge, patient will perform bathroom transfers     Dates: Start: 05/30/20       Description: 1) Individualized Goal: Supervision with AE/DME PRN   2) Interventions:  OT E Stim Attended, OT Group Therapy, OT Self Care/ADL, OT Cognitive Skill Dev, OT Manual Ther Technique, OT Neuro Re-Ed/Balance, OT Sensory Int Techniques, OT Therapeutic Activity, OT Aquatic Therapy, OT Evaluation, and OT Therapeutic Exercise

## 2020-06-19 NOTE — DISCHARGE PLANNING
"MINDI spoke w/ Shruti from [facility].  She will try to get \"GAP auth\" but they could not get services started until July 1st.  MINDI called patients wife and LM on VM for HH choice.    "

## 2020-06-19 NOTE — PROGRESS NOTES
Spiritual Care Note    Patient Information     Patient's Name: Reyes Amezcua   MRN: [id number]    YOB: 1955   Age and Gender: 64 y.o. male   Service Area: Inpatient    Room (and Bed): [place] [address]   Ethnicity or Nationality:     Primary Language: English   Judaism/Spiritual preference: Hoahaoism   Place of Residence: [place]   Family/Friends/Others Present: Yes, wife   Clinical Team Present: No   Medical Diagnosis(-es)/Procedure(s): TBI   Code Status: Full Code    Date of Admission: 5/29/2020   Length of Stay: 21 days        Spiritual Care Provider Information:  Name of Spiritual Care Provider: Clare Leos  Title of Spiritual Care Provider: Associate Yang  Phone Number: [phone]  E-mail: [email or web address]  Total time : 20 minutes    Spiritual Screen Results:    Gen Nursing  Spiritual Screen  Is your spiritual health or inner well-being important to you as you cope with your medical condition?: Yes  Would you like to receive a visit from our Spiritual Care team or your own Restorationist or spiritual leader?: No  Was spiritual care education provided to the patient?: Declined     Palliative Care  PC Judaism/Spiritual Screening  Was spiritual care education provided to the patient?: Declined      Encounter/Request Information  Encounter/Request Type   Visited With: Patient and family together  Nature of the Visit: Follow-up, On shift  Continue Visiting: Yes  Next Follow-up Date: 06/03/20  General Visit: Yes  Referral From/ Origin of Request: Verbal family    Religous Needs/Values  Judaism Needs Visit  Judaism Needs: Prayer    Spiritual Assessment     Spiritual Care Encounters    Observations/Symptoms: Accepting, Thankfulness    Interaction/Conversation: Visited with pt and wife, who are very gaateful that pt is going home on Tuesday.  offered prayer for thanksgiivng and continued healing.  Wife and pt thanked  for her many visits during this pt's hospital  stay.    Assessment: Need    Need: Seeking Spiritual Assistance and Support    Interventions: Compassonate presence, prayer.    Plan: Weekly Visits    Notes:

## 2020-06-19 NOTE — THERAPY
Speech Language Pathology  Daily Treatment     Patient Name: Reyes Amezcua  Age:  64 y.o., Sex:  male  Medical Record #: [id number]  Today's Date: 6/19/2020     Precautions  Precautions: Fall Risk, Weight Bearing As Tolerated Right Upper Extremity  Comments: R orbital Fx, R clavicle Fx     Subjective    Pt pleasant and cooperative.      Objective     06/19/20 1034   Outcome Measures   Outcome Measures Utilized RCBA2   RCBA-2 (Reading Comprehension Battery for Aphasia)   Core Subtest: Paragraph-factual and inferentia  7   Core Subtest: Word visual 10   Core Subtest: Word auditory 10   Core Subtest: Word semantic 7   WAB-R (Western Aphasia Battery)   Spontaneous Speech:  Information Content 8   Spontaneous Speech:  Fluency 9   Comprehension:  Yes/No Questions 60   Comprehension:  Word Recognition 59   Comprehension:  Sequential Commands 70   Repetition:  Total 90   Naming:  Object Naming 52   Naming:  Word Fluency 7   Naming:  Sentence Completion 7   Naming:  Responsive Speech 9   Spontaneous Speech Score 17   Comprehension Score 9.4   Repetition Score 9   Naming Score 7.5   Aphasia Quotient (QA) 85.8   WAB-R Degree of Severity Mild Impairment   WPTAS ([place] Post-traumatic Amnesia Scale)   How old are you? 1   What is your date of birth? 1   What month are we in? 1   What time of day is it? (morning, noon, or night) 0   What day of the week is it? 0   What year are we in? 0   What is the name of this place 1   Do you remember me? 1   What is my name? 0   Target Picture 1 1   Target Picture 2 1   Target Picture 3 1   Orientation Score 4   Recall Score 4   Total Score 8   Interdisciplinary Plan of Care Collaboration   IDT Collaboration with  Family / Caregiver   Patient Position at End of Therapy Family / Friend in Room   Collaboration Comments spouse present, pt MOD I in room   SLP Total Time Spent   SLP Individual Total Time Spent (Mins) 60   Charge Group   SLP Treatment - Individual Speech Language Treatment -  Individual       Assessment    Follow up outcome assessment testing completed in anticipation of d/c for .   The Western Aphasia Battery-Revised (WAB-R) assesses adult patients with acquired neurological disorders (e.g., as a result of stroke, head injury, dementia) for aphasia.  WAB-R assesses the linguistic skills most frequently affected by aphasia, in addition to key nonlinguistic skills, and provides differential diagnosis information.     WAB-R (Western Aphasia Battery)  Spontaneous Speech:  Information Content: 8  Spontaneous Speech:  Fluency: 9  Comprehension:  Yes/No Questions: 60  Comprehension:  Word Recognition: 59  Comprehension:  Sequential Commands: 70  Repetition:  Total: 90  Naming:  Object Namin  Naming:  Word Fluency: 7  Naming:  Sentence Completion: 7  Naming:  Responsive Speech: 9  Spontaneous Speech Score: 17  Comprehension Score: 9.4  Repetition Score: 9  Naming Score: 7.5  Aphasia Quotient (QA): 85.8  WAB-R Degree of Severity: Mild Impairment  ** It should be noted pt was only able to complete Y/N questions and auditory word recognition at initial assessment.     The [place] Post-traumatic Amnesia Scale (WPTAS) is a brief bedside standardized test that measures length of post-traumatic amnesia (PTA) in people with traumatic brain injury. It consists of twelve questions that assess orientation to person, place and time, and ability to consistently retain new information from one day to another. It is administered once a day, each and every day, until the patient achieves a perfect score across three consecutive days, after which the individual is deemed to have emerged from post-traumatic amnesia.     WPTAS ([place] Post-traumatic Amnesia Scale)  How old are you?: Correct  What is your date of birth?: Correct  What month are we in?: Correct  What time of day is it? (morning, noon, or night): Incorrect  What day of the week is it?: Incorrect  What year are we in?: Incorrect  What is  the name of this place: Correct  Do you remember me?: Correct  What is my name?: Incorrect  Target Picture 1: Correct  Target Picture 2: Correct  Target Picture 3: Correct  Orientation Score: 4  Recall Score: 4  Total Score: 8  Still in post-traumatic amnesia?: Yes  Level of Severity: Moderate    Initiated RCBA, unable to finish due to time constraints. Will continue in subsequent therapy session with reporting of scores.     Plan    Continue RCBA, complete home program.     Speech Therapy Problems     Problem: Expression STGs     Dates: Start: 06/10/20       Goal: STG-Within one week, patient will     Dates: Start: 06/10/20       Description: 1) Individualized goal:  answer wh- questions related to highly personal information in 8/10 trials with use of initial phoneme cue (verbal or written)  2) Interventions:  SLP Speech Language Treatment              Goal: STG-Within one week, patient will     Dates: Start: 06/10/20       Description: 1) Individualized goal:  complete object/picture naming with 80% accuracy provided MIN cues.   2) Interventions:  SLP Speech Language Treatment                    Problem: Problem Solving STGs     Dates: Start: 06/10/20       Description: 1) Individualized goal: see goal under STG  2) Interventions:  SLP Cognitive Skill Development        Goal: STG-Within one week, patient will     Dates: Start: 06/10/20       Description: 1) Individualized goal:  score 12/12 on the WPTA for 3 consecutive days.   2) Interventions:  SLP Speech Language Treatment and SLP Cognitive Skill Development                    Problem: Speech/Swallowing LTGs     Dates: Start: 05/30/20       Goal: LTG-By discharge, patient will comprehend     Dates: Start: 05/30/20       Description: 1) Individualized goal:  simple functional language (spoken, pictures, written) to participate with care with at least 80% accuracy and minimal cues  2) Interventions:  SLP Speech Language Treatment and SLP Cognitive Skill  Development              Goal: LTG-By discharge, patient will express     Dates: Start: 05/30/20       Description: 1) Individualized goal: basic wants/needs to participate with care for 4/5 opportunities and minimal cues  2) Interventions:  SLP Speech Language Treatment

## 2020-06-20 PROCEDURE — 770005 HCHG ROOM/CARE - REHAB PRIVATE (11*

## 2020-06-20 PROCEDURE — 700102 HCHG RX REV CODE 250 W/ 637 OVERRIDE(OP): Performed by: PHYSICAL MEDICINE & REHABILITATION

## 2020-06-20 PROCEDURE — 97129 THER IVNTJ 1ST 15 MIN: CPT

## 2020-06-20 PROCEDURE — A9270 NON-COVERED ITEM OR SERVICE: HCPCS | Performed by: PHYSICAL MEDICINE & REHABILITATION

## 2020-06-20 PROCEDURE — 97112 NEUROMUSCULAR REEDUCATION: CPT

## 2020-06-20 PROCEDURE — 97116 GAIT TRAINING THERAPY: CPT

## 2020-06-20 PROCEDURE — 92507 TX SP LANG VOICE COMM INDIV: CPT

## 2020-06-20 PROCEDURE — 97530 THERAPEUTIC ACTIVITIES: CPT

## 2020-06-20 RX ADMIN — GABAPENTIN 300 MG: 300 CAPSULE ORAL at 20:59

## 2020-06-20 RX ADMIN — MELATONIN 1000 UNITS: at 08:28

## 2020-06-20 RX ADMIN — OMEPRAZOLE 20 MG: 20 CAPSULE, DELAYED RELEASE ORAL at 08:28

## 2020-06-20 RX ADMIN — GABAPENTIN 300 MG: 300 CAPSULE ORAL at 15:46

## 2020-06-20 RX ADMIN — FLUOXETINE 20 MG: 20 TABLET, FILM COATED ORAL at 08:28

## 2020-06-20 RX ADMIN — TRAZODONE HYDROCHLORIDE 50 MG: 50 TABLET ORAL at 21:27

## 2020-06-20 RX ADMIN — GABAPENTIN 300 MG: 300 CAPSULE ORAL at 08:28

## 2020-06-20 ASSESSMENT — GAIT ASSESSMENTS
DISTANCE (FEET): 150
GAIT LEVEL OF ASSIST: MODIFIED INDEPENDENT

## 2020-06-20 ASSESSMENT — ACTIVITIES OF DAILY LIVING (ADL): BED_CHAIR_WHEELCHAIR_TRANSFER_DESCRIPTION: INCREASED TIME

## 2020-06-20 NOTE — CARE PLAN
Problem: Skin Integrity  Goal: Risk for impaired skin integrity will decrease  Outcome: PROGRESSING AS EXPECTED  Note: Skin intact no open areas noted.      Problem: Pain Management  Goal: Pain level will decrease to patient's comfort goal  Outcome: PROGRESSING AS EXPECTED  Note: Denies pain this shift.

## 2020-06-20 NOTE — THERAPY
Speech Language Pathology  Daily Treatment     Patient Name: Reyes Amezcua  Age:  64 y.o., Sex:  male  Medical Record #: [id number]  Today's Date: 2020     Precautions  Precautions: Fall Risk, Weight Bearing As Tolerated Right Upper Extremity  Comments: R orbital Fx, R clavicle Fx    Subjective    Pt pleasant and cooperative, reports having a nice visit with family this morning through the windows over the phone.      Objective     20 1004   Reading Comprehension    Reading Words Supervision (5)   Reading Phrases Supervision (5)   Reading Sentences Minimal (4)   Functional Reading Materials Minimal (4)   RCBA-2 (Reading Comprehension Battery for Aphasia)   Core Subtest: Paragraph-factual and inferentia  10   Core Subtest: Word visual 7   Core Subtest: Word auditory 10   Core Subtest: Word semantic 10   Core Subtest: Functional reading 7   Core Subtest: Synonyms 8   Core Subtest: Sentence-picture 9   Core Subtest: Paragraph-picture 8   Core Subtest: Morpho syntax 8   Interdisciplinary Plan of Care Collaboration   IDT Collaboration with  Family / Caregiver   Patient Position at End of Therapy Family / Friend in Room   Collaboration Comments spouse present for session   SLP Total Time Spent   SLP Individual Total Time Spent (Mins) 60   Charge Group   SLP Treatment - Individual Speech Language Treatment - Individual       Assessment    Completed The Reading Comprehension Battery for Aphasia (RCBA-2) provides a systematic evaluation of the nature and degree of reading impairment in adults with aphasia, including oral-reading comprehension.    RCBA-2 (Reading Comprehension Battery for Aphasia)  Core Subtest: Paragraph-factual and inferentia : 10  Core Subtest: Word visual: 7  Core Subtest: Word auditory: 10  Core Subtest: Word semantic: 10  Core Subtest: Functional readin  Core Subtest: Synonyms: 8  Core Subtest: Sentence-picture: 9  Core Subtest: Paragraph-picture: 8  Core Subtest: Morpho syntax:  8    Review of those answers missed, pt was able to self correct. Pt also recalled prior responses from day prior when correcting.     Plan    Review home program, d/c planning for anticipated d/c of 6/23    Speech Therapy Problems     Problem: Expression STGs     Dates: Start: 06/10/20       Goal: STG-Within one week, patient will     Dates: Start: 06/10/20       Description: 1) Individualized goal:  answer wh- questions related to highly personal information in 8/10 trials with use of initial phoneme cue (verbal or written)  2) Interventions:  SLP Speech Language Treatment              Goal: STG-Within one week, patient will     Dates: Start: 06/10/20       Description: 1) Individualized goal:  complete object/picture naming with 80% accuracy provided MIN cues.   2) Interventions:  SLP Speech Language Treatment                    Problem: Problem Solving STGs     Dates: Start: 06/10/20       Description: 1) Individualized goal: see goal under STG  2) Interventions:  SLP Cognitive Skill Development        Goal: STG-Within one week, patient will     Dates: Start: 06/10/20       Description: 1) Individualized goal:  score 12/12 on the WPTA for 3 consecutive days.   2) Interventions:  SLP Speech Language Treatment and SLP Cognitive Skill Development                    Problem: Speech/Swallowing LTGs     Dates: Start: 05/30/20       Goal: LTG-By discharge, patient will comprehend     Dates: Start: 05/30/20       Description: 1) Individualized goal:  simple functional language (spoken, pictures, written) to participate with care with at least 80% accuracy and minimal cues  2) Interventions:  SLP Speech Language Treatment and SLP Cognitive Skill Development              Goal: LTG-By discharge, patient will express     Dates: Start: 05/30/20       Description: 1) Individualized goal: basic wants/needs to participate with care for 4/5 opportunities and minimal cues  2) Interventions:  SLP Speech Language Treatment

## 2020-06-20 NOTE — THERAPY
"Physical Therapy   Daily Treatment     Patient Name: Reyes Amezcua  Age:  64 y.o., Sex:  male  Medical Record #: [id number]  Today's Date: 6/20/2020     Precautions  Precautions: (P) Fall Risk, Weight Bearing As Tolerated Right Upper Extremity  Comments: (P) R orbital Fx, R clavicle Fx     Subjective    Patient and wife in room and agreeable to therapy.     Objective       06/20/20 1431   Precautions   Precautions Fall Risk;Weight Bearing As Tolerated Right Upper Extremity   Comments R orbital Fx, R clavicle Fx    Gait Functional Level of Assist    Gait Level Of Assist Modified Independent   Assistive Device None   Distance (Feet) 150   # of Times Distance was Traveled 2   Deviation   (mild instability)   Transfer Functional Level of Assist   Bed, Chair, Wheelchair Transfer Modified Independent   Bed Chair Wheelchair Transfer Description Increased time  (no AD)   Bed Mobility    Supine to Sit Independent   Sit to Supine Independent   Sit to Stand Independent   Scooting Independent   Rolling Independent   Neuro-Muscular Treatments   Comments Standing balance activities with no UE support, intermittent support on parallel bars vs. wife's hands: anterior, lateral, and rotational stepping to salsa dancing, single leg sit<>stand from elevated therapy mat with 4# weight 2x5 each leg; alternating lunges on Bosu 3x10 anterior 3x10 lateral; alternating toe taps on 15\" tall theraband 1x20; modified tandem CLAU resisting trunk rotation and performing trunk rotation 1x10 each direction each leg leading; tandem CLAU 2x30 seconds; single leg stance 2x15 seconds. Sit<>stands with no UE support with band around knees for glute med activation 1x10, lateral band walks 1x10 each direction,   Interdisciplinary Plan of Care Collaboration   IDT Collaboration with  Family / Caregiver   Patient Position at End of Therapy Family / Friend in Room;Other (Comments)  (with OT present)   Collaboration Comments wife present for session   PT Total " "Time Spent   PT Individual Total Time Spent (Mins) 60   PT Charge Group   PT Gait Training 1   PT Neuromuscular Re-Education / Balance 3       Assessment    Patient tolerated session well, focus of session on dynamic balance activities. Patient agreeable to trialing salsa dancing to address balance, coordination, and cognition. Patient and wife educated on d/c recommendations for balance exercises, as well as home health/[facility]/outpatient.    Strengths: Supportive family, Willingly participates in therapeutic activities, Independent prior level of function, Motivated for self care and independence  Barriers: Impaired balance, Impaired activity tolerance    Plan    Anticipated d/c 6/23/2020; please complete IRF-Rika and [place] Balance Scale. Patient is currently independent with no AD. HEP created and provided to patient/wife by time of d/c.    Physical Therapy Problems     Problem: Balance     Dates: Start: 06/16/20       Goal: STG-Within one week, patient will     Dates: Start: 06/16/20       Description: 1) Individualized goal:  score >-45/56 on the [place] Balance Scale  2) Interventions:  PT Group Therapy, PT Gait Training, PT Therapeutic Exercises, PT Neuro Re-Ed/Balance, PT Therapeutic Activity, and PT Evaluation                Problem: Mobility     Dates: Start: 06/09/20       Goal: STG-Within one week, patient will ambulate up/down a curb     Dates: Start: 06/09/20       Description: 1) Individualized goal:  Patient will amb with no AD SBA up/down curb  2) Interventions:  PT Group Therapy, PT Gait Training, PT Therapeutic Exercises, PT Neuro Re-Ed/Balance, PT Therapeutic Activity, and PT Manual Therapy            Goal: STG-Within one week, patient will ascend and descend four to six stairs     Dates: Start: 06/09/20       Description: 1) Individualized goal:  Amb up/ down 4 6\" stairs with HR SBA  2) Interventions:  PT Group Therapy, PT Gait Training, PT Therapeutic Exercises, PT Neuro Re-Ed/Balance, " PT Therapeutic Activity, and PT Manual Therapy            Goal: STG-Within one week, patient will ambulate up/down flight of stairs     Dates: Start: 06/16/20       Description: 1) Individualized goal:  with single rail and supervision.  2) Interventions:  PT Group Therapy, PT Gait Training, PT Therapeutic Exercises, PT Neuro Re-Ed/Balance, PT Therapeutic Activity, and PT Evaluation                  Problem: PT-Long Term Goals     Dates: Start: 05/30/20       Goal: LTG-By discharge, patient will ambulate     Dates: Start: 05/30/20       Description: 1) Individualized goal:  150 ft with LRAD and supervision.  2) Interventions:  PT Group Therapy, PT Gait Training, PT Therapeutic Exercises, PT Neuro Re-Ed/Balance, PT Aquatic Therapy, PT Therapeutic Activity, and PT Evaluation          Goal: LTG-By discharge, patient will transfer one surface to another     Dates: Start: 05/30/20       Description: 1) Individualized goal:  with LRAD and supervision.  2) Interventions:  PT Group Therapy, PT Gait Training, PT Therapeutic Exercises, PT Neuro Re-Ed/Balance, PT Aquatic Therapy, PT Therapeutic Activity, and PT Evaluation            Goal: LTG-By discharge, patient will ambulate up/down flight of stairs     Dates: Start: 05/30/20       Description: 1) Individualized goal:  with single rail and supervision.  2) Interventions:  PT Group Therapy, PT Gait Training, PT Therapeutic Exercises, PT Neuro Re-Ed/Balance, PT Aquatic Therapy, PT Therapeutic Activity, and PT Evaluation            Goal: LTG-By discharge, patient will transfer in/out of a car     Dates: Start: 05/30/20       Description: 1) Individualized goal:  with LRAD and supervision.  2) Interventions:  PT Group Therapy, PT Gait Training, PT Therapeutic Exercises, PT Neuro Re-Ed/Balance, PT Aquatic Therapy, PT Therapeutic Activity, and PT Evaluation            Goal: LTG-By discharge, patient will     Dates: Start: 05/30/20       Description: 1) Individualized goal:   perform bed mobility independently and no bed functions.  2) Interventions:  PT Group Therapy, PT Gait Training, PT Therapeutic Exercises, PT Neuro Re-Ed/Balance, PT Aquatic Therapy, PT Therapeutic Activity, and PT Evaluation            Goal: LTG-By discharge, patient will     Dates: Start: 05/30/20       Description: 1) Individualized goal:  negotiate single curb with LRAD and supervision.  2) Interventions:  PT Group Therapy, PT Gait Training, PT Therapeutic Exercises, PT Neuro Re-Ed/Balance, PT Aquatic Therapy, PT Therapeutic Activity, and PT Evaluation

## 2020-06-20 NOTE — THERAPY
"Occupational Therapy  Daily Treatment     Patient Name: Reyes Amezcua  Age:  64 y.o., Sex:  male  Medical Record #: [id number]  Today's Date: 6/20/2020     Precautions  Precautions: (P) Fall Risk, Weight Bearing As Tolerated Right Upper Extremity  Comments: (P) R orbital Fx, R clavicle Fx     Safety   ADL Safety : Modified Independent  Bathroom Safety: Modified Independent    Subjective    \"Oh this would be so much fun to do more often\" wife reported about the PVC pipe tree      Objective       06/20/20 0901   Precautions   Precautions Fall Risk;Weight Bearing As Tolerated Left Upper Extremity   Comments R orbital Fx, R clavicle Fx   Pain   Intervention Declines   Pain 0 - 10 Group   Pain Rating Scale (NPRS) 0   Therapist Pain Assessment Post Activity Pain Same as Prior to Activity   Cognition    Level of Consciousness Alert   Sleep/Wake Cycle   Sleep & Rest Awake   Neuro-Muscular Treatments   Neuro-Muscular Treatments Weight Shift Right;Weight Shift Left   Comments Pt engaged in dynamic standing balance activity; played table tennis with wife and therapist; 1 small LOB noted when reaching down for ball and able to recover with CGA; able to complete rest of play with SBA for safety    Bed Mobility    Supine to Sit Modified Independent   Sit to Supine Modified Independent   Scooting Modified Independent   Rolling Modified Independent   Interdisciplinary Plan of Care Collaboration   Patient Position at End of Therapy In Bed;Call Light within Reach;Tray Table within Reach;Phone within Reach;Family / Friend in Room   OT Total Time Spent   OT Individual Total Time Spent (Mins) 60   OT Charge Group   OT Cognitive Skill Development First 15 Minutes 1   OT Neuromuscular Re-education / Balance 2   OT Therapy Activity 1       Assessment    Pt tolerated session well. Pt able to complete 2 PVC pipe tree patterns at tabletop with no cues needed for medium difficulty picture and min cues for hard 3-D picture to follow. Pt " completed dynamic standing balance activity with table tennis with SBA, 1 small LOB, able to recover with CGA when reaching down for ball and turning around quickly. Pt very motivated to get home safely with wife on Tuesday. Wife present and engaged during therapy.      Plan    Attention to task, functional cog, higher level balance and functional mobility, endurance, management of double vision, promote functional communication and word finding, management of clavicle fracture, dual tasking, enjoys playing pinMine pong with wife in family lounge (therapy assist for guarding pt for balance), prep for d/c Tuesday 6/23    Occupational Therapy Goals     Problem: OT Long Term Goals     Dates: Start: 05/30/20       Goal: LTG-By discharge, patient will complete basic self care tasks     Dates: Start: 05/30/20       Description: 1) Individualized Goal:  Min A - Supervision with AE PRN  2) Interventions:  OT E Stim Attended, OT Group Therapy, OT Self Care/ADL, OT Cognitive Skill Dev, OT Manual Ther Technique, OT Neuro Re-Ed/Balance, OT Sensory Int Techniques, OT Therapeutic Activity, OT Aquatic Therapy, OT Evaluation, and OT Therapeutic Exercise          Goal: LTG-By discharge, patient will perform bathroom transfers     Dates: Start: 05/30/20       Description: 1) Individualized Goal: Supervision with AE/DME PRN   2) Interventions:  OT E Stim Attended, OT Group Therapy, OT Self Care/ADL, OT Cognitive Skill Dev, OT Manual Ther Technique, OT Neuro Re-Ed/Balance, OT Sensory Int Techniques, OT Therapeutic Activity, OT Aquatic Therapy, OT Evaluation, and OT Therapeutic Exercise

## 2020-06-21 PROCEDURE — A9270 NON-COVERED ITEM OR SERVICE: HCPCS | Performed by: PHYSICAL MEDICINE & REHABILITATION

## 2020-06-21 PROCEDURE — 700102 HCHG RX REV CODE 250 W/ 637 OVERRIDE(OP): Performed by: PHYSICAL MEDICINE & REHABILITATION

## 2020-06-21 PROCEDURE — 770005 HCHG ROOM/CARE - REHAB PRIVATE (11*

## 2020-06-21 RX ADMIN — MELATONIN 1000 UNITS: at 08:28

## 2020-06-21 RX ADMIN — GABAPENTIN 300 MG: 300 CAPSULE ORAL at 08:28

## 2020-06-21 RX ADMIN — FLUOXETINE 20 MG: 20 TABLET, FILM COATED ORAL at 08:28

## 2020-06-21 RX ADMIN — GABAPENTIN 300 MG: 300 CAPSULE ORAL at 20:38

## 2020-06-21 RX ADMIN — OMEPRAZOLE 20 MG: 20 CAPSULE, DELAYED RELEASE ORAL at 08:28

## 2020-06-21 RX ADMIN — GABAPENTIN 300 MG: 300 CAPSULE ORAL at 15:07

## 2020-06-21 ASSESSMENT — FIBROSIS 4 INDEX: FIB4 SCORE: .987654320987654321

## 2020-06-21 NOTE — CARE PLAN
Pt is not impulsive and does well transferring self. Has good safety awareness. Wanted to get a shower today and called and waited for assistance to be safe. Spent most of day with wife, they walk around the hospital and talk and wife helps significantly with patient's memory and order of events. Patient is voiding without retaining and moving their bowels regularly and continently. Has no s/s of DVT. Pt and wife are both excited about upcoming discharge. Pt headaches seem completely under control with increase in gabapentin. Also patient seems to be more expressive, still has somewhat of a flat affect but is improving.     Problem: Knowledge Deficit  Goal: Knowledge of disease process/condition, treatment plan, diagnostic tests, and medications will improve  Outcome: PROGRESSING AS EXPECTED  Goal: Knowledge of the prescribed therapeutic regimen will improve  Outcome: PROGRESSING AS EXPECTED     Problem: Discharge Barriers/Planning  Goal: Patient's continuum of care needs will be met  Outcome: PROGRESSING AS EXPECTED     Problem: Safety  Goal: Will remain free from injury  Outcome: MET  Goal: Will remain free from falls  Outcome: MET     Problem: Infection  Goal: Will remain free from infection  Outcome: MET     Problem: Venous Thromboembolism (VTW)/Deep Vein Thrombosis (DVT) Prevention:  Goal: Patient will participate in Venous Thrombosis (VTE)/Deep Vein Thrombosis (DVT)Prevention Measures  Outcome: MET     Problem: Bowel/Gastric:  Goal: Normal bowel function is maintained or improved  Outcome: MET  Goal: Will not experience complications related to bowel motility  Outcome: MET     Problem: Urinary Elimination:  Goal: Ability to reestablish a normal urinary elimination pattern will improve  Description: Nickerson catheter was removed this morning. Pt. was able to void moderate amount of urine. PVR was   Outcome: MET

## 2020-06-21 NOTE — CARE PLAN
Problem: Safety  Goal: Will remain free from falls  Outcome: PROGRESSING AS EXPECTED  Intervention: Implement fall precautions  Flowsheets (Taken 6/21/2020 0251)  Bed Alarm: Yes - Alarm On  Environmental Precautions: Bed in Low Position     Problem: Infection  Goal: Will remain free from infection  Outcome: PROGRESSING AS EXPECTED     Problem: Pain Management  Goal: Pain level will decrease to patient's comfort goal  Outcome: PROGRESSING AS EXPECTED  Intervention: Educate and implement non-pharmacologic comfort measures. Examples: relaxation, distration, play therapy, activity therapy, massage, etc.  Note: Assessed for any s/s of pain, pt denies, verbalizing feeling okay.

## 2020-06-21 NOTE — CARE PLAN
Pt reports headaches have improved dramatically with gabapentin increase. Has not needed any prn pain medicine since Thursday. Had no pain today. Spends most of day in therapy and walking around with wife. No new skin issues observed and all previous issues resolved.   Problem: Skin Integrity  Goal: Risk for impaired skin integrity will decrease  Outcome: PROGRESSING AS EXPECTED     Problem: Pain Management  Goal: Pain level will decrease to patient's comfort goal  Outcome: PROGRESSING AS EXPECTED

## 2020-06-22 PROBLEM — S02.85XA: Status: RESOLVED | Noted: 2020-05-09 | Resolved: 2020-06-22

## 2020-06-22 PROBLEM — S42.034A CLOSED NONDISPLACED FRACTURE OF ACROMIAL END OF RIGHT CLAVICLE: Status: RESOLVED | Noted: 2020-05-09 | Resolved: 2020-06-22

## 2020-06-22 PROBLEM — R13.12 DYSPHAGIA, OROPHARYNGEAL: Status: RESOLVED | Noted: 2020-05-13 | Resolved: 2020-06-22

## 2020-06-22 PROBLEM — T14.90XA TRAUMA: Status: RESOLVED | Noted: 2020-05-09 | Resolved: 2020-06-22

## 2020-06-22 PROBLEM — J96.90 RESPIRATORY FAILURE FOLLOWING TRAUMA (HCC): Status: RESOLVED | Noted: 2020-05-09 | Resolved: 2020-06-22

## 2020-06-22 PROBLEM — S02.0XXA CLOSED FRACTURE OF VAULT OF SKULL (HCC): Status: RESOLVED | Noted: 2020-05-09 | Resolved: 2020-06-22

## 2020-06-22 PROBLEM — R33.9 URINARY RETENTION: Status: RESOLVED | Noted: 2020-05-27 | Resolved: 2020-06-22

## 2020-06-22 PROCEDURE — 99233 SBSQ HOSP IP/OBS HIGH 50: CPT | Performed by: PHYSICAL MEDICINE & REHABILITATION

## 2020-06-22 PROCEDURE — 700102 HCHG RX REV CODE 250 W/ 637 OVERRIDE(OP): Performed by: PHYSICAL MEDICINE & REHABILITATION

## 2020-06-22 PROCEDURE — A9270 NON-COVERED ITEM OR SERVICE: HCPCS | Performed by: PHYSICAL MEDICINE & REHABILITATION

## 2020-06-22 PROCEDURE — 92507 TX SP LANG VOICE COMM INDIV: CPT

## 2020-06-22 PROCEDURE — 97530 THERAPEUTIC ACTIVITIES: CPT

## 2020-06-22 PROCEDURE — 770005 HCHG ROOM/CARE - REHAB PRIVATE (11*

## 2020-06-22 PROCEDURE — 97112 NEUROMUSCULAR REEDUCATION: CPT

## 2020-06-22 PROCEDURE — 97110 THERAPEUTIC EXERCISES: CPT

## 2020-06-22 RX ORDER — GABAPENTIN 300 MG/1
300 CAPSULE ORAL 3 TIMES DAILY
Qty: 90 CAP | Refills: 1 | Status: SHIPPED | OUTPATIENT
Start: 2020-06-22 | End: 2020-06-23 | Stop reason: SDUPTHER

## 2020-06-22 RX ORDER — FLUOXETINE HYDROCHLORIDE 20 MG/1
20 CAPSULE ORAL DAILY
Qty: 90 CAP | Refills: 2 | Status: SHIPPED | OUTPATIENT
Start: 2020-06-22 | End: 2020-06-23 | Stop reason: SDUPTHER

## 2020-06-22 RX ADMIN — FLUOXETINE 20 MG: 20 TABLET, FILM COATED ORAL at 08:21

## 2020-06-22 RX ADMIN — GABAPENTIN 300 MG: 300 CAPSULE ORAL at 08:21

## 2020-06-22 RX ADMIN — GABAPENTIN 300 MG: 300 CAPSULE ORAL at 19:40

## 2020-06-22 RX ADMIN — MELATONIN 1000 UNITS: at 08:21

## 2020-06-22 RX ADMIN — GABAPENTIN 300 MG: 300 CAPSULE ORAL at 15:30

## 2020-06-22 RX ADMIN — OMEPRAZOLE 20 MG: 20 CAPSULE, DELAYED RELEASE ORAL at 08:21

## 2020-06-22 ASSESSMENT — ACTIVITIES OF DAILY LIVING (ADL)
TOILETING_LEVEL_OF_ASSIST: ABLE TO COMPLETE TOILETING WITHOUT ASSIST
BED_CHAIR_WHEELCHAIR_TRANSFER_DESCRIPTION: OTHER (COMMENT)
SHOWER_TRANSFER_LEVEL_OF_ASSIST: REQUIRES SUPERVISION WITH SHOWER TRANSFER
TOILET_TRANSFER_LEVEL_OF_ASSIST: ABLE TO COMPLETE TOILET TRANSFER WITHOUT ASSIST

## 2020-06-22 ASSESSMENT — GAIT ASSESSMENTS
GAIT LEVEL OF ASSIST: SUPERVISED
DISTANCE (FEET): 2000
DEVIATION: BRADYKINETIC

## 2020-06-22 ASSESSMENT — BALANCE ASSESSMENTS
STANDING UNSUPPORTED ONE FOOT IN FRONT: 4
PLACE ALTERNATE FOOT ON STEP OR STOOL WHILE STANDING UNSUPPORTED: 2
STANDING UNSUPPORTED WITH FEET TOGETHER: 4
STANDING TO SITTING: 4
STANDING UNSUPPORTED: 4
LOOK OVER LEFT AND RIGHT SHOULDERS WHILE STANDING: 4
LONG VERSION TOTAL SCORE (MAX 56): 49
TURN 360 DEGREES: 3
SITTING UNSUPPORTED: 4
SITTING TO STANDING: 4
STANDING ON ONE LEG: 1
PICK UP OBJECT FROM THE FLOOR FROM A STANDING POSITION: 3
LONG VERSION TOTAL SCORE (MAX 56): 49
STANDING UNSUPPORTED WITH EYES CLOSED: 4
REACHING FORWARD WITH OUTSTRETCHED ARM WHILE STANDING: 4
TRANSFERS: 4

## 2020-06-22 NOTE — CARE PLAN
Problem: OT Long Term Goals  Goal: LTG-By discharge, patient will complete basic self care tasks  Description: 1) Individualized Goal:  Min A - Supervision with AE PRN  2) Interventions:  OT E Stim Attended, OT Group Therapy, OT Self Care/ADL, OT Cognitive Skill Dev, OT Manual Ther Technique, OT Neuro Re-Ed/Balance, OT Sensory Int Techniques, OT Therapeutic Activity, OT Aquatic Therapy, OT Evaluation, and OT Therapeutic Exercise  Outcome: MET  Goal: LTG-By discharge, patient will perform bathroom transfers  Description: 1) Individualized Goal: Supervision with AE/DME PRN   2) Interventions:  OT E Stim Attended, OT Group Therapy, OT Self Care/ADL, OT Cognitive Skill Dev, OT Manual Ther Technique, OT Neuro Re-Ed/Balance, OT Sensory Int Techniques, OT Therapeutic Activity, OT Aquatic Therapy, OT Evaluation, and OT Therapeutic Exercise  Outcome: MET

## 2020-06-22 NOTE — CARE PLAN
Problem: Speech/Swallowing LTGs  Goal: LTG-By discharge, patient will comprehend  Description: 1) Individualized goal:  simple functional language (spoken, pictures, written) to participate with care with at least 80% accuracy and minimal cues  2) Interventions:  SLP Speech Language Treatment and SLP Cognitive Skill Development      Outcome: MET  Goal: LTG-By discharge, patient will express  Description: 1) Individualized goal: basic wants/needs to participate with care for 4/5 opportunities and minimal cues  2) Interventions:  SLP Speech Language Treatment      Outcome: MET     Problem: Expression STGs  Goal: STG-Within one week, patient will  Description: 1) Individualized goal:  answer wh- questions related to highly personal information in 8/10 trials with use of initial phoneme cue (verbal or written)  2) Interventions:  SLP Speech Language Treatment      Outcome: MET  Goal: STG-Within one week, patient will  Description: 1) Individualized goal:  complete object/picture naming with 80% accuracy provided MIN cues.   2) Interventions:  SLP Speech Language Treatment      Outcome: MET     Problem: Problem Solving STGs  Goal: STG-Within one week, patient will  Description: 1) Individualized goal:  score 12/12 on the WPTA for 3 consecutive days.   2) Interventions:  SLP Speech Language Treatment and SLP Cognitive Skill Development      Outcome: DISCHARGED-GOAL NOT MET

## 2020-06-22 NOTE — CARE PLAN
Problem: PT-Long Term Goals  Goal: LTG-By discharge, patient will ambulate  Description: 1) Individualized goal:  150 ft with LRAD and supervision.  2) Interventions:  PT Group Therapy, PT Gait Training, PT Therapeutic Exercises, PT Neuro Re-Ed/Balance, PT Aquatic Therapy, PT Therapeutic Activity, and PT Evaluation  Outcome: MET  Goal: LTG-By discharge, patient will transfer one surface to another  Description: 1) Individualized goal:  with LRAD and supervision.  2) Interventions:  PT Group Therapy, PT Gait Training, PT Therapeutic Exercises, PT Neuro Re-Ed/Balance, PT Aquatic Therapy, PT Therapeutic Activity, and PT Evaluation    Outcome: MET  Goal: LTG-By discharge, patient will ambulate up/down flight of stairs  Description: 1) Individualized goal:  with single rail and supervision.  2) Interventions:  PT Group Therapy, PT Gait Training, PT Therapeutic Exercises, PT Neuro Re-Ed/Balance, PT Aquatic Therapy, PT Therapeutic Activity, and PT Evaluation    Outcome: MET  Goal: LTG-By discharge, patient will transfer in/out of a car  Description: 1) Individualized goal:  with LRAD and supervision.  2) Interventions:  PT Group Therapy, PT Gait Training, PT Therapeutic Exercises, PT Neuro Re-Ed/Balance, PT Aquatic Therapy, PT Therapeutic Activity, and PT Evaluation    Outcome: MET  Goal: LTG-By discharge, patient will  Description: 1) Individualized goal:  perform bed mobility independently and no bed functions.  2) Interventions:  PT Group Therapy, PT Gait Training, PT Therapeutic Exercises, PT Neuro Re-Ed/Balance, PT Aquatic Therapy, PT Therapeutic Activity, and PT Evaluation    Outcome: MET  Goal: LTG-By discharge, patient will  Description: 1) Individualized goal:  negotiate single curb with LRAD and supervision.  2) Interventions:  PT Group Therapy, PT Gait Training, PT Therapeutic Exercises, PT Neuro Re-Ed/Balance, PT Aquatic Therapy, PT Therapeutic Activity, and PT Evaluation    Outcome: MET

## 2020-06-22 NOTE — THERAPY
Physical Therapy   Daily Treatment     Patient Name: Reyes Amezcua  Age:  64 y.o., Sex:  male  Medical Record #: [id number]  Today's Date: 6/22/2020     Precautions  Precautions: (P) Weight Bearing As Tolerated Right Lower Extremity  Comments: (P) R orbital Fx, R clavicle Fx     Subjective    Patient and spouse receptive to training; D/C questions answered. Prepared for D/C.     Objective       06/22/20 1401   Precautions   Precautions Weight Bearing As Tolerated Right Lower Extremity   Comments R orbital Fx, R clavicle Fx    Gait Functional Level of Assist    Gait Level Of Assist Supervised  (SPV for pathfinding only indoors, SPV outdoors, no AD)   Assistive Device None   Distance (Feet) 2000   # of Times Distance was Traveled 1   Deviation Bradykinetic   Wheelchair Functional Level of Assist   Wheelchair Assist   (NT; Functionally ambulatory)   Stairs Functional Level of Assist   Level of Assist with Stairs Modified Independent   # of Stairs Climbed 12   Stairs Description Hand rails   Transfer Functional Level of Assist   Bed, Chair, Wheelchair Transfer Independent   Bed Chair Wheelchair Transfer Description Other (comment)  (no AD)   Standing Lower Body Exercises   Hamstring Curl 1 set of 10;Bilateral    Hip Extension 1 set of 10;Bilateral    Hip Abduction 1 set of 10;Bilateral   Marching 1 set of 10   Heel Rise 1 set of 10;Bilateral   Mini Squat Partial;1 set of 10   Bed Mobility    Supine to Sit Independent   Sit to Supine Independent   Sit to Stand Independent   Scooting Independent   Rolling Independent   Neuro-Muscular Treatments   Neuro-Muscular Treatments Sequencing;Weight Shift Right;Weight Shift Left   Comments Car transfer SPV no AD. Curb SPV no AD.  Grass <> standing (floor recovery training) SBA 2x.   [place]edo Balance Scale   Sitting Unsupported (Score 0-4) 4   Change Of Positon: Sitting To Standing (Score 0-4) 4   Change Of Positon: Standing To Sitting (Score 0-4) 4   Transfers (Score 0-4) 4   Standing  Unsupported (Score 0-4) 4   Standing With Eyes Closed (Score 0-4) 4   Standing With Feet Together (Score 0-4) 4   Tandem Standing (Score 0-4) 4   Standing On One Leg (Score 0-4) 1   Turning Trunk (Feet Fixed) (Score 0-4) 4   Retrieving Objects From Floor (Score 0-4) 3   Turning 360 Degrees (Score 0-4) 3   Stool Stepping (Score 0-4) 2   Reaching Forward While Standing (Score 0-4) 4   Huber Balance Total Score (0-56) 49   Interdisciplinary Plan of Care Collaboration   IDT Collaboration with  Family / Caregiver   Patient Position at End of Therapy Family / Friend in Room   Collaboration Comments POC, D/C recommendations, HEP    Strengths & Barriers   Strengths Supportive family;Willingly participates in therapeutic activities;Pleasant and cooperative;Motivated for self care and independence;Good endurance;Good balance   Barriers Fatigue   PT Total Time Spent   PT Individual Total Time Spent (Mins) 60   PT Charge Group   PT Therapeutic Exercise 1   PT Neuromuscular Re-Education / Balance 2   PT Therapeutic Activities 1     Review of D/C recommendations.     Assessment    Patient and spouse demonstrated recall of previous training and readiness for D/C. Patient was able to perform a floor recovery without use of support to pull up on. Patient scored 49/56 on HUBER Balance Scale = clinically significant change from previous assessment (36/56), and low fall risk.   Strengths: (P) Supportive family, Willingly participates in therapeutic activities, Pleasant and cooperative, Motivated for self care and independence, Good endurance, Good balance  Barriers: (P) Fatigue    Plan    D/C patient home with spouse/ Rehab without walls, HEP.     Physical Therapy Problems     Problem: Balance     Dates: Start: 06/16/20       Goal: STG-Within one week, patient will     Dates: Start: 06/16/20       Description: 1) Individualized goal:  score >-45/56 on the Huber Balance Scale  2) Interventions:  PT Group Therapy, PT Gait Training, PT  "Therapeutic Exercises, PT Neuro Re-Ed/Balance, PT Therapeutic Activity, and PT Evaluation                Problem: Mobility     Dates: Start: 06/09/20       Goal: STG-Within one week, patient will ambulate up/down a curb     Dates: Start: 06/09/20       Description: 1) Individualized goal:  Patient will amb with no AD SBA up/down curb  2) Interventions:  PT Group Therapy, PT Gait Training, PT Therapeutic Exercises, PT Neuro Re-Ed/Balance, PT Therapeutic Activity, and PT Manual Therapy            Goal: STG-Within one week, patient will ascend and descend four to six stairs     Dates: Start: 06/09/20       Description: 1) Individualized goal:  Amb up/ down 4 6\" stairs with HR SBA  2) Interventions:  PT Group Therapy, PT Gait Training, PT Therapeutic Exercises, PT Neuro Re-Ed/Balance, PT Therapeutic Activity, and PT Manual Therapy            Goal: STG-Within one week, patient will ambulate up/down flight of stairs     Dates: Start: 06/16/20       Description: 1) Individualized goal:  with single rail and supervision.  2) Interventions:  PT Group Therapy, PT Gait Training, PT Therapeutic Exercises, PT Neuro Re-Ed/Balance, PT Therapeutic Activity, and PT Evaluation                  Problem: PT-Long Term Goals     Dates: Start: 05/30/20       Goal: LTG-By discharge, patient will ambulate     Dates: Start: 05/30/20       Description: 1) Individualized goal:  150 ft with LRAD and supervision.  2) Interventions:  PT Group Therapy, PT Gait Training, PT Therapeutic Exercises, PT Neuro Re-Ed/Balance, PT Aquatic Therapy, PT Therapeutic Activity, and PT Evaluation          Goal: LTG-By discharge, patient will transfer one surface to another     Dates: Start: 05/30/20       Description: 1) Individualized goal:  with LRAD and supervision.  2) Interventions:  PT Group Therapy, PT Gait Training, PT Therapeutic Exercises, PT Neuro Re-Ed/Balance, PT Aquatic Therapy, PT Therapeutic Activity, and PT Evaluation            Goal: LTG-By " discharge, patient will ambulate up/down flight of stairs     Dates: Start: 05/30/20       Description: 1) Individualized goal:  with single rail and supervision.  2) Interventions:  PT Group Therapy, PT Gait Training, PT Therapeutic Exercises, PT Neuro Re-Ed/Balance, PT Aquatic Therapy, PT Therapeutic Activity, and PT Evaluation            Goal: LTG-By discharge, patient will transfer in/out of a car     Dates: Start: 05/30/20       Description: 1) Individualized goal:  with LRAD and supervision.  2) Interventions:  PT Group Therapy, PT Gait Training, PT Therapeutic Exercises, PT Neuro Re-Ed/Balance, PT Aquatic Therapy, PT Therapeutic Activity, and PT Evaluation            Goal: LTG-By discharge, patient will     Dates: Start: 05/30/20       Description: 1) Individualized goal:  perform bed mobility independently and no bed functions.  2) Interventions:  PT Group Therapy, PT Gait Training, PT Therapeutic Exercises, PT Neuro Re-Ed/Balance, PT Aquatic Therapy, PT Therapeutic Activity, and PT Evaluation            Goal: LTG-By discharge, patient will     Dates: Start: 05/30/20       Description: 1) Individualized goal:  negotiate single curb with LRAD and supervision.  2) Interventions:  PT Group Therapy, PT Gait Training, PT Therapeutic Exercises, PT Neuro Re-Ed/Balance, PT Aquatic Therapy, PT Therapeutic Activity, and PT Evaluation
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
## 2020-06-22 NOTE — DISCHARGE PLANNING
"Patients wife requesting note from Dr. 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  Letter completed.  Will give to patient and his wife upon DC.    "

## 2020-06-22 NOTE — THERAPY
Speech Language Pathology  Daily Treatment     Patient Name: Reyes Amezcua  Age:  64 y.o., Sex:  male  Medical Record #: [id number]  Today's Date: 6/22/2020     Precautions  Precautions: Fall Risk, Weight Bearing As Tolerated Right Upper Extremity  Comments: R orbital Fx, R clavicle Fx     Subjective    Pt pleasant and cooperative, spouse present for family training to review home program.      Objective     06/22/20 1034   Interdisciplinary Plan of Care Collaboration   IDT Collaboration with  Family / Caregiver   Patient Position at End of Therapy Family / Friend in Room   Collaboration Comments spouse present for session, family training   SLP Total Time Spent   SLP Individual Total Time Spent (Mins) 60   Charge Group   SLP Treatment - Individual Speech Language Treatment - Individual       Assessment    Review of home ST program with patient and spouse. Pt able to demonstrate comprehension of directions for activities by completed 1-2 trials of those activities reviewed. Spouse and patient educated on keeping set schedule upon d/c, setting aside time to complete structured therapy activities when well rested and reviewing with spouse for accuracy if doing self study. Spouse and pt asking appropriate questions, all questions answered.   Pt also provided with community resources for aphasia communication group and Brain Injury support group for future reference.     Plan    Anticipated d/c scheduled for 6/23/20    Speech Therapy Problems     Problem: Expression STGs     Dates: Start: 06/10/20       Goal: STG-Within one week, patient will     Dates: Start: 06/10/20       Description: 1) Individualized goal:  answer wh- questions related to highly personal information in 8/10 trials with use of initial phoneme cue (verbal or written)  2) Interventions:  SLP Speech Language Treatment              Goal: STG-Within one week, patient will     Dates: Start: 06/10/20       Description: 1) Individualized goal:  complete  object/picture naming with 80% accuracy provided MIN cues.   2) Interventions:  SLP Speech Language Treatment                    Problem: Problem Solving STGs     Dates: Start: 06/10/20       Description: 1) Individualized goal: see goal under STG  2) Interventions:  SLP Cognitive Skill Development        Goal: STG-Within one week, patient will     Dates: Start: 06/10/20       Description: 1) Individualized goal:  score 12/12 on the WPTA for 3 consecutive days.   2) Interventions:  SLP Speech Language Treatment and SLP Cognitive Skill Development                    Problem: Speech/Swallowing LTGs     Dates: Start: 05/30/20       Goal: LTG-By discharge, patient will comprehend     Dates: Start: 05/30/20       Description: 1) Individualized goal:  simple functional language (spoken, pictures, written) to participate with care with at least 80% accuracy and minimal cues  2) Interventions:  SLP Speech Language Treatment and SLP Cognitive Skill Development              Goal: LTG-By discharge, patient will express     Dates: Start: 05/30/20       Description: 1) Individualized goal: basic wants/needs to participate with care for 4/5 opportunities and minimal cues  2) Interventions:  SLP Speech Language Treatment

## 2020-06-22 NOTE — THERAPY
Occupational Therapy  Daily Treatment     Patient Name: Reyes Amezcua  Age:  64 y.o., Sex:  male  Medical Record #: [id number]  Today's Date: 6/22/2020     Precautions  Precautions: (P) Fall Risk, Weight Bearing As Tolerated Right Upper Extremity  Comments: (P) R orbital Fx, R clavicle Fx     Safety   ADL Safety : (P) Modified Independent  Bathroom Safety: (P) Modified Independent    Subjective    Pt received up in w/c, requesting to go over recommendations for continued therapy at home     Objective       06/22/20 0831   Precautions   Precautions Fall Risk;Weight Bearing As Tolerated Right Upper Extremity   Comments R orbital Fx, R clavicle Fx    Safety    ADL Safety  Modified Independent   Bathroom Safety Modified Independent   Standing Upper Body Exercises   Standing Upper Body Exercises Yes   External Shoulder Rotation 1 set of 15;Medium Resistance Theraband;Right   Elbow Extension 1 set of 15;Bilateral;Medium Resistance Theraband   Other Exercises standing at table with washcloth under RUE; 1x15 each exercise; standing facing table: tabletop circles clockwise and counterclockwise, standing with R hip to table: shoulder adduction/abduction to 90, semi circles fwd/back. Standing facing wall arm crawls 1x10 with 5 sec holds, standing with R hip to wall arm crawls 1x10 with 5 sec hold   Comments pt issued written instructions on recommended home exercises   Interdisciplinary Plan of Care Collaboration   IDT Collaboration with  Family / Caregiver   Patient Position at End of Therapy Family / Friend in Room  (wife escorted pt back to room at end of session)   Collaboration Comments wife present for session, educated on home recommendations   OT Total Time Spent   OT Individual Total Time Spent (Mins) 60   OT Charge Group   OT Therapy Activity 4     Assessment    Pt tolerated session well, reports slight pain with AROM in shoulder flex and abd above 90, educated on supportive AROM via wall crawls and tabletop glides  as well as light strengthening to triceps, external rotators, and upper traps, pt tolerated well, issued written instructions. Discussed recommended activities at home to continue to progress function; cooking with recipes, listening to news and discussing with friends, jigsaw puzzles and sudoku, playing guitar with brother. Discussed keeping written track of activity and self-monitoring symptoms (head pain, fatigue) to track and progress activity level. Pt continues with double vision in lower visual fields, tolerating patched glasses well, pt and wife educated on working on gaze stabilization at transition point (from single to double) to try to progress visual deficits.      Plan    D/C home with assist from family, HHOT follow up for continued progression ideally with [facility]    Occupational Therapy Goals     Problem: OT Long Term Goals     Dates: Start: 05/30/20       Goal: LTG-By discharge, patient will complete basic self care tasks     Dates: Start: 05/30/20       Description: 1) Individualized Goal:  Min A - Supervision with AE PRN  2) Interventions:  OT E Stim Attended, OT Group Therapy, OT Self Care/ADL, OT Cognitive Skill Dev, OT Manual Ther Technique, OT Neuro Re-Ed/Balance, OT Sensory Int Techniques, OT Therapeutic Activity, OT Aquatic Therapy, OT Evaluation, and OT Therapeutic Exercise          Goal: LTG-By discharge, patient will perform bathroom transfers     Dates: Start: 05/30/20       Description: 1) Individualized Goal: Supervision with AE/DME PRN   2) Interventions:  OT E Stim Attended, OT Group Therapy, OT Self Care/ADL, OT Cognitive Skill Dev, OT Manual Ther Technique, OT Neuro Re-Ed/Balance, OT Sensory Int Techniques, OT Therapeutic Activity, OT Aquatic Therapy, OT Evaluation, and OT Therapeutic Exercise

## 2020-06-23 VITALS
OXYGEN SATURATION: 95 % | RESPIRATION RATE: 20 BRPM | TEMPERATURE: 97.8 F | HEIGHT: 73 IN | WEIGHT: 153 LBS | DIASTOLIC BLOOD PRESSURE: 70 MMHG | HEART RATE: 66 BPM | BODY MASS INDEX: 20.28 KG/M2 | SYSTOLIC BLOOD PRESSURE: 121 MMHG

## 2020-06-23 PROCEDURE — A9270 NON-COVERED ITEM OR SERVICE: HCPCS | Performed by: PHYSICAL MEDICINE & REHABILITATION

## 2020-06-23 PROCEDURE — 700102 HCHG RX REV CODE 250 W/ 637 OVERRIDE(OP): Performed by: PHYSICAL MEDICINE & REHABILITATION

## 2020-06-23 PROCEDURE — 99239 HOSP IP/OBS DSCHRG MGMT >30: CPT | Performed by: PHYSICAL MEDICINE & REHABILITATION

## 2020-06-23 RX ORDER — FLUOXETINE HYDROCHLORIDE 20 MG/1
20 CAPSULE ORAL DAILY
Qty: 90 CAP | Refills: 2 | Status: SHIPPED | OUTPATIENT
Start: 2020-06-23 | End: 2020-07-30

## 2020-06-23 RX ORDER — GABAPENTIN 300 MG/1
300 CAPSULE ORAL 3 TIMES DAILY
Qty: 90 CAP | Refills: 1 | Status: SHIPPED | OUTPATIENT
Start: 2020-06-23 | End: 2020-09-08 | Stop reason: SINTOL

## 2020-06-23 RX ADMIN — FLUOXETINE 20 MG: 20 TABLET, FILM COATED ORAL at 08:15

## 2020-06-23 RX ADMIN — MELATONIN 1000 UNITS: at 08:15

## 2020-06-23 RX ADMIN — GABAPENTIN 300 MG: 300 CAPSULE ORAL at 08:15

## 2020-06-23 RX ADMIN — OMEPRAZOLE 20 MG: 20 CAPSULE, DELAYED RELEASE ORAL at 08:15

## 2020-06-23 NOTE — DISCHARGE PLANNING
Case Management Discharge Instructions              Please follow up with [facility]: at [phone].  They plan to start services as of July 1st.     Home Health: Speech Therapist, Occupational Therapist, Physical Therapist        Follow-up Information:     Raman Meyer D.O.-Neuro-Ophthalmology  [address]  [place] [id number] [phone]  Office will call to set up appointment     Sharon Blanco M.D.-Primary Care  [address]  [place] [id number] [phone]  Go on 6/24/2020  8:40 check in time 8:25am. Wear mask.      Darion Torres D.O.-Neurosurgery  [address]  [place] [address]  [phone]  Go on 7/7/2020  CT of the head prior. 10am. check in 9:15 for paperwork.      Anne Vergara D.O.-Psyiatry/[facility]  [address] [facility] [id number] [phone]  On 7/8/2020  9:30am

## 2020-06-23 NOTE — CARE PLAN
Problem: Recreation Therapy  Goal: STG-Within one week, patient will  Description: Meet with Recreation Therapist x1 a week and share on leisure interests he would like participate in during sessions.   Outcome: MET  Goal: STG-Within one week, patient will  Description: Pt will meet with a therapy tech to work on recreation participation under the supervision of the Recreation Therapist.   Outcome: MET  Goal: LTG-By discharge, patient will  Description: Meet with Recreation Therapist x1 a week and share on leisure interests he would like participate following discharge.  Outcome: MET  Goal: LTG-By discharge, patient will  Description: Pt will meet with a therapy tech to work on recreation participation following discharge under the supervision of the Recreation Therapist.   Outcome: MET
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
## 2020-06-23 NOTE — DISCHARGE PLANNING
Case management Summary:   Met with patient and his wife prior to discharge.     Reviewed all follow up appointments.     Referral made to [facility] and they have accepted referral and are ready to follow as of July 1st, 2020.  Patient did not want home health prior to [facility] following him.    During hospitalization, I have provided support and education and have been available for questions and information during hours of operation, communicated with therapy team and MD along with providing links/resources  to outside services.    Patient verbalizes agreement with all plans and has an understanding of the next steps within the post acute services.     Individualized Goals:   1. Not established at admission    Outcome:   1. Patient made great progress with therapies and looks forward to getting home today.

## 2020-06-23 NOTE — CARE PLAN
Problem: Discharge Barriers/Planning  Goal: Patient's continuum of care needs will be met  Outcome: PROGRESSING AS EXPECTED  Note: For discharge home 6/23/2020. TBI. Flat affect. Continent of bowel and bladder. Regular, thin liquids diet. Fall precautions. No complains of pain at this time. No respiratory distress. Will continue to monitor.

## 2020-06-23 NOTE — CARE PLAN
Problem: Knowledge Deficit  Goal: Knowledge of disease process/condition, treatment plan, diagnostic tests, and medications will improve  Note: Encourage patient to let staff know when he move his bowels. Pt. Is Mod I without device. LBM 6/22/20.      Problem: Respiratory:  Goal: Respiratory status will improve  Note: Pt. c/o headache at the end of his therapy sessions. Scheduled Gabapentin was given.

## 2020-06-23 NOTE — PROGRESS NOTES
Patient discharged to home per order.  Discharge instructions reviewed with patient and wife; they verbalize understanding and signed copies placed in chart.  Patient has all belongings; signed copy of form in chart.  Patient left facility at 1115 via ambulation accompanied by rehab staff and wife.

## 2020-06-24 ENCOUNTER — OFFICE VISIT (OUTPATIENT)
Dept: MEDICAL GROUP | Facility: MEDICAL CENTER | Age: 65
End: 2020-06-24
Payer: COMMERCIAL

## 2020-06-24 VITALS
RESPIRATION RATE: 16 BRPM | HEART RATE: 68 BPM | HEIGHT: 74 IN | BODY MASS INDEX: 19.64 KG/M2 | WEIGHT: 153 LBS | OXYGEN SATURATION: 98 % | TEMPERATURE: 98.4 F

## 2020-06-24 DIAGNOSIS — S06.2X9D DIFFUSE AXONAL BRAIN INJURY WITH LOSS OF CONSCIOUSNESS, SUBSEQUENT ENCOUNTER: ICD-10-CM

## 2020-06-24 DIAGNOSIS — K63.9 COLON WALL THICKENING: ICD-10-CM

## 2020-06-24 DIAGNOSIS — Z00.00 HEALTHCARE MAINTENANCE: ICD-10-CM

## 2020-06-24 PROCEDURE — 99204 OFFICE O/P NEW MOD 45 MIN: CPT | Performed by: FAMILY MEDICINE

## 2020-06-24 ASSESSMENT — FIBROSIS 4 INDEX: FIB4 SCORE: .987654320987654321

## 2020-06-24 ASSESSMENT — PATIENT HEALTH QUESTIONNAIRE - PHQ9: CLINICAL INTERPRETATION OF PHQ2 SCORE: 0
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
## 2020-06-30 ENCOUNTER — TELEPHONE (OUTPATIENT)
Dept: PHYSICAL MEDICINE AND REHAB | Facility: REHABILITATION | Age: 65
End: 2020-06-30

## 2020-06-30 NOTE — TELEPHONE ENCOUNTER
We received letter (scanned into chart) from [facility] mgr. I left msg to see what he needed from us. I let him know pt has not yet been seen by Dr. Vergara as follow up from Rehab hospital.

## 2020-07-02 ENCOUNTER — HOSPITAL ENCOUNTER (OUTPATIENT)
Dept: RADIOLOGY | Facility: MEDICAL CENTER | Age: 65
End: 2020-07-02
Attending: NEUROLOGICAL SURGERY
Payer: COMMERCIAL

## 2020-07-02 ENCOUNTER — TELEPHONE (OUTPATIENT)
Dept: MEDICAL GROUP | Facility: MEDICAL CENTER | Age: 65
End: 2020-07-02

## 2020-07-02 DIAGNOSIS — S06.2X9D DIFFUSE AXONAL BRAIN INJURY WITH LOSS OF CONSCIOUSNESS, SUBSEQUENT ENCOUNTER: ICD-10-CM

## 2020-07-02 DIAGNOSIS — S02.85XA CLOSED FRACTURE OF RIGHT ORBIT, INITIAL ENCOUNTER (HCC): ICD-10-CM

## 2020-07-02 DIAGNOSIS — S06.9X9A TRAUMATIC BRAIN INJURY WITH LOSS OF CONSCIOUSNESS, INITIAL ENCOUNTER (HCC): ICD-10-CM

## 2020-07-02 DIAGNOSIS — S02.0XXA CLOSED FRACTURE OF VAULT OF SKULL, INITIAL ENCOUNTER (HCC): ICD-10-CM

## 2020-07-02 PROCEDURE — 70450 CT HEAD/BRAIN W/O DYE: CPT

## 2020-07-02 NOTE — TELEPHONE ENCOUNTER
Patient would like a recommendation for a neurologist they used to see Dr. Meyer but he has declined the referral. So they need another one

## 2020-07-08 ENCOUNTER — OFFICE VISIT (OUTPATIENT)
Dept: PHYSICAL MEDICINE AND REHAB | Facility: REHABILITATION | Age: 65
End: 2020-07-08
Payer: COMMERCIAL

## 2020-07-08 VITALS
DIASTOLIC BLOOD PRESSURE: 64 MMHG | BODY MASS INDEX: 19.64 KG/M2 | TEMPERATURE: 98.2 F | HEIGHT: 74 IN | WEIGHT: 153 LBS | SYSTOLIC BLOOD PRESSURE: 108 MMHG | OXYGEN SATURATION: 98 % | HEART RATE: 91 BPM

## 2020-07-08 DIAGNOSIS — N31.9 NEUROGENIC BLADDER: ICD-10-CM

## 2020-07-08 DIAGNOSIS — T78.40XS HYPERSENSITIVITY, SEQUELA: ICD-10-CM

## 2020-07-08 DIAGNOSIS — K59.2 NEUROGENIC BOWEL: ICD-10-CM

## 2020-07-08 DIAGNOSIS — S06.9X9A TRAUMATIC BRAIN INJURY WITH LOSS OF CONSCIOUSNESS, INITIAL ENCOUNTER (HCC): Primary | ICD-10-CM

## 2020-07-08 DIAGNOSIS — M79.2 NEUROPATHIC PAIN: ICD-10-CM

## 2020-07-08 DIAGNOSIS — G47.9 SLEEPING DIFFICULTY: ICD-10-CM

## 2020-07-08 DIAGNOSIS — I95.1 ORTHOSTATIC HYPOTENSION: ICD-10-CM

## 2020-07-08 DIAGNOSIS — Z74.09 IMPAIRED MOBILITY AND ACTIVITIES OF DAILY LIVING: ICD-10-CM

## 2020-07-08 DIAGNOSIS — Z78.9 IMPAIRED MOBILITY AND ACTIVITIES OF DAILY LIVING: ICD-10-CM

## 2020-07-08 DIAGNOSIS — H93.11 TINNITUS OF RIGHT EAR: ICD-10-CM

## 2020-07-08 PROCEDURE — 99215 OFFICE O/P EST HI 40 MIN: CPT | Performed by: PHYSICAL MEDICINE & REHABILITATION

## 2020-07-08 RX ORDER — MIDODRINE HYDROCHLORIDE 2.5 MG/1
2.5 TABLET ORAL 2 TIMES DAILY PRN
Qty: 60 TAB | Refills: 2 | Status: SHIPPED | OUTPATIENT
Start: 2020-07-08 | End: 2020-09-08

## 2020-07-08 ASSESSMENT — ENCOUNTER SYMPTOMS
PALPITATIONS: 0
SENSORY CHANGE: 1
HEADACHES: 1
FALLS: 0
DIARRHEA: 0
MEMORY LOSS: 0
DIZZINESS: 1
BLURRED VISION: 1
FEVER: 0
COUGH: 0
SHORTNESS OF BREATH: 0
DOUBLE VISION: 1
FOCAL WEAKNESS: 0
SORE THROAT: 0
CONSTIPATION: 0
TINGLING: 1
WEAKNESS: 0
CHILLS: 0
BRUISES/BLEEDS EASILY: 0

## 2020-07-08 ASSESSMENT — PATIENT HEALTH QUESTIONNAIRE - PHQ9: CLINICAL INTERPRETATION OF PHQ2 SCORE: 0

## 2020-07-08 ASSESSMENT — FIBROSIS 4 INDEX: FIB4 SCORE: .987654320987654321
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
## 2020-07-14 DIAGNOSIS — E23.7 PITUITARY DYSFUNCTION (HCC): ICD-10-CM

## 2020-07-14 DIAGNOSIS — S06.9X9A TRAUMATIC BRAIN INJURY WITH LOSS OF CONSCIOUSNESS, INITIAL ENCOUNTER (HCC): ICD-10-CM

## 2020-07-24 ENCOUNTER — HOSPITAL ENCOUNTER (OUTPATIENT)
Dept: LAB | Facility: MEDICAL CENTER | Age: 65
End: 2020-07-24
Attending: PHYSICAL MEDICINE & REHABILITATION
Payer: COMMERCIAL

## 2020-07-24 DIAGNOSIS — S06.9X9A TRAUMATIC BRAIN INJURY WITH LOSS OF CONSCIOUSNESS, INITIAL ENCOUNTER (HCC): ICD-10-CM

## 2020-07-24 LAB
CORTIS SERPL-MCNC: 11.5 UG/DL (ref 0–23)
T4 FREE SERPL-MCNC: 1.08 NG/DL (ref 0.93–1.7)
TESTOST SERPL-MCNC: 434 NG/DL (ref 175–781)
TSH SERPL DL<=0.005 MIU/L-ACNC: 2.11 UIU/ML (ref 0.38–5.33)

## 2020-07-24 PROCEDURE — 84443 ASSAY THYROID STIM HORMONE: CPT

## 2020-07-24 PROCEDURE — 82533 TOTAL CORTISOL: CPT

## 2020-07-24 PROCEDURE — 36415 COLL VENOUS BLD VENIPUNCTURE: CPT

## 2020-07-24 PROCEDURE — 84439 ASSAY OF FREE THYROXINE: CPT

## 2020-07-24 PROCEDURE — 84403 ASSAY OF TOTAL TESTOSTERONE: CPT

## 2020-07-30 ENCOUNTER — OFFICE VISIT (OUTPATIENT)
Dept: PHYSICAL MEDICINE AND REHAB | Facility: REHABILITATION | Age: 65
End: 2020-07-30
Payer: COMMERCIAL

## 2020-07-30 VITALS
BODY MASS INDEX: 19.89 KG/M2 | WEIGHT: 155 LBS | DIASTOLIC BLOOD PRESSURE: 68 MMHG | TEMPERATURE: 98.9 F | OXYGEN SATURATION: 96 % | HEIGHT: 74 IN | SYSTOLIC BLOOD PRESSURE: 108 MMHG | HEART RATE: 94 BPM

## 2020-07-30 DIAGNOSIS — M79.2 NEUROPATHIC PAIN: ICD-10-CM

## 2020-07-30 DIAGNOSIS — S06.9X9A TRAUMATIC BRAIN INJURY WITH LOSS OF CONSCIOUSNESS, INITIAL ENCOUNTER (HCC): Primary | ICD-10-CM

## 2020-07-30 DIAGNOSIS — R42 DIZZINESS: ICD-10-CM

## 2020-07-30 DIAGNOSIS — Z74.09 IMPAIRED MOBILITY AND ACTIVITIES OF DAILY LIVING: ICD-10-CM

## 2020-07-30 DIAGNOSIS — Z78.9 IMPAIRED MOBILITY AND ACTIVITIES OF DAILY LIVING: ICD-10-CM

## 2020-07-30 DIAGNOSIS — H53.2 DOUBLE VISION: ICD-10-CM

## 2020-07-30 DIAGNOSIS — R53.83 OTHER FATIGUE: ICD-10-CM

## 2020-07-30 DIAGNOSIS — R20.8 ALLODYNIA: ICD-10-CM

## 2020-07-30 PROCEDURE — 99215 OFFICE O/P EST HI 40 MIN: CPT | Performed by: PHYSICAL MEDICINE & REHABILITATION

## 2020-07-30 RX ORDER — DULOXETIN HYDROCHLORIDE 20 MG/1
20 CAPSULE, DELAYED RELEASE ORAL DAILY
Qty: 30 CAP | Refills: 2 | Status: SHIPPED | OUTPATIENT
Start: 2020-07-30 | End: 2020-09-08

## 2020-07-30 ASSESSMENT — ENCOUNTER SYMPTOMS
SORE THROAT: 0
FEVER: 0
DIARRHEA: 0
CHILLS: 0
MEMORY LOSS: 1
PALPITATIONS: 0
CONSTIPATION: 0
TINGLING: 1
FALLS: 0
DOUBLE VISION: 1
BRUISES/BLEEDS EASILY: 0
SHORTNESS OF BREATH: 0
COUGH: 0

## 2020-07-30 ASSESSMENT — PATIENT HEALTH QUESTIONNAIRE - PHQ9: CLINICAL INTERPRETATION OF PHQ2 SCORE: 0

## 2020-07-30 ASSESSMENT — FIBROSIS 4 INDEX: FIB4 SCORE: .987654320987654321
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
## 2020-08-25 ENCOUNTER — PATIENT MESSAGE (OUTPATIENT)
Dept: MEDICAL GROUP | Facility: MEDICAL CENTER | Age: 65
End: 2020-08-25

## 2020-08-26 ENCOUNTER — OFFICE VISIT (OUTPATIENT)
Dept: MEDICAL GROUP | Facility: MEDICAL CENTER | Age: 65
End: 2020-08-26
Payer: COMMERCIAL

## 2020-08-26 VITALS
TEMPERATURE: 96.9 F | BODY MASS INDEX: 20.92 KG/M2 | WEIGHT: 163 LBS | HEIGHT: 74 IN | HEART RATE: 74 BPM | DIASTOLIC BLOOD PRESSURE: 60 MMHG | RESPIRATION RATE: 16 BRPM | SYSTOLIC BLOOD PRESSURE: 112 MMHG | OXYGEN SATURATION: 98 %

## 2020-08-26 DIAGNOSIS — M79.601 PAIN OF RIGHT UPPER EXTREMITY: ICD-10-CM

## 2020-08-26 PROCEDURE — 99214 OFFICE O/P EST MOD 30 MIN: CPT | Performed by: FAMILY MEDICINE

## 2020-08-26 RX ORDER — DOXYCYCLINE HYCLATE 100 MG
100 TABLET ORAL 2 TIMES DAILY
Qty: 14 TAB | Refills: 0 | Status: SHIPPED | OUTPATIENT
Start: 2020-08-26 | End: 2020-09-02

## 2020-08-26 ASSESSMENT — FIBROSIS 4 INDEX: FIB4 SCORE: .987654320987654321

## 2020-08-27 NOTE — PROGRESS NOTES
CC: Right arm pain and swelling    HPI:   Reyes presents today with right arm pain and swelling.  The condition started 2 days ago, the pain is more when he tried to stretch his arm.  Patient denies any major trauma to the arm, he has been doing physical therapy.  For the past 2 days the arm has been painful, swollen and warm, he has a mild (not significant )tenderness on pressure.  Denies any recent history of insect bite.  Denies any fever, chills, nausea, vomiting.  Denies any skin rash.        Patient Active Problem List    Diagnosis Date Noted   • Diffuse axonal brain injury (HCC) 05/09/2020     Priority: High   • Thrush 05/25/2020     Priority: Medium   • Colon wall thickening 05/23/2020     Priority: Low   • Fever, unspecified 05/17/2020     Priority: Low   • Screening examination for infectious disease 05/09/2020     Priority: Low   • No contraindication to deep vein thrombosis (DVT) prophylaxis 05/09/2020     Priority: Low       Current Outpatient Medications   Medication Sig Dispense Refill   • doxycycline (VIBRAMYCIN) 100 MG Tab Take 1 Tab by mouth 2 times a day for 7 days. 14 Tab 0   • DULoxetine (CYMBALTA) 20 MG Cap DR Particles Take 1 Cap by mouth every day. 30 Cap 2   • midodrine (PROAMATINE) 2.5 MG Tab Take 1 Tab by mouth 2 times a day as needed. Take 1-2 tabs for dizziness (Patient not taking: Reported on 7/30/2020) 60 Tab 2   • gabapentin (NEURONTIN) 300 MG Cap Take 1 Cap by mouth 3 times a day. 90 Cap 1   • acetaminophen (TYLENOL) 325 MG Tab 2 Tabs by Per G Tube route every 6 hours as needed for Fever (T > 101.5). 30 Tab 0     No current facility-administered medications for this visit.          Allergies as of 08/26/2020   • (No Known Allergies)        ROS: Denies any chest pain, Shortness of breath, Changes bowel or bladder, Lower extremity edema.    Physical Exam:  /60 (BP Location: Right arm, Patient Position: Sitting, BP Cuff Size: Adult)   Pulse 74   Temp 36.1 °C (96.9 °F) (Temporal)   " Resp 16   Ht 1.88 m (6' 2\") Comment: verbal  Wt 73.9 kg (163 lb)   SpO2 98%   BMI 20.93 kg/m²   Gen.: Well-developed, well-nourished, no apparent distress,pleasant and cooperative with the examination  Skin:  Warm and dry with good turgor. No rashes or suspicious lesions in visible areas  Cor: Regular rate and rhythm without murmur, gallop or rub.  Lungs: Respirations unlabored.Clear to auscultation with equal breath sounds bilaterally. No wheezes, rhonchi.  Right upper extremity: Slightly swollen forearm (on the supine aspect of the forearm), warm, mild tenderness, no redness        Assessment and Plan.   64 y.o. male     1. Pain of right upper extremity  Possible cellulitis/mild hematoma  Will start patient on doxycycline for a week.  Patient advised to return to the clinic in 7 to 10 days for reevaluation  Patient advised to call, return to the clinic as soon as possible if the condition gets worse(only swelling, redness, or tenderness)    - doxycycline (VIBRAMYCIN) 100 MG Tab; Take 1 Tab by mouth 2 times a day for 7 days.  Dispense: 14 Tab; Refill: 0      "

## 2020-08-28 ENCOUNTER — APPOINTMENT (OUTPATIENT)
Dept: SPEECH THERAPY | Facility: REHABILITATION | Age: 65
End: 2020-08-28
Attending: PHYSICAL MEDICINE & REHABILITATION
Payer: COMMERCIAL

## 2020-09-01 ENCOUNTER — SPEECH THERAPY (OUTPATIENT)
Dept: SPEECH THERAPY | Facility: REHABILITATION | Age: 65
End: 2020-09-01
Attending: PHYSICAL MEDICINE & REHABILITATION
Payer: COMMERCIAL

## 2020-09-01 DIAGNOSIS — S06.6X9A TRAUMATIC SUBARACHNOID HEMORRHAGE WITH LOSS OF CONSCIOUSNESS, INITIAL ENCOUNTER (HCC): ICD-10-CM

## 2020-09-01 DIAGNOSIS — I69.019 COGNITIVE DEFICIT DUE TO OLD SUBARACHNOID HEMORRHAGE: ICD-10-CM

## 2020-09-01 DIAGNOSIS — I69.011 MEMORY DEFICIT FOLLOWING NONTRAUMATIC SUBARACHNOID HEMORRHAGE: ICD-10-CM

## 2020-09-01 DIAGNOSIS — I69.020 APHASIA DUE TO AND NOT CONCURRENT WITH NONTRAUMATIC SUBARACHNOID HEMORRHAGE: ICD-10-CM

## 2020-09-01 PROCEDURE — 92523 SPEECH SOUND LANG COMPREHEN: CPT

## 2020-09-01 ASSESSMENT — ENCOUNTER SYMPTOMS: NO PATIENT REPORTED PAIN: 1
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
## 2020-09-03 ENCOUNTER — SPEECH THERAPY (OUTPATIENT)
Dept: SPEECH THERAPY | Facility: REHABILITATION | Age: 65
End: 2020-09-03
Attending: PHYSICAL MEDICINE & REHABILITATION
Payer: COMMERCIAL

## 2020-09-03 DIAGNOSIS — I69.019 COGNITIVE DEFICIT DUE TO OLD SUBARACHNOID HEMORRHAGE: ICD-10-CM

## 2020-09-03 DIAGNOSIS — I69.011 MEMORY DEFICIT FOLLOWING NONTRAUMATIC SUBARACHNOID HEMORRHAGE: ICD-10-CM

## 2020-09-03 DIAGNOSIS — I69.020 APHASIA DUE TO AND NOT CONCURRENT WITH NONTRAUMATIC SUBARACHNOID HEMORRHAGE: ICD-10-CM

## 2020-09-03 PROCEDURE — 92507 TX SP LANG VOICE COMM INDIV: CPT

## 2020-09-03 NOTE — OP THERAPY DAILY TREATMENT
"  Outpatient Speech Therapy  DAILY TREATMENT     [facility] Speech [address].  [address]  [place] [address]  Phone:  [phone]  Fax:  [phone]    Date: 09/03/2020    Patient: Reyes Amezcua  YOB: 1955  MRN: [id number]     Time Calculation    Start time: 0900  Stop time: 1000 Time Calculation (min): 60 minutes         Chief Complaint: Other (Attention)    Visit #: 2    Subjective:   Reason for Therapy:     Reason For Evaluation:  Stroke Rehabilitaion, CVA, Aphasia, Cognition and Speech/Language  Social Support:     Social support system: unaccompanied.    Patient Mental Status:  Alert and Responsive (Oriented x 4; good spirits)  Progress Factors:     Progression:  Getting Better  Additional Subjective Comments:      No changes to medical history noted or reported since initial speech therapy visit. Patient with continued concern regarding noted \"swelling or lump\" to right forearm. Patient noted that patient and wife are addressing with physicians at this time.       Objective:   Treatments/Interventions Performed:  Cognitive-Linguistic training, Home program, Patient/Caregiver education and Compensatory strategy training    Speech therapy initiated, addressing cognitive linguistic function with focus on attention.    Sustained attention addressed through completion of structured, trail making task: patient completed with 26/26 = 100% accuracy ;independent. Complex, divided attention targeted through structured trail making task with patient completing with 25/26 = 96.1% accuracy with patient demonstrating independence in initiation of self-correction; independently identifying error to positively impact accuracy during structured task.    Complex, divided attention continued in combination with expressive language skills specific to generative naming, alternating naming animals, places to visit to letters of the alphabet. Patient completed the structured task with: " "21/ 26 = 80.7% accuracy; independent.     Complex, divided attention also addressed through completion of crossword puzzle (21 clues). Patient completed the structured task with 18/21 = 85.7% accuracy; independent.       Speech Therapy Assessment:     Written Language Assessment:     Ability to write single words (spontaneously) Minimal.     Language comments: Patient with noted complaints during written expression task \"I don't know how to spell things\" requesting assistance as necessary \"does Kangaroo start with a C or a K?\"     Cognitive Linguistic Assessment:     Patient attention sustained: WFL    Patient attention divided: Minimal        Speech Therapy Plan :   Prognosis & Recommendations  Impression Summary:  Patient noted to benefit from education completed both during initiation and completion of structured activities to promote understanding and insight into deficits, as well as ways to incorporate strategies learned and activities into the home setting for home program. Patient noted that current home program is focused largely on patient generative naming to address difficulty in area of expressive language skills, specific to word finding difficulties experienced at this time.   Prognosis:  Excellent  Prognosis Details:  Patient is highly motivated, reporting that recommendations from initial evaluation have already been implemented at home as part of home program. Given this compliance, patient likely to make excellent progress with outpatient speech therapy services.   Therapy Recommendations  Recommendation:  Individual Speech Therapy,  Planned Therapy Interventions:  Home Program, Compensatory Strategy Training, Patient/Caregiver Education, Cognitive-Linguistic training and Speech/Language training,   Plan Details:  Continue with current POC, no changes.                "

## 2020-09-08 ENCOUNTER — OFFICE VISIT (OUTPATIENT)
Dept: MEDICAL GROUP | Facility: MEDICAL CENTER | Age: 65
End: 2020-09-08
Payer: COMMERCIAL

## 2020-09-08 ENCOUNTER — OFFICE VISIT (OUTPATIENT)
Dept: PHYSICAL MEDICINE AND REHAB | Facility: REHABILITATION | Age: 65
End: 2020-09-08
Payer: COMMERCIAL

## 2020-09-08 VITALS
BODY MASS INDEX: 20.15 KG/M2 | HEIGHT: 74 IN | WEIGHT: 157 LBS | HEART RATE: 74 BPM | DIASTOLIC BLOOD PRESSURE: 78 MMHG | TEMPERATURE: 98.3 F | SYSTOLIC BLOOD PRESSURE: 110 MMHG | OXYGEN SATURATION: 99 %

## 2020-09-08 VITALS
SYSTOLIC BLOOD PRESSURE: 106 MMHG | RESPIRATION RATE: 14 BRPM | HEART RATE: 76 BPM | DIASTOLIC BLOOD PRESSURE: 68 MMHG | TEMPERATURE: 97.2 F | OXYGEN SATURATION: 98 % | WEIGHT: 161 LBS | HEIGHT: 74 IN | BODY MASS INDEX: 20.66 KG/M2

## 2020-09-08 DIAGNOSIS — M25.561 ACUTE PAIN OF RIGHT KNEE: ICD-10-CM

## 2020-09-08 DIAGNOSIS — Z78.9 IMPAIRED MOBILITY AND ACTIVITIES OF DAILY LIVING: ICD-10-CM

## 2020-09-08 DIAGNOSIS — R22.31 SKIN LUMP OF ARM, RIGHT: ICD-10-CM

## 2020-09-08 DIAGNOSIS — R10.9 ABDOMINAL DISCOMFORT: ICD-10-CM

## 2020-09-08 DIAGNOSIS — S06.9X9A TRAUMATIC BRAIN INJURY WITH LOSS OF CONSCIOUSNESS, INITIAL ENCOUNTER (HCC): Primary | ICD-10-CM

## 2020-09-08 DIAGNOSIS — R09.A2 GLOBUS SENSATION: ICD-10-CM

## 2020-09-08 DIAGNOSIS — R42 DIZZINESS: ICD-10-CM

## 2020-09-08 DIAGNOSIS — M79.2 NEUROPATHIC PAIN: ICD-10-CM

## 2020-09-08 DIAGNOSIS — Z74.09 IMPAIRED MOBILITY AND ACTIVITIES OF DAILY LIVING: ICD-10-CM

## 2020-09-08 DIAGNOSIS — R05.9 COUGH: ICD-10-CM

## 2020-09-08 PROCEDURE — 99214 OFFICE O/P EST MOD 30 MIN: CPT | Performed by: PHYSICAL MEDICINE & REHABILITATION

## 2020-09-08 PROCEDURE — 99214 OFFICE O/P EST MOD 30 MIN: CPT | Performed by: FAMILY MEDICINE

## 2020-09-08 ASSESSMENT — ENCOUNTER SYMPTOMS
MEMORY LOSS: 0
SORE THROAT: 0
PALPITATIONS: 0
FEVER: 0
SHORTNESS OF BREATH: 0
FALLS: 0
BRUISES/BLEEDS EASILY: 0
COUGH: 1
CHILLS: 0
DIARRHEA: 0
CONSTIPATION: 0

## 2020-09-08 ASSESSMENT — FIBROSIS 4 INDEX
FIB4 SCORE: .987654320987654321
FIB4 SCORE: .987654320987654321

## 2020-09-08 ASSESSMENT — PATIENT HEALTH QUESTIONNAIRE - PHQ9: CLINICAL INTERPRETATION OF PHQ2 SCORE: 0

## 2020-09-08 NOTE — PROGRESS NOTES
CC: Lump of right forearm, intermittent abdominal discomfort    HPI:   Reyes presents today discuss the following:    Skin lump of arm, right  Patient had a mild cellulitis over the right forearm 10 days ago, was treated with doxycycline.  Redness and tenderness has resolved.  Patient still have a mass that is nontender under the skin over the right forearm.      Abdominal discomfort  Patient has been having intermittent lower abdominal discomfort, this time it took place about a month ago, stayed for 1 day, resolved and come back again about a week ago state only for few hours and went away.  Currently denies any pain, denies any constipation or diarrhea.  Pain not associated with food, no exacerbation or alleviating factors.  Denies nausea or vomiting. Patient requested to be seen by GI. Patient is due for colonoscopy as well.    - REFERRAL TO GASTROENTEROLOGY  Patient Active Problem List    Diagnosis Date Noted   • Diffuse axonal brain injury (HCC) 05/09/2020     Priority: High   • Thrush 05/25/2020     Priority: Medium   • Colon wall thickening 05/23/2020     Priority: Low   • Fever, unspecified 05/17/2020     Priority: Low   • Screening examination for infectious disease 05/09/2020     Priority: Low   • No contraindication to deep vein thrombosis (DVT) prophylaxis 05/09/2020     Priority: Low       Current Outpatient Medications   Medication Sig Dispense Refill   • DULoxetine (CYMBALTA) 20 MG Cap DR Particles Take 1 Cap by mouth every day. 30 Cap 2   • midodrine (PROAMATINE) 2.5 MG Tab Take 1 Tab by mouth 2 times a day as needed. Take 1-2 tabs for dizziness (Patient not taking: Reported on 7/30/2020) 60 Tab 2   • gabapentin (NEURONTIN) 300 MG Cap Take 1 Cap by mouth 3 times a day. 90 Cap 1   • acetaminophen (TYLENOL) 325 MG Tab 2 Tabs by Per G Tube route every 6 hours as needed for Fever (T > 101.5). 30 Tab 0     No current facility-administered medications for this visit.          Allergies as of 09/08/2020   •  "(No Known Allergies)        ROS: Denies any chest pain, Shortness of breath, Changes bowel or bladder, Lower extremity edema.    Physical Exam:  /68 (BP Location: Left arm, Patient Position: Sitting, BP Cuff Size: Adult)   Pulse 76   Temp 36.2 °C (97.2 °F) (Temporal)   Resp 14   Ht 1.88 m (6' 2\")   Wt 73 kg (161 lb)   SpO2 98%   BMI 20.67 kg/m²   Gen.: Well-developed, well-nourished, no apparent distress,pleasant and cooperative with the examination  Skin: Skin over right forearm is normal, [place] firm mass under the skin, and tender, size 2-1/2 x 1 cm.    Right forearm: [place] firm mass under the skin, and tender, size 2-1/2 x 1 cm.    Abdomen: Soft, nontender, no distention, normal bowel sound      Assessment and Plan.   64 y.o. male     1. Skin lump of arm, right  Patient had a lump on supine aspect of right forearm, nontender.    - US-EXTREMITY NON VASCULAR UNILATERAL RIGHT; Future    2. Abdominal discomfort  Intermittent, not associated with diarrhea or constipation, today he is asymptomatic.  Patient requested to be seen by GI.  Patient is due for colonoscopy as well.    - REFERRAL TO GASTROENTEROLOGY      "
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
## 2020-09-09 ENCOUNTER — TELEPHONE (OUTPATIENT)
Dept: PHYSICAL THERAPY | Facility: REHABILITATION | Age: 65
End: 2020-09-09

## 2020-09-09 ENCOUNTER — PHYSICAL THERAPY (OUTPATIENT)
Dept: PHYSICAL THERAPY | Facility: REHABILITATION | Age: 65
End: 2020-09-09
Attending: PHYSICAL MEDICINE & REHABILITATION
Payer: COMMERCIAL

## 2020-09-09 DIAGNOSIS — I60.9 SUBARACHNOID HEMORRHAGE WITH LOSS OF CONSCIOUSNESS OF LESS THAN 1 HOUR (HCC): ICD-10-CM

## 2020-09-09 PROCEDURE — 97110 THERAPEUTIC EXERCISES: CPT

## 2020-09-09 PROCEDURE — 97162 PT EVAL MOD COMPLEX 30 MIN: CPT

## 2020-09-09 ASSESSMENT — ENCOUNTER SYMPTOMS
PAIN SCALE: 0
QUALITY: BURNING
PAIN SCALE AT HIGHEST: 7

## 2020-09-09 ASSESSMENT — BALANCE ASSESSMENTS
BALANCE - SITTING DYNAMIC: NORMAL
BALANCE - STANDING DYNAMIC: NORMAL
BALANCE - STANDING STATIC: NORMAL
BALANCE - SITTING STATIC: NORMAL

## 2020-09-11 ENCOUNTER — SPEECH THERAPY (OUTPATIENT)
Dept: SPEECH THERAPY | Facility: REHABILITATION | Age: 65
End: 2020-09-11
Attending: PHYSICAL MEDICINE & REHABILITATION
Payer: COMMERCIAL

## 2020-09-11 DIAGNOSIS — I69.022 DYSARTHRIA FOLLOWING NONTRAUMATIC SUBARACHNOID HEMORRHAGE: ICD-10-CM

## 2020-09-11 PROCEDURE — 92507 TX SP LANG VOICE COMM INDIV: CPT

## 2020-09-11 PROCEDURE — 92610 EVALUATE SWALLOWING FUNCTION: CPT

## 2020-09-11 NOTE — OP THERAPY DAILY TREATMENT
"  Outpatient Speech Therapy  DAILY TREATMENT     [facility] Speech [address].  [address]  [place] [address]  Phone:  [phone]  Fax:  [phone]    Date: 09/11/2020    Patient: Reyes Amezcua  YOB: 1955  MRN: [id number]     Time Calculation    Start time: 0900  Stop time: 0955 Time Calculation (min): 55 minutes         Chief Complaint: Cough (\"sometimes things slip down my throat and then I cough but the is more related to talking than eating\") and Other (reports of increased drool)    Visit #: 3    Subjective:   Reason for Therapy:     Reason For Evaluation:  Speech/Language, Cognition and TBI  Social Support:     Social support system: unaccompanied.    Patient Mental Status:  Alert and Responsive  Additional Subjective Comments:      Patient arrived to outpatient speech therapy with increased reports of cough and drool, patient originally noting that cough was outside of any intake; however, patient later stated that \"cough this morning resulted in me spitting my food out all over the place.\"      Objective:   Treatments/Interventions Performed:  Patient/Caregiver education  Other Treatment Interventions:  Given reports of increased drool and changes to oral motor coordination, Frenchay Dysarthria Assessment 2 administered  Treatment Intervention tool(s) used:  The Frenchay Dysarthria Assessment (FDA) is a rating scale used to assess patient performance on a range of behaviors related to speech function. The test is divided into seven sections: (a) Reflexes, (b) Respiration, (c) Lips, (d) Palate, (e) Laryngeal, (f) Tongue and (g) Intelligibility.  Influencing factors contributing to patient overall speech production skills are also considered.   Objective Details:  Frenchay Dysarthria Assessment results:    Reflexes: b-c mild-moderate. Patient noted to demonstrate increased drooling when not paying attention or during talking, characterized by slight loss, " bilaterally to labial corners.  Repiration: a normal  Lips: b mild. Patient with slight asymmetry to lips present during rest and with movement including spread, alternating movements. Slight break in lip seal noted when asked to blow air into cheeks. Minimal breakdown in articulatory precision demonstrated during Chang.  Palate: a normal  Laryngeal: b mild. Patient demonstrated good pitch range; although noted that when singing around the house he has increased difficulty in maintaining falsetto. Voice is largely effective and appropriate; although monotone speech quality demonstrated to novel, independent speech productions.  Tongue: b mild. Patient noted to demonstrate reduced accuracy and labored movement of tongue during Chang. Query patient oral awareness given complaints regarding loss of food to outer gums during mastication, need to large bites with solids.           Speech Therapy Assessment:     Speech Mechanism Assessment:     Patient voice description: Clear    Laterality of patient facial weakness: Left    Patient's oral movements are voluntary and coordination: Minimal    Patient speaks fluently: WFL    Patient dysarthria: Minimal  Speech mechanism comments: Results of the FDA 2 suggest that patient presented with a mild dysarthria, characterized by breakdown in articulatory accuracy and precision of lips, tongue during more complex speech production tasks. Patient also noted to demonstrate slight monotone vocal quality; although this has been noted to improve since initial evaluation. Results suggest that implementation of exercise addressing oral motor coordination, strength would likely benefit patient in complaints regarding presence of drooling at this time.     Oral Motor Status:     Labial strength and control for patient: Good    Lingual strength and control for patient: Fair    Patient saliva management: FairPatient oral sensation and awareness: Fair    Patient awareness of swallow problem:  Fair    Oral motor status comments: Results of assessment of oral motor structure and function related to speech and swallow suggests that patient may be experiences changes to oral phase of swallow as a result of reduced coordination and strength of lips, tongue following TBI. Further assessment of swallow may be indicated.       Speech Therapy Plan :   Prognosis & Recommendations  Impression Summary:  Given patient increased reports of cough without, but then with intake with increased awareness regarding presence of drool, assessment of oral motor function related to speech and swallow was completed through administration of the Frenchay Dysarthria Assessment.    Results suggest that patient with noted breakdown in coordination of movement of lips, tongue during Chang with reduced tongue strength also suggested. As a result, patient was provided with education and handout in oral motor exercise to implement as part of home program to improve coordination and strength of oral motor musculature related to speech and swallow.     If patient difficulty in swallow persists, patient would likely benefit from formal, bedside swallow assessment to ensure safety in swallow at this time. As a precaution, compensatory swallow strategies were reviewed with patient, with focus on rate and amount of intake, given increased concern regarding changes to oral sensation that may be impacting patient oral awareness with regards to swallow at this time.  Recommended diet textures at time of discharge from Acute Rehabilitative services also reviewed, with education provided on SB6 textures to promote safety in swallow.   Compensatory swallow strategies:  Upright position 90 degrees during meal and 45 minutes after meal, Reduce bolus size and Multiple swallows  ST Plan L5: SB6.  Liquid Recommendation:  Thin (TN0)  Planned Therapy Interventions:  Home Program, Compensatory Strategy Training, Patient/Caregiver Education, Speech/Language  training and Cognitive-Linguistic training,   Plan Details:  Consider dysphagia assessment based on patient reports at next scheduled outpatient ST session.

## 2020-09-16 ENCOUNTER — PHYSICAL THERAPY (OUTPATIENT)
Dept: PHYSICAL THERAPY | Facility: REHABILITATION | Age: 65
End: 2020-09-16
Attending: PHYSICAL MEDICINE & REHABILITATION
Payer: COMMERCIAL

## 2020-09-16 ENCOUNTER — SPEECH THERAPY (OUTPATIENT)
Dept: SPEECH THERAPY | Facility: REHABILITATION | Age: 65
End: 2020-09-16
Attending: PHYSICAL MEDICINE & REHABILITATION
Payer: COMMERCIAL

## 2020-09-16 DIAGNOSIS — I60.9 SUBARACHNOID HEMORRHAGE WITH LOSS OF CONSCIOUSNESS OF LESS THAN 1 HOUR (HCC): ICD-10-CM

## 2020-09-16 DIAGNOSIS — I69.020 APHASIA DUE TO AND NOT CONCURRENT WITH NONTRAUMATIC SUBARACHNOID HEMORRHAGE: ICD-10-CM

## 2020-09-16 DIAGNOSIS — I69.011 MEMORY DEFICIT FOLLOWING NONTRAUMATIC SUBARACHNOID HEMORRHAGE: ICD-10-CM

## 2020-09-16 DIAGNOSIS — I69.019 COGNITIVE DEFICIT DUE TO OLD SUBARACHNOID HEMORRHAGE: ICD-10-CM

## 2020-09-16 PROCEDURE — 92507 TX SP LANG VOICE COMM INDIV: CPT

## 2020-09-16 PROCEDURE — 97112 NEUROMUSCULAR REEDUCATION: CPT

## 2020-09-16 NOTE — OP THERAPY DAILY TREATMENT
"  Outpatient Physical Therapy  DAILY TREATMENT     [facility] Physical Therapy [address].  [address]  [place] [address]  Phone:  [phone]  Fax:  [phone]    Date: 09/16/2020    Patient: Reyes Amezcua  YOB: 1955  MRN: [id number]     Time Calculation    Start time: 0915  Stop time: 1025 Time Calculation (min): 70 minutes         Chief Complaint: Vertigo    Visit #: 2    SUBJECTIVE:  Pt presents today for vestibular eval. States the sx are not present when he is sitting or lying down.  The problem is mainly when he comes from a low to high position. Describes a sensation of numbness/coldness that comes over him.  Denies spinning, lightheadedness or blackout sensation.  Most intense when the movement is initiated, but will typically reduce in intensity if he continues on.  \"Mostly I just work through it.\"  Is worse toward the evenings, ex// cooking dinner.  Finds himself hitting objects in his periphery (knocking over a cup of water when trying to place another object). Admits having double vision that has been improving since the accident using gaze stab exercises given by home health OT. Now happens mainly with looking down. VOR diagonal exercises have been most bothersome. Wears monocular lenses, will be eval'd by a neuropthalmology on the 22nd. He sleeps well (8-9 hours at night), but is also typically taking 2 30 naps in the day.     OBJECTIVE:  Current objective measures:   VOMS= 6 points (triggers only with vestibular demands)  Convergence WNL at 6 cm after 3 trials, but first attempt was 7 cm with effort to control the R eye.  Improved with practice   Cover test WNL  Frenzel exam: gaze holding WNL  MCTSIB: WNL across all measures  LOS: WNL except fwd weight shift   FGA: 24/30. Difficulty with HTs, EC, tandem gait. Pattern becomes wide and slightly ataxic when challenged.             Therapeutic Treatments and Modalities:     1. Neuromuscular Re-education (CPT 04228)    " Therapeutic Treatment and Modalities Summary:   - Vision/vestibular testing completed as above  - Marquez string 3 bead saccade.  Done in clinic x 10 reps. Given string for home with HO.  To perform 2 min at a time, 2-3x/day    Time-based treatments/modalities:    Physical Therapy Timed Treatment Charges  Neuromusc re-ed, balance, coor, post minutes (CPT 46674): 60 minutes      ASSESSMENT:   Response to treatment: Pt with vision and vestibular impairments primarily under dual tasking demands and when interacting with complex environments.  Pt would benefit from progressive VRT to assist with resolving dizziness and returning to PLOF/independence.     PLAN/RECOMMENDATIONS:   Plan for treatment: therapy treatment to continue next visit.  Planned interventions for next visit: continue with current treatment.

## 2020-09-16 NOTE — OP THERAPY DAILY TREATMENT
"  Outpatient Speech Therapy  DAILY TREATMENT     [facility] Speech [address]  [address]  [place] [address]  Phone:  [phone]  Fax:  [phone]    Date: 09/16/2020    Patient: Reyes Amezcua  YOB: 1955  MRN: [id number]     Time Calculation    Start time: 1100  Stop time: 1130 Time Calculation (min): 30 minutes         Chief Complaint: Aphasia (\"it's mostly remembering words\")    Visit #: 4    Subjective:   Additional Subjective Comments:      Patient noted \"when I stand up, I feel a little disoriented.\" With regards to speech therapy, patient endorses largest complaint with regards to difficulty in word retrieval increasingly \"in the evenings.\" Patient stated that vision and cognitive problems tend to increase in the evening.     Patient queried regarding difficulty in swallow expressed during previous ST session. Patient stated that at this time he has \"no concerns\" reporting that he is implementing strategies as discussed during meals with good results. ST will continue to monitor swallow through patient report at this time.       Objective:   Treatments/Interventions Performed:  Patient/Caregiver education, Compensatory strategy training, Home program and Cognitive-Linguistic training    Speech therapy initiated, addressing cognitive linguistic function through completion of the following, structured tasks:    Complex, divided attention continued in combination with expressive language skills specific to generative naming, alternating naming animals, things to eat to letters of the alphabet. Patient completed the structured task with: 21/25 = 84% accuracy; independent.     Expressive language skills specific to complaints with regards to word finding difficulties targeted in combination with reasoning skills with patient provided with single word and asked to provide 2 different meanings for the word provided. Patient completed the structured task with: 9/10 - 90% " accuracy, independent.     Patient was provided with complex, problem solving/ deductive reasoning task with patient provided with paragraph level, written description of someones day and asked to sequence 7 events encountered based on paragraph description. Patient completed the structured task with: 5/7 = 71.4% accuracy to independent level. Patient provided with direct education/ training in use of strategies to implement to promote accuracy during complex cognitive linguistic tasks.     Speech Therapy Assessment:     Expressive Language Assessment:     Ability to exhibit appropriate naming: Minimal.    Patient's ability to formulate complex/abstract language is Minimal.    Perseveration is Present.    Expressive language comments: Patient noted to demonstrate mild perseverations given structured, generative naming task (alternating naming to animals, things to eat). Patient noted that accuracy in word retrieval is positively impacted by context.     Cognitive Linguistic Assessment:     Patient complex reasoning ability: Moderate    Cognitive-Linguistic comments: Patient demonstrated good response to education provided on strategies to assist in accuracy in completion of more complex problem solving/ reasoning tasks. How to break down information into smaller parts to increase understanding discussed, with use of strategy resulting in improved problem solving/ reasoning improving accuracy to 7/7 - 100%.     Education provided related to suggested impulsivity. Patient provided with strategies on how to address impulsivity to increase safety. Focus on planning prior to initiation of tasks discussed, with further reinforcement in use of planning to be discussed as outpatient speech therapy continues.         Speech Therapy Plan :   Therapy Recommendations  Recommendation: Individual Speech Therapy,  Planned Therapy Interventions:  Home Program, Compensatory Strategy Training, Patient/Caregiver Education,  Cognitive-Linguistic training and Speech/Language training,   Plan Details:  Continue with current POC, no changes.

## 2020-09-18 ENCOUNTER — SPEECH THERAPY (OUTPATIENT)
Dept: SPEECH THERAPY | Facility: REHABILITATION | Age: 65
End: 2020-09-18
Attending: PHYSICAL MEDICINE & REHABILITATION
Payer: COMMERCIAL

## 2020-09-18 ENCOUNTER — PHYSICAL THERAPY (OUTPATIENT)
Dept: PHYSICAL THERAPY | Facility: REHABILITATION | Age: 65
End: 2020-09-18
Attending: PHYSICAL MEDICINE & REHABILITATION
Payer: COMMERCIAL

## 2020-09-18 DIAGNOSIS — I69.020 APHASIA DUE TO AND NOT CONCURRENT WITH NONTRAUMATIC SUBARACHNOID HEMORRHAGE: ICD-10-CM

## 2020-09-18 DIAGNOSIS — I69.011 MEMORY DEFICIT FOLLOWING NONTRAUMATIC SUBARACHNOID HEMORRHAGE: ICD-10-CM

## 2020-09-18 DIAGNOSIS — I69.019 COGNITIVE DEFICIT DUE TO OLD SUBARACHNOID HEMORRHAGE: ICD-10-CM

## 2020-09-18 DIAGNOSIS — I60.9 SUBARACHNOID HEMORRHAGE WITH LOSS OF CONSCIOUSNESS OF LESS THAN 1 HOUR (HCC): ICD-10-CM

## 2020-09-18 PROCEDURE — 92507 TX SP LANG VOICE COMM INDIV: CPT

## 2020-09-18 PROCEDURE — 97112 NEUROMUSCULAR REEDUCATION: CPT

## 2020-09-18 NOTE — OP THERAPY DAILY TREATMENT
Outpatient Speech Therapy  DAILY TREATMENT     [facility] Speech [address].  [address]  [place] [address]  Phone:  [phone]  Fax:  [phone]    Date: 09/18/2020    Patient: Reyes Amezcua  YOB: 1955  MRN: [id number]     Time Calculation    Start time: 1100  Stop time: 1130 Time Calculation (min): 30 minutes         Chief Complaint: Poor Memory    Visit #: 5    Subjective:   Reason for Therapy:     Reason For Evaluation:  Cognition, Speech/Language, TBI and Aphasia  Social Support:     Social support system: unaccompanied.    Patient Mental Status:  Alert and Responsive (Oriented x 4; good spirits)  Progress Factors:     Progression:  Getting Better  Additional Subjective Comments:      Patient endorses that thinking skills are improving, endorsing use of previously educated/ trained strategies, such as writing down information, to increase accuracy in solving complex, problem solving tasks. Patient endorses he uses Zoom to get assistance from friends with cognitive linguistic tasks.       Objective:   Treatments/Interventions Performed:  Patient/Caregiver education, Compensatory strategy training, Home program, Cognitive-Linguistic training and Speech/Language treatment    Speech therapy initiated, addressing cognitive linguistic function through completion of the following, structured tasks:    Problem solving/ reasoning and reading comprehension addressing to solving complex, 4 step if, then statements: patient completed with 30/33 = 90% accuracy; independent.    Memory recall addressed through structured mental manipulation task: patient was provided with instruction and then asked to repeat words in order of direction. Patient necessitated direct instruction to accurately the complete the task; with use of compensatory memory strategies including: use of repetition, word association to improve accuracy in recall. Patient necessitated direct cues through  repetition of both instruction and then words to demonstrate accuracy 2/3 (66.7%).     Speech Therapy Assessment:     Receptive Language Assessment: Patient follows 4 step complex commands: Minimal (understanding of if, then/ temporal concepts)    Receptive language comments: Given performance on structured direction following task, patient would likely benefit from further participation in structured, direction following tasks incorporating conditional statements (if, then) and temporal concepts (before, after). Patient was noted to engage in talk aloud strategy to increase understanding during more complex problem solving/ reasoning tasks completed as part of today's session.     Cognitive Linguistic Assessment:     Patient attention divided: Minimal    Patient immediate memory: Moderate and Minimal    Cognitive-Linguistic comments: Patient provided with further education regarding use of compensatory strategies that would be helpful in increasing accuracy to structured problem solving tasks.  Patient also provided with education related to strategies that proved helpful in increasing accuracy during structured, memory recall/ mental manipulation tasks.         Speech Therapy Plan :   Prognosis & Recommendations  Impression Summary:  Patient is demonstrated good use of strategies to improve accuracy given structured cognitive linguistic tasks. Patient with noted deficits in working memory as evidenced during performance of structured mental manipulation tasks completed as part of today's session. Patient was educated in use of compensatory memory strategies that would likely support improved performance during memory recall tasks including repetition, association.   Therapy Recommendations  Recommendation:  Individual Speech Therapy,  Planned Therapy Interventions:  Compensatory Strategy Training, Home Program, Patient/Caregiver Education, Speech/Language training and Cognitive-Linguistic training,   Plan Details:   Continue with current POC, no changes.

## 2020-09-18 NOTE — OP THERAPY DAILY TREATMENT
"  Outpatient Physical Therapy  DAILY TREATMENT     [facility] Physical Therapy [address]  [address]  [place] [address]  Phone:  [phone]  Fax:  [phone]    Date: 09/18/2020    Patient: Reyes Amezcua  YOB: 1955  MRN: [id number]     Time Calculation    Start time: 0945  Stop time: 1044 Time Calculation (min): 59 minutes         Chief Complaint: Vertigo    Visit #: 3    SUBJECTIVE:  No new complaints.  I have been trying to challenge myself with balance.  I get worn out when cooking, yadiel complex recipes.     OBJECTIVE:      Therapeutic Treatments and Modalities:     1. Neuromuscular Re-education (CPT 89907)    Therapeutic Treatment and Modalities Summary:   - Elliptical: balance practice without UE assist, reversing direction, with HT (1 hand LT)  - Rocker board: AP and lateral sways x 1 min EO. Balance on tilt x 30\" ea 4 way. Lateral with coordinated HT horiz. Lateral sways with EC x 1 min  - Trampoline: mardsen ball- 3 way with cog challenge, ball toss  - BOSU step ups: with cog challenge  - 2x4: fwd/back tandem, f/b cross over steps    Time-based treatments/modalities:    Physical Therapy Timed Treatment Charges  Neuromusc re-ed, balance, coor, post minutes (CPT 02511): 59 minutes    ASSESSMENT:   Response to treatment: Able to tolerate moderate level demands with no adverse effect in regard to dizziness and nausea. Noting ataxic quality, LOB with novel demands, but improves with cuing and showing good learning affect.    PLAN/RECOMMENDATIONS:   Plan for treatment: therapy treatment to continue next visit.  Planned interventions for next visit: continue with current treatment. Marquez string with macro challenge       "

## 2020-09-22 ENCOUNTER — OFFICE VISIT (OUTPATIENT)
Dept: OPHTHALMOLOGY | Facility: MEDICAL CENTER | Age: 65
End: 2020-09-22
Payer: COMMERCIAL

## 2020-09-22 DIAGNOSIS — H52.31 ANISOMETROPIA: ICD-10-CM

## 2020-09-22 DIAGNOSIS — H49.9 OPHTHALMOPLEGIA: ICD-10-CM

## 2020-09-22 DIAGNOSIS — H46.8 TRAUMATIC OPTIC NEUROPATHY: ICD-10-CM

## 2020-09-22 PROCEDURE — 92060 SENSORIMOTOR EXAMINATION: CPT | Performed by: OPHTHALMOLOGY

## 2020-09-22 PROCEDURE — 99204 OFFICE O/P NEW MOD 45 MIN: CPT | Mod: 25 | Performed by: OPHTHALMOLOGY

## 2020-09-22 PROCEDURE — 92250 FUNDUS PHOTOGRAPHY W/I&R: CPT | Performed by: OPHTHALMOLOGY

## 2020-09-22 PROCEDURE — 92015 DETERMINE REFRACTIVE STATE: CPT | Performed by: OPHTHALMOLOGY

## 2020-09-22 ASSESSMENT — REFRACTION_MANIFEST
OS_SPHERE: +1.00
OS_CYLINDER: +0.25
OD_CYLINDER: +1.25
OD_SPHERE: +0.25
METHOD_AUTOREFRACTION: 1
OS_AXIS: 075
OD_AXIS: 103

## 2020-09-22 ASSESSMENT — REFRACTION
OD_AXIS: 103
OD_CYLINDER: +1.25
OS_AXIS: 076
OS_SPHERE: +0.75
OD_SPHERE: +0.25
OS_CYLINDER: +0.50

## 2020-09-22 ASSESSMENT — REFRACTION_WEARINGRX
OD_SPHERE: +1.25
OS_ADD: +0.75
OD_CYLINDER: +0.50
OS_AXIS: 077
OS_ADD: +1.50
SPECS_TYPE: BIFOCAL
OS_SPHERE: +2.00
OS_CYLINDER: +0.25
OS_AXIS: 070
OD_ADD: +0.75
OD_CYLINDER: +0.25
OD_AXIS: 090
OS_CYLINDER: +0.25
OD_AXIS: 104
OS_SPHERE: +0.50
OD_SPHERE: +1.25

## 2020-09-22 ASSESSMENT — CUP TO DISC RATIO
OD_RATIO: 0.2
OS_RATIO: 0.1

## 2020-09-22 ASSESSMENT — EXTERNAL EXAM - RIGHT EYE: OD_EXAM: NORMAL

## 2020-09-22 ASSESSMENT — SLIT LAMP EXAM - LIDS
COMMENTS: NORMAL
COMMENTS: NORMAL

## 2020-09-22 ASSESSMENT — TONOMETRY
OS_IOP_MMHG: 13
OD_IOP_MMHG: 12

## 2020-09-22 ASSESSMENT — CONF VISUAL FIELD
OD_NORMAL: 1
OS_NORMAL: 1

## 2020-09-22 ASSESSMENT — VISUAL ACUITY
OS_SC: 20/30+2
OD_SC: J7
OS_SC: J7
METHOD: SNELLEN - LINEAR
OD_SC: 20/15-2

## 2020-09-22 ASSESSMENT — ENCOUNTER SYMPTOMS
EYE PAIN: 1
DOUBLE VISION: 1

## 2020-09-22 ASSESSMENT — EXTERNAL EXAM - LEFT EYE: OS_EXAM: NORMAL

## 2020-09-22 NOTE — ASSESSMENT & PLAN NOTE
9/22/2020 - OCT NFl thickness normal at 110 OD and 106 OS. Normal ganglion cell thickness. No evidence of any retrograde axonal degeneration.

## 2020-09-22 NOTE — ASSESSMENT & PLAN NOTE
9/22/2020 - complex constellation of findings. 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 The exo is probably a combination of a breakdown of an underlying exophoria because of the head injury as well as more difficulty in control secondary to the uncorrected monovision. 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 In addition I reviewed the CT scans done that did demonstrate non displaced fractures of the lateral orbital, superior orbital and medial orbital wall. Also associated intra orbital air and hematoma. Thus might have had some muscular injury. Discussed option of continuing to observe since only 4 month post injury, adding temporary prism, and balancing the monovision with an rx. We decided to continue to observe and correct the monovision with a distance Rx. Will re-eval in 3 months
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
## 2020-09-22 NOTE — ASSESSMENT & PLAN NOTE
9/22/2020 - maybe exacerbating control of the exophoria. Give rx to improve acuity, better balance and hopefully improve control.

## 2020-09-23 ENCOUNTER — SPEECH THERAPY (OUTPATIENT)
Dept: SPEECH THERAPY | Facility: REHABILITATION | Age: 65
End: 2020-09-23
Attending: PHYSICAL MEDICINE & REHABILITATION
Payer: COMMERCIAL

## 2020-09-23 ENCOUNTER — PHYSICAL THERAPY (OUTPATIENT)
Dept: PHYSICAL THERAPY | Facility: REHABILITATION | Age: 65
End: 2020-09-23
Attending: PHYSICAL MEDICINE & REHABILITATION
Payer: COMMERCIAL

## 2020-09-23 DIAGNOSIS — I69.020 APHASIA DUE TO AND NOT CONCURRENT WITH NONTRAUMATIC SUBARACHNOID HEMORRHAGE: ICD-10-CM

## 2020-09-23 DIAGNOSIS — I69.019 COGNITIVE DEFICIT DUE TO OLD SUBARACHNOID HEMORRHAGE: ICD-10-CM

## 2020-09-23 DIAGNOSIS — I60.9 SUBARACHNOID HEMORRHAGE WITH LOSS OF CONSCIOUSNESS OF LESS THAN 1 HOUR (HCC): ICD-10-CM

## 2020-09-23 DIAGNOSIS — I69.011 MEMORY DEFICIT FOLLOWING NONTRAUMATIC SUBARACHNOID HEMORRHAGE: ICD-10-CM

## 2020-09-23 PROCEDURE — 97110 THERAPEUTIC EXERCISES: CPT

## 2020-09-23 PROCEDURE — 92507 TX SP LANG VOICE COMM INDIV: CPT

## 2020-09-23 PROCEDURE — 97140 MANUAL THERAPY 1/> REGIONS: CPT

## 2020-09-23 NOTE — OP THERAPY DAILY TREATMENT
"  Outpatient Speech Therapy  DAILY TREATMENT     [address]  [address]  [place] [address]  Phone:  [phone]  Fax:  [phone]    Date: 09/23/2020    Patient: Reyes Amezcua  YOB: 1955  MRN: [id number]     Time Calculation    Start time: 1100  Stop time: 1130 Time Calculation (min): 30 minutes         Chief Complaint: Poor Memory (\"the thing I had a long discussion with Kerry, the thing is I can't remember things that happen a day ago to a couple of weeks ago\") and Aphasia (\"there are all sorts of words that I can't remember\")    Visit #: 6    Subjective:   Additional Subjective Comments:      Patient returns to outpatient speech therapy with increased complaints regarding presence of word finding difficulties and memory recall difficulties impacting recall of family members names, places, etc.       Objective:   Treatments/Interventions Performed:  Patient/Caregiver education, Compensatory strategy training, Home program, Cognitive-Linguistic training and Speech/Language treatment    Speech therapy completed, addressing patient complaints regarding memory recall through participation in the following, structured activities:    Memory recall addressed through structured mental manipulation task: patient was provided with instruction and then asked to repeat words in order of direction.  Patient was provided with 4 words, initiated compensatory strategy of repetition to independent level, when provided with instruction and asked to repeat words in order of ranking as requested. Patient was accuracy in immediate recall of 4 words: 3/5 trials (60%) accuracy; providing words following instruction: 3/5 (60%) accuracy.     Speech Therapy Assessment:     Cognitive Linguistic Assessment:     Patient immediate memory: Moderate (working memory)    Patient recent and short term memory: Minimal    Cognitive-Linguistic comments: Use of memory journal encouraged to " address recall to newly learned information. Memory recall strategy through association addressed.  Education provided with memory recall limitations, specific to working memory to 3 units of newly learned information addressed to promote accuracy in recall. Patient verbalized understanding to education as provided.         Speech Therapy Plan :   Prognosis & Recommendations  Impression Summary:  Patient demonstrated good response to use of strategies, with patient demonstrating need for multiple repetitions to address deficits in working memory.   Prognosis:  Good  Prognosis Details:  Patient demonstrating good response to direct education provided by outpatient speech therapy to address deficits in word finding and working memory, negatively impacting independence in communication and memory recall to newly learned information. Continued participation in direct, outpatient speech therapy recommended and warranted based on patient progress but also areas of continued need.   Therapy Recommendations  Recommendation: Individual Speech Therapy,  Planned Therapy Interventions:  Home Program, Compensatory Strategy Training, Patient/Caregiver Education, Speech/Language training and Cognitive-Linguistic training,   Plan Details:  Continue with current POC.

## 2020-09-24 NOTE — OP THERAPY DAILY TREATMENT
Outpatient Physical Therapy  DAILY TREATMENT     [facility] Physical [address].  [address]  [place] [address]  Phone:  [phone]  Fax:  [phone]    Date: 09/25/2020    Patient: Reyes Amezcua  YOB: 1955  MRN: [id number]     Time Calculation    Start time: 1032  Stop time: 1120 Time Calculation (min): 48 minutes         Chief Complaint: Shoulder Problem    Visit #: 5    SUBJECTIVE:  I noticed a lot of improvement with what you did with my shoulder. I noticed that my shoulder has been less flared up with walking. Going behind my back was extremely painful but now it's a little better.      Goals:   Short Term Goals:   1. Pt will be independent with written HEP.  2. Pt will complete QuickDash questionnaire.  3. Pt will demonstrate postural correction with min cuing in clinic.  Short term goal time span:  2-4 weeks      Long Term Goals:    1. Pt will be independent with written HEP.  2. Pt will have a significant increase in QuickDash score.  3. Pt will demonstrate postural correction without cuing required.  4. Pt will demonstrate painfree R GH AROM within 10 deg (or less) of uninvolved UE in order to perform ADL's.  5. All GH testing will be strong and pain-free in order for pt to improve QOL and perform ADL's.     Long term goal time span:  6-8 weeks    OBJECTIVE:  Current objective measures:   Quickdash General Total Score: 18.18     GH AROM Abduction: 90 deg (seated) *pain  GH AROM abduction: WFL with scapular setting; painless  GH IR AROM BTB: T12    Post treament    GH AROM abduction: 100 (fatigue) *pain  GH IR AROM BTB: T7    Therapeutic Exercises (CPT 65129):     1. Posture education, x2 min, reviewed    2. Verbal review of hep, discontinued wall angels due to onset n/t BUE's    3. Pt education, educated pt re: scapular mechanics and importance of setting shoulder blades before reaching    5. Pec stretch at wall+PPT, 2x30 sec, reviewed hep; HO provided    6.  "FR progression; 2x10, open book \"pec stretch\", increase onset n/t in B fingers; return to normal within 1.5 min after stopping activity, alt GH flexion, increase pain>90 deg; modified to perform to 90 deg only, rosemary wings to 90 deg abd    9. Pulleys GH IR emphasis , 2min, //GH IR AROM BTB: T7    12. UPOC: 11/5/20      Therapeutic Exercise Summary: Educated pt to perform all hep in pain-free range.        Therapeutic Treatments and Modalities:     1. Manual Therapy (CPT 44005), see below, x12 min    Therapeutic Treatment and Modalities Summary: Cranial glides to scapula grade III and IV with MET x2 sets 15 followed by subscap release 2x10 with pt in L SL (pillow b/w pt and therapist for modesty) then STM to superior 2/3 rhomboids          Time-based treatments/modalities:    Physical Therapy Timed Treatment Charges  Manual therapy minutes (CPT 17175): 12 minutes  Therapeutic exercise minutes (CPT 14410): 36 minutes  Pain pre treatment:    ASSESSMENT:   Response to treatment:   Continued poor postural awareness with continued onset of n/t in B fingers (al rays) with exercises bringing scapula into retraction, slight increase during; resolves within 1-2 min after exercise; will continue to monitor.     Pt requesting to “work on lump in upper arm;” reports previous therapist have massaged this area. Pt may benefit from manual work to address area in case of tissue restriction. However, has pending orders for RUE US due to prior swelling in R forearm. Will contact MD re: DAGOBERTOE restrictions.    PLAN/RECOMMENDATIONS:   Plan for treatment: therapy treatment to continue next visit.  Planned interventions for next visit: continue with current treatmentFurther discuss TOS s/s; assess response to hep. Follow up with MD re: [employer] (has pending US orders)       "

## 2020-09-25 ENCOUNTER — SPEECH THERAPY (OUTPATIENT)
Dept: SPEECH THERAPY | Facility: REHABILITATION | Age: 65
End: 2020-09-25
Attending: PHYSICAL MEDICINE & REHABILITATION
Payer: COMMERCIAL

## 2020-09-25 ENCOUNTER — PHYSICAL THERAPY (OUTPATIENT)
Dept: PHYSICAL THERAPY | Facility: REHABILITATION | Age: 65
End: 2020-09-25
Attending: PHYSICAL MEDICINE & REHABILITATION
Payer: COMMERCIAL

## 2020-09-25 ENCOUNTER — TELEPHONE (OUTPATIENT)
Dept: PHYSICAL THERAPY | Facility: REHABILITATION | Age: 65
End: 2020-09-25

## 2020-09-25 DIAGNOSIS — I69.019 COGNITIVE DEFICIT DUE TO OLD SUBARACHNOID HEMORRHAGE: ICD-10-CM

## 2020-09-25 DIAGNOSIS — I69.020 APHASIA DUE TO AND NOT CONCURRENT WITH NONTRAUMATIC SUBARACHNOID HEMORRHAGE: ICD-10-CM

## 2020-09-25 DIAGNOSIS — I60.9 SUBARACHNOID HEMORRHAGE WITH LOSS OF CONSCIOUSNESS OF LESS THAN 1 HOUR (HCC): ICD-10-CM

## 2020-09-25 DIAGNOSIS — I69.011 MEMORY DEFICIT FOLLOWING NONTRAUMATIC SUBARACHNOID HEMORRHAGE: ICD-10-CM

## 2020-09-25 PROCEDURE — 97110 THERAPEUTIC EXERCISES: CPT

## 2020-09-25 PROCEDURE — 92507 TX SP LANG VOICE COMM INDIV: CPT

## 2020-09-25 PROCEDURE — 97140 MANUAL THERAPY 1/> REGIONS: CPT

## 2020-09-25 NOTE — OP THERAPY DAILY TREATMENT
"  Outpatient Speech Therapy  DAILY TREATMENT     [facility] Speech [address].  [address]  [place] [address]  Phone:  [phone]  Fax:  [phone]    Date: 09/25/2020    Patient: Reyes Amezcua  YOB: 1955  MRN: [id number]     Time Calculation    Start time: 1500  Stop time: 1555 Time Calculation (min): 55 minutes         Chief Complaint: Aphasia (word finding)    Visit #: 7    Subjective:   Reason for Therapy:     Reason For Evaluation:  Aphasia, Speech/Language, Cognition and TBI  Social Support:     Accompanied By:  Spouse (wife, brought patient to today's therapy session)    Patient Mental Status:  Alert and Responsive  Progress Factors:     Progression:  Getting Better  Additional Subjective Comments:      \"I've been monitoring my REM sleep through my Fitbit and realized that I am barely getting any REM sleep at all.\" Patient acknowledges that he is beginning to use a daily journal that is helpful in recall to new information, such as ordering glasses from [employer].       Objective:   Treatments/Interventions Performed:  Patient/Caregiver education, Compensatory strategy training, Home program, Cognitive-Linguistic training and Speech/Language treatment    Aphasia addressed specific to expressive language skills in area of: generative naming to concrete categories. Without use of strategy, patient was successful in naming 14 animals given one-minute interval. Education/ training was then provided related to use of sub categorization. Using strategy, patient demonstrated improved success to naming both concrete and abstract categories: naming 16 (furniture) and 18 (things that are green) in single minute.    Reading comprehension targeted in combination with reasoning skills with patient provided with 5 sentence paragraph and asked to answer questions based on true, false or unknown to given category. Patient completed the structured task with 8/10 - 80% accuracy; " independent.     Complex, divided attention targeted in combination with generative naming to parameters of alternate naming to letters of the alphabet. Patient completed the structured task with: 26/26 = 100% accuracy; only verbal cues x 3 (11.5%).    Speech Therapy Assessment:     Expressive Language Assessment:     Ability to exhibit appropriate naming: Minimal.    Patient's ability to sustain dialogues within a given topic is Supervision Required (when experiencing a word finding difficulty; patient can demonstrate perseveration of topic).    Patient's ability to formulate complex/abstract language is Supervision Required.    Expressive language comments: Expressive language skills targeted to production of novel conversation based on patient topic of choice (article read and discussed previously with friend from [facility]). Patient demonstrated independence in generation of language to novel, complex level for 3 minutes, given presence of pausing only x 2. No significant word finding demonstrated to the 3 minute conversation.     Patient demonstrated good response to initiation of subcategorization to increase accuracy in naming during structured, generative naming tasks.     Cognitive Linguistic Assessment:     Patient attention divided: Supervision Required    Patient complex reasoning ability: Minimal    Cognitive-Linguistic comments: Cognitive linguistic function in area of reasoning addressed through completion of structured paragraph reading task with patient asked to determine if cues were true, false or unknown. Patient with mild deficits in reasoning skills to independent level; given supervision cues, patient demonstrated improved reasoning to structured task.         Speech Therapy Plan :   Prognosis & Recommendations  Impression Summary:  Patient demonstrated good response to initiation of subcategorization compensatory naming strategy as educated/ trained during today's session.    Patient is  demonstrating improved accuracy in generation of language to complex, novel conversation speech level demonstrating limited presence of word finding difficulties to 3 minute conversation completed during today's session.       Prognosis:  Excellent  Prognosis Details:  With improvement in cognitive linguistic function, patient is demonstrating increased awareness to self and surroundings, even pointing out which picture (out of 4) had a different artist that created the picture in the lobby at the end of today's session.   Therapy Recommendations  Recommendation: Individual Speech Therapy,  Planned Therapy Interventions:  Home Program, Compensatory Strategy Training, Patient/Caregiver Education, Cognitive-Linguistic training and Speech/Language training,   Plan Details:  Continue with more complex problem solving and reasoning tasks. Consider more complex reading comprehension tasks to assist in return to work setting.

## 2020-09-26 ENCOUNTER — IMMUNIZATION (OUTPATIENT)
Dept: SOCIAL WORK | Facility: CLINIC | Age: 65
End: 2020-09-26
Payer: COMMERCIAL

## 2020-09-26 DIAGNOSIS — Z23 NEED FOR VACCINATION: ICD-10-CM

## 2020-09-26 PROCEDURE — 90471 IMMUNIZATION ADMIN: CPT | Performed by: REGISTERED NURSE

## 2020-09-26 PROCEDURE — 90686 IIV4 VACC NO PRSV 0.5 ML IM: CPT | Performed by: REGISTERED NURSE

## 2020-09-29 ENCOUNTER — SPEECH THERAPY (OUTPATIENT)
Dept: SPEECH THERAPY | Facility: REHABILITATION | Age: 65
End: 2020-09-29
Attending: PHYSICAL MEDICINE & REHABILITATION
Payer: COMMERCIAL

## 2020-09-29 ENCOUNTER — PHYSICAL THERAPY (OUTPATIENT)
Dept: PHYSICAL THERAPY | Facility: REHABILITATION | Age: 65
End: 2020-09-29
Attending: PHYSICAL MEDICINE & REHABILITATION
Payer: COMMERCIAL

## 2020-09-29 DIAGNOSIS — I69.011 MEMORY DEFICIT FOLLOWING NONTRAUMATIC SUBARACHNOID HEMORRHAGE: ICD-10-CM

## 2020-09-29 DIAGNOSIS — I69.019 COGNITIVE DEFICIT DUE TO OLD SUBARACHNOID HEMORRHAGE: ICD-10-CM

## 2020-09-29 DIAGNOSIS — I69.020 APHASIA DUE TO AND NOT CONCURRENT WITH NONTRAUMATIC SUBARACHNOID HEMORRHAGE: ICD-10-CM

## 2020-09-29 DIAGNOSIS — I60.9 SUBARACHNOID HEMORRHAGE WITH LOSS OF CONSCIOUSNESS OF LESS THAN 1 HOUR (HCC): ICD-10-CM

## 2020-09-29 PROCEDURE — 97140 MANUAL THERAPY 1/> REGIONS: CPT

## 2020-09-29 PROCEDURE — 97110 THERAPEUTIC EXERCISES: CPT

## 2020-09-29 PROCEDURE — 92507 TX SP LANG VOICE COMM INDIV: CPT

## 2020-09-29 NOTE — OP THERAPY DAILY TREATMENT
"  Outpatient Speech Therapy  DAILY TREATMENT     [facility] Speech [address].  [address]  [place] [address]  Phone:  [phone]  Fax:  [phone]    Date: 09/29/2020    Patient: Reyes Amezcua  YOB: 1955  MRN: [id number]     Time Calculation    Start time: 0800  Stop time: 0855 Time Calculation (min): 55 minutes         Chief Complaint: Aphasia and Poor Memory    Visit #: 8    Subjective:   Reason for Therapy:     Reason For Evaluation:  Speech/Language, Cognition, TBI and Aphasia  Social Support:     Accompanied By:  Spouse (wife, present and supportive)    Patient Mental Status:  Alert and Responsive (Oriented x 4; good spirits)  Progress Factors:     Progression:  Getting Better  Additional Subjective Comments:      Wife reports improvement in overall physical complaints and physical skills, including vision. Patient wife endorses that with regards to cognition, patient continues to struggle with short term memory, memory for more recent events. Patient wife also endorses \"he's super independent, doing more and more; but then when something goes wrong it seems to frustrate him more than it used to before.\"  Patient wife also reports that he \"is thinking more black and white when compared to before\".       Objective:   Treatments/Interventions Performed:  Patient/Caregiver education, Home program, Cognitive-Linguistic training, Speech/Language treatment and Compensatory strategy training    Extensive discussion completed related to challenges patient is currently facing with regards to higher level/ flexible thinking and TBI.  Education completed regarding effective of TBI on thinking skills.     Cognitive linguistic skills targeted through completion of structured task addressing:  Solving of multi-level multiplication problems (3 x 2) with patient completing with:  2/5 (40%) accuracy; independent.  Use of calculator to check accuracy in work to allow patient the " "opportunity for self-corrections proved helpful.     Home program discussed specific to ideas to address both visual and auditory attention with patient encouraged to work on: reading tasks aloud, listening to Podcasts to increase both visual and auditory attention.       Speech Therapy Assessment:     Cognitive Linguistic Assessment:     Patient complex problem solving ability: Moderate    Cognitive-Linguistic comments: Given a complex, multiplication task, patient with noted complaints \"I'm having a hard time remembering the combinations\" although patient did well to complete the structured task with minimal-no cues.        Speech Therapy Plan :   Prognosis & Recommendations  Impression Summary:  Patient and wife were very responsive to education completed during today's session regarding TBI, impact of TBI on current level of function, progression of therapy and expectations with regards to how therapy can assist patient in return to previous level of function.     Multiple ideas and strategies were discussed related to how to incorporate newly learned strategies for memory, mental flexibility, etc.       Therapy Recommendations  Recommendation:  Individual Speech Therapy,  Planned Therapy Interventions:  Home Program, Compensatory Strategy Training, Patient/Caregiver Education, Cognitive-Linguistic training and Speech/Language training,   Plan Details:  Continue with current POC. Continue to address mental flexibility, higher level problem solving/ reasoning skills. Patient encouraged to bring tasks relevant to patient needs to assist to return in previous level of function.                "

## 2020-09-29 NOTE — OP THERAPY DAILY TREATMENT
"  Outpatient Physical Therapy  DAILY TREATMENT     [facility] Physical [address].  [address]  [place] [address]  Phone:  [phone]  Fax:  [phone]    Date: 09/29/2020    Patient: Reyes Amezcua  YOB: 1955  MRN: [id number]     Time Calculation    Start time: 0903  Stop time: 0954 Time Calculation (min): 51 minutes         Chief Complaint: Shoulder Problem    Visit #: 6    SUBJECTIVE:  I feel like I have more pain in the back of the shoulder versus the front and side of my arm. I'm scheduled for an ultrasound tomorrow. Did you talk to the doctor about my arm?      Goals:   Short Term Goals:   1. Pt will be independent with written HEP.  2. Pt will complete QuickDash questionnaire.  3. Pt will demonstrate postural correction with min cuing in clinic.  Short term goal time span:  2-4 weeks      Long Term Goals:    1. Pt will be independent with written HEP.  2. Pt will have a significant increase in QuickDash score.  3. Pt will demonstrate postural correction without cuing required.  4. Pt will demonstrate painfree R GH AROM within 10 deg (or less) of uninvolved UE in order to perform ADL's.  5. All GH testing will be strong and pain-free in order for pt to improve QOL and perform ADL's.     Long term goal time span:  6-8 weeks    OBJECTIVE:  Current objective measures:         GH AROM abduction: WFL with scapular setting; painless  (return of shoulder pain with poor posture in sitting and GH abd AROM)    Modified VBI (pt positioned seated with head in hand): Negative bilaterally    TTP R supraspinatus, R subclavis, R superior 2/3 rhomboids      Therapeutic Exercises (CPT 31010):     1. UBE, x3 min alt every 30 sec, warm up    2. Verbal review of hep, discontinued wall angels due to onset n/t BUE's    3. Pt education, re: shoulder girdle including clavicle, SC and AC joint    5. Pec stretch at wall+PPT, verbal review    6. FR progression; 2x10, open book \"pec stretch\", " "minor onset n/t in BUE's, alt GH flexion, crepitus with R GH flexion, rosemary wings to 90 deg abd, 90 deg elevation    9. Chin nods supine, x15, hep    10. Wall angels, x5, unable to maintain UE's at wall; decreased n/t in BUE's this visit compared to previously post manual    11. Rows, x5 with 10 sec hold, progressed with green TB for home; hep    18. UPOC: 11/5/20      Therapeutic Exercise Summary: Educated pt to perform all hep in pain-free range. HO provided for chin nods        Therapeutic Treatments and Modalities:     1. Manual Therapy (CPT 86193), see below, x12 min    Therapeutic Treatment and Modalities Summary: Inferior and posterior glides to SC grade III followed by STM to R subclavius muscle. Then inferior glides grade III to AC joint with PROM GH ER to end range; pt positioned in hooklying// decreased frequency crepitus with GH forward AROM following.     STM to c/s paraspinals with gentle distraction// increased tone observed R; decreased n/t in BUE's with \"increased circulation per pt\" with B GH abduction with concurrent manual c/s distraction in hooklying          Time-based treatments/modalities:    Physical Therapy Timed Treatment Charges  Manual therapy minutes (CPT 43199): 12 minutes  Therapeutic exercise minutes (CPT 75455): 39 minutes  Pain pre treatment:    ASSESSMENT:   Response to treatment:   MD requesting hold on manual to RUE near \"lump\" until UE US performed; explained to pt last week but no recollection based on subjective. Difficult to assess current cognitive status as able to recollect exercises and instructions from previous sessions but other episodes of forgetfulness noted.    Continued emphasis on posture as is painfree with GH abduction AROM with accurate set up of shoulder blades. Reports decrease n/t and sensation of \"cold fingertips\" after c/s STM manual (assessed with GH abduction in hooklying and wall angels).    Pt has UE scheduled for tomorrow.    PLAN/RECOMMENDATIONS: "   Plan for treatment: therapy treatment to continue next visit.  Planned interventions for next visit: continue with current treatment   Revisit dave GOMEZ to EVANGELINA austin per MD recommendations until cleared by UE US*

## 2020-10-01 ENCOUNTER — HOSPITAL ENCOUNTER (OUTPATIENT)
Dept: RADIOLOGY | Facility: MEDICAL CENTER | Age: 65
End: 2020-10-01
Attending: FAMILY MEDICINE
Payer: COMMERCIAL

## 2020-10-01 DIAGNOSIS — R22.31 SKIN LUMP OF ARM, RIGHT: ICD-10-CM

## 2020-10-01 PROCEDURE — 76882 US LMTD JT/FCL EVL NVASC XTR: CPT | Mod: RT

## 2020-10-02 ENCOUNTER — PHYSICAL THERAPY (OUTPATIENT)
Dept: PHYSICAL THERAPY | Facility: REHABILITATION | Age: 65
End: 2020-10-02
Attending: PHYSICAL MEDICINE & REHABILITATION
Payer: COMMERCIAL

## 2020-10-02 ENCOUNTER — SPEECH THERAPY (OUTPATIENT)
Dept: SPEECH THERAPY | Facility: REHABILITATION | Age: 65
End: 2020-10-02
Attending: PHYSICAL MEDICINE & REHABILITATION
Payer: COMMERCIAL

## 2020-10-02 DIAGNOSIS — I69.011 MEMORY DEFICIT FOLLOWING NONTRAUMATIC SUBARACHNOID HEMORRHAGE: ICD-10-CM

## 2020-10-02 DIAGNOSIS — I60.9 SUBARACHNOID HEMORRHAGE WITH LOSS OF CONSCIOUSNESS OF LESS THAN 1 HOUR (HCC): ICD-10-CM

## 2020-10-02 DIAGNOSIS — I69.019 COGNITIVE DEFICIT DUE TO OLD SUBARACHNOID HEMORRHAGE: ICD-10-CM

## 2020-10-02 DIAGNOSIS — I69.020 APHASIA DUE TO AND NOT CONCURRENT WITH NONTRAUMATIC SUBARACHNOID HEMORRHAGE: ICD-10-CM

## 2020-10-02 PROCEDURE — 92507 TX SP LANG VOICE COMM INDIV: CPT

## 2020-10-02 PROCEDURE — 97110 THERAPEUTIC EXERCISES: CPT

## 2020-10-02 PROCEDURE — 97140 MANUAL THERAPY 1/> REGIONS: CPT

## 2020-10-02 NOTE — OP THERAPY DAILY TREATMENT
Outpatient Physical Therapy  DAILY TREATMENT     [facility] Physical [address].  [address]  [place] [address]  Phone:  [phone]  Fax:  [phone]    Date: 10/02/2020    Patient: Reyes Amezcua  YOB: 1955  MRN: [id number]     Time Calculation    Start time: 0901  Stop time: 0955 Time Calculation (min): 54 minutes         Chief Complaint: Shoulder Problem    Visit #: 7    SUBJECTIVE:  I noticed my neck was a little sore after you worked on it last time. I have pain in the back of the shoulder but it's also the front of the shoulder. My fingers are cold a lot. I still get the numbness/tingling when I raise my arms. The back of my shoulder hurts when I walk.      Goals:   Short Term Goals:   1. Pt will be independent with written HEP. (Met)  2. Pt will complete QuickDash questionnaire.   3. Pt will demonstrate postural correction with min cuing in clinic.  Short term goal time span:  2-4 weeks      Long Term Goals:    1. Pt will be independent with written HEP.  2. Pt will have a significant increase in QuickDash score.  3. Pt will demonstrate postural correction without cuing required.  4. Pt will demonstrate painfree R GH AROM within 10 deg (or less) of uninvolved UE in order to perform ADL's.  5. All GH testing will be strong and pain-free in order for pt to improve QOL and perform ADL's.     Long term goal time span:  6-8 weeks    OBJECTIVE:  Current objective measures:        Cervical spine:   Flexion: Limited; pulling posterior neck  Extension: Limited; pulling posterior neck  SB L: Decreased  SB R: Decreased  Rotation bilaterally: Slight decrease        Spurling's R: Negative     PA's to c/s C2-C7 gr III: No onset/reproduction of n/t or pain in UE    Full R GH AROM with full abduction and good posturing    Elevated R first rib with pain per pt; 1st rib depression gr III//no onset n/t RUE    TTP R rhomboids, R UT (reproduce's one area of pt's pain)    Imaging:  US  "RUE 10/1/20:  HISTORY/REASON FOR EXAM:  Palpable abnormality in right forearm        TECHNIQUE/EXAM DESCRIPTION AND NUMBER OF VIEWS:  Limited color, duplex and grayscale evaluation of the right forearm was obtained for complaint of palpable abnormality.     COMPARISON:  None.     FINDINGS:  Limited evaluation of the right upper extremity in the region of the forearm demonstrates no sonographic abnormality such as mass or fluid collection.     IMPRESSION:     No sonographic abnormality in the region of the right forearm. As with any palpable abnormality, follow-up on clinical grounds is advised.      Therapeutic Exercises (CPT 96278):     1. UBE, x3 min alt every 30 sec, warm up    2. Verbal review of hep    3. Pt education, re: strengthening proximally before distally    4. Walk away angles, green TB; 2x10, fatigue; posterior chain weaknes; difficulty maintaining UE's in ER    5. Planks on forearms, 2x30 sec, hep    6. Serratus slides at wall, x15, hep    7. Side planks (modified on elbow), discontinued; onset of R shoulder pain    8. Side plank at wall, 2x30 sec ea, able to tolerate without discomfort    18. UPOC: 11/5/20      Therapeutic Exercise Summary: Educated pt to perform all hep in pain-free range. HO provided     Fatigue following session        Therapeutic Treatments and Modalities:     1. Manual Therapy (CPT 07813), see below, x21 min    Therapeutic Treatment and Modalities Summary: STM to c/s paraspinals with gentle distraction// increased tone observed R// no change with GH abd (n/t) previous session was decreased n/t in BUE's with \"increased circulation per pt\"     C/s manual traction in hooklying//no change n/t in BUE with GH abd (previous session improvement)    OP to pec minor R//no onset n/t RUE              Time-based treatments/modalities:    Physical Therapy Timed Treatment Charges  Manual therapy minutes (CPT 63097): 21 minutes  Therapeutic exercise minutes (CPT 80146): 33 minutes  Pain pre " "treatment:    ASSESSMENT:   Response to treatment:   TTP in R rhomboids and R triceps/R biceps. Appears to have two different sites of pain. Unable to reproduce R shoulder pain or n/t in BUE's this session with testing and manual at neck; previous session noted decrease n/t and sensation of \"cold fingertips\" after c/s STM manual (assessed with GH abduction in hooklying and wall angels).     Pt reporting overall decrease in R rhomboids area pain after performing walk away angels with retesting R GH abd; however R upper arm pain has remained unchanged. Assessment in this area has been limited secondary to awaiting US results. Per report, pt had US of R forearm which is negative (see obj above); however, has additional lump in R upper arm; will clarify with PCP re: clearance for addressing upper arm as is different site from ordered US.      PLAN/RECOMMENDATIONS:   Plan for treatment: therapy treatment to continue next visit.  Planned interventions for next visit: continue with current treatment   Revisit wall angels; assess R upper arm as approved by PCP.    No STM to RUE near lump per MD recommendations until cleared by UE US*       "

## 2020-10-02 NOTE — OP THERAPY DAILY TREATMENT
Outpatient Speech Therapy  DAILY TREATMENT     [facility] Speech [address].  [address]  [place] [address]  Phone:  [phone]  Fax:  [phone]    Date: 10/02/2020    Patient: Reyes Amezcua  YOB: 1955  MRN: [id number]     Time Calculation    Start time: 0800  Stop time: 0855 Time Calculation (min): 55 minutes         Chief Complaint: Poor Memory (keeping a diary like you suggested is starting to help out)    Visit #: 9    Subjective:   Reason for Therapy:     Reason For Evaluation:  Cognition, TBI, Aphasia and Speech/Language  Social Support:     Social support system: unaccompanied.    Patient Mental Status:  Responsive and Alert (Oriented x 4; good spirits)  Progress Factors:     Progression:  Getting Better  Additional Subjective Comments:      Patient reports that wife has encouraged him to begin journaling feelings in addition to daily events to assist with deficits in memory recall.       Objective:   Treatments/Interventions Performed:  Patient/Caregiver education, Home program, Cognitive-Linguistic training and Compensatory strategy training  Other Treatment Interventions:  3 digit multiplication, Deductive reasoning puzzle  Treatment Intervention tool(s) used:  Attention, problem solving for everyday relevant activities targeted through solving mutli-digit (3) multiplication presented in 3 x 3 format. Patient was accurate in solving problems with 1/5 (20%) accuracy to independent level. Patient was allowed to self-check work using calculator to increase awareness and understanding in insight. With errors identified, patient was asked to again complete the structured task, completed with 1/3 (33%) accuracy. Patient was noted to benefit from clinician completing problem to allow to identify errors and attempt self-corrections.     Complex, divided attention, working memory and complex problem solving/ reasoning targeted through completion of deductive  "reasoning puzzle with 12 possible answers. Patient provided with direct instruction in expectation in use of strategies and planning/ organizing information to assist with accuracy. Patient completed with 8/12 = 66.7% to the independent level; remaining 4 were solved with moderate verbal prompts/ instruction/ cues.         Speech Therapy Assessment:     Cognitive Linguistic Assessment:     Patient complex problem solving ability: Moderate    Cognitive-Linguistic comments: At this time, patient with verbalizations primarily related to amount of REM sleep as causal factor of difficulties in memory recall at this time. Patient was cautioned in this as only contributing factor to deficits in memory with education provided related to importance of attention related to memory recall. Patient was receptive to education; but stated that \"I really don't have any problems with my memory at this time.\" Use of structured therapy tasks to increase insight into relationship between attention-memory recommended.     Given structured deductive reasoning puzzle, was initially accurate in following strategies as outlined by SLP resulting in good accuracy; however, following initial read through, patient was then observed to begin jumping around the page and instruction/ resulting in increased confusion and more than one answer in some instances. Moderate verbal prompts/ instruction/ cues were provided by the clinician with reinforcement in use of strategies that assisted with planning, organizing and deficits in attention.         Speech Therapy Plan :   Prognosis & Recommendations  Impression Summary:  Patient continues to demonstrate marked improvement in cognitive linguistic function; although patient with need for moderate verbal cues to increase accuracy given more complex problem solving/ reasoning tasks. Patient remains open to education to increase insight into deficits and promote continued progress in accuracy and " independence.   Therapy Recommendations  Recommendation:  Individual Speech Therapy,  Planned Therapy Interventions:  Home Program, Compensatory Strategy Training, Patient/Caregiver Education, Cognitive-Linguistic training and Speech/Language training,   Plan Details:  Continue with current POC. Continue with complex problem solving addressing mathematic relevant problems, given this is tied to patient work. Continue to address complex problem solving/ reasoning with ability to self-check and self-correct to improve insight into deficits.

## 2020-10-06 ENCOUNTER — SPEECH THERAPY (OUTPATIENT)
Dept: SPEECH THERAPY | Facility: REHABILITATION | Age: 65
End: 2020-10-06
Attending: PHYSICAL MEDICINE & REHABILITATION
Payer: COMMERCIAL

## 2020-10-06 DIAGNOSIS — I69.011 MEMORY DEFICIT FOLLOWING NONTRAUMATIC SUBARACHNOID HEMORRHAGE: ICD-10-CM

## 2020-10-06 DIAGNOSIS — I69.019 COGNITIVE DEFICIT DUE TO OLD SUBARACHNOID HEMORRHAGE: ICD-10-CM

## 2020-10-06 DIAGNOSIS — I69.020 APHASIA DUE TO AND NOT CONCURRENT WITH NONTRAUMATIC SUBARACHNOID HEMORRHAGE: ICD-10-CM

## 2020-10-06 PROCEDURE — 92507 TX SP LANG VOICE COMM INDIV: CPT

## 2020-10-06 NOTE — OP THERAPY DAILY TREATMENT
"  Outpatient Speech Therapy  DAILY TREATMENT     [facility] Speech [address].  [address]  [place] [address]  Phone:  [phone]  Fax:  [phone]    Date: 10/06/2020    Patient: Reyes Amezcua  YOB: 1955  MRN: [id number]     Time Calculation    Start time: 0900  Stop time: 0955 Time Calculation (min): 55 minutes         Chief Complaint: Other (Inflexible thinking)    Visit #: 10    Subjective:   Additional Subjective Comments:      \"As some things get better, other things come to the forefront.\"       Objective:   Treatments/Interventions Performed:  Cognitive-Linguistic training, Home program, Patient/Caregiver education, Compensatory strategy training and Speech/Language treatment  Treatment Intervention tool(s) used:  Deductive reasoning puzzle; Auditory memory through structured story retell  Objective Details:  Complex, divided attention, working memory and complex problem solving/ reasoning targeted through completion of deductive reasoning puzzle with 15 possible answers. Patient initiated strategies to address planning/ organization and promote accuracy in task completion to independent level. Patient completed the structured task with 13/15 = 86.6% to the independent level (improved 19.9% from previous session with cues improved from moderate to independent). Remaining 2 responses were completed to minimal verbal prompts/ instruction/ cues.     Auditory memory addressed in combination with working memory completing tasks of mental manipulation of 4 items (recalling 2 digit numbers and placing in order they would occur). Patient was encourage in use of compensatory memory strategies to promote accuracy in task completion. Using write it down and repetition memory strategies, patient demonstrated accuracy in mental manipulation of 4 numbers with 3/5 (60%) accuracy; patient was noted to demonstrate consistent accuracy in recall of 3 numbers (5/5 trials).      " "    Speech Therapy Assessment:     Cognitive Linguistic Assessment:     Patient recent and short term memory: Minimal (3-4 units of newly learned information with use of repetition/ write it down memory strategies)    Patient complex reasoning ability: Minimal    Patient complex problem solving ability: Minimal    Cognitive-Linguistic comments: Patient provided with frequent education throughout today's session in use of strategies implemented that proved beneficial in increasing accuracy to structured tasks implemented during today's session. Patient continues to demonstrate presence of inflexible thinking \"I don't know if I ever thought that way\" for justification regarding deficits encountered versus identifying opportunities in which to implement and generalize newly learned strategies to promote accuracy in completion of structured tasks.         Speech Therapy Plan :   Prognosis & Recommendations  Impression Summary:  Patient use of compensatory strategies to assist in problem solving/ reasoning given more complex tasks was noted to result in large improvement in accuracy given deductive reasoning puzzle completed as part of today's session (20% improvement from previous session).    Patient with encouraged in use of strategies that proved beneficial during outpatient speech therapy sessions to generalize skills learned to home environment (examples provided to patient explanation of difficulty in following instructions to install shower bench at home). Examples provided on how strategies learned during outpatient speech therapy session would likely be helpful in task completion. Patient was receptive, but presence of inflexible thinking noted.   Therapy Recommendations  Recommendation:  Individual Speech Therapy,  Planned Therapy Interventions:  Home Program, Compensatory Strategy Training, Patient/Caregiver Education, Speech/Language training and Cognitive-Linguistic training,   Plan Details:  Continue with " current POC. Continue with education in use of strategies to increase self-awareness and accuracy in use of strategies to promote improved problem solving/ reasoning and generalization of skills learned to home/ community environments.

## 2020-10-06 NOTE — OP THERAPY DAILY TREATMENT
Outpatient Physical Therapy  DAILY TREATMENT     [facility] Physical Therapy [address].  [address]  [place] [address]  Phone:  [phone]  Fax:  [phone]    Date: 10/07/2020    Patient: Reyes Amezcua  YOB: 1955  MRN: [id number]     Time Calculation    Start time: 0714  Stop time: 0815 Time Calculation (min): 61 minutes         Chief Complaint: Vertigo and Loss Of Balance    Visit #: 8    SUBJECTIVE:  Pt returns to vestibular therapist to address his dizziness and imbalance. States he is feeling about 50% improved.  The dizziness has subsided substantially.  Still occurs during STS transitions, but less intense. Has been able to go on long (4 mile) hikes with his wife without issue.  Excited to report that he was able to jump/hop over a stream.     OBJECTIVE:      Therapeutic Treatments and Modalities:     1. Neuromuscular Re-education (CPT 69656)    Therapeutic Treatment and Modalities Summary:   - STS: EC standard x 10, on airex EC x 10, on airex with HT x 10  - Pivot board: standing hip IR/ER  - BOSU: step up fwd x 5 ea side, lateral up and overs (Aniya balance) x 5 ea way, standing balance with alt look over shoulder (x 4 ea way, difficult, ModA balance), standing balance with rebounder toss x 20  - Shuttle: Squat bilat 8C x 40, 5C SL x 20 ea, jumps 3C 2 x 20  - Agility ladder: 2 feet to a square (fwd and lateral), in/out weaving, 1 square 2 foot hops with SBA    Time-based treatments/modalities:    Physical Therapy Timed Treatment Charges  Neuromusc re-ed, balance, coor, post minutes (CPT 58571): 61 minutes      ASSESSMENT:   Response to treatment: Notable improvements in stability compared to last visit for balance. Resultant decreased reported perception of dizziness by 50%. Able to advance to coordination/agility tasks with good tolerance.  Noting preference to load through the L LE with high balance demands, decreased coordination through the R LE. Improves with  cuing    PLAN/RECOMMENDATIONS:   Plan for treatment: therapy treatment to continue next visit.  Planned interventions for next visit: continue with current treatment.

## 2020-10-07 ENCOUNTER — PHYSICAL THERAPY (OUTPATIENT)
Dept: PHYSICAL THERAPY | Facility: REHABILITATION | Age: 65
End: 2020-10-07
Attending: PHYSICAL MEDICINE & REHABILITATION
Payer: COMMERCIAL

## 2020-10-07 DIAGNOSIS — I60.9 SUBARACHNOID HEMORRHAGE WITH LOSS OF CONSCIOUSNESS OF LESS THAN 1 HOUR (HCC): ICD-10-CM

## 2020-10-07 PROCEDURE — 97112 NEUROMUSCULAR REEDUCATION: CPT

## 2020-10-09 ENCOUNTER — PHYSICAL THERAPY (OUTPATIENT)
Dept: PHYSICAL THERAPY | Facility: REHABILITATION | Age: 65
End: 2020-10-09
Attending: PHYSICAL MEDICINE & REHABILITATION
Payer: COMMERCIAL

## 2020-10-09 ENCOUNTER — SPEECH THERAPY (OUTPATIENT)
Dept: SPEECH THERAPY | Facility: REHABILITATION | Age: 65
End: 2020-10-09
Attending: PHYSICAL MEDICINE & REHABILITATION
Payer: COMMERCIAL

## 2020-10-09 DIAGNOSIS — I69.019 COGNITIVE DEFICIT DUE TO OLD SUBARACHNOID HEMORRHAGE: ICD-10-CM

## 2020-10-09 DIAGNOSIS — I69.020 APHASIA DUE TO AND NOT CONCURRENT WITH NONTRAUMATIC SUBARACHNOID HEMORRHAGE: ICD-10-CM

## 2020-10-09 DIAGNOSIS — I69.011 MEMORY DEFICIT FOLLOWING NONTRAUMATIC SUBARACHNOID HEMORRHAGE: ICD-10-CM

## 2020-10-09 DIAGNOSIS — I60.9 SUBARACHNOID HEMORRHAGE WITH LOSS OF CONSCIOUSNESS OF LESS THAN 1 HOUR (HCC): ICD-10-CM

## 2020-10-09 PROCEDURE — 97110 THERAPEUTIC EXERCISES: CPT

## 2020-10-09 PROCEDURE — 92507 TX SP LANG VOICE COMM INDIV: CPT

## 2020-10-09 NOTE — OP THERAPY DAILY TREATMENT
Outpatient Physical Therapy  DAILY TREATMENT     [facility] Physical [address].  [address]  [place] [address]  Phone:  [phone]  Fax:  [phone]    Date: 10/09/2020    Patient: Reyes Amezcua  YOB: 1955  MRN: [id number]     Time Calculation    Start time: 0916  Stop time: 1015 Time Calculation (min): 59 minutes         Chief Complaint: Loss Of Balance and Vertigo    Visit #: 9    SUBJECTIVE:  I worked with my brother on a house project yesterday and was very fatigued afterward. Had to go to bed early.     OBJECTIVE:      Therapeutic Treatments and Modalities:     1. Neuromuscular Re-education (CPT 71654)    Therapeutic Treatment and Modalities Summary:   - TM: 7 min total. Focusing on balance with no UE support. HTs vert and horiz.  Lateral and backward stepping-> progressing to no UE support.  - Rocker board: lateral with ball toss  - Gait with tennis ball: bounce, then upward toss, then both alternating, then hand to hand ball toss  - Gait with VORx1 x 4 laps (unstable at first, but improved to no LOB on final lap)  - Shuttle: 8C squat x 20, 3C jumps x 20   - Pivot board: standing hip IR/ER  - Agility ladder: 2 feet to a square (fwd and lateral), in/out weaving, 1 square 2 foot hops with SBA  - Heel walking, toe walking x 2 laps ea  - jogging, clinic floor x 3 laps: SBA.     Time-based treatments/modalities:    Physical Therapy Timed Treatment Charges  Therapeutic exercise minutes (CPT 97178): 60 minutes      ASSESSMENT:   Response to treatment: Steady improvements in tolerance and stability during complex tasks.  Agility ladder at slow speed was acurate. Able to advance to jogging, which will allow increased speed on ladder next visit.  Cont to be L LE dominant, R LE catching a few times during jog. Sx only reproduced during gait with VOR.     PLAN/RECOMMENDATIONS:   Plan for treatment: therapy treatment to continue next visit.  Planned interventions for next  visit: continue with current treatment.

## 2020-10-09 NOTE — OP THERAPY DAILY TREATMENT
"  Outpatient Speech Therapy  DAILY TREATMENT     [facility] [address].  [address]  [place] [address]  Phone:  [phone]  Fax:  [phone]    Date: 10/09/2020    Patient: Reyes Amezcua  YOB: 1955  MRN: [id number]     Time Calculation    Start time: 0800  Stop time: 0855 Time Calculation (min): 55 minutes         Chief Complaint: Poor Memory (\"I tend to not remember things I have been working on\")    Visit #: 11    Subjective:   Reason for Therapy:     Reason For Evaluation:  Cognition  Social Support:     Accompanied By:  Spouse (patient wife brought patient to today's therapy visit)    Patient Mental Status:  Responsive  Additional Subjective Comments:      \"I had a long discussion with Diane on the way down here and her take is the same as mine, when I'm presented with material I tend to forget it.\"       Objective:   Treatments/Interventions Performed:  Patient/Caregiver education, Compensatory strategy training, Cognitive-Linguistic training and Home program  Other Treatment Interventions:  Education in use of Association/ Visualization compensatory memory strategies; Short term and time delay memory recall to 4 units of newly learned information; Deductive reasoning puzzle  Treatment Intervention tool(s) used:  Patient was provided with education in understanding of association/ visualization compensatory memory strategies with structured practice initiated through recall of 4 words using strategy (generate simple story to connect words and create context). Patient demonstrated short term recall of 4 units of newly learned, related words with 4/4 = 100% accuracy; time delay recall to 3 minutes 4/4 = 100% accuracy; 4 minute delay 4/4 - 100% accuracy; 5 minute delay 4/4 - 100% accuracy. Using chunking memory strategy, patient demonstrated accurate recall of 12 items as part of review; 100% accuracy, independent.     Complex, divided attention addressed in " "combination with word finding difficulties through generative naming alternating naming animals/ places to visit beginning with letters of the alphabet completed with 26/26 = 100% accuracy, verbal cues provided only x 2 (7%) of total responses.    Attention, working memory, problem solving for more complex, sentence level information targeted through completion of deductive reasoning puzzle with 12 possible answers. Patient completed with 12/12 = 100% accuracy; independent.          Speech Therapy Assessment:     Written Language Assessment:     Ability to write single words (spontaneously) WFL.     Language comments: Patient continues to comment on his increased difficulty in accuracy of spelling; however, this is reported as long standing \"if its anything longer than cat I have a hard time spelling it\".     Cognitive Linguistic Assessment:     Cognitive-Linguistic comments: Understanding and use of compensatory memory strategies proved helpful in assisting patient in recall to newly learned information through use of association and chunking information strategies. Patient short term and time delay recall remains best at 4 units of newly learned information when information is related.             Speech Therapy Plan :   Prognosis & Recommendations  Impression Summary:  Patient was noted to benefit from use of compensatory memory strategies resulting in improved recall to newly learned information when information was provided using association/ chunking memory and time delayed recall, ultimately resulting in recall of 12 units of newly learned information (presented in groups of 4) following 20 minute delay, independent.     Patient is demonstrating marked improvement in completion of complex reasoning skills at this time as evidenced by performance on structured, deductive reasoning task.     Following completion of outpatient ST session, patient was seen by PT. Patient was asked to set timer for 1 hour to see " how many items he could recall with use of newly learned in strategies given considerable time delay. Following 1 hour with distraction (participation in outpatient PT session), PT reported patient was independent in recall of 12 items.   Therapy Recommendations  Recommendation:  Individual Speech Therapy,  Planned Therapy Interventions:  Patient/Caregiver Education, Compensatory Strategy Training, Cognitive-Linguistic training and Home Program,   Plan Details:  Continue with POC. Patient provided with recommendations regarding how to incorporate structured memory recall implemented during today's session to home environment to promote generalization in understanding/ use of memory strategies.

## 2020-10-13 ENCOUNTER — SPEECH THERAPY (OUTPATIENT)
Dept: SPEECH THERAPY | Facility: REHABILITATION | Age: 65
End: 2020-10-13
Attending: PHYSICAL MEDICINE & REHABILITATION
Payer: COMMERCIAL

## 2020-10-13 DIAGNOSIS — I69.019 COGNITIVE DEFICIT DUE TO OLD SUBARACHNOID HEMORRHAGE: ICD-10-CM

## 2020-10-13 DIAGNOSIS — I69.020 APHASIA DUE TO AND NOT CONCURRENT WITH NONTRAUMATIC SUBARACHNOID HEMORRHAGE: ICD-10-CM

## 2020-10-13 DIAGNOSIS — I69.011 MEMORY DEFICIT FOLLOWING NONTRAUMATIC SUBARACHNOID HEMORRHAGE: ICD-10-CM

## 2020-10-13 PROCEDURE — 92507 TX SP LANG VOICE COMM INDIV: CPT

## 2020-10-13 NOTE — OP THERAPY DAILY TREATMENT
"  Outpatient Speech Therapy  DAILY TREATMENT     [facility] Speech [address]  [address]  [place] [address]  Phone:  [phone]  Fax:  [phone]    Date: 10/13/2020    Patient: Reyes Amezcua  YOB: 1955  MRN: [id number]     Time Calculation    Start time: 0800  Stop time: 0855 Time Calculation (min): 55 minutes         Chief Complaint: Poor Memory and Aphasia (word finding)    Visit #: 12    Subjective:   Reason for Therapy:     Reason For Evaluation:  Cognition, Speech/Language and Aphasia  Social Support:     Social support system: unaccompanied.    Patient Mental Status:  Responsive  Additional Subjective Comments:      \"My head starts to fatigue so much by the end of the day.\" \"Sometimes I have a hard time naming, or thinking of the word I want to say.\"       Objective:   Other Treatment Interventions:  Education completed on compensatory memory strategy: subcategorization; planning/ organization addressed through structured activity using map-planning errands  Treatment Intervention tool(s) used:  Word finding addressed through participation in structured naming tasks implementing use of educated/ trained sub categorization compensatory naming strategy to concrete categories: patient was accurate in naming as follows: animals (18), vegetables (8), things in a kitchen (20), states (21), naming a minimum of 12 items to concrete categories in 1 minute 3/4 (75%) accuracy. Abstract categories completed with things that are green (18), things that are fast (16), things that are round (12) with patient naming a minimum of 12 items to abstract categories in 1 minute intervals 3/3 (100%) accuracy.     Reading comprehension, complex planning, organization and problem solving to 1) accurately sequencing activities to plan 8 errands; 2) determine the amount of time necessary to complete the tasks. Patient demonstrated accurate planning and problem solving to sequence daily " activities to independent level. Patient with need for direct cueing faded to independent to determine amount of time necessary to complete the tasks listed.              Speech Therapy Assessment:     Cognitive Linguistic Assessment:     Cognitive-Linguistic comments: Given complex problem solving activity, patient was noted to demonstrate increased difficulty in identifying details to independent level to accurately complete the task. Task was initially completed quickly, with patient missing the details to account for accurate driving time from one location to the next. Following clinician verbal cues to draw attention to the instructions, patient then demonstrated accuracy in task completion to independent level.         Speech Therapy Plan :   Prognosis & Recommendations  Impression Summary:  Patient demonstrated good response to initiation of sub categorization, compensatory naming strategy, to improve working memory and language generation to both concrete and abstract categories as targeted during today's session.     Patient continues to necessitate direct verbal cues to increase attention detail for accuracy problem solving given more complex problem solving tasks.   Therapy Recommendations  Recommendation: Individual Speech Therapy,  Planned Therapy Interventions:  Home Program, Patient/Caregiver Education, Compensatory Strategy Training, Cognitive-Linguistic training and Speech/Language training,   Plan Details:  Continue with current POC, continue to implement more complex problem solving tasks, incorporate and reinforce use of compensatory memory and cognitive strategies

## 2020-10-14 ENCOUNTER — PHYSICAL THERAPY (OUTPATIENT)
Dept: PHYSICAL THERAPY | Facility: REHABILITATION | Age: 65
End: 2020-10-14
Attending: PHYSICAL MEDICINE & REHABILITATION
Payer: COMMERCIAL

## 2020-10-14 DIAGNOSIS — I60.9 SUBARACHNOID HEMORRHAGE WITH LOSS OF CONSCIOUSNESS OF LESS THAN 1 HOUR (HCC): ICD-10-CM

## 2020-10-14 PROCEDURE — 97112 NEUROMUSCULAR REEDUCATION: CPT

## 2020-10-14 NOTE — OP THERAPY DAILY TREATMENT
Outpatient Physical Therapy  DAILY TREATMENT     [facility] Physical [address]  [address]  [place] [address]  Phone:  [phone]  Fax:  [phone]    Date: 10/14/2020    Patient: Reyes Amezcua  YOB: 1955  MRN: [id number]     Time Calculation    Start time: 0846  Stop time: 0947 Time Calculation (min): 61 minutes         Chief Complaint: Loss Of Balance    Visit #: 10    SUBJECTIVE:  I still get a little dizzy upon standing up.  Also complaining of odd temperature sensations in the feet and hands, occasional swelling. He will make an appt to talk with his MD about vascular concerns.     In regard to dizziness and balance, reporting 80% overall improvement.  Steadily more confident when hiking and moving around his home. That VOR exercise really confuses my head    OBJECTIVE:      Therapeutic Treatments and Modalities:     1. Neuromuscular Re-education (CPT 57436)    Therapeutic Treatment and Modalities Summary:   - Elliptical: 7 min total. Direction changes, HTs, VORx1 without UE support  - Shuttle:jumps 3C x 30 reps  - BOSU: up and overs laterally  - Stork hinge: back leg with light touch. To a cone target x 10 ea side  - Travolta: x 20 ea   - 4 square quick steps->multi direction hoping  - Fig 4 pulls (prep for jogging): mirror feedback and max verbal cues to coordinate the R LE.     Time-based treatments/modalities:    Physical Therapy Timed Treatment Charges  Neuromusc re-ed, balance, coor, post minutes (CPT 95469): 60 minutes    ASSESSMENT:   Response to treatment: Difficulty with R LE coordination, req more support with single leg tasks and unable to successfully complete fig 4 pulls on that side.     PLAN/RECOMMENDATIONS:   Plan for treatment: therapy treatment to continue next visit.  Planned interventions for next visit: continue with current treatment.

## 2020-10-16 ENCOUNTER — PHYSICAL THERAPY (OUTPATIENT)
Dept: PHYSICAL THERAPY | Facility: REHABILITATION | Age: 65
End: 2020-10-16
Attending: PHYSICAL MEDICINE & REHABILITATION
Payer: COMMERCIAL

## 2020-10-16 ENCOUNTER — SPEECH THERAPY (OUTPATIENT)
Dept: SPEECH THERAPY | Facility: REHABILITATION | Age: 65
End: 2020-10-16
Attending: PHYSICAL MEDICINE & REHABILITATION
Payer: COMMERCIAL

## 2020-10-16 DIAGNOSIS — I69.020 APHASIA DUE TO AND NOT CONCURRENT WITH NONTRAUMATIC SUBARACHNOID HEMORRHAGE: ICD-10-CM

## 2020-10-16 DIAGNOSIS — I69.011 MEMORY DEFICIT FOLLOWING NONTRAUMATIC SUBARACHNOID HEMORRHAGE: ICD-10-CM

## 2020-10-16 DIAGNOSIS — I69.019 COGNITIVE DEFICIT DUE TO OLD SUBARACHNOID HEMORRHAGE: ICD-10-CM

## 2020-10-16 DIAGNOSIS — I60.9 SUBARACHNOID HEMORRHAGE WITH LOSS OF CONSCIOUSNESS OF LESS THAN 1 HOUR (HCC): ICD-10-CM

## 2020-10-16 PROCEDURE — 97112 NEUROMUSCULAR REEDUCATION: CPT

## 2020-10-16 PROCEDURE — 92507 TX SP LANG VOICE COMM INDIV: CPT

## 2020-10-16 NOTE — OP THERAPY DAILY TREATMENT
Outpatient Physical Therapy  DAILY TREATMENT     [facility] Physical Therapy [address]  [address]  [place] [address]  Phone:  [phone]  Fax:  [phone]    Date: 10/16/2020    Patient: Reyes Amezcua  YOB: 1955  MRN: [id number]     Time Calculation    Start time: 1015  Stop time: 1116 Time Calculation (min): 61 minutes         Chief Complaint: Loss Of Balance    Visit #: 11    SUBJECTIVE:  No issues after LV. Remembered that he was jump roping prior to the accident and brought in the rope to try today.     OBJECTIVE:      Therapeutic Treatments and Modalities:     1. Neuromuscular Re-education (CPT 42141)    Therapeutic Treatment and Modalities Summary:    - TM: 7 min total. Direction changes, HTs, VORx1 without UE support  - grape vine x 3 laps  - BOSU: up and over lateral,    - Stork hinge x 2 min ea.  Able to improve to true SLS.   - Travolta: x 20 ea   - Trampoline balance: bounces, weight shifts, over the shoulder ball toss (bilat and progressing to with COG challenge. Counting down from 100 by 3)  - Jump roping progression (wrist turns unilateral-> bounce with turns -> full jumping)  Was able to make successful attempts 5x5 reps prior to missing timing.   - Near/far saccades x 4 lines.     Time-based treatments/modalities:    Physical Therapy Timed Treatment Charges  Neuromusc re-ed, balance, coor, post minutes (CPT 53980): 60 minutes    ASSESSMENT:   Response to treatment: Steady improvements in balance and dynamic coordination which is translating to improved function with higher level tasks (jump roping). Near/far saccades were very challenging, req finger to guide position and 10+ corrections. Often skipping a letter.     PLAN/RECOMMENDATIONS:   Plan for treatment: therapy treatment to continue next visit.  Planned interventions for next visit: continue with current treatment.

## 2020-10-20 ENCOUNTER — PHYSICAL THERAPY (OUTPATIENT)
Dept: PHYSICAL THERAPY | Facility: REHABILITATION | Age: 65
End: 2020-10-20
Attending: PHYSICAL MEDICINE & REHABILITATION
Payer: COMMERCIAL

## 2020-10-20 ENCOUNTER — SPEECH THERAPY (OUTPATIENT)
Dept: SPEECH THERAPY | Facility: REHABILITATION | Age: 65
End: 2020-10-20
Attending: PHYSICAL MEDICINE & REHABILITATION
Payer: COMMERCIAL

## 2020-10-20 DIAGNOSIS — I60.9 SUBARACHNOID HEMORRHAGE WITH LOSS OF CONSCIOUSNESS OF LESS THAN 1 HOUR (HCC): ICD-10-CM

## 2020-10-20 DIAGNOSIS — I69.019 COGNITIVE DEFICIT DUE TO OLD SUBARACHNOID HEMORRHAGE: ICD-10-CM

## 2020-10-20 DIAGNOSIS — I69.011 MEMORY DEFICIT FOLLOWING NONTRAUMATIC SUBARACHNOID HEMORRHAGE: ICD-10-CM

## 2020-10-20 PROCEDURE — 92507 TX SP LANG VOICE COMM INDIV: CPT

## 2020-10-20 PROCEDURE — 97140 MANUAL THERAPY 1/> REGIONS: CPT

## 2020-10-20 PROCEDURE — 97110 THERAPEUTIC EXERCISES: CPT

## 2020-10-20 NOTE — OP THERAPY DAILY TREATMENT
"  Outpatient Physical Therapy  DAILY TREATMENT     [facility] Physical [address].  [address]  [place] [address]  Phone:  [phone]  Fax:  [phone]    Date: 10/20/2020    Patient: Reyes Amezcua  YOB: 1955  MRN: [id number]     Time Calculation      930 1034 64 min         Chief Complaint: Shoulder Problem    Visit #: 12    SUBJECTIVE:  I feel like the pain has changed. I can reach my hand out to the side now with just minimal pain but the pain in the side of my shoulder (demonstrates deltoid region) has gotten worse; the most painful is reaching behind my back. I still have problems with walking and the back of my shoulder hurts. My fingertips are still cold and tingly.     Pt present with anatomy book today. Reports \"I'm trying to figure out how I have these symptoms on both sides of my body and in my face.\"        Therapeutic Exercises (CPT 21786):     1. QuickDash questionaire    2. Objective testing    14. UPOC: 11/5/20    Therapeutic Treatments and Modalities:     1. Manual Therapy (CPT 05933), see below    Therapeutic Treatment and Modalities Summary: Subscap release x 10 with pt SL; pillow b/w pt and therapist for modesty. Followed by post GH mobs gr III at R shoulder with increasing elevation (GH maintained in scap plane)//pt with increased GH IR following BTB, however, continued pain reports at same intensity    Time-based treatments/modalities:       Pain pre treatment:    ASSESSMENT:   Response to treatment: See progress note      PLAN/RECOMMENDATIONS:   Plan for treatment: therapy treatment to continue next visit.  Planned interventions for next visit: See progress note           "

## 2020-10-20 NOTE — OP THERAPY DAILY TREATMENT
"  Outpatient Speech Therapy  DAILY TREATMENT     [facility] Speech [address]  [address]  [place] [address]  Phone:  [phone]  Fax:  [phone]    Date: 10/20/2020    Patient: Reyes Amezcua  YOB: 1955  MRN: [id number]     Time Calculation    Start time: 0810  Stop time: 0900 Time Calculation (min): 50 minutes         Chief Complaint: Poor Memory    Visit #: 14    Subjective:   Reason for Therapy:     Reason For Evaluation:  Cognition (Memory recall)  Social Support:     Accompanied By:  Spouse (brought to session by wife)    Patient Mental Status:  Responsive (Oriented x 4; good spirits)  Progress Factors:     Progression:  Getting Better  Additional Subjective Comments:      \"It's still memory stuff\". Patient provided example of encountered difficulty with word finding (unable to name a nimisha) patient stating \"words are familiar, but I just can't remember them.\" Patient stated that old memories are starting to return, such as the memory of jump roping as an adult.       Objective:   Treatments/Interventions Performed:  Patient/Caregiver education, Compensatory strategy training, Home program and Cognitive-Linguistic training  Other Treatment Interventions:  Memory recall using spaced retrieval to structured time delay intervals  Treatment Intervention tool(s) used:  Working memory addressed in combination with time delayed recall recall of 4 unrelated items: patient demonstrated accuracy in recall to 3 minute delay: 1st trial 3/4 items (75%); 2nd trial 3/4 (75%); 3rd trial 4/4 (100%) accuracy; representing 83.3% accuracy; independent. Time delay recall set to 7 minutes with patient demonstrating accuracy in completion of structured practice to 12 items with 9/12 = 75% accuracy; remaining 2/3 (66%) given semantic cues.     Distraction was provided during memory recall task with patient provided with 8 activities to complete in a day. Patient was asked to sequence " the events based on places he would visit here in [place] to complete effectively based on location. Patient demonstrated appropriate planning and sequencing to the events 7/8 events = 87.5% accuracy; independent.          Speech Therapy Assessment:     Cognitive Linguistic Assessment:     Patient immediate memory: Minimal    Patient recent and short term memory: Minimal    Patient procedural memory: WFL    Patient prospective and time delay memory: Minimal    Patient biographical information memory: WFL    Cognitive-Linguistic comments: Given structured errand activity, patient was independent in accessing external memory aids (such as cell phone) to determine locations of events. Given structured time delay recall, patient was noted to demonstrate independence in initiation of external strategies such as use of repetition, tactile cues (associating items with fingers on his hand).         Speech Therapy Plan :   Prognosis & Recommendations  Impression Summary:  Patient is demonstrating continued improvement in memory recall, demonstrating improved accuracy to recall of multiple units of newly learned information, demonstrating independence in use of external memory strategies to assist in recall to independent level. Patient is also demonstrating good use of external memory aids to recall names of locations, for example, to demonstrate good planning and organization of information to complete schedule of errands related to independent activities of daily living to independent level.   Therapy Recommendations  Recommendation: Individual Speech Therapy and Other, 1) Patient was asked to follow up on referral placed for OT given progress with outpatient speech therapy related to cognitive function and complaints regarding visual scanning and fine motor coordination related to written expression   Planned Therapy Interventions:  Home Program, Patient/Caregiver Education, Compensatory Strategy Training and  Cognitive-Linguistic training,   Plan Details:  Continue with memory recall based activities.

## 2020-10-22 ENCOUNTER — PHYSICAL THERAPY (OUTPATIENT)
Dept: PHYSICAL THERAPY | Facility: REHABILITATION | Age: 65
End: 2020-10-22
Attending: PHYSICAL MEDICINE & REHABILITATION
Payer: COMMERCIAL

## 2020-10-22 ENCOUNTER — SPEECH THERAPY (OUTPATIENT)
Dept: SPEECH THERAPY | Facility: REHABILITATION | Age: 65
End: 2020-10-22
Attending: PHYSICAL MEDICINE & REHABILITATION
Payer: COMMERCIAL

## 2020-10-22 DIAGNOSIS — I69.019 COGNITIVE DEFICIT DUE TO OLD SUBARACHNOID HEMORRHAGE: ICD-10-CM

## 2020-10-22 DIAGNOSIS — I60.9 SUBARACHNOID HEMORRHAGE WITH LOSS OF CONSCIOUSNESS OF LESS THAN 1 HOUR (HCC): ICD-10-CM

## 2020-10-22 DIAGNOSIS — I69.011 MEMORY DEFICIT FOLLOWING NONTRAUMATIC SUBARACHNOID HEMORRHAGE: ICD-10-CM

## 2020-10-22 PROCEDURE — 92507 TX SP LANG VOICE COMM INDIV: CPT

## 2020-10-22 PROCEDURE — 97140 MANUAL THERAPY 1/> REGIONS: CPT

## 2020-10-22 PROCEDURE — 97110 THERAPEUTIC EXERCISES: CPT

## 2020-10-22 NOTE — OP THERAPY PROGRESS SUMMARY
"  Outpatient Physical Therapy  PROGRESS SUMMARY NOTE      [facility] Physical Therapy [place] [address]  [address]  [place] [address]  Phone:  [phone]  Fax:  [phone]    Date of Visit: 10/20/2020    Patient: Reyes Amezcua  YOB: 1955  MRN: [id number]     Referring Provider: Anne Vergara D.O.  [address] [place] [address]   Referring Diagnosis Traumatic subarachnoid hemorrhage with loss of consciousness of unspecified duration, initial encounter [S06.6X9A]     Visit Diagnoses     ICD-10-CM   1. Subarachnoid hemorrhage with loss of consciousness of less than 1 hour ([facility])  I60.9       Rehab Potential: fair    Progress Report Period: 9/9/20-10/20/20    Functional Assessment Used          Objective Findings and Assessment:   Patient progression towards goals: Pt has been seen for 5 visits of skilled physical therapy to address R shoulder specifically. Pt now demonstrating full GH abduction (previous complaint) with minimal pain complaints; however, new reports of pain with mobilizing shoulder behind the back with increasing lateral shoulder pain. Pt continuing to present with poor postural awareness despite repeated cuing in clinic, poor scapular and GH dissociation, and posterior GH tightness noted in session today. Pt may benefit from continued sessions with emphasis on R shoulder to continue strengthening, improving pain-free AROM, and postural awareness re-education to return to ADL's and prevent future injuries.    Of note, per most recent vestibular PT note, 10/14/20, \"In regard to dizziness and balance, reporting 80% overall improvement.  Please see daily note from 10/14/20 for additional details.    Pt reporting cold limbs and tingling in BUE's and LUE's; previously advised by both PT's to consult with PCP re: complaints.    Objective findings and assessment details: Pain:  Current: 0/10  With activity at worst: 7/10  Aggravating:  Location:   R Deltoid (reaching " "behind the back)  R rhomboids (Walking)    Relieving: Resting shoulder    Neck AROM:  Flexion: 1 in from chest *pain at R neck  Extension: limited *pain at R neck  Rotation: WFL painless  SB L: limited *Pain in R neck  (Neck AROM does not reproduce common complaints per pt)     Shoulder AROM:     Special tests:  Spurlings test: Negative; pain with set up into extension and R SB (pain at lateral R neck)    Sandra: No increase in sensation of n/t in UE's (pt presenting with n/t and cold fingertips at initiation of therapy)    GH AROM:     Flexion: R: 130 deg (L: 141 deg)  Abduction: R:156 deg (L: 160 deg)   GH IR: L(at 0 deg)  (arm at side)  GH ER\" L: 70 deg; R 65 deg    GH IR BTB: L: T2; R: Sacrum     Limbs cool to touch (BUE's and LLE's)    GH isometrics:   All WFL and painless today        Goals:   Short Term Goals:   Goals:   Short Term Goals:   1. Pt will be independent with written HEP. (Met)  2. Pt will complete QuickDash questionnaire.  (Met)  3. Pt will demonstrate postural correction with min cuing in clinic. (ongoing)  Short term goal time span:  2-4 weeks    Short term goal time span:  2-4 weeks      Long Term Goals:    Long Term Goals:  (Ongoing goals)  1. Pt will be independent with written HEP.  2. Pt will have a significant increase in QuickDash score.  3. Pt will demonstrate postural correction without cuing required.  4. Pt will demonstrate painfree R GH AROM within 10 deg (or less) of uninvolved UE in order to perform ADL's.  5. All GH testing will be strong and pain-free in order for pt to improve QOL and perform ADL's.     Long term goal time span:  6-8 weeks    Long term goal time span:  6-8 weeks    Plan:   Planned therapy interventions:  Manual Therapy (CPT 88343), E Stim Unattended (CPT 74073), Neuromuscular Re-education (CPT 11973), Mechanical Traction (CPT 99272), Therapeutic Exercise (CPT 64621) and Therapeutic Activities (CPT 35616)  Frequency: 1-2x/wk.  Duration in visits:  5  Plan details: "  UPOC: 11/27/20      Referring provider co-signature:  I have reviewed this plan of care and my co-signature certifies the need for services.     Certification Period: 10/20/2020 to 11/27/20    Physician Signature: ________________________________ Date: ______________

## 2020-10-22 NOTE — OP THERAPY DAILY TREATMENT
"  Outpatient Speech Therapy  DAILY TREATMENT     [facility] [address].  [address]  [place] [address]  Phone:  [phone]  Fax:  [phone]    Date: 10/22/2020    Patient: Reyes Amezcua  YOB: 1955  MRN: [id number]     Time Calculation    Start time: 0800  Stop time: 0855 Time Calculation (min): 55 minutes         Chief Complaint: Poor Memory    Visit #: 15    Subjective:   Reason for Therapy:     Reason For Evaluation:  Cognition  Social Support:     Accompanied By:  Spouse (patient wife brought patient to today's visit)    Patient Mental Status:  Responsive (Oriented x 4; good spirits)  Additional Subjective Comments:      \"I went for a walk with Kerry, and I saw a statue and couldn't remember the name, but later in the day, I remembered it was a nimisha.\"       Objective:   Treatments/Interventions Performed:  Patient/Caregiver education, Compensatory strategy training, Home program and Cognitive-Linguistic training  Other Treatment Interventions:  Internal and External memory stratgies  Treatment Intervention tool(s) used:  Memory recall addressed to use of internal and external memory strategies to recall 12 items.  Determining logical solutions related to determining single words to categories.   Complex problem solving for independent language related activities of daily living targeted through completion of structured, sentence level problem solving to concepts of time and money.   Objective Details:  Patient independent in implementation of external strategies including: write it down, association/ chunking like items for accuracy in short term recall: 12/12 - 100% accuracy. Time delay recall to 15 minutes with distraction completed with 11/12 = 91.6% accuracy; independent. Second trial:  12/12 items recalled to short term time delay independent, 100% accuracy. Time delay recall again to 15 minute delay with distraction 10/12 = 83.3% accuracy; independent. "     Determining logical solutions based on letter placement in words to determine word given concrete category cues completed with 67.9% accuracy, independent. Minimal-moderate verbal cues provided through semantic or syntactic cues, improving accuracy to 25/28 = 89.3% accuracy.     Complex problem solving to concepts of time completed with: 9/10 = 90% accuracy; independent. Money: sent home to review and will report next session.              Speech Therapy Assessment:     Cognitive Linguistic Assessment:     Patient immediate memory: Minimal and Supervision Required    Patient recent and short term memory: WFL (with use of external, internal compensatory memory strategies)    Patient prospective and time delay memory: Minimal (91.6% average based on recall to 12 items targeted x 2 time delay)    Patient complex reasoning ability: Minimal    Patient complex problem solving ability: Minimal    Cognitive-Linguistic comments: Given structured memory recall task, patient was accurate in independently implementing internal (association/ chunking) and external strategies including write it down to organize items to form smaller groups completing initial short term recall to 12/12 - 100% accuracy; independent.  Use of strategy was helpful in increasing accuracy in time delay recall to 15 minute intervals x 2.         Speech Therapy Plan :   Prognosis & Recommendations  Impression Summary:  Patient is demonstrating improvement in cognitive linguistic function as evidenced by independent implementation and use of internal and external strategies to increase accuracy in memory recall. Using strategies, patient demonstrated a 16.6% improvement in time delay recall with distraction from previous session.   Therapy Recommendations  Recommendation:  Individual Speech Therapy,  Planned Therapy Interventions:  Home Program, Patient/Caregiver Education, Compensatory Strategy Training and Cognitive-Linguistic training,   Plan  Details:  Continue with POC, continue with higher level problem solving/ reasoning skills.

## 2020-10-26 NOTE — OP THERAPY DAILY TREATMENT
Outpatient Physical Therapy  DAILY TREATMENT     [facility] [place] [address] [address]  [address]  [place] [address]  Phone:  [phone]  Fax:  [phone]    Date: 10/27/2020    Patient: Reyes Amezcua  YOB: 1955  MRN: [id number]     Time Calculation    Start time: 0919  Stop time: 1015 Time Calculation (min): 56 minutes         Chief Complaint: Loss Of Balance    Visit #: 14    SUBJECTIVE:  Pt describes good progress in regard to dizziness and balance issues.  He continues to wake up feeling 'off', but now described as mild compared to spinning sensation at onset and clears within 30-60 mins. He has improved to hiking various terrain without issue and no recent falls. Reports resolution of dizziness that was occurring with fwd bending. Pt continues to report dizziness with quick STS transitions (charateristic of orthostatic hypotension) and wishing he could ride his bike (but won't be released for this until 1 yr post TBI). Otherwise, describes no functional impairments related to these issues.     OBJECTIVE:  Dizziness Handicap Inventory - Physical Items Score: 0  Dizziness Handicap Inventory - Emotional Items Score: 4  Dizziness Handicap Inventory - Functional Items Score: 4  Dizziness Handicap Inventory - Total Score: 8    MCTSIB and LOS WNL  FGA= 30/30  VOMS= 0 points    Therapeutic Treatments and Modalities:     1. Neuromuscular Re-education (CPT 34418)    Therapeutic Treatment and Modalities Summary:    - Reassessment of objective measures as above.   - Tandem walking fwd and back x 2 laps  - grapevine x 2 laps with speed  - Gait with 3 way HTs  - Narrow gait with VORx1    - Discussed ongoing HEP for balance/VRT and importance of continuing dual tasking to best prep for high level tasks.     Time-based treatments/modalities:    Physical Therapy Timed Treatment Charges  Neuromusc re-ed, balance, coor, post minutes (CPT 55801): 56 minutes    ASSESSMENT:   Response to  treatment: Mr. Amezcua has made good progress in regard to balance/dizziness complaints.  His balance measures are WNL and dizziness primarily perceptible with high level sports related activities and dual tasking.  He is indep with the HEP for this and shows low need for skilled guidance.  Treatment for the R shoulder is of greater importance at this time and he will continue with his primary PT for this.     PLAN/RECOMMENDATIONS:   Plan for treatment: therapy treatment to continue next visit.  Planned interventions for next visit: continue with current treatment.

## 2020-10-27 ENCOUNTER — SPEECH THERAPY (OUTPATIENT)
Dept: SPEECH THERAPY | Facility: REHABILITATION | Age: 65
End: 2020-10-27
Attending: PHYSICAL MEDICINE & REHABILITATION
Payer: COMMERCIAL

## 2020-10-27 ENCOUNTER — PHYSICAL THERAPY (OUTPATIENT)
Dept: PHYSICAL THERAPY | Facility: REHABILITATION | Age: 65
End: 2020-10-27
Attending: PHYSICAL MEDICINE & REHABILITATION
Payer: COMMERCIAL

## 2020-10-27 DIAGNOSIS — I60.9 SUBARACHNOID HEMORRHAGE WITH LOSS OF CONSCIOUSNESS OF LESS THAN 1 HOUR (HCC): ICD-10-CM

## 2020-10-27 DIAGNOSIS — I69.019 COGNITIVE DEFICIT DUE TO OLD SUBARACHNOID HEMORRHAGE: ICD-10-CM

## 2020-10-27 DIAGNOSIS — I69.011 MEMORY DEFICIT FOLLOWING NONTRAUMATIC SUBARACHNOID HEMORRHAGE: ICD-10-CM

## 2020-10-27 DIAGNOSIS — I69.020 APHASIA DUE TO AND NOT CONCURRENT WITH NONTRAUMATIC SUBARACHNOID HEMORRHAGE: ICD-10-CM

## 2020-10-27 PROCEDURE — 92507 TX SP LANG VOICE COMM INDIV: CPT

## 2020-10-27 PROCEDURE — 97112 NEUROMUSCULAR REEDUCATION: CPT

## 2020-10-27 NOTE — OP THERAPY DAILY TREATMENT
"  Outpatient Speech Therapy  DAILY TREATMENT     [facility] [address].  [address]  [place] [address]  Phone:  [phone]  Fax:  [phone]    Date: 10/27/2020    Patient: Reyes Amezcua  YOB: 1955  MRN: [id number]     Time Calculation    Start time: 0800  Stop time: 0855 Time Calculation (min): 55 minutes         Chief Complaint: Poor Memory (\"I'm still having memory difficulties, trying to remember names\")    Visit #: 16    Subjective:   Reason for Therapy:     Reason For Evaluation:  Cognition, Aphasia, TBI and Speech/Language  Social Support:     Social support system: wife, Kerry, brought patient to today's therapy visit.    Patient Mental Status:  Responsive (Oriented x 4; good spirits)  Progress Factors:     Progression:  Staying the same  Additional Subjective Comments:      \"I have a hard time connecting past memories with recent memories.\"       Objective:   Treatments/Interventions Performed:  Patient/Caregiver education, Home program, Cognitive-Linguistic training, Speech/Language treatment and Compensatory strategy training  Other Treatment Interventions:  Internal/ external memory strategies  Treatment Intervention tool(s) used:  Use of internal memory strategies specific to working memory targeted with understanding in use of association/ linking and visualization internal memory strategies reviewed. Patient was provided with 15 items and asked to recall following 10 minute delay with distraction.   Reading comprehension/ working memory and short term memory recall addressed through paragraph level comprehension with patient asked to identify comprehension as true, false, and unknown.   Complex, divided attention, working memory, complex problem solving and reasoning addressed through completion of structured, deductive reasoning puzzle to field of 20.   Auditory memory addressed through solving sentence level word problems related to money. " "  Objective Details:  Recall of 15 items following 10 minute delay with distraction completed with 15/15 = 100% accuracy. 15 items following 30 minute delay with distraction 12/15 = 80% with min verbal (category cues) increasing accuracy to 100%.   Inference based on paragraph comprehension completed with: 1st trial 3/5 (60%) accuracy; 2nd trial 5/5 (100%) accuracy representing 80% accuracy overall. Patient noted to not use any external cognitive strategies given complex task, with patient provided with education regarding how use of strategies would likely be helpful in improving accuracy.   Deductive reasoning puzzle to field of 20 completed with 20/20 = 100% accuracy; independent.   Auditory memory recall for working/ short term recall 100% accuracy; Solving complex word problems related to money 9/10 = 90% accuracy; independent.          Speech Therapy Assessment:     Cognitive Linguistic Assessment:     Patient attention divided: WFL    Patient immediate memory: Supervision Required    Patient prospective and time delay memory: Minimal    Patient complex reasoning ability: WFL and Supervision Required    Patient complex problem solving ability: Minimal and Supervision Required (Deductive reasoning puzzle to field of 20 completed with 100% accuracy; independent)    Cognitive-Linguistic comments: Patient reports that with use of daily journal he is now connecting memories to notes he writes down. Patient also reports that \"if I sit on something and work through it a couple of times, then I know I remember it.\" Patient notes that repetitive practice has been most beneficial to memory recall at this time.     Memory strategies to address deficits in working memory as educated during previous session \"I'm must be working on visualization because I am opening the cupboards and looking the garage and remembering what is in them.\"         Speech Therapy Plan :   Prognosis & Recommendations  Impression Summary:  Patient " is now demonstrating improvement in processing speed, suggesting overall improvement in working memory with use of internal and external cognitive strategies.   Therapy Recommendations  Recommendation:  Individual Speech Therapy,  Planned Therapy Interventions:  Home Program, Patient/Caregiver Education, Compensatory Strategy Training and Cognitive-Linguistic training,

## 2020-10-29 ENCOUNTER — PHYSICAL THERAPY (OUTPATIENT)
Dept: PHYSICAL THERAPY | Facility: REHABILITATION | Age: 65
End: 2020-10-29
Attending: PHYSICAL MEDICINE & REHABILITATION
Payer: COMMERCIAL

## 2020-10-29 ENCOUNTER — SPEECH THERAPY (OUTPATIENT)
Dept: SPEECH THERAPY | Facility: REHABILITATION | Age: 65
End: 2020-10-29
Attending: PHYSICAL MEDICINE & REHABILITATION
Payer: COMMERCIAL

## 2020-10-29 ENCOUNTER — OFFICE VISIT (OUTPATIENT)
Dept: MEDICAL GROUP | Facility: MEDICAL CENTER | Age: 65
End: 2020-10-29
Payer: COMMERCIAL

## 2020-10-29 VITALS
SYSTOLIC BLOOD PRESSURE: 102 MMHG | RESPIRATION RATE: 16 BRPM | WEIGHT: 163 LBS | BODY MASS INDEX: 20.92 KG/M2 | DIASTOLIC BLOOD PRESSURE: 62 MMHG | HEIGHT: 74 IN | TEMPERATURE: 97.1 F | HEART RATE: 60 BPM | OXYGEN SATURATION: 99 %

## 2020-10-29 DIAGNOSIS — R20.9 COLD EXTREMITIES: ICD-10-CM

## 2020-10-29 DIAGNOSIS — I69.011 MEMORY DEFICIT FOLLOWING NONTRAUMATIC SUBARACHNOID HEMORRHAGE: ICD-10-CM

## 2020-10-29 DIAGNOSIS — I69.019 COGNITIVE DEFICIT DUE TO OLD SUBARACHNOID HEMORRHAGE: ICD-10-CM

## 2020-10-29 DIAGNOSIS — I60.9 SUBARACHNOID HEMORRHAGE WITH LOSS OF CONSCIOUSNESS OF LESS THAN 1 HOUR (HCC): ICD-10-CM

## 2020-10-29 PROCEDURE — 99214 OFFICE O/P EST MOD 30 MIN: CPT | Performed by: FAMILY MEDICINE

## 2020-10-29 PROCEDURE — 92507 TX SP LANG VOICE COMM INDIV: CPT | Performed by: SPEECH-LANGUAGE PATHOLOGIST

## 2020-10-29 PROCEDURE — 97110 THERAPEUTIC EXERCISES: CPT

## 2020-10-29 PROCEDURE — 97140 MANUAL THERAPY 1/> REGIONS: CPT

## 2020-10-29 ASSESSMENT — FIBROSIS 4 INDEX: FIB4 SCORE: .987654320987654321

## 2020-10-29 NOTE — OP THERAPY DAILY TREATMENT
"  Outpatient Speech Therapy  DAILY TREATMENT     [facility] [address].  [address]  [place] [address]  Phone:  [phone]  Fax:  [phone]    Date: 10/29/2020    Patient: Reyes Amezcua  YOB: 1955  MRN: [id number]     Time Calculation    Start time: 0800              Chief Complaint: Poor Memory    Visit #: 17    Subjective:   Reason for Therapy:     Reason For Evaluation:  Cognition and Speech/Language  Social Support:     Accompanied By:  Spouse  Additional Subjective Comments:      Patient reported that he continues to have a \"hard time holding on to\" information. Patient was accompanied by his wife for today's session. Patient alert and engaged for he duration of the session.       Objective:   Treatments/Interventions Performed:  Cognitive-Linguistic training, Speech/Language treatment and Home program  Treatment Intervention tool(s) used:  SLP provided patient with tasks to target auditory attention and working memory by listening to mock voicemails, writing down key information and answering comprehension questions. SLP also provided patient with a deduction puzzle task to target reasoning and attention.   Objective Details:  Patient required each voicemail to be repeated X1-4 to write down the key details. Patient answered 15/20 (75%) questions from the voicemail with one repetition and 20/20 (100%) with 2-4 repetitions. Patient also completed a deduction puzzle (FO20) with 60% accuracy independently and increased to 100% accuracy with moderate verbal cues.          Speech Therapy Assessment:     Receptive Language Assessment:     Patient understands simple paragraph: Supervision Required    Receptive language comments: Patient was able to infer meaning from auditory information with 100% accuracy. Errors were noted with auditory memory, rather than comprehension.      Cognitive Linguistic Assessment:     Patient attention sustained: Minimal    Patient " immediate memory: Supervision Required    Patient recent and short term memory: Minimal    Patient complex reasoning ability: Moderate    Cognitive-Linguistic comments: Patient demonstrated difficulty with auditory attention to determine the key details from lengthy auditory information.         Speech Therapy Plan :   Plan Details:  SLP recs continued tx targeting auditory attention and memory. SLP showed patient the [employer] candi to download for additional practice at home.

## 2020-10-29 NOTE — PROGRESS NOTES
"CC: Cold extremities, dizziness    HPI:   Reyes presents today because he has been having cold hands and feet.  Denies any fingers or toes pain.  He has a normal circulation lower extremities .  Denies any tenderness or change in the color of his fingers and toes.    Patient also experiencing dizziness that is mainly when standing up.  Usually goes away without a problem.  No nausea or vomiting.  Sometimes it happens when he moves his head excessively.    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 30 Tab 0     No current facility-administered medications for this visit.          Allergies as of 10/29/2020   • (No Known Allergies)        ROS: Denies any chest pain, Shortness of breath, Changes bowel or bladder, Lower extremity edema.    Physical Exam:  /62 (BP Location: Right arm, Patient Position: Sitting, BP Cuff Size: Adult)   Pulse 60   Temp 36.2 °C (97.1 °F) (Temporal)   Resp 16   Ht 1.88 m (6' 2\") Comment: patient stated  Wt 73.9 kg (163 lb)   SpO2 99%   BMI 20.93 kg/m²   Gen.: Well-developed, well-nourished, no apparent distress,pleasant and cooperative with the examination  Skin:  Warm and dry with good turgor.  Skin over the fingers and toes is cold but has normal color  Neck: Trachea " midline,no masses or adenopathy. No JVD.  Cor: Regular rate and rhythm without murmur, gallop or rub.  Lungs: Respirations unlabored.Clear to auscultation with equal breath sounds bilaterally. No wheezes, rhonchi.  Extremities: No changes in the color of fingers and toes, but the cold.  Normal peripheral pulses          Assessment and Plan.   64 y.o. male     1. Cold extremities  Possible mild Raynaud's phenomenon  For mild forms of Raynaud’s , covering exposed skin before leaving the house can help. If an attack occurs, soaking the affected parts in warm, not hot, water can alleviate symptoms and prevent them from worsening.  If stress is a factor, learning to manage stress can help.    2.  Dizziness  Blood pressure checked while :  Layin/70  Sitting :118/70  Standing 126/84  possibly benign positional vertigo.    Patient advised to move slowly, and changes positions slowly.  Avoid moving his head without his body.  Meclizine as needed.  Return to clinic if the condition gets worse.

## 2020-10-29 NOTE — OP THERAPY DAILY TREATMENT
Outpatient Physical Therapy  DAILY TREATMENT     [facility] [address].  [address]  [place] [address]  Phone:  [phone]  Fax:  [phone]    Date: 10/29/2020    Patient: Reyes Amezcua  YOB: 1955  MRN: [id number]     Time Calculation    Start time: 0902  957 55 min         Chief Complaint: Shoulder Problem    Visit #: 15    SUBJECTIVE:  My shoulder has been hurting behind the back; it's about the same. Pt present with wife, Kerry.     Objective:  Pre treatment:  GH IR BTB AROM: Sacrum 7/10 pain    GH IR BTB AROM:  After thoracic mobilization treatment: T7 with 6/10 pain; compensating with lumbar extension however, improved GH and scapular dissociation compared to last visit without significant anterior tilting of scapula observed  After STM to R UE: T7 with 3/10 pain    Therapeutic Exercises (CPT 94329):     1. UBE, x4 min; level 2 resist , warm up    2. Sleeper stretch in SL, 3x30 sec, added to hep    3. Triceps stretch, 5l50igs, added to hep; edu to perform in supine for gravity assistance    4. Pt education, posture education review    5. FR sequence, thoracic extension over FR, mid, low scapular and high scapular mobilization 10x ea; reporting discomfort in R shoulder, improved with adduction    6. Pt education, towel roll at l/s for incr upright posture, edu to support UE's on pillow while looking at phone for posture correction tips    14. UPOC: 11/5/20      Therapeutic Exercise Summary: Pt performed these exercises with instruction and SPV.  Provided handout with these exercises for daily HEP.      Therapeutic Treatments and Modalities:     1. Manual Therapy (CPT 10587), see below    Therapeutic Treatment and Modalities Summary: STM to R pec; GH maintained in scapular plane GH abd 90 and 90 deg flexion  Then IASTM to R triceps muscle (2 taut areas TTP)//improved comfort with BTB     Time-based treatments/modalities:    Physical Therapy Timed Treatment  Charges  Manual therapy minutes (CPT 91652): 15 minutes  Therapeutic exercise minutes (CPT 27981): 35 minutes  Pain pre treatment:    ASSESSMENT:   Response to treatment:   Improved scapular and GH dissociation with decreased anterior scap tilting observed with GH BTB following thoracic mobilization during session today; further improvements with pain after IASTM to taut bands noted in R triceps muscles. Updated hep with t/s mobilization and edu to continue stretching at home. Continued poor posture awareness, edu to pt and family re: importance of correction.    Pt has follow up with Dr. Blanco today re: c/o cold limbs.    PLAN/RECOMMENDATIONS:   Plan for treatment: therapy treatment to continue next visit.  Planned interventions for next visit: Review t/s mobilization and manual to RUE, reassess GH AROM BTB. Review visit with PCP.

## 2020-11-03 ENCOUNTER — SPEECH THERAPY (OUTPATIENT)
Dept: SPEECH THERAPY | Facility: REHABILITATION | Age: 65
End: 2020-11-03
Attending: PHYSICAL MEDICINE & REHABILITATION
Payer: COMMERCIAL

## 2020-11-03 ENCOUNTER — PHYSICAL THERAPY (OUTPATIENT)
Dept: PHYSICAL THERAPY | Facility: REHABILITATION | Age: 65
End: 2020-11-03
Attending: PHYSICAL MEDICINE & REHABILITATION
Payer: COMMERCIAL

## 2020-11-03 DIAGNOSIS — I69.011 MEMORY DEFICIT FOLLOWING NONTRAUMATIC SUBARACHNOID HEMORRHAGE: ICD-10-CM

## 2020-11-03 DIAGNOSIS — I69.019 COGNITIVE DEFICIT DUE TO OLD SUBARACHNOID HEMORRHAGE: ICD-10-CM

## 2020-11-03 DIAGNOSIS — I60.9 SUBARACHNOID HEMORRHAGE WITH LOSS OF CONSCIOUSNESS OF LESS THAN 1 HOUR (HCC): ICD-10-CM

## 2020-11-03 PROCEDURE — 97110 THERAPEUTIC EXERCISES: CPT

## 2020-11-03 PROCEDURE — 92507 TX SP LANG VOICE COMM INDIV: CPT | Performed by: SPEECH-LANGUAGE PATHOLOGIST

## 2020-11-03 PROCEDURE — 97012 MECHANICAL TRACTION THERAPY: CPT

## 2020-11-03 PROCEDURE — 97140 MANUAL THERAPY 1/> REGIONS: CPT

## 2020-11-03 NOTE — OP THERAPY DAILY TREATMENT
"  Outpatient Speech Therapy  DAILY TREATMENT     [facility] [address]  [address]  [place] [address]  Phone:  [phone]  Fax:  [phone]    Date: 11/03/2020    Patient: Reyes Amezcua  YOB: 1955  MRN: [id number]     Time Calculation    Start time: 0800  Stop time: 0856 Time Calculation (min): 56 minutes         Chief Complaint: Poor Memory    Visit #: 18    Subjective:   Additional Subjective Comments:      Patient stated that he would like to continue to work on memory, \"There are still al lot of things that I can't remember. I write things down that I have done that day.\" Patient was very engaged in therapy today. He was also more talkative with SLP, conversing about a town they have both previously lived.       Objective:   Treatment Intervention tool(s) used:  SLP provided patient with tasks to target memory for delayed recall of a picture scene, following a 30 minute delay.     SLP also provided patient with tasks to target immediate recall for designs by having patient recall a simple design to draw once stimuli was removed.     SLP also provided patient with tasks to target executive functioning/planning by having patient organize a list of tasks for the day into 8 steps.   Objective Details:  Patient studied a picture scene for approximately one minute and used the Elaboration strategy to describe details for later recall. Patient recalled details with 80% accuracy when stimuli was immediately removed. Patient was shown the picture again to correct the missed items. He later recalled the same details with 90% accuracy, following a 30 minute delay.     Patient was given line drawing designs of up to 15 lines. He studied each design for approximately 20 seconds and was able to recreate with 80% accuracy.     Patient completed the executive functioning task with 10% accuracy independently. The task required him to plan steps for the day and he perseverated " on attempting to do 3 of the steps in a 15 minute window because they were near each other on the map. Once SLP prompted patient that he was only allowed to put one item in each 15-minute box, he was able to complete the task with 80% accuracy with minimal cues, increasing to 100% accuracy with moderate cues.              Speech Therapy Assessment:     Cognitive Linguistic Assessment:     Patient attention sustained: WFL    Patient immediate memory: Supervision Required    Patient recent and short term memory: Supervision Required    Patient executive functioning ability: Moderate    Patient decision making and planning ability: Moderate    Cognitive-Linguistic comments: Patient required minimal verbal cues to use memory strategies. He is very aware of memory deficits and strategies he has been given in therapy sessions. Patient required extended time and moderate verbal cues to complete higher level planning tasks. He was also noted to have difficulty redirecting his thought process once a mistake was made.         Speech Therapy Plan :   Prognosis & Recommendations  Impression Summary:  Patient continues to make progress each week. He is demonstrating improved memory. However, he continue to have significant deficits in the area of executive functioning.   Plan Comments  Additional comments:  Continue ST to target higher level cognitive tasks for patient to return to independent PLOF.

## 2020-11-03 NOTE — OP THERAPY DAILY TREATMENT
Outpatient Physical Therapy  DAILY TREATMENT     [facility] Physical [address].  [address]  [place] [address]  Phone:  [phone]  Fax:  [phone]    Date: 11/03/2020    Patient: Reyes Amezcua  YOB: 1955  MRN: [id number]     Time Calculation    Start time: 0901  944 43 min         Chief Complaint: Shoulder Problem    Visit #: 16    SUBJECTIVE:  My shoulder is actually worse today. Going behind the back is still the worst movement. The back of my shoulder hurts when I try to reach behind the back. I do get pain in my shoulder blade when I'm walking with my wife.    Objective:  Pre treatment:  GH IR BTB AROM: Sacrum 7/10 pain    GH IR BTB AROM: T5 before pain 5/10 at end range following manual to cervical    GH IR BTB AROM: T4 before pain 3-4/10 at end range after cervical mechanical traction        Therapeutic Exercises (CPT 91926):     1. UBE, x4 min; level 2 resist , warm up    2. Verbal review of hep    14. UPOC: 11/27/20      Therapeutic Exercise Summary: Pt performed these exercises with instruction and SPV.  Provided handout with these exercises for daily HEP.      Therapeutic Treatments and Modalities:     1. Manual Therapy (CPT 47566), see below, x14 min    2. Mechanical Traction (CPT 18575), c/s traction 14/8# with mhp x15 min, slight soreness at lateral R neck; improved GH IR BTB (see obj above)    Therapeutic Treatment and Modalities Summary: STM to B c/s paraspinals and suboccipitals with slight traction followed by PA's to C2-C6 grade III//improved GH AROM BTB with decreased pain following txt          Time-based treatments/modalities:    Physical Therapy Timed Treatment Charges  Manual therapy minutes (CPT 15522): 14 minutes  Therapeutic exercise minutes (CPT 64641): 14 minutes         ASSESSMENT:   Response to treatment:   Despite improvements made within session last visit following thoracic mobilizations, pt reporting min relief x 1 day with no long  "lasting improvement. Presenting to therapy today and reporting \"the shoulder feels terrible.\"     Increased emphasis on cervical as pt has previously complained of lateral neck pain; however, previous screening exams negative for reproduction of shoulder pain so not primary focus in past. Pt responding well following manual to cervical spine with significant increase in painless GH AROM BTB.  May benefit from continued focus on cervical spine if lasting improvements.    Of note, pt had visit with Dr. Blanco, with cold limbs thought to be attributed to mild Raynaud's phenomenon per MD.     PLAN/RECOMMENDATIONS:   Plan for treatment: therapy treatment to continue next visit.  Planned interventions for next visit: Reassess AROM, continue c/s treatment, indep c/s traction unit for home if improvement         "

## 2020-11-05 ENCOUNTER — APPOINTMENT (OUTPATIENT)
Dept: PHYSICAL THERAPY | Facility: REHABILITATION | Age: 65
End: 2020-11-05
Attending: PHYSICAL MEDICINE & REHABILITATION
Payer: COMMERCIAL

## 2020-11-05 ENCOUNTER — SPEECH THERAPY (OUTPATIENT)
Dept: SPEECH THERAPY | Facility: REHABILITATION | Age: 65
End: 2020-11-05
Attending: PHYSICAL MEDICINE & REHABILITATION
Payer: COMMERCIAL

## 2020-11-05 DIAGNOSIS — I69.011 MEMORY DEFICIT FOLLOWING NONTRAUMATIC SUBARACHNOID HEMORRHAGE: ICD-10-CM

## 2020-11-05 DIAGNOSIS — I69.019 COGNITIVE DEFICIT DUE TO OLD SUBARACHNOID HEMORRHAGE: ICD-10-CM

## 2020-11-05 PROCEDURE — 92507 TX SP LANG VOICE COMM INDIV: CPT

## 2020-11-05 NOTE — OP THERAPY DAILY TREATMENT
"  Outpatient Speech Therapy  DAILY TREATMENT     [facility] Speech [address].  [address]  [place] [address]  Phone:  [phone]  Fax:  [phone]    Date: 11/05/2020    Patient: Reyes Amezcua  YOB: 1955  MRN: [id number]     Time Calculation    Start time: 0800  Stop time: 0900 Time Calculation (min): 60 minutes         Chief Complaint: Poor Memory    Visit #: 19    Subjective:   Additional Subjective Comments:      \"I've been working with that application that you recommended ([employer]) and its been going very well.\"       Objective:   Treatments/Interventions Performed:  Cognitive-Linguistic training, Home program, Patient/Caregiver education and Compensatory strategy training  Other Treatment Interventions:  Administration of the [place]'s Cognitive Examination - ACE III  Objective Details:  ACE III score:  Total score 87/100    Attention: 18/18 (100%)  Memory: 20/26 (76.9%)  Fluency: 10/14 (71.4%)  Language: 24/26 (92.3%)  Visuospatial 15/16 (93.75%)    Results of the ACE III suggest that patient has demonstrated good improvement in attention since beginning outpatient speech therapy services. Patient has also seen improvement in language, which has resulted in improved accuracy and independence in communication; although patient does state that \"my wife frequently tells me I used the wrong word\" during novel, conversational speech tasks.     Patient with continued deficits in both working and time delay memory recall as evidenced by mild-moderate deficits in fluency and mild deficits in memory. Given structured memory recall, patient was most accurate in recall to 3 units of newly learned information, demonstrating immediate, short and time delayed recall with distractions up to 30 minutes with 100% accuracy.     Speech Therapy Assessment:     Expressive Language Assessment:     Ability to exhibit appropriate naming: Minimal.    Patient's ability to use " "automatic language appropriately is WFL.    Patient's ability to verbalize wants and needs is WFL.    Patient's ability to formulate complex/abstract language is Minimal (Given presence of word errors, word substituions (paraphasias) impacting accuracy in verbal expression to the novel, converstional speech level).    Paraphasic is Present.    Expressive language comments: Patient continues to demonstrated reduced self-monitoring with regards to presence of word errors, word substitutions or paraphasias to independent speech productions negatively impacting intelligibility; although patient is independent in use of verbal expression to communicate personal and medically relevant information to an independent level.     ACE III: Language: 24/26. Patient accurate in confrontation naming of 11/12 (91.6%) providing response of \"squeeze box\" versus accordion.   ACE III: Fluency: 10/14. Patient independent in naming 19 items to concrete category (animals) for a score of 6 (out of possible 7). 10 items to abstract category (words beginning with /p/) for a score of 4 (out of a possible 7). Patient was noted to demonstrate independence in implementation of previously educated/ trained strategies, including sub categorization, which was most successful to concrete category.     Written Language Assessment:     Ability to generate sentences WFL.    Language comments: ACE III: Language 24/26. Patient independent in self generation of 2 sentences to personal information independent. Patient with concerns regarding hand-eye coordination, legibility of written expression.     Receptive Language Assessment:     Patient ability to identify objects: WFL    Patient follows 3 step simple commands: WFL    Patient follows 3 step complex commands: WFL    Receptive language comments: ACE III: Language 24/26. Patient was independent in following 3 step simple and complex commands to independent level and identified objects based on abstract " "descriptions, again independent.     Reading Comprehension Assessment:     Patient ability to comprehend single words: Supervision Required    Reading comprehension comments: ACE III: Language: 24/26. Patient with error in reading of single words providing response of \"print\" > pint. Patient did not identify or self correct error.     Cognitive Linguistic Assessment:     Portions of the Other (ACE III) are used.    Patient attention sustained: WFL    Patient attention selective: [facility]    Patient attention divided: Supervision Required    Patient orientation to day: [facility]    Patient orientation to month: [facility]    Patient orientation to time: [facility]    Patient orientation to self: [facility]    Patient orientation to place: [facility]    Patient immediate memory: Minimal    Patient recent and short term memory: Minimal    Patient procedural memory: [facility]    Patient prospective and time delay memory: Minimal and Moderate    Patient biographical information memory: [facility]    Patient spontaneous clock drawing ability: [facility]    Cognitive-Linguistic comments: ACE III: Memory: 20/26. Patient demonstrated accuracy in recall to 3 items to immediate, short and time delayed recall up to 30 minutes of 3 items with 100% accuracy. To address (7 units of newly learned information), patient required 3 repetitions to accurately state all 7 details, independently recalling 3/7 details (42.8%) following time delay recall with distraction. Providing multiple choice (field of 3) patient was able to recall 3/5 details (60%) accuracy.         Speech Therapy Plan :   Prognosis & Recommendations  Impression Summary:  While patient is demonstrating progress with outpatient speech therapy addressing deficits in speech, expressive, receptive and cognitive skills following TBI, results of the ACE III suggest that patient continues to demonstrate deficits in areas of memory, expressive language and fluency. Patient was provided with education regarding results of ACE III and " suggestions regarding activities to implement as part of home program to address. As part of review, patient was provided with education in specific examples to increase understanding and compliance with both internal and external memory and cognitive strategies that would also assist in improving accuracy and independence. Patient verbalized understanding when direct examples were provided and allowed to ask questions to ensure understanding.   Therapy Recommendations  Recommendation:  Individual Speech Therapy,  Planned Therapy Interventions:  Home Program, Patient/Caregiver Education, Compensatory Strategy Training, Cognitive-Linguistic training and Speech/Language training,   Plan Details:  Continue with current POC. Consider formal reassessment in 2-3 visits to determine continuation related to progress demonstrated towards current POC.

## 2020-11-10 ENCOUNTER — SPEECH THERAPY (OUTPATIENT)
Dept: SPEECH THERAPY | Facility: REHABILITATION | Age: 65
End: 2020-11-10
Attending: PHYSICAL MEDICINE & REHABILITATION
Payer: COMMERCIAL

## 2020-11-10 ENCOUNTER — PHYSICAL THERAPY (OUTPATIENT)
Dept: PHYSICAL THERAPY | Facility: REHABILITATION | Age: 65
End: 2020-11-10
Attending: PHYSICAL MEDICINE & REHABILITATION
Payer: COMMERCIAL

## 2020-11-10 DIAGNOSIS — I60.9 SUBARACHNOID HEMORRHAGE WITH LOSS OF CONSCIOUSNESS OF LESS THAN 1 HOUR (HCC): ICD-10-CM

## 2020-11-10 DIAGNOSIS — I69.019 COGNITIVE DEFICIT DUE TO OLD SUBARACHNOID HEMORRHAGE: ICD-10-CM

## 2020-11-10 DIAGNOSIS — I69.011 MEMORY DEFICIT FOLLOWING NONTRAUMATIC SUBARACHNOID HEMORRHAGE: ICD-10-CM

## 2020-11-10 DIAGNOSIS — I69.020 APHASIA DUE TO AND NOT CONCURRENT WITH NONTRAUMATIC SUBARACHNOID HEMORRHAGE: ICD-10-CM

## 2020-11-10 PROCEDURE — 92507 TX SP LANG VOICE COMM INDIV: CPT

## 2020-11-10 PROCEDURE — 97110 THERAPEUTIC EXERCISES: CPT

## 2020-11-10 PROCEDURE — 97140 MANUAL THERAPY 1/> REGIONS: CPT

## 2020-11-10 PROCEDURE — 97012 MECHANICAL TRACTION THERAPY: CPT

## 2020-11-10 NOTE — OP THERAPY DAILY TREATMENT
"  Outpatient Speech Therapy  DAILY TREATMENT     [facility] Speech [address].  [address]  [place] [address]  Phone:  [phone]  Fax:  [phone]    Date: 11/10/2020    Patient: Reyes Amezcua  YOB: 1955  MRN: [id number]     Time Calculation    Start time: 0800  Stop time: 0855 Time Calculation (min): 55 minutes         Chief Complaint: Poor Memory and Aphasia    Visit #: 20    Subjective:   Reason for Therapy:     Reason For Evaluation:  Cognition  Social Support:     Social support system: unaccompanied; brought to therapy by wife.    Patient Mental Status:  Responsive  Additional Subjective Comments:      \"The things that I still struggle with are remembering this, like when I saw that picture of the accordion and the only thing I could come up with was squeeze box.  How do I get my brain to remember those words?\"  Patient verbalized frustration regarding deficits in memory he is currently experiencing. Patient stated that goal of cognitive therapy would be to return to some form of work environment, preferably in previous work field of .       Objective:   Treatments/Interventions Performed:  Cognitive-Linguistic training, Home program, Patient/Caregiver education and Compensatory strategy training  Other Treatment Interventions:  Use of external strategies to solve complex, reasoning task through deductive reasoning puzzle  Treatment Intervention tool(s) used:  Implementation of internal, external memory strategies for recall of 12 units of newly learned information to short and time delay recall of 10 minutes.   Objective Details:  Patient independent in initiation of external cognitive strategies, including highlighting/ circling/ underlining important information. Min verbal cues necessary to assist in solving complex, deductive reasoning puzzle: completed with 12/14 = 85.7% accuracy.    Complex problem solving/ reasoning continued through completion " "of 2nd deductive reasoning puzzle (field of 12). 2nd puzzle proved difficult, with patient completing with 4/12 - 33% accuracy; independent.     Given structured memory recall, patient independent in write it down strategy with information presented auditory; internal strategies of repetition, association demonstrated independent. Short term recall 11/ 12 (single verbal cues in form of word association) 91.7% accuracy; independent. Time delay recall to 10 minutes 8/12 = 66% accuracy; independent. Additional verbal cues resulted in improved recall to 10/12 = 83.3% accuracy.          Speech Therapy Assessment:     Cognitive Linguistic Assessment:     Patient recent and short term memory: Minimal (to independent level)    Patient prospective and time delay memory: Minimal (with additional verbal cues)    Patient complex reasoning ability: Minimal (with minimal verbal cues)    Patient decision making and planning ability: Minimal    Cognitive-Linguistic comments: Given complex reasoning task, patient demonstrated increased difficulty in planning to begin the structured task. Patient required min cues to draw attention to detail in written directions to effectively begin the structured task.     Strategies to assist in memory recall independently identified to internal strategies through association. Patient stated visualization as strategy but stated \"I tried to see what they looked like but that didn't help.\" Patient reduced carryover in use of compensatory memory strategies has likely resulted in continued difficulty in accuracy of memory recall.          Speech Therapy Plan :   Prognosis & Recommendations  Impression Summary:  Given a new or unfamiliar task, patient benefits from initial direct instruction and verbal prompts/ cues to demonstrating planning and organization. Patient with continued need for reminders in use of external cognitive strategies that have proved helpful in improving accuracy and independence " given more complex reasoning/ problem solving task. Patient appears to be most accurate in recall and initiation of internal cognitive and memory strategies, as evidenced by patient performance given structured short and time delay memory recall tasks.  Patient's ability to effectively generalize skills, including internal and external cognitive, memory strategies, will be largest contributing factor to success outside of structured therapy environment.     Vocational rehabilitation discussed, to provide patient with community resources that support patient goal of return to work. Patient was responsive to education completed.   Therapy Recommendations  Recommendation:  Individual Speech Therapy,  Planned Therapy Interventions:  Home Program, Patient/Caregiver Education, Compensatory Strategy Training, Cognitive-Linguistic training and Speech/Language training,   Plan Details:  Continue with current POC.

## 2020-11-10 NOTE — OP THERAPY DAILY TREATMENT
Outpatient Physical Therapy  DAILY TREATMENT     [facility] Physical [address]  [address]  [place] [address]  Phone:  [phone]  Fax:  [phone]    Date: 11/10/2020    Patient: Reyes Amezcua  YOB: 1955  MRN: [id number]     Time Calculation    Start time: 0902  955 53 min         Chief Complaint: Shoulder Problem    Visit #: 17    SUBJECTIVE:  I actually got a few hours of relief after the last session but then it came back. My neck was a little sore after the traction but it helped my neck out a lot.      Objective:  Pre treatment:   GH IR BTB AROM: Sacrum 5-7/10 pain    GH IR BTB AROM: T5 before pain 5/10 at end range following manual to cervical    GH IR BTB AROM: T4 before pain 3-4/10 at end range after cervical mechanical traction        Therapeutic Exercises (CPT 53480):     1. UBE, x4 min; level 2 resist , warm up    2. Verbal review of hep    3. Trap stretch, x30 sec , added to hep    4. Scalene stretch (anterior and posterior), 1x30 sec ea, added to hep    5. Double lacrosse balls supine and at wall, x10, added to hep; HO provided if interested in purchasing online; additonal edu on how to craft using two tennis balls at home    6. SNAG cervical side bending, x10, added to hep    8. Discussion of hep, no additional charge    14. UPOC: 11/27/20      Therapeutic Exercise Summary: Pt performed these exercises with instruction and SPV.  Provided handout with these exercises for daily HEP.      Therapeutic Treatments and Modalities:     1. Manual Therapy (CPT 26456), see below    2. Mechanical Traction (CPT 67540), c/s traction 14/8# with mhp x15 min    Therapeutic Treatment and Modalities Summary: STM to B c/s paraspinals and suboccipitals with slight traction followed by down glides to R C2-C6 grade III followed by R 1st rib mobilization//improved GH AROM BTB with decreased pain following txt; local tenderness R first rib, increased tone R c/s paraspinals  with pt reporting most tenderness at suboccipitals            Time-based treatments/modalities:    Physical Therapy Timed Treatment Charges  Manual therapy minutes (CPT 26098): 16 minutes  Therapeutic exercise minutes (CPT 52213): 22 minutes         ASSESSMENT:   Response to treatment:   Pt reporting relief x few hours following manual to cervical and traction. Presents to therapy today with shoulder AROM and pain without improvement from last session. Pt reporting compliant with hep but not receiving long term relief/improvement. Pt education with emphasis on posture management as is continuing to demonstrate poor posture in sessions.     Pt currently responding well in sessions with significant increases in painfree AROM however poor carryover between sessions without clear cause. May continue few sessions PT before considering re-assessment/other treatments outside of therapy.    PLAN/RECOMMENDATIONS:   Plan for treatment: therapy treatment to continue next visit.  Planned interventions for next visit: Reassess AROM, continue c/s treatment, indep c/s traction unit for home if continued improvement. Assess response to new c/s hep.

## 2020-11-12 ENCOUNTER — PHYSICAL THERAPY (OUTPATIENT)
Dept: PHYSICAL THERAPY | Facility: REHABILITATION | Age: 65
End: 2020-11-12
Attending: PHYSICAL MEDICINE & REHABILITATION
Payer: COMMERCIAL

## 2020-11-12 ENCOUNTER — SPEECH THERAPY (OUTPATIENT)
Dept: SPEECH THERAPY | Facility: REHABILITATION | Age: 65
End: 2020-11-12
Attending: PHYSICAL MEDICINE & REHABILITATION
Payer: COMMERCIAL

## 2020-11-12 DIAGNOSIS — I69.011 MEMORY DEFICIT FOLLOWING NONTRAUMATIC SUBARACHNOID HEMORRHAGE: ICD-10-CM

## 2020-11-12 DIAGNOSIS — I69.020 APHASIA DUE TO AND NOT CONCURRENT WITH NONTRAUMATIC SUBARACHNOID HEMORRHAGE: ICD-10-CM

## 2020-11-12 DIAGNOSIS — I69.019 COGNITIVE DEFICIT DUE TO OLD SUBARACHNOID HEMORRHAGE: ICD-10-CM

## 2020-11-12 DIAGNOSIS — I60.9 SUBARACHNOID HEMORRHAGE WITH LOSS OF CONSCIOUSNESS OF LESS THAN 1 HOUR (HCC): ICD-10-CM

## 2020-11-12 PROCEDURE — 92507 TX SP LANG VOICE COMM INDIV: CPT

## 2020-11-12 PROCEDURE — 97110 THERAPEUTIC EXERCISES: CPT

## 2020-11-12 PROCEDURE — 97012 MECHANICAL TRACTION THERAPY: CPT

## 2020-11-12 PROCEDURE — 97140 MANUAL THERAPY 1/> REGIONS: CPT

## 2020-11-12 NOTE — OP THERAPY DAILY TREATMENT
Outpatient Physical Therapy  DAILY TREATMENT     [facility] Physical [address].  [address]  [place] [address]  Phone:  [phone]  Fax:  [phone]    Date: 11/12/2020    Patient: Reyes Amezcua  YOB: 1955  MRN: [id number]     Time Calculation    Start time: 0904  955 53 min         Chief Complaint: Neck Problem, Shoulder Problem, and Arm Problem    Visit #: 18    SUBJECTIVE:  After the machine I felt good the rest of the day but the next day I felt pain again. I don't have as much pain in the shoulder when I'm walking.       Objective:  Pre treatment:   GH IR BTB AROM: L3  5/10 pain    GH IR BTB AROM: T7 before pain 5/10 at end range following manual to cervical    GH IR BTB AROM: T7 before pain 5/10 at end range following periscapular strengthening exercises        Therapeutic Exercises (CPT 11580):     1. UBE, x4 min; level 2 resist , warm up    2. Verbal review of hep    3. Trap stretch, verbal review with demo    4. Scalene stretch (anterior and posterior), verbal review with demo    10. Rows, level III TB; 1x10 with 10 sec hold    11. Pull downs, level III TB; 1x10 with 10 sec hold, reviewed; pt performing incorrectly    12. Pt education, continue strengthening at home    13. Self c/s traction unit , x4 min, tech provided band and education with demo using door at home for self cervical traction    14. UPOC: 11/27/20      Therapeutic Exercise Summary: Pt performed these exercises with instruction and SPV.  Provided handout with these exercises for daily HEP.      Therapeutic Treatments and Modalities:     1. Manual Therapy (CPT 41074), see below, x8 min    2. Mechanical Traction (CPT 59263), c/s traction 16/8# with mhp x15 min    Therapeutic Treatment and Modalities Summary: STM to B c/s paraspinals and suboccipitals with slight traction (pt continuing to report tenderness on R)          Time-based treatments/modalities:    Physical Therapy Timed Treatment  Charges  Manual therapy minutes (CPT 84189): 8 minutes  Therapeutic exercise minutes (CPT 95864): 17 minutes         ASSESSMENT:   Response to treatment:   Pt reporting relief x 1 day with improved shoulder pain and increased range of motion following last session. Presenting to therapy today with slight improvements in GH range BTB and pain complaints.     Pt continuing to respond well to cervical manual within session with increase GH AROM BTB. Increased c/s parameters today and will assess how pt responding to these parameters in subsequent sessions. Pt does continue to display poor postural awareness with cuing provided today during session.     PLAN/RECOMMENDATIONS:   Plan for treatment: therapy treatment to continue next visit.  Planned interventions for next visit: Continue with current interventions; repeat manual to cervical with down glides to R C2-6 and 1st ib inf mobs. Assess response to incr c/s mech traction parameters.

## 2020-11-12 NOTE — OP THERAPY DAILY TREATMENT
"  Outpatient Speech Therapy  DAILY TREATMENT     [facility] Speech [address].  [address]  [place] [address]  Phone:  [phone]  Fax:  [phone]    Date: 11/12/2020    Patient: Reyes Amezcua  YOB: 1955  MRN: [id number]     Time Calculation    Start time: 0800  Stop time: 0900 Time Calculation (min): 60 minutes         Chief Complaint: Poor Memory    Visit #: 21    Subjective:   Reason for Therapy:     Reason For Evaluation:  Cognition, Speech/Language and Aphasia  Social Support:     Social support system: unaccompanied to session; brought to therapy by wife.    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 Patient acknowledges that some he does remember and others he does not, but wife is helping as she can. 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       Objective:   Treatments/Interventions Performed:  Patient/Caregiver education, Compensatory strategy training, Home program, Cognitive-Linguistic training and Speech/Language treatment  Other Treatment Interventions:  Structured therapy tasks initiated addressing expressive language, written language and auditory memory recall to newly learned information  Treatment Intervention tool(s) used:  Expressive language skills targeted through completion of structured task with patient provided with 4 letter word and asked to determine 2 additional words with letters presented.   Memory recall to paragraph level information targeted with patient read a multiple paragraph story and asked to answer questions " related to the story.  Memory recall for auditory information targeted through completion of structured, mental manipulation task with patient provided with 4 words and asked to place words in order given category cues.   Objective Details:  2 new words using 4 letters completed with 10/10 - 100% accuracy; independent.   Memory recall to paragraph level information: Wh-questions: 10/13 = 76.9% accuracy; independent. Patient with noted difficulty in recall of specific dates.   Mental manipulation of 4 items: 19/20 = 95% accuracy. Patient necessitated 2 repeats of 4 items to promote accuracy in recall.            Speech Therapy Assessment:     Expressive Language Assessment:     Patient's ability to sustain dialogues within a given topic is WFL.    Patient's ability to formulate complex/abstract language is WFL.    Expressive language comments: Patient participated in complex conversation with clinician in area of interest (specific to research patient had started prior to accident with hearing aids). Patient maintained topic of complex conversation independent, with no episodes of word finding difficulty or presence of paraphasias noted to 10 minute conversation.     Cognitive Linguistic Assessment:     Patient recent and short term memory: Supervision Required (need for x 2 repetitions to ensure understanding; improved from 3-4 repetitions necessary at start of outpatient speech therapy services)    Cognitive-Linguistic comments: SLP continued to provide encouragement in use of internal memory strategies, with use of visualization also reviewed/ encouraged. Use of internal strategies that targeted both left and right side of working memory appeared to have a positive impact on patient accuracy in recall.         Speech Therapy Plan :   Prognosis & Recommendations  Impression Summary:  Speech therapy focused on auditory memory recall as part of today's session, given auditory memory appears to have more difficulty  than visual memory. Patient demonstrated improved accuracy to recall of multiple units of information when presented in story format.  Prognosis:  Good  Prognosis Details:  Patient continues to require encouragement for initiation of educated internal memory strategies to improve accuracy in memory recall to newly learned information. Use of strategies has demonstrated significant improvement in accuracy of recall during structured sessions; although generalization of strategies outside of structured therapy setting, to home for example, remains difficult for patient at this time. ST continues to reinforce use of strategies outside of therapy setting as a result.   Therapy Recommendations  Recommendation:  Individual Speech Therapy,  Planned Therapy Interventions:  Home Program, Patient/Caregiver Education, Compensatory Strategy Training, Cognitive-Linguistic training and Speech/Language training,   Plan Details:  Continue with POC.

## 2020-11-17 ENCOUNTER — SPEECH THERAPY (OUTPATIENT)
Dept: SPEECH THERAPY | Facility: REHABILITATION | Age: 65
End: 2020-11-17
Attending: PHYSICAL MEDICINE & REHABILITATION
Payer: COMMERCIAL

## 2020-11-17 DIAGNOSIS — I69.020 APHASIA DUE TO AND NOT CONCURRENT WITH NONTRAUMATIC SUBARACHNOID HEMORRHAGE: ICD-10-CM

## 2020-11-17 DIAGNOSIS — I69.011 MEMORY DEFICIT FOLLOWING NONTRAUMATIC SUBARACHNOID HEMORRHAGE: ICD-10-CM

## 2020-11-17 DIAGNOSIS — I69.019 COGNITIVE DEFICIT DUE TO OLD SUBARACHNOID HEMORRHAGE: ICD-10-CM

## 2020-11-17 PROCEDURE — 92507 TX SP LANG VOICE COMM INDIV: CPT

## 2020-11-17 NOTE — OP THERAPY DAILY TREATMENT
"  Outpatient Speech Therapy  DAILY TREATMENT     [facility] Speech [address].  [address]  [place] [address]  Phone:  [phone]  Fax:  [phone]    Date: 2020    Patient: Reyes Amezcua  YOB: 1955  MRN: [id number]     Time Calculation    Start time: 0800  Stop time: 0855 Time Calculation (min): 55 minutes         Chief Complaint: Other (\"I've been starting to get back into things (for work) and its difficult\")    Visit #: 22    Subjective:   Reason for Therapy:     Reason For Evaluation:  Cognition and Aphasia  Social Support:     Social support system: unaccompanied; brought to therapy by wife.    Patient Mental Status:  Responsive (Oriented x 4; good spirits)  Progress Factors:     Progression:  Getting Better  Additional Subjective Comments:      Patient endorses that difficulties he is currently experiencing in remembering things previous to accident are most frustrating at this time with patient providing example of how he can't remember where he put information into files on his computer as he begins to look through old work materials.     Patient acknowledges use of external strategies, making notes daily, to recall information from day to day providing examples of things that would be important to recall.       Objective:   Treatments/Interventions Performed:  Cognitive-Linguistic training, Home program, Patient/Caregiver education, Speech/Language treatment and Compensatory strategy training  Other Treatment Interventions:  Reassessment of cognitive linguistic function through administration of the Cognitive Linguistic Quick Test (CLQT)  Objective Details:  Subtest (TASK) scores:      Personal Facts: 8 (8)  Symbol Cancellation: 12 (11)  Confrontation Naming: 10 (10)  Clock drawin (12)  Story Retellin (6)  Symbol Trials: 10 (10)  Generative namin (5)  Design Memory: 6 (6)  Mazes: 8 (7)  Design Generation:10 (6)     *indicates a score that is " "below criterion score for specific task with criterion score listed in paranthesis     Task scores were combined to obtain an overall severity rating. Results revealed the following cognitive domain scores/ severity ratings:  Attention 205 Within Normal Limits improved from (88) MODERATE  Memory 165 improved from (156) Within Normal Limits  Executive functions 34 improved from (30) Within Normal Limits  Language 32 Within Normal Limits improved from (28) MILD  Visuospatial skills 101 Within Normal Limits improved from (72) MILD     Cognitive domain scores were combined to obtain an overall severity rating. Results revealed a composite severity rating of 4.0 (4.0-3.5) Within Normal Limits improved from 3.2 consistent with MILD cognitive linguistic deficits at the start of skilled intervention.  Results suggest that patient has demonstrated marked improvement in all areas targeted through outpatient speech therapy.           Speech Therapy Assessment:     Expressive Language Assessment:     Ability to exhibit appropriate naming: WFL.    Patient's ability to sustain dialogues within a given topic is WFL.    Patient's ability to formulate complex/abstract language is WFL.    Expressive language comments: Patient is demonstrating marked improvement in complexity of language and vocabulary used, example provided during today's session \"bifurcation\".     CLQT: Language score: 32 (Within Normal Limits). Subtests: Generative Naming (7) above expected range.     Written Language Assessment:     Ability to write single words (spontaneously) WFL.     Ability to generate phrases WFL.    Ability to generate sentences WFL.    Language comments: Patient independent in written expression for formulation of complex words, sentences. Patient with some spelling errors; although patient acknowledged that spelling errors were consistent with prior level of written expression.     Receptive Language Assessment:     Complex yes/no " questions: [facility]    Patient understands complex paragraph: [facility]    Receptive language comments: CLQT: Language 32 (Within Normal Limits). Subtest: Story retell 7, which is above expected performance. Patient independent in recall of 11 (out of a possible 18) details given complex paragraph read by examiner. Patient independent in answering complex yes/ no questions 6/6 (100%).     Cognitive Linguistic Assessment:     Patient attention sustained: [facility]    Patient attention selective: [facility]    Patient attention divided: [facility]    Patient orientation to day: [facility]    Patient orientation to month: [facility]    Patient orientation to time: [facility]    Patient orientation to self: [facility]    Patient orientation to place: [facility]    Patient immediate memory: Minimal    Patient recent and short term memory: Minimal    Patient remote and long term memory: Supervision Required    Patient procedural memory: [facility]    Patient prospective and time delay memory: Minimal    Patient biographical information memory: [facility]    Patient executive functioning ability: [facility]    Patient spontaneous clock drawing ability: [facility]    Cognitive-Linguistic comments: Results of readministration of the CLQT revealed patient with marked improvement in memory recall (205 versus 88 at start of therapy) with patient demonstrating slight improvements in area of memory as minimal deficits persist in areas of working, short term and time delay recall. Patient endorses he has effective internal and external memory strategies that he implements at home to the independent level to recall important information, new events, such as keeping a daily journal as recommended by ST. Patient educated he will likely necessitate this type of assistance for memory deficits upon discharge from outpatient speech therapy services.         Speech Therapy Plan :   Prognosis & Recommendations  Impression Summary:  Mr. Reyes Amezcua, a 64 year old male, continued participation in direct, outpatient speech therapy  "services addressing cognitive linguistic deficits in the setting of TBI.     Results of today's reassessment of cognitive linguistic function through administration of the CLQT revealed patient demonstrated improved accuracy in all areas assessed with marked improvement demonstrated in attention, improved from moderate at the start of skilled intervention to within functional limits during today's reassessment, and language improved from mild at start of outpatient speech therapy services to within normal limits. Observations during assessment revealed patient with marked improvement in processing speed/ timing for more complex tasks, with assessment completed in 35 minutes (compared to 60 minutes during initial evaluation).      Results of reassessment suggest that patient continues to demonstrate progress in outpatient speech therapy services addressing cognitive linguistic function with overall cognitive score improved from 3.2 based on CLQT results during initial evaluation to 4.0 based on today's reassessment.   Prognosis:  Good  Prognosis Details:  Patient is demonstrating overall marked improvement in cognitive linguistic function; although deficits in memory persist, resulting in verbalizations of \"frustration\" by the patient at this time. Further direct intervention to reinforce use of internal and external memory strategies and establish a home program to implement following completion of direct, outpatient speech therapy services recommended and warranted.   Therapy Recommendations  Recommendation:  Individual Speech Therapy,  Planned Therapy Interventions:  Home Program, Compensatory Strategy Training, Patient/Caregiver Education and Cognitive-Linguistic training,   Plan Details:  D/C planning as outlined. Patient has 2 remaining outpatient speech therapy visits scheduled.                "

## 2020-11-19 ENCOUNTER — PHYSICAL THERAPY (OUTPATIENT)
Dept: PHYSICAL THERAPY | Facility: REHABILITATION | Age: 65
End: 2020-11-19
Attending: PHYSICAL MEDICINE & REHABILITATION
Payer: COMMERCIAL

## 2020-11-19 ENCOUNTER — SPEECH THERAPY (OUTPATIENT)
Dept: SPEECH THERAPY | Facility: REHABILITATION | Age: 65
End: 2020-11-19
Attending: PHYSICAL MEDICINE & REHABILITATION
Payer: COMMERCIAL

## 2020-11-19 DIAGNOSIS — I69.011 MEMORY DEFICIT FOLLOWING NONTRAUMATIC SUBARACHNOID HEMORRHAGE: ICD-10-CM

## 2020-11-19 DIAGNOSIS — I60.9 SUBARACHNOID HEMORRHAGE WITH LOSS OF CONSCIOUSNESS OF LESS THAN 1 HOUR (HCC): ICD-10-CM

## 2020-11-19 DIAGNOSIS — I69.019 COGNITIVE DEFICIT DUE TO OLD SUBARACHNOID HEMORRHAGE: ICD-10-CM

## 2020-11-19 PROCEDURE — 97012 MECHANICAL TRACTION THERAPY: CPT

## 2020-11-19 PROCEDURE — 97140 MANUAL THERAPY 1/> REGIONS: CPT

## 2020-11-19 PROCEDURE — 97110 THERAPEUTIC EXERCISES: CPT

## 2020-11-19 PROCEDURE — 92507 TX SP LANG VOICE COMM INDIV: CPT

## 2020-11-19 NOTE — OP THERAPY DAILY TREATMENT
"  Outpatient Speech Therapy  DAILY TREATMENT     [facility] Speech [address].  [address]  [place] [address]  Phone:  [phone]  Fax:  [phone]    Date: 11/19/2020    Patient: Reyes Amezcua  YOB: 1955  MRN: [id number]     Time Calculation    Start time: 0800  Stop time: 0855 Time Calculation (min): 55 minutes         Chief Complaint: Other (\"I feel like there are things that you are working on that make a lot of sense, but there are things I still need to work on myself\") and Poor Memory    Visit #: 23    Subjective:   Reason for Therapy:     Reason For Evaluation:  Cognition (Memory)  Social Support:     Social support system: unaccompanied.    Patient Mental Status:  Responsive (Oriented x 4; good spirits)  Progress Factors:     Progression:  Getting Better  Additional Subjective Comments:      \"I missed my 7:30 appointment this morning. 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 Patient said he looked up the winery, which then resulted in recall of memory.       Objective:   Treatments/Interventions Performed:  Home program, Cognitive-Linguistic training, Patient/Caregiver education and Compensatory strategy training  Other Treatment Interventions:  Use of internal/ external memory strategies to increase accuracy in recall  Treatment Intervention tool(s) used:  Memory recall addressed to recall of 6 units of newly learned information to 10 minute time delay.  Complex attention, working memory, reading comprehension and problem solving and reasoning addressed through completion of structured deductive reasoning task sequencing 7 activities based on paragraph level cues.     Objective Details:  Memory recall to 6 units of newly learned information given 10 minute time delay recalled with 1st " trial 6/6 - 100% accuracy; independent. 2nd trial   Complex problem solving/ deductive reasoning task completed with 5/7 (71.4% accuracy) on initial trial. Read through with clinician to check answers resulted in patient independently identifying error to change 2 incorrect responses, resulting in 7/7 - 100% accuracy. Self correction independent 2/2 obligatory context.   Deductive reasoning continued to field of 16 completed with 1st trial 2/4 (50%), self-corrected to 4/4 - 100% following read aloud with clinician. 2nd trial 4/4 (100%) independent. 3rd trial 4/4 (100%) independent. 4th trial 4/4 (100%) independent. 12/14 (85.7%).          Speech Therapy Assessment:     Cognitive Linguistic Assessment:     Patient complex reasoning ability: Supervision Required and Minimal    Patient complex problem solving ability: Supervision Required    Cognitive-Linguistic comments: Use of read aloud or rereading important information discussed to promote accuracy when encountering more complex problem solving/ reasoning tasks.         Speech Therapy Plan :   Prognosis & Recommendations  Impression Summary:  Patient was again encouraged in use of internal and external memory strategies to improve accuracy in recall to both old and newly learned information, given reports of improved recall of long term memory when visual (picture) cues were sought. Patient was encouraged that use of these strategies will likely be necessary to continue to demonstrate improvements in memory recall.    Cognitive strategies reviewed, specific to rereading information, making notes, rearranging/ highlight important information when presented with tasks of increased complexity of problem solving or reasoning. Direct examples were provided to patient, for example, how rereading paragraph and making notes assisted in accuracy given complex problem solving/ deductive reasoning task given paragraph level cues.   Therapy Recommendations  Recommendation:  Other, 1) Referral to Occupational Therapy to address complaints regarding numbness/ tingling to hands and patient desire to return to independent activity of daily living, mainly driving  Planned Therapy Interventions:  Home Program, Compensatory Strategy Training, Patient/Caregiver Education and Cognitive-Linguistic training,   Plan Details:  D/C planning as patient has 1 remaining visit of outpatient speech therapy scheduled. Patient encouraged to bring wife to final session to discuss progress and provide support regarding activities that can be continued in the home setting to address persistent deficits in attention, memory in the setting of s/p TBI.

## 2020-11-19 NOTE — OP THERAPY DAILY TREATMENT
"  Outpatient Physical Therapy  DAILY TREATMENT     [facility] Physical [address].  [address]  [place] [address]  Phone:  [phone]  Fax:  [phone]    Date: 11/19/2020    Patient: Reyes Amezcua  YOB: 1955  MRN: [id number]     Time Calculation    Start time: 1631  1714 43 min         Chief Complaint: Shoulder Problem    Visit #: 20    SUBJECTIVE:  It's interesting because I have a really tender spot in the top of my shoulder. I still have a hard time reaching behind my back to wash it.      Objective:  Pre treatment:   GH IR BTB AROM: L2  5/10 pain    GH IR BTB AROM: T6 painless, \"but I can't move forward because the shoulder stops me\" (following manual and scapular/rhomboid release)      Therapeutic Exercises (CPT 72147):     1. Memory quiz for words from speech session    2. Verbal review of hep    3. Thread the needle exercise, [id number], hep    16. UPOC: 11/27/20      Therapeutic Exercise Summary: Pt performed these exercises with instruction and SPV.  Provided handout with these exercises for daily HEP.      Therapeutic Treatments and Modalities:     1. Manual Therapy (CPT 99522), see below, x8 min    2. Mechanical Traction (CPT 65343), c/s traction 16/8# with mhp x15 min    Therapeutic Treatment and Modalities Summary: Scapular release x 10 with myofascial release to rhomboids R and levator scap R  (pt positioned in L SL with pillow b/w knees, pillow b/w pt and therapist for modesty)          Time-based treatments/modalities:    Physical Therapy Timed Treatment Charges  Manual therapy minutes (CPT 96663): 9 minutes  Therapeutic exercise minutes (CPT 32657): 19 minutes         ASSESSMENT:   Response to treatment:   Pt continuing to demonstrate relief during session but no long term changes. Today able to attain increase range BTB with no end range pain. Discussion with pt re: taking break from therapy if continued no improvement at next week's session; pt " verbalized understanding. Additional time spent discussing pt's postural dysfunction and importance of monitoring this outside of physical therapy sessions.    PLAN/RECOMMENDATIONS:   Plan for treatment: therapy treatment to continue next visit.  Planned interventions for next visit: Anticipate DC. Discuss use of shower brush for behind the back

## 2020-11-19 NOTE — OP THERAPY DAILY TREATMENT
Outpatient Physical Therapy  DAILY TREATMENT     [facility] Physical [address]  [address]  [place] [address]  Phone:  [phone]  Fax:  [phone]    Date: 11/19/2020    Patient: Reyes Amezcua  YOB: 1955  MRN: [id number]     Time Calculation       955 53 min         Chief Complaint: No chief complaint on file.    Visit #: 19    SUBJECTIVE:  After the machine I felt good the rest of the day but the next day I felt pain again. I don't have as much pain in the shoulder when I'm walking.       Objective:  Pre treatment:   GH IR BTB AROM: L3  5/10 pain    GH IR BTB AROM: T7 before pain 5/10 at end range following manual to cervical    GH IR BTB AROM: T7 before pain 5/10 at end range following periscapular strengthening exercises        Therapeutic Exercises (CPT 04561):     1. UBE, x4 min; level 2 resist , warm up    2. Verbal review of hep    3. Trap stretch, verbal review with demo    4. Scalene stretch (anterior and posterior), verbal review with demo    10. Rows, level III TB; 1x10 with 10 sec hold    11. Pull downs, level III TB; 1x10 with 10 sec hold, reviewed; pt performing incorrectly    12. Pt education, continue strengthening at home    13. Self c/s traction unit , x4 min, tech provided band and education with demo using door at home for self cervical traction    14. UPOC: 11/27/20      Therapeutic Exercise Summary: Pt performed these exercises with instruction and SPV.  Provided handout with these exercises for daily HEP.      Therapeutic Treatments and Modalities:     1. Manual Therapy (CPT 23815), see below, x8 min    2. Mechanical Traction (CPT 04966), c/s traction 16/8# with mhp x15 min    Therapeutic Treatment and Modalities Summary: STM to B c/s paraspinals and suboccipitals with slight traction followed by downglides   (pt continuing to report tenderness on R)          Time-based treatments/modalities:              ASSESSMENT:   Response to treatment:    Pt reporting relief x 1 day with improved shoulder pain and increased range of motion following last session. Presenting to therapy today with slight improvements in GH range BTB and pain complaints.     Pt continuing to respond well to cervical manual within session with increase GH AROM BTB. Increased c/s parameters today and will assess how pt responding to these parameters in subsequent sessions. Pt does continue to display poor postural awareness with cuing provided today during session.     PLAN/RECOMMENDATIONS:   Plan for treatment: therapy treatment to continue next visit.  Planned interventions for next visit: Continue with current interventions; repeat manual to cervical with down glides to R C2-6 and 1st ib inf mobs. Assess response to incr c/s [facility], [place] traction parameters.

## 2020-11-24 ENCOUNTER — SPEECH THERAPY (OUTPATIENT)
Dept: SPEECH THERAPY | Facility: REHABILITATION | Age: 65
End: 2020-11-24
Attending: PHYSICAL MEDICINE & REHABILITATION
Payer: COMMERCIAL

## 2020-11-24 ENCOUNTER — PHYSICAL THERAPY (OUTPATIENT)
Dept: PHYSICAL THERAPY | Facility: REHABILITATION | Age: 65
End: 2020-11-24
Attending: PHYSICAL MEDICINE & REHABILITATION
Payer: COMMERCIAL

## 2020-11-24 DIAGNOSIS — I69.019 COGNITIVE DEFICIT DUE TO OLD SUBARACHNOID HEMORRHAGE: ICD-10-CM

## 2020-11-24 DIAGNOSIS — S06.9X9A TRAUMATIC BRAIN INJURY WITH LOSS OF CONSCIOUSNESS, INITIAL ENCOUNTER (HCC): ICD-10-CM

## 2020-11-24 DIAGNOSIS — Z78.9 IMPAIRED MOBILITY AND ACTIVITIES OF DAILY LIVING: ICD-10-CM

## 2020-11-24 DIAGNOSIS — I69.011 MEMORY DEFICIT FOLLOWING NONTRAUMATIC SUBARACHNOID HEMORRHAGE: ICD-10-CM

## 2020-11-24 DIAGNOSIS — Z74.09 IMPAIRED MOBILITY AND ACTIVITIES OF DAILY LIVING: ICD-10-CM

## 2020-11-24 DIAGNOSIS — I60.9 SUBARACHNOID HEMORRHAGE WITH LOSS OF CONSCIOUSNESS OF LESS THAN 1 HOUR (HCC): ICD-10-CM

## 2020-11-24 DIAGNOSIS — I69.020 APHASIA DUE TO AND NOT CONCURRENT WITH NONTRAUMATIC SUBARACHNOID HEMORRHAGE: ICD-10-CM

## 2020-11-24 PROCEDURE — 92507 TX SP LANG VOICE COMM INDIV: CPT

## 2020-11-24 PROCEDURE — 97110 THERAPEUTIC EXERCISES: CPT

## 2020-11-24 PROCEDURE — 97140 MANUAL THERAPY 1/> REGIONS: CPT

## 2020-11-24 PROCEDURE — 97012 MECHANICAL TRACTION THERAPY: CPT

## 2020-11-24 NOTE — OP THERAPY DAILY TREATMENT
Outpatient Physical Therapy  DAILY TREATMENT     [facility] Physical [address].  [address]  [place] [address]  Phone:  [phone]  Fax:  [phone]    Date: 11/24/2020    Patient: Reyes Amezcua  YOB: 1955  MRN: [id number]     Time Calculation    Start time: 0904 0945 41 min         Chief Complaint: Shoulder Problem    Visit #: 20    *pt on time to appt but had to use restroom - delayed start to session     SUBJECTIVE:  I'm actually feeling a little better. Behind the back is better with barely any pain but it's still restricted when I reach the top.    Pt present with wife, Diane.    Objective:  Pre treatment:   GH IR BTB AROM: T6  2/10 pain    GH IR BTB AROM: T5 0/10 pain      Therapeutic Exercises (CPT 55170):     1. Memory quiz for words from speech session    2. Verbal review of hep    3. Thread the needle exercise, [id number], reviewed    4. Standing rhomboid stretch hands interlocked, x30 sec, hep    5. Rhomboid stretch at wall, x30 sec, hep    6. GH IR BTB with pillow case, x10, hep    16. UPOC: 11/27/20      Therapeutic Exercise Summary: Pt performed these exercises with instruction and SPV.  Provided handout with these exercises for daily HEP.      Therapeutic Treatments and Modalities:     1. Manual Therapy (CPT 38968), see below, x8 min    2. Mechanical Traction (CPT 07140), c/s traction 16/8# with mhp x15 min    Therapeutic Treatment and Modalities Summary: Scapular release x 10 with myofascial release to rhomboids R and levator scap R  (pt positioned in L SL with pillow b/w knees, pillow b/w pt and therapist for [facility])          Time-based treatments/modalities:    Physical Therapy Timed Treatment Charges  Manual therapy minutes (CPT 17017): 8 minutes  Therapeutic exercise minutes (CPT 00425): 18 minutes         ASSESSMENT:   Response to treatment:   Pt responding well to rhomboid release and stretching with significant improvement in GH range BTB compared  to previous sessions.   Followed up with mobilization BTB with pillow case, slight incr in discomfort during but no worse after completion of ex. Anticipate DC next few sessions with indep hep.    PLAN/RECOMMENDATIONS:   Plan for treatment: therapy treatment to continue next visit.  Planned interventions for next visit: Continue mobilizing and strengthening GH joint, anticipate DC next few sessions with indep hep.

## 2020-11-24 NOTE — OP THERAPY DISCHARGE SUMMARY
Outpatient Speech Language Pathology  DISCHARGE SUMMARY NOTE      [facility] Speech Language Pathology [address].  [address]  [place] [address]  Phone:  [phone]  Fax:  [phone]        Patient Name:  Reyes Amezcua  :  1955  MR#:  [id number]    HICN:       Visits: 24       Cancel/No-Show:     Diagnosis/ICD-10:   1. Traumatic subarachnoid hemorrhage with loss of consciousness, initial encounter ([facility])  S06.6X9A   2. 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 Aphasia due to and not concurrent with nontraumatic subarachnoid hemorrhage  I69.020         Referring Provider: Anne Vergara D.O.    SOC Date: 2020    Onset Date: 2020      Your patient is being discharged from Speech therapy with the following comments:  Goals Met    Mr. Reyes Amezcua, a 64 year old male, participated in direct, outpatient speech therapy addressing deficits in speech-language, cognitive linguistic function in setting of traumatic brain injury, traumatic subarachnoid hemorrhage with loss of consciousness.     Patient demonstrated marked improvement with participation in direct, outpatient speech therapy services with results of reassess/ment of/ cognitive linguistic function through administration of the CLQT revealed patient demonstrated improved accuracy in all areas assessed with marked improvement demonstrated in attention, improved from moderate at the start of skilled intervention to within functional limits during today's reassessment, and language improved from mild at start of outpatient speech therapy services to within normal limits. Observations during assessment revealed patient with marked improvement in processing speed/ timing for more complex tasks, with assessment completed in 35 minutes (compared to 60 minutes during initial evaluation).       With patient wife present during today's  session, patient areas of continued deficit were discussed, with focus on deficits experienced in attention, working, short term and time delay memory recall.  Strategies to address continued deficits in cognitive function discussed with activities to continue to implement in the home setting discussed including: use of both internal and external memory strategies as outlined through participation in direct, outpatient speech therapy services, continuation of daily journal to promote written expression in linguistic expression, reception and cognitive function, continuation of home program addressing cognitive activities to promote continued improvement. Community resources to address patient social interactions and promote memory improvement discussed including [facility], [facility] through [employer].      Given patient high level of cognitive function prior to injury, patient potential to benefit from a formal evaluation of cognitive function discussed through a Neuropsychological evaluation. It was stated that this would provide useful information in determining cognitive strengths and weaknesses, as well as further guide therapeutic intervention if patient wishes to return to outpatient speech therapy in 3-4 months.      Given results of standardized assessment measures, and patient cognitive linguistic function ranging from within normal limits to mild cognitive deficits based on assessment administered, it is recommended that patient discharge from outpatient speech therapy services at this time as patient has met both short and long term goals for the current intervention period. Reassessment in 3-4 months is recommended, following participation in Neuropsychological evaluation, for further guidance of areas of strength, areas of improvement to assist in guiding outpatient speech therapy services.   Prognosis:  Good  Prognosis Details:  Results of reassessment suggest that patient demonstrated excellent  progress in outpatient speech therapy services addressing cognitive linguistic function with overall cognitive score improved from 3.2 based on CLQT results during initial evaluation to 4.0 based on reassessment completed 11/17/2020.      ACE III administered 11/24/2020 revealed a total score of 86/100, representing mild cognitive linguistic deficits. Patient with increased concern in area of attention, memory recall.   Goals  Short Term Goals:   1. Patient will demonstrate improved accuracy in expressive language skills, implementing circumlocution and/or semantic feature analysis when experiencing a word finding difficulty 3/4 obligatory contexts, independent: GOAL MET 11/24/2020.   2. Patient will improve receptive language skills completing 3-4 step complex directions incorporating before/ after, if then statements, presented verbally or written completing with 92% accuracy independent: GOAL MET 11/24/2020.    3. Patient will improve memory recall to newly learned personal, functional or medically relevant information recalling a minimum of 5 details to newly learned information, recalling a minimum of 5 unrelated objects or words, implementing educated/ trained compensatory memory strategies to independent level: GOAL MET 11/24/2020 given minimal verbal prompts/ reminders for use of internal memory strategies.  4. Patient will improve attention, complex sustained, progressing to selective and complex, divided attention tasks with 92% accuracy; independent: GOAL NOT MET.   5. Patient will improve working memory and language completing generative naming tasks from concrete, progressing to abstract categories, naming a minimum of 12 items in one-minute to given categories, implementing strategies independent 4/5 trials (80%) accuracy: GOAL MET 11/24/2020.   6. Patient will improve planning, organization and self-monitoring, completing 2 complex problem solving and/or reasoning tasks in 10 minutes demonstrating  independence in self-correction to complete with 100% accuracy.      Long Term Goals:  1.) Patient will participate in 5 minute novel conversation demonstrating no episodes of word finding difficulties and speech production skills within functional limits to independent level: GOAL MET 11/24/2020.   2.) Patient will improve cognitive linguistic function to return to previous level of accuracy and independence in completion of independent, language related activities of daily living as evidenced by performance on standardized assessment measure within functional limits for all areas assessed: GOAL MET 11/17/2020 based on results of CLQT.     Therapy Recommendations  Recommendation:  No further speech therapy indicated at this time, 1) Consideration for referral for formal cognitive testing through Neuropsychological evaluation  2) Consideration for referral to Occupational Therapy to address continued complaints regarding numbness to fingers, fine motor coordination and patient desire to return to driving when ready   Plan Details:  D/C outpatient speech therapy. Consider outpatient speech therapy follow up evaluation in 3-4 months or sooner as difficulties or concerns arise.            Nidhi Curry, SLP

## 2020-11-24 NOTE — OP THERAPY DAILY TREATMENT
Outpatient Speech Therapy  DAILY TREATMENT     [facility] Speech [address].  [address]  [place] [address]  Phone:  [phone]  Fax:  [phone]    Date: 11/24/2020    Patient: Reyes Amezcua  YOB: 1955  MRN: [id number]     Time Calculation    Start time: 0800  Stop time: 0855 Time Calculation (min): 55 minutes         Chief Complaint: Other (lightheadness with standing and with walking/ turning head)    Visit #: 24    Subjective:   Reason for Therapy:     Reason For Evaluation:  Cognition, Speech/Language, Aphasia and TBI  Social Support:     Social support system: wife, Kerry, present and supportive to today's final visit.    Patient Mental Status:  Responsive (good spirits)  Progress Factors:     Progression:  Getting Better  Additional Subjective Comments:      Patient arrived to outpatient speech therapy session reporting increased concern regarding feeling of lightheadedness he experiences upon standing and during walking. Patient noted to be seeing PT following today's ST visit to address.       Objective:   Treatments/Interventions Performed:  Home program, Patient/Caregiver education, Compensatory strategy training, Cognitive-Linguistic training and Speech/Language treatment  Other Treatment Interventions:  Administration of [place]'s Cognitive Examination III to assist in providing direct guidance for ST recommendations upon discharge from direct speech therapy services  Objective Details:  ACE III total score: 86/100, representing mild cognitive linguistic deficits.    Attention 12/18 (66.7%)  Memory 23/26 (88.5%)  Fluency 11/14 (78.5%)  Language 24/26 (92.3%)  Visuospatial 16/16 (100%)         Speech Therapy Assessment:     Cognitive Linguistic Assessment:     Patient orientation to day: Minimal (corrected when provided verbal prompt)    Patient orientation to month: [facility]    Patient orientation to time: [facility]    Patient orientation to self: [facility]    Patient  orientation to place: WFL    Patient immediate memory: Minimal    Patient recent and short term memory: Minimal    Patient remote and long term memory: Minimal    Patient procedural memory: WFL    Patient prospective and time delay memory: Minimal    Patient biographical information memory: [facility]    Cognitive-Linguistic comments: With wife present during today's session, patient was noted to demonstrate a marked difference in attention scores during today's assessment versus administration of CLQT 11/17/2020. Results suggest that environmental distractions or factors, may influence accuracy in attention. This was discussed as part of session review. Likewise, patient was noted to not implement use of internal memory strategies as previously educated/ trained and reinforced through outpatient speech therapy to assist in recall. Importance in use of strategies also reinforced as part of session review.         Speech Therapy Plan :   Prognosis & Recommendations  Impression Summary:  Mr. Reyes Amezcua, a 64 year old male, participated in direct, outpatient speech therapy addressing deficits in speech-language, cognitive linguistic function in setting of traumatic brain injury, traumatic subarachnoid hemorrhage with loss of consciousness.    Patient demonstrated marked improvement with participation in direct, outpatient speech therapy services with results of reassessment of cognitive linguistic function through administration of the CLQT revealed patient demonstrated improved accuracy in all areas assessed with marked improvement demonstrated in attention, improved from moderate at the start of skilled intervention to within functional limits during today's reassessment, and language improved from mild at start of outpatient speech therapy services to within normal limits. Observations during assessment revealed patient with marked improvement in processing speed/ timing for more complex tasks, with assessment completed in  35 minutes (compared to 60 minutes during initial evaluation).       With patient wife present during today's session, patient areas of continued deficit were discussed, with focus on deficits experienced in attention, working, short term and time delay memory recall.  Strategies to address continued deficits in cognitive function discussed with activities to continue to implement in the home setting discussed including: use of both internal and external memory strategies as outlined through participation in direct, outpatient speech therapy services, continuation of daily journal to promote written expression in linguistic expression, reception and cognitive function, continuation of home program addressing cognitive activities to promote continued improvement. Community resources to address patient social interactions and promote memory improvement discussed including [facility], [facility] through [employer].     Given patient high level of cognitive function prior to injury, patient potential to benefit from a formal evaluation of cognitive function discussed through a Neuropsychological evaluation. It was stated that this would provide useful information in determining cognitive strengths and weaknesses, as well as further guide therapeutic intervention if patient wishes to return to outpatient speech therapy in 3-4 months.     Given results of standardized assessment measures, and patient cognitive linguistic function ranging from within normal limits to mild cognitive deficits based on assessment administered, it is recommended that patient discharge from outpatient speech therapy services at this time as patient has met both short and long term goals for the current intervention period. Reassessment in 3-4 months is recommended, following participation in Neuropsychological evaluation, for further guidance of areas of strength, areas of improvement to assist in guiding outpatient speech therapy services.    Prognosis:  Good  Prognosis Details:  Results of reassessment suggest that patient demonstrated excellent progress in outpatient speech therapy services addressing cognitive linguistic function with overall cognitive score improved from 3.2 based on CLQT results during initial evaluation to 4.0 based on reassessment completed 11/17/2020.     ACE III administered 11/24/2020 revealed a total score of 86/100, representing mild cognitive linguistic deficits. Patient with increased concern in area of attention, memory recall.   Goals  Short Term Goals:   1. Patient will demonstrate improved accuracy in expressive language skills, implementing circumlocution and/or semantic feature analysis when experiencing a word finding difficulty 3/4 obligatory contexts, independent: GOAL MET 11/24/2020.   2. Patient will improve receptive language skills completing 3-4 step complex directions incorporating before/ after, if then statements, presented verbally or written completing with 92% accuracy independent: GOAL MET 11/24/2020.    3. Patient will improve memory recall to newly learned personal, functional or medically relevant information recalling a minimum of 5 details to newly learned information, recalling a minimum of 5 unrelated objects or words, implementing educated/ trained compensatory memory strategies to independent level: GOAL MET 11/24/2020 given minimal verbal prompts/ reminders for use of internal memory strategies.  4. Patient will improve attention, complex sustained, progressing to selective and complex, divided attention tasks with 92% accuracy; independent: GOAL NOT MET.   5. Patient will improve working memory and language completing generative naming tasks from concrete, progressing to abstract categories, naming a minimum of 12 items in one-minute to given categories, implementing strategies independent 4/5 trials (80%) accuracy: GOAL MET 11/24/2020.   6. Patient will improve planning, organization and  self-monitoring, completing 2 complex problem solving and/or reasoning tasks in 10 minutes demonstrating independence in self-correction to complete with 100% accuracy.     Long Term Goals:  1.) Patient will participate in 5 minute novel conversation demonstrating no episodes of word finding difficulties and speech production skills within functional limits to independent level: GOAL MET 11/24/2020.   2.) Patient will improve cognitive linguistic function to return to previous level of accuracy and independence in completion of independent, language related activities of daily living as evidenced by performance on standardized assessment measure within functional limits for all areas assessed: GOAL MET 11/17/2020 based on results of CLQT.   Therapy Recommendations  Recommendation:  No further speech therapy indicated at this time, 1) Consideration for referral for formal cognitive testing through Neuropsychological evaluation  2) Consideration for referral to Occupational Therapy to address continued complaints regarding numbness to fingers, fine motor coordination and patient desire to return to driving when ready   Plan Details:  D/C outpatient speech therapy

## 2020-12-01 ENCOUNTER — PHYSICAL THERAPY (OUTPATIENT)
Dept: PHYSICAL THERAPY | Facility: REHABILITATION | Age: 65
End: 2020-12-01
Attending: PHYSICAL MEDICINE & REHABILITATION
Payer: COMMERCIAL

## 2020-12-01 DIAGNOSIS — I60.9 SUBARACHNOID HEMORRHAGE WITH LOSS OF CONSCIOUSNESS OF LESS THAN 1 HOUR (HCC): ICD-10-CM

## 2020-12-01 PROCEDURE — 97110 THERAPEUTIC EXERCISES: CPT

## 2020-12-01 NOTE — OP THERAPY DAILY TREATMENT
"  Outpatient Physical Therapy  DAILY TREATMENT     [facility] Physical [address].  [address]  [place] [address]  Phone:  [phone]  Fax:  [phone]    Date: 12/01/2020    Patient: Reyes Amezcua  YOB: 1955  MRN: [id number]     Time Calculation    Start time: 0800  0831 31 min         Chief Complaint: Shoulder Problem    Visit #: 21    SUBJECTIVE:  I feel like there is a bit more motion. I see Dr. Vergara next week.     Objective:  Pre treatment:   GH IR BTB AROM: T7  2/10 pain    Post treatment:  GH IR BTB AROM: T5 0/10 pain              Therapeutic Exercises (CPT 23874):     1. Pulleys, GH IR and flexion; 10x ea, warm up    2. Verbal review of hep    4. Standing rhomboid stretch hands interlocked, x30 sec, reviewed verbally; \"I really feel the stretch and it hurts my shoulder.\"    5. Superman on swissball, 7x with 10 sec hold, difficulty reaching end range     6. GH IR BTB with pillow case, x10    8. Modified box, 15x with level I TB, infra weakness with increasing GH AROM    16. UPOC: 11/27/20      Therapeutic Exercise Summary: Pt performed these exercises with instruction and SPV.  Provided handout with these exercises for daily HEP.      Therapeutic Treatments and Modalities:     2. Mechanical Traction (CPT 72368), c/s traction 16/8# with mhp x15 min    Therapeutic Treatment and Modalities Summary: Scapular release x 10 with myofascial release to rhomboids R and levator scap R  (pt positioned in L SL with pillow b/w knees, pillow b/w pt and therapist for modesty)          Time-based treatments/modalities:          ASSESSMENT:   Response to treatment:   Pt continuing to maintain increases in GH IR BTB, however no sig improvements made in the last two sessions. Emphasis today on GH stab with increasing range. Decreased pain and increased GH IR BTB following. Discussion with pt to continue with rhomboid stretching and emphasis on strengthening for hep. Will continue " emphasis on strengthening in subsequent sessions. Anticipate DC next few sessions with indep hep.    OT referral placed. Pt will be initiating OT services in the near future.    PLAN/RECOMMENDATIONS:   Plan for treatment: therapy treatment to continue next visit.  Planned interventions for next visit: Continue  Strengthening, anticipate DC next few sessions with indep hep.

## 2020-12-08 ENCOUNTER — OFFICE VISIT (OUTPATIENT)
Dept: PHYSICAL MEDICINE AND REHAB | Facility: REHABILITATION | Age: 65
End: 2020-12-08
Payer: COMMERCIAL

## 2020-12-08 ENCOUNTER — PHYSICAL THERAPY (OUTPATIENT)
Dept: PHYSICAL THERAPY | Facility: REHABILITATION | Age: 65
End: 2020-12-08
Attending: PHYSICAL MEDICINE & REHABILITATION
Payer: COMMERCIAL

## 2020-12-08 VITALS
DIASTOLIC BLOOD PRESSURE: 74 MMHG | HEIGHT: 74 IN | TEMPERATURE: 97.3 F | WEIGHT: 158 LBS | HEART RATE: 66 BPM | OXYGEN SATURATION: 100 % | SYSTOLIC BLOOD PRESSURE: 112 MMHG | BODY MASS INDEX: 20.28 KG/M2

## 2020-12-08 DIAGNOSIS — R42 DIZZINESS: ICD-10-CM

## 2020-12-08 DIAGNOSIS — S06.9X9A TRAUMATIC BRAIN INJURY WITH LOSS OF CONSCIOUSNESS, INITIAL ENCOUNTER (HCC): Primary | ICD-10-CM

## 2020-12-08 DIAGNOSIS — H53.2 DOUBLE VISION: ICD-10-CM

## 2020-12-08 DIAGNOSIS — Z78.9 IMPAIRED MOBILITY AND ACTIVITIES OF DAILY LIVING: ICD-10-CM

## 2020-12-08 DIAGNOSIS — M79.2 NEUROPATHIC PAIN: ICD-10-CM

## 2020-12-08 DIAGNOSIS — I60.9 SUBARACHNOID HEMORRHAGE WITH LOSS OF CONSCIOUSNESS OF LESS THAN 1 HOUR (HCC): ICD-10-CM

## 2020-12-08 DIAGNOSIS — Z74.09 IMPAIRED MOBILITY AND ACTIVITIES OF DAILY LIVING: ICD-10-CM

## 2020-12-08 DIAGNOSIS — R53.83 OTHER FATIGUE: ICD-10-CM

## 2020-12-08 PROCEDURE — 99215 OFFICE O/P EST HI 40 MIN: CPT | Performed by: PHYSICAL MEDICINE & REHABILITATION

## 2020-12-08 PROCEDURE — 97012 MECHANICAL TRACTION THERAPY: CPT

## 2020-12-08 PROCEDURE — 97110 THERAPEUTIC EXERCISES: CPT

## 2020-12-08 RX ORDER — SODIUM, POTASSIUM,MAG SULFATES 17.5-3.13G
SOLUTION, RECONSTITUTED, ORAL ORAL
COMMUNITY
Start: 2020-11-20 | End: 2021-06-17

## 2020-12-08 RX ORDER — SCOLOPAMINE TRANSDERMAL SYSTEM 1 MG/1
1 PATCH, EXTENDED RELEASE TRANSDERMAL
Qty: 8 PATCH | Refills: 0 | Status: SHIPPED | OUTPATIENT
Start: 2020-12-08 | End: 2021-06-17

## 2020-12-08 ASSESSMENT — ENCOUNTER SYMPTOMS
WEAKNESS: 0
DIARRHEA: 0
FALLS: 0
CONSTIPATION: 0
FEVER: 0
COUGH: 0
SORE THROAT: 0
CHILLS: 0
SHORTNESS OF BREATH: 0
MEMORY LOSS: 1
PALPITATIONS: 0
BRUISES/BLEEDS EASILY: 0
FOCAL WEAKNESS: 0
TINGLING: 1

## 2020-12-08 ASSESSMENT — FIBROSIS 4 INDEX: FIB4 SCORE: .987654320987654321

## 2020-12-08 ASSESSMENT — PATIENT HEALTH QUESTIONNAIRE - PHQ9: CLINICAL INTERPRETATION OF PHQ2 SCORE: 0
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
## 2020-12-08 NOTE — OP THERAPY DAILY TREATMENT
Outpatient Physical Therapy  DAILY TREATMENT     [facility] Physical [address]  [address]  [place] [address]  Phone:  [phone]  Fax:  [phone]    Date: 12/08/2020    Patient: Reyes Amezcua  YOB: 1955  MRN: [id number]     Time Calculation    Start time: 1502  1545 28 min         Chief Complaint: Shoulder Problem    Visit #: 22    SUBJECTIVE:  I stretch my area and it helps. I was wondering if you can show me how to massage that area and i'll try to have my wife do it at home. It's hard stretching my triceps because Kerry has to help me by pushing my shoulder back. I saw Dr. Vergara and she gave me some great tips.    Objective:  Pre treatment:   Triceps length limited RUE      Therapeutic Exercises (CPT 07363):     2. Verbal review of hep    3. Triceps stretch (single arm prayer stretch), 2x30 sec, hep; modified version to perform indep     8. Modified box, 15x with level I TB, infra weakness with increasing GH AROM; reviewed, fatiguing and muscle juddering with increasing elevation    9. [facility] education, re: [facility]    16. UPOC: 11/27/20      Therapeutic Exercise Summary: Pt performed these exercises with instruction and SPV.  Provided handout with these exercises for daily HEP.      Therapeutic Treatments and Modalities:     2. Mechanical Traction (CPT 86722), c/s traction 16/8# with mhp x15 min    Therapeutic Treatment and Modalities Summary: Scapular release x 10 with myofascial release to rhomboids R and levator scap R  (pt positioned in L SL with pillow b/w knees, pillow b/w pt and therapist for modesty)          Time-based treatments/modalities:    Physical Therapy Timed Treatment Charges  Therapeutic exercise minutes (CPT 90073): 28 minutes     ASSESSMENT:   Response to treatment:   Continued triceps length limitations additionally limiting further GH IR BTB; modified hep due to pt having difficulty performing  independently.   Continued infra weakness especially with increasing elevations.  Will continue emphasis on strengthening in subsequent sessions. Anticipate DC next few sessions with indep hep.        PLAN/RECOMMENDATIONS:   Plan for treatment: therapy treatment to continue next visit.  Planned interventions for next visit: Continue  Strengthening, anticipate DC next few sessions with indep hep.

## 2020-12-16 ENCOUNTER — HOSPITAL ENCOUNTER (OUTPATIENT)
Dept: RADIOLOGY | Facility: MEDICAL CENTER | Age: 65
End: 2020-12-16
Attending: PSYCHIATRY & NEUROLOGY
Payer: COMMERCIAL

## 2020-12-16 ENCOUNTER — HOSPITAL ENCOUNTER (OUTPATIENT)
Dept: LAB | Facility: MEDICAL CENTER | Age: 65
End: 2020-12-16
Attending: PSYCHIATRY & NEUROLOGY
Payer: COMMERCIAL

## 2020-12-16 DIAGNOSIS — S01.90XS: ICD-10-CM

## 2020-12-16 DIAGNOSIS — S06.9X5S: ICD-10-CM

## 2020-12-16 DIAGNOSIS — R20.2 PARESTHESIA: ICD-10-CM

## 2020-12-16 LAB
ALBUMIN SERPL BCP-MCNC: 4.9 G/DL (ref 3.2–4.9)
ALBUMIN/GLOB SERPL: 1.5 G/DL
ALP SERPL-CCNC: 72 U/L (ref 30–99)
ALT SERPL-CCNC: 15 U/L (ref 2–50)
ANION GAP SERPL CALC-SCNC: 11 MMOL/L (ref 7–16)
AST SERPL-CCNC: 14 U/L (ref 12–45)
BASOPHILS # BLD AUTO: 0.9 % (ref 0–1.8)
BASOPHILS # BLD: 0.06 K/UL (ref 0–0.12)
BILIRUB SERPL-MCNC: 0.6 MG/DL (ref 0.1–1.5)
BUN SERPL-MCNC: 11 MG/DL (ref 8–22)
CALCIUM SERPL-MCNC: 10.1 MG/DL (ref 8.5–10.5)
CHLORIDE SERPL-SCNC: 103 MMOL/L (ref 96–112)
CO2 SERPL-SCNC: 26 MMOL/L (ref 20–33)
CREAT SERPL-MCNC: 0.75 MG/DL (ref 0.5–1.4)
CRP SERPL HS-MCNC: 0.09 MG/DL (ref 0–0.75)
EOSINOPHIL # BLD AUTO: 0.14 K/UL (ref 0–0.51)
EOSINOPHIL NFR BLD: 2 % (ref 0–6.9)
ERYTHROCYTE [DISTWIDTH] IN BLOOD BY AUTOMATED COUNT: 47.1 FL (ref 35.9–50)
ERYTHROCYTE [SEDIMENTATION RATE] IN BLOOD BY WESTERGREN METHOD: 2 MM/HOUR (ref 0–20)
EST. AVERAGE GLUCOSE BLD GHB EST-MCNC: 111 MG/DL
GLOBULIN SER CALC-MCNC: 3.3 G/DL (ref 1.9–3.5)
GLUCOSE SERPL-MCNC: 96 MG/DL (ref 65–99)
HBA1C MFR BLD: 5.5 % (ref 0–5.6)
HCT VFR BLD AUTO: 49.8 % (ref 42–52)
HGB BLD-MCNC: 16.2 G/DL (ref 14–18)
IMM GRANULOCYTES # BLD AUTO: 0.01 K/UL (ref 0–0.11)
IMM GRANULOCYTES NFR BLD AUTO: 0.1 % (ref 0–0.9)
LYMPHOCYTES # BLD AUTO: 2.89 K/UL (ref 1–4.8)
LYMPHOCYTES NFR BLD: 41.8 % (ref 22–41)
MCH RBC QN AUTO: 29.7 PG (ref 27–33)
MCHC RBC AUTO-ENTMCNC: 32.5 G/DL (ref 33.7–35.3)
MCV RBC AUTO: 91.4 FL (ref 81.4–97.8)
MONOCYTES # BLD AUTO: 0.54 K/UL (ref 0–0.85)
MONOCYTES NFR BLD AUTO: 7.8 % (ref 0–13.4)
NEUTROPHILS # BLD AUTO: 3.28 K/UL (ref 1.82–7.42)
NEUTROPHILS NFR BLD: 47.4 % (ref 44–72)
NRBC # BLD AUTO: 0 K/UL
NRBC BLD-RTO: 0 /100 WBC
PLATELET # BLD AUTO: 210 K/UL (ref 164–446)
PMV BLD AUTO: 11.9 FL (ref 9–12.9)
POTASSIUM SERPL-SCNC: 4.1 MMOL/L (ref 3.6–5.5)
PROT SERPL-MCNC: 8.2 G/DL (ref 6–8.2)
RBC # BLD AUTO: 5.45 M/UL (ref 4.7–6.1)
SODIUM SERPL-SCNC: 140 MMOL/L (ref 135–145)
TSH SERPL DL<=0.005 MIU/L-ACNC: 2.52 UIU/ML (ref 0.38–5.33)
VIT B12 SERPL-MCNC: 423 PG/ML (ref 211–911)
WBC # BLD AUTO: 6.9 K/UL (ref 4.8–10.8)

## 2020-12-16 PROCEDURE — 86140 C-REACTIVE PROTEIN: CPT

## 2020-12-16 PROCEDURE — 84443 ASSAY THYROID STIM HORMONE: CPT

## 2020-12-16 PROCEDURE — 82607 VITAMIN B-12: CPT

## 2020-12-16 PROCEDURE — 80053 COMPREHEN METABOLIC PANEL: CPT

## 2020-12-16 PROCEDURE — 36415 COLL VENOUS BLD VENIPUNCTURE: CPT

## 2020-12-16 PROCEDURE — 84165 PROTEIN E-PHORESIS SERUM: CPT

## 2020-12-16 PROCEDURE — 86038 ANTINUCLEAR ANTIBODIES: CPT

## 2020-12-16 PROCEDURE — 85652 RBC SED RATE AUTOMATED: CPT

## 2020-12-16 PROCEDURE — 83036 HEMOGLOBIN GLYCOSYLATED A1C: CPT

## 2020-12-16 PROCEDURE — 84155 ASSAY OF PROTEIN SERUM: CPT

## 2020-12-16 PROCEDURE — 85025 COMPLETE CBC W/AUTO DIFF WBC: CPT

## 2020-12-16 PROCEDURE — 72141 MRI NECK SPINE W/O DYE: CPT

## 2020-12-17 ENCOUNTER — PHYSICAL THERAPY (OUTPATIENT)
Dept: PHYSICAL THERAPY | Facility: REHABILITATION | Age: 65
End: 2020-12-17
Attending: PHYSICAL MEDICINE & REHABILITATION
Payer: COMMERCIAL

## 2020-12-17 DIAGNOSIS — I60.9 SUBARACHNOID HEMORRHAGE WITH LOSS OF CONSCIOUSNESS OF LESS THAN 1 HOUR (HCC): ICD-10-CM

## 2020-12-17 PROCEDURE — 97012 MECHANICAL TRACTION THERAPY: CPT

## 2020-12-17 PROCEDURE — 97110 THERAPEUTIC EXERCISES: CPT
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
## 2020-12-17 NOTE — OP THERAPY DAILY TREATMENT
Outpatient Physical Therapy  DAILY TREATMENT     [facility] Physical [address]  [address]  [place] [address]  Phone:  [phone]  Fax:  [phone]    Date: 12/17/2020    Patient: Reyes Amezcua  YOB: 1955  MRN: [id number]     Time Calculation    Start time: 1332  1425 53 min         Chief Complaint: Shoulder Problem    *Pt present with wifeDiane    Visit #: 23    SUBJECTIVE:  I've started noticing my shoulder has been spasming the past week. I still notice some problems in the top of my shoulder when I'm walking but it's better when I bring my shoulders back.      Objective: See DC note for additional findings      Therapeutic Exercises (CPT 01812):     2. Verbal review of hep, modified current hep for maintenance; updated HO sheets provided    8. Modified box, 15x with level I TB, reviewed    9. Pt education, re: discussion of management of muscle spasms using ice, isometrics; if not improving follow up with PCP for medical origin    10. Coordination of care, re: future appts with PCP and neurologist    11. GH isometrics, demonstration with HO provided     12. Goals assessment    13. Objective measures, discussion with pt and wife regarding improvements/progress made in therapy    16. UPOC: 11/27/20      Therapeutic Exercise Summary: Pt performed these exercises with instruction and SPV.  Provided handout with these exercises for daily HEP.    Vitals in supine after modified box exercise:  74 bpm  98% SPO2       *Vestibular therapist followed up with pt and wife x 5 min to review maintenance hep and ask questions re: dizziness.            Therapeutic Treatments and Modalities:     2. Mechanical Traction (CPT 05097), c/s traction 18/9# with [facility] x15 min    Therapeutic Treatment and Modalities Summary:         Time-based treatments/modalities:    Physical Therapy Timed Treatment Charges  Therapeutic exercise minutes (CPT 66475): 38 minutes     ASSESSMENT:   Response  to treatment:   See DC note      PLAN/RECOMMENDATIONS:   Plan for treatment: See DC note for details

## 2020-12-18 LAB — NUCLEAR IGG SER QL IA: NORMAL

## 2020-12-19 LAB
ALBUMIN SERPL ELPH-MCNC: 4.6 G/DL (ref 3.75–5.01)
ALPHA1 GLOB SERPL ELPH-MCNC: 0.25 G/DL (ref 0.19–0.46)
ALPHA2 GLOB SERPL ELPH-MCNC: 0.78 G/DL (ref 0.48–1.05)
B-GLOBULIN SERPL ELPH-MCNC: 0.82 G/DL (ref 0.48–1.1)
GAMMA GLOB SERPL ELPH-MCNC: 1.26 G/DL (ref 0.62–1.51)
INTERPRETATION SERPL IFE-IMP: NORMAL
MONOCLON BAND OBS SERPL: NORMAL
PATHOLOGY STUDY: NORMAL
PROT SERPL-MCNC: 7.7 G/DL (ref 6.3–8.2)

## 2020-12-21 LAB
ALBUMIN 24H MFR UR ELPH: 71.2 %
ALPHA1 GLOB 24H MFR UR ELPH: 6.5 %
ALPHA2 GLOB 24H MFR UR ELPH: 15.9 %
B-GLOBULIN 24H MFR UR ELPH: 5.6 %
COLLECT DURATION TIME SPEC: NORMAL HRS
EER MONOCLONAL PROTEIN STUDY, 24 HOUR U Q5964: NORMAL
GAMMA GLOB 24H MFR UR ELPH: 0.8 %
INTERPRETATION UR IFE-IMP: NORMAL
M PROTEIN 24H MFR UR ELPH: 0 %
M PROTEIN 24H UR ELPH-MRATE: NORMAL MG/24 HRS
PROT 24H UR-MRATE: NORMAL MG/D (ref 40–150)
PROT UR-MCNC: 5 MG/DL
SPECIMEN VOL ?TM UR: NORMAL ML

## 2021-01-05 ENCOUNTER — APPOINTMENT (OUTPATIENT)
Dept: OPHTHALMOLOGY | Facility: MEDICAL CENTER | Age: 66
End: 2021-01-05
Payer: COMMERCIAL

## 2021-01-07 ENCOUNTER — APPOINTMENT (OUTPATIENT)
Dept: OCCUPATIONAL THERAPY | Facility: REHABILITATION | Age: 66
End: 2021-01-07
Attending: PHYSICAL MEDICINE & REHABILITATION
Payer: COMMERCIAL

## 2021-01-08 NOTE — DISCHARGE INSTRUCTIONS
[facility] NURSING DISCHARGE INSTRUCTIONS    Blood Pressure: 116/68  Weight: 69.4 kg (153 lb)  Nursing recommendations for Reyes Amezcua at time of discharge are as follows:  Client verbalized understanding of all discharge instructions and prescriptions.     Review all your home medications and newly ordered medications with your doctor and/or pharmacist. Follow medication instructions as directed by your doctor and/or pharmacist.    Pain Management:   Discharge Pain Medication Instructions:  Comfort Goal: Comfort with Movement, Sleep Comfortably  Notify your primary care provider if pain is unrelieved with these measures, if the pain is new, or increased in intensity.    Discharge Skin Characteristics:    Discharge Skin Exam:    Wound 05/12/20 Chin (Active)     Skin / Wound Care Instructions: Please contact your primary care physician for any change in skin integrity.     If You Have Surgical Incisions / Wounds:  Monitor surgical site(s) for signs of increased swelling, redness or symptoms of drainage from the site or fever as this could indicate signs and symptoms of infection. If these symptoms are noted, notifiy your primary care provider.      Discharge Safety Instructions: Should Not Be Left Alone In The House     Discharge Safety Concerns: Other (Comments)(Impulsivity)  The interdisciplinary team has made recommendation that you should not be left alone  in the house due to Impulsivity.  Anti-embolic stockings are required during the day and off at night to increase circulation to the lower extremities.    Discharge Diet: Regular     Discharge Liquids: Thin  Discharge Bowel Function: Continent  Please contact your primary care physician for any changes in bowel habits.  Discharge Bowel Program:    Discharge Bladder Function: Continent  Discharge Urinary Devices:        Nursing Discharge Plan:        Case Management Discharge Instructions:   Discharge Location:    Agency  Name/Address/Phone:    Home Health:    Outpatient Services:    [facility] Provider/Phone:    Medical Equipment Ordered:    Prescription Faxed to:        Discharge Medication Instructions:  Below are the medications your physician expects you to take upon discharge:    Fall Prevention in the Home  Falls can cause injuries and can affect people from all age groups. There are many simple things that you can do to make your home safe and to help prevent falls.  What can I do on the outside of my home?  · Regularly repair the edges of walkways and driveways and fix any cracks.  · Remove high doorway thresholds.  · Trim any shrubbery on the main path into your home.  · Use bright outdoor lighting.  · Clear walkways of debris and clutter, including tools and rocks.  · Regularly check that handrails are securely fastened and in good repair. Both sides of any steps should have handrails.  · Install guardrails along the edges of any raised decks or porches.  · Have leaves, snow, and ice cleared regularly.  · Use sand or salt on walkways during winter months.  · In the garage, clean up any spills right away, including grease or oil spills.  What can I do in the bathroom?  · Use night lights.  · Install grab bars by the toilet and in the tub and shower. Do not use towel bars as grab bars.  · Use non-skid mats or decals on the floor of the tub or shower.  · If you need to sit down while you are in the shower, use a plastic, non-slip stool.  · Keep the floor dry. Immediately clean up any water that spills on the floor.  · Remove soap buildup in the tub or shower on a regular basis.  · Attach bath mats securely with double-sided non-slip rug tape.  · Remove throw rugs and other tripping hazards from the floor.  What can I do in the bedroom?  · Use night lights.  · Make sure that a bedside light is easy to reach.  · Do not use oversized bedding that drapes onto the floor.  · Have a firm chair that has side arms to use for getting  dressed.  · Remove throw rugs and other tripping hazards from the floor.  What can I do in the kitchen?  · Clean up any spills right away.  · Avoid walking on wet floors.  · Place frequently used items in easy-to-reach places.  · If you need to reach for something above you, use a sturdy step stool that has a grab bar.  · Keep electrical cables out of the way.  · Do not use floor polish or wax that makes floors slippery. If you have to use wax, make sure that it is non-skid floor wax.  · Remove throw rugs and other tripping hazards from the floor.  What can I do in the stairways?  · Do not leave any items on the stairs.  · Make sure that there are handrails on both sides of the stairs. Fix handrails that are broken or loose. Make sure that handrails are as long as the stairways.  · Check any carpeting to make sure that it is firmly attached to the stairs. Fix any carpet that is loose or worn.  · Avoid having throw rugs at the top or bottom of stairways, or secure the rugs with carpet tape to prevent them from moving.  · Make sure that you have a light switch at the top of the stairs and the bottom of the stairs. If you do not have them, have them installed.  What are some other fall prevention tips?  · Wear closed-toe shoes that fit well and support your feet. Wear shoes that have rubber soles or low heels.  · When you use a stepladder, make sure that it is completely opened and that the sides are firmly locked. Have someone hold the ladder while you are using it. Do not climb a closed stepladder.  · Add color or contrast paint or tape to grab bars and handrails in your home. Place contrasting color strips on the first and last steps.  · Use mobility aids as needed, such as canes, walkers, scooters, and crutches.  · Turn on lights if it is dark. Replace any light bulbs that burn out.  · Set up furniture so that there are clear paths. Keep the furniture in the same spot.  · Fix any uneven floor surfaces.  · Choose a  carpet design that does not hide the edge of steps of a stairway.  · Be aware of any and all pets.  · Review your medicines with your healthcare provider. Some medicines can cause dizziness or changes in blood pressure, which increase your risk of falling.  Talk with your health care provider about other ways that you can decrease your risk of falls. This may include working with a physical therapist or  to improve your strength, balance, and endurance.  This information is not intended to replace advice given to you by your health care provider. Make sure you discuss any questions you have with your health care provider.  Document Released: 12/08/2003 Document Revised: 05/16/2017 Document Reviewed: 01/22/2016  [employer] Patient Education © 2017 [employer]        Depression / Suicide Risk    As you are discharged from this [facility] facility, it is important to learn how to keep safe from harming yourself.    Recognize the warning signs:  · Abrupt changes in personality, positive or negative- including increase in energy   · Giving away possessions  · Change in eating patterns- significant weight changes-  positive or negative  · Change in sleeping patterns- unable to sleep or sleeping all the time   · Unwillingness or inability to communicate  · Depression  · Unusual sadness, discouragement and loneliness  · Talk of wanting to die  · Neglect of personal appearance   · Rebelliousness- reckless behavior  · Withdrawal from people/activities they love  · Confusion- inability to concentrate     If you or a loved one observes any of these behaviors or has concerns about self-harm, here's what you can do:  · Talk about it- your feelings and reasons for harming yourself  · Remove any means that you might use to hurt yourself (examples: pills, rope, extension cords, firearm)  · Get professional help from the community (Mental Health, Substance Abuse, psychological counseling)  · Do not be alone:Call  "your Safe Contact- someone whom you trust who will be there for you.  · Call your local CRISIS HOTLINE 399-6495 or 055-760-7792  · Call your local Children's Mobile Crisis Response Team [place] (338) 173-2097 or www.[employer]  · Call the toll free National Suicide Prevention Hotlines   · National Suicide Prevention Lifeline 563-133-GQPS (8006)  · [employer] Hope Line Network 800-SUICIDE (788-5607)                    Speech Therapy Discharge Instructions for Reyes Amezcua    6/22/2020    Diet: Regular (7), Thin (0)  Swallow Strategies: eat slowly, if needed set your utensil down between bites as a pacing strategy.  Supervision: Reyes, you have made tremendous progress in a few short weeks! Great job here at Rehab. Keep up the great work at home and best of luck in your continued recovery! ~ Trupti Davila, MS, CCC-SLP  Just a couple things to remember:   Expressive Aphasia: This disorder refers to impairment in expressed speech. With expressive aphasia, the patient knows what he or she wants to say, but cannot find the words to say it.  In mild cases, only an occasional word or two is lost, and the communication can proceed fairly normally. In more severe cases, most or all words can be lost, and the person needs to find an alternative means of communication.  Below are some strategies you can use to help your loved one express information:     What is Circumlocution?    Circumlocution (sir-cum-low-cue-shun) is the act of describing many features of an object, event, or action without saying the exact word for the object, event, or action.     It is a common strategy used by individuals with word finding difficulties. When at a loss for an intended word they will often describe the word to get their message across (and at the same time try to cue the word they're looking for).     For example, if an individual with word finding difficulty couldn't think of the word, axe, they might say;   \"It's the thing " "that chops wood. You know, lumberjacks use them; it has a long handle and a sharp edge. They chop down trees with it ... what is that thing called?\"    Using \"Cues\" to help Word Retrieval    Cues are simply giving someone (or yourself) a hint or clue as to what the missing word might be. Remember, you usually know the word you're trying to retrieve, so a clue might help you find it more rapidly.   There are two types of useful cues:    1. Phonemic Cues: These cues use sounds to aid in a patient's word retrieval. The first vowel or consonant sound of the missing word is typically used as the phonemic cue.  For example, if the missing word is soup you could cue that word by making an extended \"S\" sound. You could say, \"Oh, you're thinking of a word that starts with Sssssss ...\"  Hearing the initial \"S\" sound can sometimes trigger an individual's memory of the full word.   Another strategy linked to phonemic cues is rhyming. For example, if a patient is having difficulty retrieving the word free, you could give them a cue by telling them that the word they are looking for rhymes with tree or three.    2. Semantic Cues: These cues are category or background clues.   A category cue would provide the category or group the missing word belongs to. For example, if the missing word is horse, you could cue that word by saying, \"It's a farm animal.\"   A background cue would state a function of the word. For example, if the missing word is hammer, you could say, \"It is used to pound nails.\"     The Cloze Exercise  Another semantic strategy is using a cloze exercise. This requires using an understanding of all the words in a sentence to give you a good idea of what the missing word might be.     For example, if the target word is door, you could say, \"I unlocked the front _________.\"   You wouldn't unlock the front tree, so the strategy here is to use the related information (unlock and front) to help determine what the target " word is (door).    Receptive Aphasia: This disorder involves an impairment in which the patient cannot understand the spoken word. In mild cases, only an occasional word or two sounds garbled or incomprehensible, and communication can proceed because the patient is able to get the essence of the communication based on the context.   In severe cases, the patient may experience most or all of the communication as if nonsense syllables are being spoken.  In moderate cases the patient is able to understand part, but not all of the communication. Sometimes, when patients appear to be noncompliant, it is because they have misunderstood the communication in the first place.  Below are some strategies you can use to help your loved one understand information:    o Allow the patient ample time to respond to questions and instructions. Most aphasic patients require more time to process incoming and outgoing messages. Let the patient try to speak before you supply the word for him or ask further questions to determine the message.     o Keep instructions and explanations simple. If the comprehension problem is severe, single-word instructions may help the patient to recall words and make understanding easier. Try to keep your conversations geared to the patient’s immediate needs and surroundings.     o As direct questions requiring “yes” or “no” answers. Encourage gestures and pointing whenever possible. Frequently this will enable the patient to say the word, and can help reduce frustration.     o Write out key words and phrases while you talk. For example, “What would you like for lunch? Soup or a sandwich?” while writing “soup” and “sandwich”. Point to each word while you say it. This will help with comprehension, while also activating reading and speaking centers in the brain.    o You may feel that your family member’s understanding is good, when it may be more impaired than you realize. You may become upset with your  spouse because they don’t seem to respond to what you are saying, or that they are “forgetting” what was just discussed. People with aphasia may react appropriately during social situations, which gives the impression that they understand the conversation. This may be a successful communication situation on the surface, but they may not understand some of the message. It can be a problem when the conversation is fast and there are many people talking.    o Use gestures a lot when you are talking, like an informal sign language. Your gesture represents the idea or object. Examples of this would be using your hand in a cutting motion for “cut”, your finger to your temple when saying “think”, or pointing to the cake when you talk about it “Do you want some cake?”    Executive function is a set of mental skills that help you get things done. These skills are controlled by an area of the brain called the frontal lobe.    Executive function helps you:  • Manage time  • Pay attention  • Switch focus  • Plan and organize  • Remember details  • Avoid saying or doing the wrong thing  • Do things based on your experience  • Multitask    When executive function isn’t working as it should, your behavior is less controlled. This can affect your ability to:  • Work or go to school  • Do things independently  • Maintain relationships    How to Manage Executive Function Problems  • Take a step-by-step approach to work.  • Rely on visual organizational aids.  • Use tools like time organizers, computers, or watches with alarms.  • Make schedules and look at them several times a day.  • Ask for written and oral instructions whenever possible.  • Plan for transition times and shifts in activities.  • Allow and budget extra time to get tasks done.    • When you are about to try a task that you haven’t done in a while (or struggled with the last time), think about the steps involved, try to anticipate possible challenges and identify what  might give you trouble, come up with a plan to compensate of those challenges, and evaluate after the fact - to determine if your plan worked or if it needs adjustment.  •  If you experience an outcome that you didn’t expect, think back to what may have contributed to the problem.   • Break complex problems down into smaller parts.   Sometimes a complicated task can be overwhelming, but if you break it down into components, you will be able to find a place where you can cope with the information.    •  Be patient and persistent.    • Develop tools and strategies that will help organize, filter and narrow down information so that you can deal with it effectively.    To increase your ability to pay attention and concentrate it is recommended you follow these strategies:     Monitor your fatigue: Monitoring our fatigue is probably one of the most important strategies we can use. Many people suffer from higher levels of fatigue than normal and must be aware that fatigue will have a major impact on attention capabilities. This means scheduling activities that require your attention at times when you feel at your best.  Make a schedule: Choose a time that’s quiet and unhurried -- maybe at night before you go to bed -- and plan out the next day, down to the task. Use a reminder candi, timer, or alarm to help you stick to that schedule. Alternate things you want to do with ones you don’t to help your mind stay engaged.  Allow plenty of time to complete tasks.  Be realistic about time: Your brain is wired differently than other people’s, and it may take you longer to get things done. That’s OK. Figure out a realistic time frame for your daily tasks -- and don’t forget to build in time for breaks if you think you’ll need them.  Quiet your mind by quieting your space: When it’s time to buckle down and get something done, take away the distractions. Use noise-canceling headphones to drown out sounds. Put your phone on silent.  Work in a room with a door you can close. If you can do your job from home, set up the space in a way that helps you focus.  Control clutter: Another way to quiet your brain is to clear your space of things you don’t need. It can prevent distractions, and it can help you stay organized because you’ll have fewer things to tidy up.  Get some organizational helpers like under-the-bed containers or over-the-door holders. Ask a friend to help if it seems like you’re swimming in a sea of debris and you don’t know where to start.    What is memory?   Memory is the ability to learn, store, and retrieve information. New or increasing problems with any or all of these 3 stages of memory often occur after a traumatic brain injury, stroke, brain tumor, multiple sclerosis, or other kind of injury or illness that affects your nervous system.   Some kinds of memory problems may also occur as part of normal aging, when many people have more trouble retrieving new information.     Types of Memory:    •Long-term (remote): memory for old, well-learned information that has been “rehearsed” (used) over time, such as the name of a childhood pet, memories of past vacations, or where you went to high school. Long-term memory tends to be retained after injury or illness.   •Short-term (recent): memory for new experiences that took place a few minutes, hours, or days ago, such as what you had for breakfast or what you did yesterday. Short-term memory tends to be the most severely affected after injury. People who have had brain injuries may have problems with their attention span, how much memory they can store, how quickly they can think, and how efficiently they learn. These memory problems will make it hard to understand and save short-term memories so that they can be correctly rehearsed and stored in long-term memory.   •Immediate (working): memory for information that is current, that you usually keep track of mentally, such as a phone  number you look up, directions someone just gave you, or keeping track of numbers in your head when you add or subtract.   •Prospective: the ability to remember to do something in the future, such as remembering to take a medicine, go to an appointment, or follow through on an assignment or project.       Strategies to Help Improve Your Memory   Your speech therapist can work with you to develop strategies to help you remember new information. There are 2 main types of strategies to help your memory: internal reminders and external reminders.     Internal Reminders     •Rehearsal: retelling yourself information you just learned, or restating it out loud in your own words.   •Repetition: saying the same information over and over, either silently or out loud.   •Clarification: asking someone else to repeat or rephrase information.   •Chunking: grouping items together to reduce the number of items to remember, such as grouping 7-digit phone numbers into 2 chunks, one with 3 numbers and the other with 4 numbers.   •Rhyming: making a rhyme out of important information.   •Acronyms or alphabet cueing: creating a letter for each word you want to remember, or vice versa. One example is using the sentence “Every Good Boy Does Fine” to remember that the lines of a treble staff in music are the notes E, G, B, D, and F.   •Imagery (also called visualization): creating pictures of the information in your mind.   •Association: linking old information or habits with the new, such as remembering to take your medicine every night at the same time that you brush your teeth.   •Personal meaning: making the new information meaningful or emotionally important to you in some way.     External Reminders     •Using a paper or electronic calendar or day planner.   •Setting timers or alarms to remind you to do something.   •Writing down reminders such as to-do lists, shopping lists, and project outlines.   •Recording new information with a  voice recorder.   •Using a medicine organizing tool.   •Creating specific, permanent places for important items. One example is always putting your keys, wallet, and cell phone in the same place every time you get home.      Occupational Therapy Discharge Instructions for Reyes Amezcua    6/22/2020    Level of Assist Required for Eating: Able to Complete Eating without Assist  Level of Assist Required for Grooming: Able to Complete Grooming without Assist  Level of Assist Required for Dressing: Able to Complete Dressing without Assist  Level of Assist Required for Toileting: Able to Complete Toileting without Assist  Level of Assist Required for Toilet Transfer: Able to Complete Toilet Transfer without Assist  Level of Assist Required for Bathing: Requires Supervision with Bathing  Level of Assist Required for Shower Transfer: Requires Supervision with Shower Transfer  Level of Assist Required for Home Mgmt: Requires Supervision with Home Management  Level of Assist Required for Meal Prep: Requires Supervision with Meal Preparation  Driving: May not Drive, Please Contact Physician for Further Information  Home Exercise Program: Refer to Home Exercise Program Handout for Details  Comments: It was great working with you Reyes! Enjoy being home with your family, I know you will continue to heal and get stronger. Please feel free to reach out with and questions and to keep us updated! -Trupti [email or web address]    _______________________________________________________________________________    Physical Therapy Discharge Instructions for Reyes Amezcua    6/22/2020    Weight Bearing Status - Patient Should: Bear Weight as Tolerated Right Arm  Level of Assist Required for Ambulation: Supervision on Flat Surfaces, Supervision on Curbs, Supervision on Stairs(Recommend use of railings on stairs)  Distance Patient May Ambulate: 2000 ft or until fatigued  Device Recommended for Ambulation: None  Level of Assist  Required for Transfers: Requires No Assist  Device Recommended for Transfers: None  Home Exercise Program: Refer to Home Exercise Program Handout for Details    Thank you for all of hard work!  Good luck back home!  Dinora MENDOZA, DPT  _____________________________________________________________________________________________         Labs/Imaging Studies/Medications

## 2021-02-25 ENCOUNTER — OCCUPATIONAL THERAPY (OUTPATIENT)
Dept: OCCUPATIONAL THERAPY | Facility: REHABILITATION | Age: 66
End: 2021-02-25
Attending: PHYSICAL MEDICINE & REHABILITATION
Payer: COMMERCIAL

## 2021-02-25 DIAGNOSIS — Z74.09 IMPAIRED MOBILITY AND ACTIVITIES OF DAILY LIVING: ICD-10-CM

## 2021-02-25 DIAGNOSIS — Z78.9 IMPAIRED MOBILITY AND ACTIVITIES OF DAILY LIVING: ICD-10-CM

## 2021-02-25 DIAGNOSIS — S06.300A UNSPECIFIED FOCAL TRAUMATIC BRAIN INJURY WITHOUT LOSS OF CONSCIOUSNESS, INITIAL ENCOUNTER (HCC): ICD-10-CM

## 2021-02-25 PROCEDURE — 97530 THERAPEUTIC ACTIVITIES: CPT

## 2021-02-25 PROCEDURE — 97166 OT EVAL MOD COMPLEX 45 MIN: CPT

## 2021-02-25 SDOH — ECONOMIC STABILITY: GENERAL: QUALITY OF LIFE: FAIR

## 2021-02-25 ASSESSMENT — ENCOUNTER SYMPTOMS: PAIN SCALE: 0

## 2021-02-25 ASSESSMENT — ACTIVITIES OF DAILY LIVING (ADL): AMBULATION_WITHOUT_ASSISTIVE_DEVICE: INDEPENDENT
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
## 2021-03-01 ASSESSMENT — ACTIVITIES OF DAILY LIVING (ADL): POOR_BALANCE: 1

## 2021-03-01 ASSESSMENT — BALANCE ASSESSMENTS
BALANCE - SITTING STATIC: GOOD
BALANCE - STANDING STATIC: FAIR +
BALANCE - STANDING DYNAMIC: FAIR +
BALANCE - SITTING DYNAMIC: GOOD

## 2021-03-02 ENCOUNTER — OCCUPATIONAL THERAPY (OUTPATIENT)
Dept: OCCUPATIONAL THERAPY | Facility: REHABILITATION | Age: 66
End: 2021-03-02
Attending: PHYSICAL MEDICINE & REHABILITATION
Payer: COMMERCIAL

## 2021-03-02 DIAGNOSIS — Z78.9 IMPAIRED MOBILITY AND ACTIVITIES OF DAILY LIVING: ICD-10-CM

## 2021-03-02 DIAGNOSIS — S06.300A UNSPECIFIED FOCAL TRAUMATIC BRAIN INJURY WITHOUT LOSS OF CONSCIOUSNESS, INITIAL ENCOUNTER (HCC): ICD-10-CM

## 2021-03-02 DIAGNOSIS — Z74.09 IMPAIRED MOBILITY AND ACTIVITIES OF DAILY LIVING: ICD-10-CM

## 2021-03-02 PROCEDURE — 97112 NEUROMUSCULAR REEDUCATION: CPT

## 2021-03-02 PROCEDURE — 97110 THERAPEUTIC EXERCISES: CPT

## 2021-03-02 NOTE — OP THERAPY DAILY TREATMENT
"  Outpatient Occupational Therapy  DAILY TREATMENT     [facility] Occupational Therapy [address].  [address]  [place] [address]  Phone:  [phone]  Fax:  [phone]    Date: 03/02/2021    Patient: Reyes Amezcua  YOB: 1955  MRN: [id number]     Time Calculation  Start time: 0900  Stop time: 1000 Time Calculation (min): 60 minutes         Chief Complaint: Hand Weakness (dec RH FMC) and Other (visual efficiency, visual-vestibular, cognition)    Visit #: 2    SUBJECTIVE: \"I feel lightheaded when I stood up after gardening\"    OBJECTIVE:  Current objective measures:   Saccades with intermittent dysmetria during rightward gaze (undershooting)  Opposition: RH with mildly decreased speed and accuracy  Sequence of trunk flexion/sit/stand/ head turns with reports of spatial disorientation          Therapeutic Exercises (CPT 95757):     1.     Therapeutic Exercise Summary:  Instructed on and performed saccades exercises horizontal and vertical patterns x 20 reps each, to perform 3 x day.   FMC exercise using tongue depressor for rotation x 30 reps bilateral directions, flipping forward/backward between thumb/IF/MF.      Therapeutic Treatments and Modalities:    1. Neuromuscular Re-education (CPT 28464)    Therapeutic Treatments and Modalities Summary: Cawthorne-Cooksey Habituation exercises: eye movements up/down and side/side EO x 20 reps each; convergence x 20 reps, head movements up/down and side/side EO x 20 reps, shoulder shrugs x 20 reps.   rolling medium sized ball down to ankles with neck flexion, roll back up to thighs with head in midline x 20 reps.  To perform 2 x day.      Time-based treatments/modalities:  Therapeutic exercise minutes (CPT 91270): 30 minutes  Neuromusc re-ed minutes (CPT 93393): 30 minutes        ASSESSMENT:   Response to treatment: Pt making progress with his visual-vestibular function in response to graded Cawthorne-Cooksey Habituation exercises with " mild provocation of symptoms of spatial disorientation which subsided following a couple minute rest break.  Pt also making gains with his oculomotor and FMC.  HEP updated with good understanding.    PLAN/RECOMMENDATIONS:   Plan for treatment: therapy treatment to continue next visit.  Planned interventions for next visit: continue with current treatment, neuromuscular re-education (CPT 79555), therapeutic activities (CPT 74407) and therapeutic exercise (CPT 87580)

## 2021-03-03 DIAGNOSIS — Z23 NEED FOR VACCINATION: ICD-10-CM

## 2021-03-04 ENCOUNTER — OCCUPATIONAL THERAPY (OUTPATIENT)
Dept: OCCUPATIONAL THERAPY | Facility: REHABILITATION | Age: 66
End: 2021-03-04
Attending: PHYSICAL MEDICINE & REHABILITATION
Payer: COMMERCIAL

## 2021-03-04 DIAGNOSIS — Z78.9 IMPAIRED MOBILITY AND ACTIVITIES OF DAILY LIVING: ICD-10-CM

## 2021-03-04 DIAGNOSIS — S06.300A UNSPECIFIED FOCAL TRAUMATIC BRAIN INJURY WITHOUT LOSS OF CONSCIOUSNESS, INITIAL ENCOUNTER (HCC): ICD-10-CM

## 2021-03-04 DIAGNOSIS — Z74.09 IMPAIRED MOBILITY AND ACTIVITIES OF DAILY LIVING: ICD-10-CM

## 2021-03-04 PROCEDURE — 97112 NEUROMUSCULAR REEDUCATION: CPT

## 2021-03-04 NOTE — OP THERAPY DAILY TREATMENT
"  Outpatient Occupational Therapy  DAILY TREATMENT     [facility] Occupational [address].  [address]  [place] [address]  Phone:  [phone]  Fax:  [phone]    Date: 03/04/2021    Patient: Reyes Amezcua  YOB: 1955  MRN: [id number]     Time Calculation  Start time: 0900  Stop time: 1000 Time Calculation (min): 60 minutes         Chief Complaint: Hand Weakness (right dec FMC) and Other (visual efficiency, visual-vestibular dysfunction, cognition)    Visit #: 3    SUBJECTIVE: \"I worked on the exercises\"    OBJECTIVE:  Current objective measures:   BH distal digits with mild tactile hypersensitivity, L > R  Vestibular: mild to moderate head-motion provoked spatial disorientation        Therapeutic Treatments and Modalities:    1. Neuromuscular Re-education (CPT 11967)    Therapeutic Treatments and Modalities Summary:  distal digit sensory re-education via towel rubs with open hand x 20 reps, rubbing towel between thumb and each digit x 10 reps, tapping on table with light/medium pressure x 10 cycles, grasp/filter of small pasta x 3 minutes each hand.  Cawthorne-Cooksey Habituation exercises: head movements horizontal/vertical EO with increased speed x 20 reps, EC x 20 reps slower speed, seated level flex trunk/neck to  cones from floor, return to upright, rotate trunk to place cone perpendicular to torso, bilateral directions x 2 sets each side, sit to stands with with EO/EC with changes in head orientation L/R/U/D x 5 reps, arms crossed over chest. Rolling large therapy ball forward with trunk/neck flexion arms outstretched, return to upright, 2 sets of 10 reps.    Time-based treatments/modalities:  Neuromusc re-ed minutes (CPT 16866): 60 minutes        ASSESSMENT:   Response to treatment: Pt making progress with his vestibular function with a decrease in head-motion provoked spatial disorientation in response to habituation exercises described above.  Pt also " responded well to sensory re-education for tactile hypersensitivity.  Written HEP updated with good understanding.    PLAN/RECOMMENDATIONS:   Plan for treatment: therapy treatment to continue next visit.  Planned interventions for next visit: continue with current treatment, neuromuscular re-education (CPT 76040) and therapeutic activities (CPT 66529)

## 2021-03-09 ENCOUNTER — OCCUPATIONAL THERAPY (OUTPATIENT)
Dept: OCCUPATIONAL THERAPY | Facility: REHABILITATION | Age: 66
End: 2021-03-09
Attending: PHYSICAL MEDICINE & REHABILITATION
Payer: COMMERCIAL

## 2021-03-09 ENCOUNTER — OFFICE VISIT (OUTPATIENT)
Dept: PHYSICAL MEDICINE AND REHAB | Facility: REHABILITATION | Age: 66
End: 2021-03-09
Payer: COMMERCIAL

## 2021-03-09 VITALS
SYSTOLIC BLOOD PRESSURE: 112 MMHG | WEIGHT: 158 LBS | RESPIRATION RATE: 18 BRPM | TEMPERATURE: 97.6 F | DIASTOLIC BLOOD PRESSURE: 60 MMHG | HEIGHT: 74 IN | BODY MASS INDEX: 20.28 KG/M2 | HEART RATE: 68 BPM

## 2021-03-09 DIAGNOSIS — M25.561 RIGHT KNEE PAIN, UNSPECIFIED CHRONICITY: ICD-10-CM

## 2021-03-09 DIAGNOSIS — Z74.09 IMPAIRED MOBILITY AND ACTIVITIES OF DAILY LIVING: ICD-10-CM

## 2021-03-09 DIAGNOSIS — Z78.9 IMPAIRED MOBILITY AND ACTIVITIES OF DAILY LIVING: ICD-10-CM

## 2021-03-09 DIAGNOSIS — S06.9X9A TRAUMATIC BRAIN INJURY WITH LOSS OF CONSCIOUSNESS, INITIAL ENCOUNTER (HCC): Primary | ICD-10-CM

## 2021-03-09 DIAGNOSIS — M79.2 NEUROPATHIC PAIN: ICD-10-CM

## 2021-03-09 DIAGNOSIS — S06.300A UNSPECIFIED FOCAL TRAUMATIC BRAIN INJURY WITHOUT LOSS OF CONSCIOUSNESS, INITIAL ENCOUNTER (HCC): ICD-10-CM

## 2021-03-09 DIAGNOSIS — R20.8 ALLODYNIA: ICD-10-CM

## 2021-03-09 PROCEDURE — 97530 THERAPEUTIC ACTIVITIES: CPT

## 2021-03-09 PROCEDURE — 97112 NEUROMUSCULAR REEDUCATION: CPT

## 2021-03-09 PROCEDURE — 99214 OFFICE O/P EST MOD 30 MIN: CPT | Performed by: PHYSICAL MEDICINE & REHABILITATION

## 2021-03-09 RX ORDER — OMEPRAZOLE 20 MG/1
20 CAPSULE, DELAYED RELEASE ORAL
COMMUNITY
Start: 2021-01-13 | End: 2021-09-09

## 2021-03-09 ASSESSMENT — ENCOUNTER SYMPTOMS
FALLS: 0
WEAKNESS: 0
MEMORY LOSS: 0
SHORTNESS OF BREATH: 0
COUGH: 0
SORE THROAT: 0
FOCAL WEAKNESS: 0
CONSTIPATION: 0
BRUISES/BLEEDS EASILY: 0
DIARRHEA: 0
PALPITATIONS: 0
FEVER: 0
CHILLS: 0

## 2021-03-09 ASSESSMENT — FIBROSIS 4 INDEX: FIB4 SCORE: 1.118861855571031545

## 2021-03-09 NOTE — OP THERAPY DAILY TREATMENT
"  Outpatient Occupational Therapy  DAILY TREATMENT     [facility] Occupational Therapy [address].  [address]  [place] [address]  Phone:  [phone]  Fax:  [phone]    Date: 03/09/2021    Patient: Reyes Amezcua  YOB: 1955  MRN: [id number]     Time Calculation  Start time: 0900  Stop time: 1000 Time Calculation (min): 60 minutes         Chief Complaint: Hand Weakness (right, dec FMC) and Other (visual-vestibular dysfunction, visual efficiency, cognitive deficits )    Visit #: 4    SUBJECTIVE: \"I've been doing the eye exercises and I think they are helping, I got the pasta and am doing the hand exercises\"    OBJECTIVE:  Current objective measures:   Saccades with mild dysmetria during rightward gaze (undershooting) 8/10 trials without corrections  Saccades with consistent undershooting in diagonal pattern (lower left/upper right)  [place]: slashing completed in 1 minute 2 seconds, underline and loop in 1 minute 9 seconds with 1 error, able to identify error after scanning  Vestibular: mild to moderate head-motion provoked spatial disorientation following sit to stands        Therapeutic Treatments and Modalities:    1. Therapeutic Activities (CPT 20665)    2. Neuromuscular Re-education (CPT 30460)    Therapeutic Treatments and Modalities Summary: Oculomotor: saccades in diagonal patterns, bilateral directions x 20 reps each.  [place], see above.  Column jumps alternating axes performed with 100% accuracy, minimal extra time.  Seated level for diagonal head/trunk movements towards contralateral knee x 20 reps each direction.  Rolling large therapy ball forward with trunk/neck flexion arms outstretched, return to upright, 1 sets of 10 reps.  Roll ball in semi-circular patterns with changes in neck flexion/neutral at end or arc x 10 reps.  Standing rotating 2.5lbs ball around waist with fixed gaze/rapid head turns x 12 reps each direction.  Standing self ball toss using 2.5lb " ball combined with eye/head movements x 20 reps.  Walking while rotating ball around waist bilateral directions x 2 sets.      Time-based treatments/modalities:  Therapeutic activity minutes (CPT 71588): 15 minutes  Neuromusc re-ed minutes (CPT 34601): 45 minutes        ASSESSMENT:   Response to treatment:  Pt making progress with his visual efficiency skills and visual-vestibular dysfunction in response to habituation exercises described above with reports of a decrease in spatial disorientation following mild provocation of symptoms.  Plan to initiate components of OT pre-driving assessment next visit.    PLAN/RECOMMENDATIONS:   Plan for treatment: therapy treatment to continue next visit.  Planned interventions for next visit: continue with current treatment and therapeutic activities (CPT 39191)
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
## 2021-03-11 ENCOUNTER — OCCUPATIONAL THERAPY (OUTPATIENT)
Dept: OCCUPATIONAL THERAPY | Facility: REHABILITATION | Age: 66
End: 2021-03-11
Attending: PHYSICAL MEDICINE & REHABILITATION
Payer: COMMERCIAL

## 2021-03-11 DIAGNOSIS — Z78.9 IMPAIRED MOBILITY AND ACTIVITIES OF DAILY LIVING: ICD-10-CM

## 2021-03-11 DIAGNOSIS — Z74.09 IMPAIRED MOBILITY AND ACTIVITIES OF DAILY LIVING: ICD-10-CM

## 2021-03-11 DIAGNOSIS — S06.300A UNSPECIFIED FOCAL TRAUMATIC BRAIN INJURY WITHOUT LOSS OF CONSCIOUSNESS, INITIAL ENCOUNTER (HCC): ICD-10-CM

## 2021-03-11 PROCEDURE — 97530 THERAPEUTIC ACTIVITIES: CPT

## 2021-03-12 NOTE — OP THERAPY DAILY TREATMENT
"  Outpatient Occupational Therapy  DAILY TREATMENT     [facility] Occupational Therapy [address]  [address]  [place] [address]  Phone:  [phone]  Fax:  [phone]    Date: 03/11/2021    Patient: Reyes Amezcua  YOB: 1955  MRN: [id number]     Time Calculation  Start time: 0430  Stop time: 0530 Time Calculation (min): 60 minutes         Chief Complaint: Other (visual-vestibular deficits, visual efficiency)    Visit #: 5    SUBJECTIVE: \"I have to take it slow and easy when I start to walk\"    OBJECTIVE:  Current objective measures:  See below           Therapeutic Treatments and Modalities:    1. Therapeutic Activities (CPT 74114)    Therapeutic Treatments and Modalities Summary: Identifying Regulatory Signs: 10/10.  Pass.  [place] Making Test Part B: First trial: error after the number 10.  Second trial: 2 errors after the number 6.  Fail.  SLUMS: 26/30 (deficits in working and STM)  MVPT-3 Visual Closure Subtest:  13/13 in 2 minutes.  Pass.  Vision Glider:  Peripheral vision:  85 degrees each eye for a total of 170 degrees of arc  Contrast sensitivity: Day: normal, Night: adequate  Acuity: Near: 20/20;  Far: 20/20  Depth Perception: Normal  Color Recognition: Normal  Lateral Phoria: normal    Time-based treatments/modalities:  Therapeutic activity minutes (CPT 74597): 60 minutes          ASSESSMENT:   Response to treatment: Pt passed all of the cognitive, visual, and visual-perceptual testing components of the OT pre-driving evaluation with the exception of the Trail Making Test Part B.  Issued extra copies for homework.  Plan to trial the driving simulator next visit.    PLAN/RECOMMENDATIONS:   Plan for treatment: therapy treatment to continue next visit.  Planned interventions for next visit: continue with current treatment and therapeutic activities (CPT 05643)       "

## 2021-03-16 ENCOUNTER — OCCUPATIONAL THERAPY (OUTPATIENT)
Dept: OCCUPATIONAL THERAPY | Facility: REHABILITATION | Age: 66
End: 2021-03-16
Attending: PHYSICAL MEDICINE & REHABILITATION
Payer: COMMERCIAL

## 2021-03-16 DIAGNOSIS — S06.300A UNSPECIFIED FOCAL TRAUMATIC BRAIN INJURY WITHOUT LOSS OF CONSCIOUSNESS, INITIAL ENCOUNTER (HCC): ICD-10-CM

## 2021-03-16 PROCEDURE — 97530 THERAPEUTIC ACTIVITIES: CPT

## 2021-03-18 ENCOUNTER — OCCUPATIONAL THERAPY (OUTPATIENT)
Dept: OCCUPATIONAL THERAPY | Facility: REHABILITATION | Age: 66
End: 2021-03-18
Attending: PHYSICAL MEDICINE & REHABILITATION
Payer: COMMERCIAL

## 2021-03-18 DIAGNOSIS — S06.300A UNSPECIFIED FOCAL TRAUMATIC BRAIN INJURY WITHOUT LOSS OF CONSCIOUSNESS, INITIAL ENCOUNTER (HCC): ICD-10-CM

## 2021-03-18 PROCEDURE — 97530 THERAPEUTIC ACTIVITIES: CPT
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
## 2021-03-25 ENCOUNTER — OCCUPATIONAL THERAPY (OUTPATIENT)
Dept: OCCUPATIONAL THERAPY | Facility: REHABILITATION | Age: 66
End: 2021-03-25
Attending: PHYSICAL MEDICINE & REHABILITATION
Payer: COMMERCIAL

## 2021-03-25 DIAGNOSIS — S06.300A UNSPECIFIED FOCAL TRAUMATIC BRAIN INJURY WITHOUT LOSS OF CONSCIOUSNESS, INITIAL ENCOUNTER (HCC): ICD-10-CM

## 2021-03-25 PROCEDURE — 97530 THERAPEUTIC ACTIVITIES: CPT

## 2021-03-25 PROCEDURE — 97110 THERAPEUTIC EXERCISES: CPT

## 2021-03-25 NOTE — OP THERAPY DAILY TREATMENT
"  Outpatient Occupational Therapy  DAILY TREATMENT     [facility] Occupational Therapy [address].  [address]  [place] [address]  Phone:  [phone]  Fax:  [phone]    Date: 03/25/2021    Patient: Reyes Amezcua  YOB: 1955  MRN: [id number]     Time Calculation  Start time: 0200  Stop time: 0300 Time Calculation (min): 60 minutes         Chief Complaint: Hand Weakness (right, dec sensorimotor) and Other (visual-vestibular deficits)    Visit #: 8    SUBJECTIVE: \"I drove around the neighborhood and felt pretty comfortable\";  Per his wife Kerry, \"He did very well\"    OBJECTIVE:  Current objective measures:   Soft tissue mobility: bilateral extrinsic flexors with mild-moderate tightness  Bilateral hands with mild intrinsic muscle tightness  Opposition: RH with mildly decreased speed and accuracy  Saccades WNL  Vestibular: mild head-motion provoked spatial disorientation following sit to stands        Therapeutic Exercises (CPT 23000):     1. BH/wrists    Therapeutic Exercise Summary:  Bilateral wrist flexor/extensor stretches x 30 second holds x 2 reps each, bilateral flexor stretch x 30 second hold table top, standing inner arm stretch x 30 second holds with elbows extended, to perform 3 reps 3 x day.  Isolated digit extension a.g with 3 second holds, gross abduction/adduction x 10, opposition x 10, tendon gliding x 5 cycles, and \"ok/shoot\" x 20 reps.      Therapeutic Treatments and Modalities:    1. Therapeutic Activities (CPT 37898)    Therapeutic Treatments and Modalities Summary: Standing BH rolling medium sized ball up/down wall within maximum reach including achieving squatting position x 10 reps.  Standing with 1 foot positioned posterior contralateral ankle x 2 reps each, anterior position x 1 rep each, 30 second intervals.  To add to HEP with supervision only.    Time-based treatments/modalities:  Therapeutic exercise minutes (CPT 75722): 45 minutes  Therapeutic activity " minutes (CPT 86262): 15 minutes        ASSESSMENT:   Response to treatment: Pt making progress with his bilateral extrinsic flexor/intrinsic soft tissue mobility, RH motor control, and flexibility for his daily routine along with improved postural control in response to graded exercises/activities described above with brief provocation of vestibular symptoms which resolved upon returning to seated position.  Written HEP updated with good understanding.    PLAN/RECOMMENDATIONS:   Plan for treatment: therapy treatment to continue next visit.  Planned interventions for next visit: continue with current treatment, neuromuscular re-education (CPT 03925), therapeutic activities (CPT 40154) and therapeutic exercise (CPT 37464)

## 2021-03-30 ENCOUNTER — OCCUPATIONAL THERAPY (OUTPATIENT)
Dept: OCCUPATIONAL THERAPY | Facility: REHABILITATION | Age: 66
End: 2021-03-30
Attending: PHYSICAL MEDICINE & REHABILITATION
Payer: COMMERCIAL

## 2021-03-30 DIAGNOSIS — Z74.09 IMPAIRED MOBILITY AND ACTIVITIES OF DAILY LIVING: ICD-10-CM

## 2021-03-30 DIAGNOSIS — S06.300A UNSPECIFIED FOCAL TRAUMATIC BRAIN INJURY WITHOUT LOSS OF CONSCIOUSNESS, INITIAL ENCOUNTER (HCC): ICD-10-CM

## 2021-03-30 DIAGNOSIS — Z78.9 IMPAIRED MOBILITY AND ACTIVITIES OF DAILY LIVING: ICD-10-CM

## 2021-03-30 PROCEDURE — 97112 NEUROMUSCULAR REEDUCATION: CPT

## 2021-03-30 PROCEDURE — 97530 THERAPEUTIC ACTIVITIES: CPT

## 2021-03-30 NOTE — OP THERAPY DAILY TREATMENT
"  Outpatient Occupational Therapy  DAILY TREATMENT     [facility] Occupational Therapy [address].  [address]  [place] [address]  Phone:  [phone]  Fax:  [phone]    Date: 03/30/2021    Patient: Reyes Amezcua  YOB: 1955  MRN: [id number]     Time Calculation  Start time: 0830  Stop time: 0930 Time Calculation (min): 60 minutes         Chief Complaint: Hand Weakness (right, decreased sensorimotor) and Other (visual-vestibular dysfunction)    Visit #: 9    SUBJECTIVE: \"I tried the exercise putting 1 foot behind the other and it was easier\"    OBJECTIVE:  Current objective measures:   Purdue Pegboard: pin/washer/sleeve sequence 10 rows alternating digits performed in 3 minutes 6 seconds.   strength: Left: 72lbs, Right: 78lbs  LH with distal digit tactile hypersensitivity  Vestibular: mild head-motion provoked spatial disorientation following sit to stands        Therapeutic Treatments and Modalities:    1. Neuromuscular Re-education (CPT 42031)    2. Therapeutic Activities (CPT 21841)    Therapeutic Treatments and Modalities Summary: Purdue Pegboard: pin/washer/sleeve sequence 10 rows alternating digits performed in 3 minutes 6 seconds.   LH sensory re-education via high speed vibration to left volar forearm, palmar hand, and digits including grasp of head of vibrator x 20 reps of 5 second holds, towel rubs x 20 reps, digit tapping with pressure x 10 cycles.    Marching in place on non-compliant surface x 20 reps, marching with contralateral head turns x 20 reps with CGA, marching in place on compliant surface x 20 reps with CGA.   Side-stepping with contralateral head turns x 20 reps.  Stepping forward/backward with opposite vertical head motions x 20 reps.  Turning in place on non-compliant surface 1/4 turns leading with head x 5 cycles each direction with CGA.  To perform each exercises 1 x day.    Time-based treatments/modalities:  Therapeutic activity minutes (CPT " 69126): 30 minutes  Neuromusc re-ed minutes (CPT 61431): 30 minutes          ASSESSMENT:   Response to treatment:  Pt making progress with his strength, motor control, and standing dynamic postural stabilization in response to graded challenges to his visual and vestibular systems as described above.  Written HEP updated with good understanding.    PLAN/RECOMMENDATIONS:   Plan for treatment: therapy treatment to continue next visit.  Planned interventions for next visit: continue with current treatment, neuromuscular re-education (CPT 80693), therapeutic activities (CPT 60058) and therapeutic exercise (CPT 94576)

## 2021-04-13 ENCOUNTER — OCCUPATIONAL THERAPY (OUTPATIENT)
Dept: OCCUPATIONAL THERAPY | Facility: REHABILITATION | Age: 66
End: 2021-04-13
Attending: PHYSICAL MEDICINE & REHABILITATION
Payer: COMMERCIAL

## 2021-04-13 DIAGNOSIS — S06.300A UNSPECIFIED FOCAL TRAUMATIC BRAIN INJURY WITHOUT LOSS OF CONSCIOUSNESS, INITIAL ENCOUNTER (HCC): ICD-10-CM

## 2021-04-13 DIAGNOSIS — Z74.09 IMPAIRED MOBILITY AND ACTIVITIES OF DAILY LIVING: ICD-10-CM

## 2021-04-13 DIAGNOSIS — Z78.9 IMPAIRED MOBILITY AND ACTIVITIES OF DAILY LIVING: ICD-10-CM

## 2021-04-13 PROCEDURE — 97530 THERAPEUTIC ACTIVITIES: CPT

## 2021-04-13 NOTE — OP THERAPY DAILY TREATMENT
"  Outpatient Occupational Therapy  DAILY TREATMENT     [facility] Occupational Therapy [address]  [address]  [place] [address]  Phone:  [phone]  Fax:  [phone]    Date: 04/13/2021    Patient: Reyes Amezcua  YOB: 1955  MRN: [id number]     Time Calculation  Start time: 0900  Stop time: 1000 Time Calculation (min): 60 minutes         Chief Complaint: Hand Weakness (right, decreased sensorimotor) and Other (visual-vestibular dysfunction)    Visit #: 10    SUBJECTIVE: \"The dizziness is going away a little faster\", \"I drove over here today without any problem\"    OBJECTIVE:  Current objective measures:   BUE AROM WNL, strength 5/5   strength: Left: 80lbs, Right: 95lbs  Motor control: opposition/FNF WNL  BH with mild distal digit tactile sensitivity, L > R  Vestibular: mild head-motion provoked spatial disorientation following sit to stands, subsides after 5-10 seconds  Romberg EO/EC non compliant surface: negative  Romberg EO/EC on compliant surface: negative  Standing balance: static/dynamic:  Good/Good  Functional mobility: able to walk with horizontal head turns with mild decrease in speed and postural adjustments.  Vertical head turns WNL  Oculomotor: smooth pursuits, saccades, and convergence WNL except mild adjustment with diagonal saccades.  Focusing near/far WFL  Grooved Pegboard: Left: 80 seconds, Right: 80 seconds  ADLs/iADLs: Independent  Driving: Independent driving safely with wife as a passenger up to 90 minutes, pt to transition to driving alone after a few more trials with wife riding in back seat.  HEP: Independent    OT Functional Assessment Tool Used: Grooved Pegboard  OT Functional Assessment Score: Left: 80 seconds, Right: 80 seconds     Therapeutic Treatments and Modalities:    1. Therapeutic Activities (CPT 71181)    Therapeutic Treatments and Modalities Summary: Reviewed and/or performed examples of exercises in HEPs including: oculomotor diagonal " "saccades, tendon gliding, motor control \"ok/shoot\", gross abduction/adduction, opposition, inner arm stretches, and sensory re-education.  Cawthorne-Cooksey habituation exercises: head turns horizontal/vertical/diagonal patterns EO/EC sitting and standing positions, sit to stands with changes in head orientation and visual feedback, standing dynamic postural stabilization exercises via 1/4 turns in place bilateral directions, side stepping with contralateral head turns, marching with opposite head turns, and ball toss/rotation exercises.  Instructed to perform 30 minutes 1 x day.  Re-tested all areas noted above.    Time-based treatments/modalities:  Therapeutic activity minutes (CPT 72299): 60 minutes          ASSESSMENT:   Response to treatment: Pt has actively participated in his skilled OT program and has made good progress to meet the majority of goals set in his initial plan of care.  Pt has made steady gains with his  strength, sensorimotor function, and oculomotor skills to independently perform his ADLs, iADLs, and recreational activities.  Pt has also made gains with his visual-vestibular function and balance to perform graded habituation and postural stabilization exercises.  While he continues to experience mild spatial disorientation following sit to stands and when incorporating head turns, he has demonstrated an increase in tolerance and a quicker recovery time following vestibular exercises.  Pt passed the OT pre-driving assessment and is safely driving with his wife as a passenger.  At this time, pt is independent with his HEP and he is safe for d/c.    PLAN/RECOMMENDATIONS:   Plan for treatment: discharge patient due to accomplished goals.  Majority of goals met.  Planned interventions for next visit: D/C to HEP.  D/C OT.       "
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
## 2021-04-15 ENCOUNTER — APPOINTMENT (OUTPATIENT)
Dept: OCCUPATIONAL THERAPY | Facility: REHABILITATION | Age: 66
End: 2021-04-15
Attending: PHYSICAL MEDICINE & REHABILITATION
Payer: COMMERCIAL

## 2021-06-17 ENCOUNTER — OFFICE VISIT (OUTPATIENT)
Dept: MEDICAL GROUP | Facility: MEDICAL CENTER | Age: 66
End: 2021-06-17
Payer: COMMERCIAL

## 2021-06-17 ENCOUNTER — SPEECH THERAPY (OUTPATIENT)
Dept: SPEECH THERAPY | Facility: REHABILITATION | Age: 66
End: 2021-06-17
Attending: OTOLARYNGOLOGY
Payer: COMMERCIAL

## 2021-06-17 VITALS
TEMPERATURE: 97.8 F | SYSTOLIC BLOOD PRESSURE: 108 MMHG | HEIGHT: 74 IN | WEIGHT: 164 LBS | OXYGEN SATURATION: 92 % | HEART RATE: 66 BPM | BODY MASS INDEX: 21.05 KG/M2 | DIASTOLIC BLOOD PRESSURE: 70 MMHG | RESPIRATION RATE: 18 BRPM

## 2021-06-17 DIAGNOSIS — R49.0 DYSPHONIA: ICD-10-CM

## 2021-06-17 DIAGNOSIS — K40.90 LEFT INGUINAL HERNIA: ICD-10-CM

## 2021-06-17 DIAGNOSIS — N52.9 ERECTILE DYSFUNCTION, UNSPECIFIED ERECTILE DYSFUNCTION TYPE: ICD-10-CM

## 2021-06-17 DIAGNOSIS — R47.1 DYSARTHRIA: ICD-10-CM

## 2021-06-17 DIAGNOSIS — R05.9 COUGH: ICD-10-CM

## 2021-06-17 PROCEDURE — 92524 BEHAVRAL QUALIT ANALYS VOICE: CPT

## 2021-06-17 PROCEDURE — 92522 EVALUATE SPEECH PRODUCTION: CPT

## 2021-06-17 PROCEDURE — 99214 OFFICE O/P EST MOD 30 MIN: CPT | Performed by: FAMILY MEDICINE

## 2021-06-17 RX ORDER — SILDENAFIL 50 MG/1
50 TABLET, FILM COATED ORAL PRN
Qty: 10 TABLET | Refills: 3 | Status: SHIPPED | OUTPATIENT
Start: 2021-06-17 | End: 2022-10-27

## 2021-06-17 ASSESSMENT — PATIENT HEALTH QUESTIONNAIRE - PHQ9: CLINICAL INTERPRETATION OF PHQ2 SCORE: 0

## 2021-06-17 ASSESSMENT — FIBROSIS 4 INDEX: FIB4 SCORE: 1.118861855571031545

## 2021-06-17 ASSESSMENT — PAIN SCALES - GENERAL: PAINLEVEL: NO PAIN

## 2021-06-17 ASSESSMENT — ENCOUNTER SYMPTOMS: NO PATIENT REPORTED PAIN: 1
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
## 2021-06-17 NOTE — PROGRESS NOTES
CC: Left inguinal hernia    HPI:   Reyes presents today discuss the following medical issues:    Left inguinal hernia  Patient has been having a bulge on the left inguinal area for the past 3 to 4 weeks, gets prominent when he walks for hiking, has been is associated with mild abdominal discomfort but no significant pain or tenderness, usually it goes back to normal when he lay down.  Denies any dysuria, denies any abdominal pain, nausea, or vomiting.  Denies any constipation or diarrhea.    Erectile dysfunction  Patient has been having chronic erectile dysfunction, usually sildenafil has been helping.  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 10 tablet 3   • omeprazole (PRILOSEC) 20 MG delayed-release capsule Take 20 mg by mouth every day.     • acetaminophen (TYLENOL) 325 MG Tab 2 Tabs by Per G Tube route every 6 hours as needed for Fever (T > 101.5). 30 Tab 0   • SUPREP BOWEL PREP KIT 17.5-3.13-1.6 GM/177ML Solution  (Patient not taking: Reported on 6/17/2021)     • scopolamine (TRANSDERM-SCOP, 1.5 MG,) 1 mg/72hr PATCH 72 HR Place 1 Patch on the skin every 72 hours. (Patient not taking: Reported on 3/9/2021) 8 Patch 0     No current facility-administered medications for this visit.         Allergies as of 06/17/2021   • (No Known Allergies)        ROS: Denies any chest  "pain, Shortness of breath, Changes bowel or bladder, Lower extremity edema.    Physical Exam:  /70 (BP Location: Left arm, Patient Position: Sitting, BP Cuff Size: Adult)   Pulse 66   Temp 36.6 °C (97.8 °F) (Temporal)   Resp 18   Ht 1.88 m (6' 2\")   Wt 74.4 kg (164 lb)   SpO2 92%   BMI 21.06 kg/m²   Gen.: Well-developed, well-nourished, no apparent distress,pleasant and cooperative with the examination    Abdomen: Mild left inguinal bulge, prominent when he stands up and cough, its reducible.  No tenderness, skin over it is normal      Assessment and Plan.   65 y.o. male     1. Left inguinal hernia  New symptoms, reducible inguinal hernia, no strangulation or obstruction.  Will send the patient to surgery for evaluation    - REFERRAL TO GENERAL SURGERY    2. Erectile dysfunction, unspecified erectile dysfunction type  Chronic.  Viagra has helped, needs refill.    - sildenafil citrate (VIAGRA) 50 MG tablet; Take 1 tablet by mouth as needed for Erectile Dysfunction.  Dispense: 10 tablet; Refill: 3      "

## 2021-06-18 ENCOUNTER — PATIENT MESSAGE (OUTPATIENT)
Dept: MEDICAL GROUP | Facility: MEDICAL CENTER | Age: 66
End: 2021-06-18

## 2021-06-18 DIAGNOSIS — N52.9 ERECTILE DYSFUNCTION, UNSPECIFIED ERECTILE DYSFUNCTION TYPE: ICD-10-CM

## 2021-06-21 RX ORDER — SILDENAFIL 50 MG/1
50 TABLET, FILM COATED ORAL PRN
Qty: 20 TABLET | Refills: 3 | Status: SHIPPED | OUTPATIENT
Start: 2021-06-21 | End: 2022-10-27

## 2021-06-28 ENCOUNTER — APPOINTMENT (OUTPATIENT)
Dept: SPEECH THERAPY | Facility: REHABILITATION | Age: 66
End: 2021-06-28
Attending: OTOLARYNGOLOGY
Payer: COMMERCIAL

## 2021-07-01 ENCOUNTER — SPEECH THERAPY (OUTPATIENT)
Dept: SPEECH THERAPY | Facility: REHABILITATION | Age: 66
End: 2021-07-01
Attending: OTOLARYNGOLOGY
Payer: COMMERCIAL

## 2021-07-01 DIAGNOSIS — R49.0 DYSPHONIA: ICD-10-CM

## 2021-07-01 DIAGNOSIS — R47.1 DYSARTHRIA: ICD-10-CM

## 2021-07-01 PROCEDURE — 92507 TX SP LANG VOICE COMM INDIV: CPT

## 2021-07-01 NOTE — OP THERAPY DAILY TREATMENT
"  Outpatient Speech Therapy  DAILY TREATMENT     [facility] Speech [address]  [address]  [place] [address]  Phone:  [phone]  Fax:  [phone]    Date: 07/01/2021    Patient: Reyes Amezcua  YOB: 1955  MRN: [id number]     Time Calculation    Start time: 0900  Stop time: 0955 Time Calculation (min): 55 minutes         Chief Complaint: Poor Speech    Visit #: 2    Subjective:   Additional Subjective Comments:      Patient returns endorsing him and wife have established a 1-5 voice scale as recommended during initial evaluation. Patient notes that it is helpful.       Objective:   Treatments/Interventions Performed:  Patient/Caregiver education, Compensatory strategy training, Respiratory Coordination / Support training, Voice training, Home program and Speech/Language treatment  Other Treatment Interventions:  Use of direct visual cues through 1-5 vocal loudness scale  Treatment Intervention tool(s) used:  SLP provided direct instruction in exercise addressing voicing for speech production  Objective Details:  Exercise addressing respiration for speech production (coordination of breath-speech) completed through the following 5/5:  1) Take a deep breath in:  let it out on an \"h\"  2) Breathe in through your nose and out through your mouth (sniff the suzanne, blow out the candle)  3) Take a deep breath in:  let it out on the following:  \"s\" 28 seconds \"sh\" 26 seconds.    Vocal loudness for counting, coordination of breath-speech: 1-3, 1-6, 1-10 maintained on single breath with vocal loudness to level 4    3 levels of loudness practice through production of single words of increasing complexity: 16/18 = 88.9% accuracy; independent. Phrases and sentences of increasing complexity completed with 13/13 = 100% accuracy, minimal verbal cues independent.    8 word sentences: 12/12 = 100% accuracy, minimal verbal cues-independent.          Speech Therapy Assessment:     Speech " "Mechanism Assessment:     Patient voice description: Reduced Intensity (improved with direct cues through use of vocal loudness chart/ 1-5 voice scale)    Patient uses adequate breath support: Yes (improved following completion of exercise addressing diaphragmatic breathing)  Speech mechanism comments: Initially, patient demonstrated increased presence of laryngeal tension during structured voice production tasks. Diaphragmatic breathing introduced with structured practice to reinforce breath-speech patterns.    Patient demonstrated improved consistency in fundamental frequency and vocal loudness to target level \"talk\"ing loudness with verbal cues to listen for resonance during voicing.         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    Patient demonstrated excellent response to initiation of structured activities addressing voice with focus on use of diaphragmatic breathing to provide insight and reinforcement in breath-voice patterns for improved voicing. Improved understanding in how breath supports intensity of voice production resulted in reduced presence of laryngeal tension during structured voice production exercise.     Patient demonstrated good response to initiation of 1-5 vocal loudness chart to support increased understanding and awareness of loudness of speech production. Patient was very receptive to use of verbal cues for resonance to support improved fundamental frequency and pitch modulation during structured voice exercise.   Therapy Recommendations  Recommendation:  Individual Speech Therapy,  Planned Therapy Interventions:  Home Program, Patient/Caregiver Education, Compensatory Strategy Training, Respiratory coordination/support training, Voice training and Speech/Language " training,   Plan Details:  Continue with address speech production, voicing to structured speech and voice production tasks of increasing complexity.

## 2021-07-07 ENCOUNTER — SPEECH THERAPY (OUTPATIENT)
Dept: SPEECH THERAPY | Facility: REHABILITATION | Age: 66
End: 2021-07-07
Attending: OTOLARYNGOLOGY
Payer: COMMERCIAL

## 2021-07-07 DIAGNOSIS — R49.0 DYSPHONIA: ICD-10-CM

## 2021-07-07 DIAGNOSIS — R47.1 DYSARTHRIA: ICD-10-CM

## 2021-07-07 PROCEDURE — 92507 TX SP LANG VOICE COMM INDIV: CPT

## 2021-07-07 NOTE — OP THERAPY DAILY TREATMENT
"  Outpatient Speech Therapy  DAILY TREATMENT     [facility] Speech [address].  [address]  [place] [address]  Phone:  [phone]  Fax:  [phone]    Date: 07/07/2021    Patient: Reyes Amezcua  YOB: 1955  MRN: [id number]     Time Calculation    Start time: 1305  Stop time: 1345 Time Calculation (min): 40 minutes         Chief Complaint: Poor Speech (its been a little challenging)    Visit #: 3    Subjective:   Additional Subjective Comments:      Patient returns reporting that speech production has been challenging. Patient endorses \"I really have to concentrate on getting in enough air, then my speech is better.\"       Objective:   Treatments/Interventions Performed:  Patient/Caregiver education, Speech/Language treatment, Respiratory Coordination / Support training, Compensatory strategy training and Home program  Other Treatment Interventions:  Use of direct visual cues through 1-5 vocal loudness scale  Treatment Intervention tool(s) used:  SLP provided direct instruction in exercise addressing accuracy of articulation movement for speech production  Objective Details:  Exercise for diaphragmatic breathing reviewed.     Speech production targeted accuracy in articulation through completion of structured oral motor exercise 10/10: oo-ee, rapid alternating movements /p/, /t/, /k/, buttercup.    Vocal loudness targeted to structured reading aloud task at paragraph level required minimal verbal prompts/ instruction/ cues to support speech production rate to support accuracy in articulation.          Speech Therapy Assessment:     Speech Mechanism Assessment:     Patient voice description: Clear    Patient uses adequate breath support: Yes  Speech mechanism comments: Patient with improved accuracy in establishing a new fundamental frequency during structured voice production tasks. Patient use of breath support during voicing is improved to structured phrase level. " Patient continued to benefit from cues to find resonance in speech production to support vocal loudness. Compensatory speech strategies of separate each word, over-articulation reinforced during structured speech production task of read aloud.       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    Patient demonstrated good response to initial session, returning to outpatient speech therapy demonstrating carryover in use of speech strategies as previously discussed including cueing self independently to improve accuracy in voice production.     Speech therapy targeted speech production specific to development of home program addressing accuracy in completion of rapid alternating movements to increase accuracy in speech production and support overall improved rate of speech production. Patient encouraged in current completion of home program, including reading aloud, to support continued development of voice and speech production skills.   Prognosis:  Good  Prognosis Details:  Patient reports that his wife no longer provides him with prompts in the home setting to increase vocal loudness, with improvements in voice resulting in improved overall loudness.   Therapy Recommendations  Recommendation:  Individual Speech Therapy,  Planned Therapy Interventions:  Home Program, Patient/Caregiver Education, Compensatory Strategy Training, Voice training, Respiratory coordination/support training and Speech/Language training,   Plan Details:  Continue with address speech production, voicing to structured speech and voice production tasks of increasing complexity.

## 2021-07-12 ENCOUNTER — SPEECH THERAPY (OUTPATIENT)
Dept: SPEECH THERAPY | Facility: REHABILITATION | Age: 66
End: 2021-07-12
Attending: OTOLARYNGOLOGY
Payer: COMMERCIAL

## 2021-07-12 DIAGNOSIS — R47.1 DYSARTHRIA: ICD-10-CM

## 2021-07-12 DIAGNOSIS — R49.0 DYSPHONIA: ICD-10-CM

## 2021-07-12 PROCEDURE — 92507 TX SP LANG VOICE COMM INDIV: CPT

## 2021-07-12 NOTE — OP THERAPY DAILY TREATMENT
"  Outpatient Speech Therapy  DAILY TREATMENT     [facility] Speech [address].  [address]  [place] [address]  Phone:  [phone]  Fax:  [phone]    Date: 07/12/2021    Patient: Reyes Amezcua  YOB: 1955  MRN: [id number]     Time Calculation    Start time: 1300  Stop time: 1345 Time Calculation (min): 45 minutes         Chief Complaint: No chief complaint on file.    Visit #: 4    Subjective:   Progress Factors:     Progression:  Getting Better  Additional Subjective Comments:      \"I was working with a friend and I was focused on talking more clearly.\" Patient endorses he continues to struggle with feelings of appropriate loudness. Patient acknowledges that he can now improve his loudness through improved fundamental frequency.       Objective:   Treatments/Interventions Performed:  Patient/Caregiver education, Compensatory strategy training, Speech/Language treatment, Home program and Voice training  Other Treatment Interventions:  Use of direct visual cues through 1-5 vocal loudness scale  Treatment Intervention tool(s) used:  SLP provided direct instruction in exercise addressing voicing for speech production   Objective Details:  Exercise addressing respiration for speech production (coordination of breath-speech) completed through the following:   Participation in 10 minute conversation with consistent fundamental frequency and loudness to independent level.      Exercise in completion of rapid alternating movements completed to oo-ee, p^, t^, k^    Voice production and accuracy in speech production targeted through structured, read aloud 10 word sentences: 8/8 = 100% accuracy, minimal verbal cues-independent.          Speech Therapy Assessment:     Speech Mechanism Assessment:     Patient voice description: Clear  Speech mechanism comments: Patient encouraged in attention to both rate and accuracy when completing practice in speech production in the home setting. " Generalization of voice demonstrated during structured outpatient speech therapy sessions encouraged, given loudness demonstrated within functional limits for structured therapy setting. Patient encouraged to continue to use voice scale in the home setting to assist in understanding appropriate volume levels, given patient notes that self-monitoring in this area remains difficult.       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    Patient returned demonstrating overall marked improvement in accuracy of voicing to an independent level. Patient continues to acknowledge that self-monitoring is difficult. Given this, patient encouraged to continue to implement voice chart in the home setting to allow for improved understanding regarding voice loudness levels and overall intelligibility.   Therapy Recommendations  Recommendation:  Individual Speech Therapy,  Planned Therapy Interventions:  Home Program, Patient/Caregiver Education, Compensatory Strategy Training, Respiratory coordination/support training, Speech/Language training and Voice training,   Plan Details:  Continue with current POC. Patient has 2 remaining visits of outpatient speech therapy scheduled with anticipation for discharge following these visits given patient overall progress.

## 2021-07-22 ENCOUNTER — SPEECH THERAPY (OUTPATIENT)
Dept: SPEECH THERAPY | Facility: REHABILITATION | Age: 66
End: 2021-07-22
Attending: OTOLARYNGOLOGY
Payer: COMMERCIAL

## 2021-07-22 DIAGNOSIS — R49.0 DYSPHONIA: ICD-10-CM

## 2021-07-22 PROCEDURE — 92507 TX SP LANG VOICE COMM INDIV: CPT

## 2021-07-22 NOTE — OP THERAPY DAILY TREATMENT
"  Outpatient Speech Therapy  DAILY TREATMENT     [facility] Speech Therapy [address].  [address]  [place] [address]  Phone:  [phone]  Fax:  [phone]    Date: 07/22/2021    Patient: Reyes Amezcua  YOB: 1955  MRN: [id number]     Time Calculation    Start time: 1347  Stop time: 1430 Time Calculation (min): 43 minutes         Chief Complaint: Other (Voice \"its getting better but it's not perfect yet\")    Visit #: 5    Subjective:   Additional Subjective Comments:      Patient endorses \"I had the opportunity to go to a wedding this weekend and had the opportunity to speak with a lot of people who knew me and they said my voice sounded really good.\"     Patient continues to complete rapid alternating movements. Patient notes that he is now independent in cueing himself \"to drop my voice a little lower.\"       Objective:   Treatments/Interventions Performed:  Patient/Caregiver education, Compensatory strategy training, Home program, Voice training and Speech/Language treatment  Treatment Intervention tool(s) used:  SLP provided direct instruction in exercise addressing voicing for speech production   Objective Details:  Exercise addressing respiration for speech production (coordination of breath-speech) completed through the following:   Participation in a 30 minute conversation with consistent fundamental frequency and loudness to independent level.     Patient complaints regarding deficits in voicing were assessed through administration of the VHI. Results revealed a total score of 34, representing moderate complaints (31-60). Functional 14 consistent with previous reports 14. Physical 14 improved from 17. Emotional 6 improved from 9. Patient endorses increased concern regarding variability of voice production and impact on his ability to be understood by others.         Speech Therapy Assessment:     Speech Mechanism Assessment:     Patient voice description: Clear    " Patient's oral movements are voluntary and coordination: WFL    Patient exhibits articulatory precision: WFL    Patient exhibits conversational intelligibility: Supervision Required    Patient dysarthria: Supervision Required    Patient uses adequate breath support: Yes      Speech Therapy Plan :   Prognosis & Recommendations  Impression Summary:  Mr. Reyes Amezcua, a 65 year old male, participated in direct, outpatient speech therapy addressing voice and speech production. Patient history is significant for TBI 5/2020.     Patient improvement in voice was characterized by improved fundamental frequency, resulting in improved vocal loudness. Although patient reports of improvement in voicing through administration of the VHI revealed limited reports of improvement, patient met short and long term voicing and speech production goals established during the intervention period. Given this, patient will be discharged from outpatient speech therapy at this time.     Patient with noted concern regarding deficits he continues to experience in memory following TBI. Patient education provided regarding recommendations at time of discharge from previous ST services, including Neuropsychological evaluation of cognitive function. Patient encouraged to discuss options with wife and following physiatrist to determine if further outpatient speech therapy addressing memory is recommended.   Goals  Short Term Goals:   1. Patient will eliminate vocal overuse to improve health of vocal folds by reducing episodes of throat clear/ cough and implementing vocal fold relaxation techniques to begin speech production with in 4 weeks as evidenced by patient report and SLP observations during structured sessions 100% accuracy: GOAL MET 7/22/2021.   2. Patient will coordination breath-voice in hierarchical speech tasks by producing sound without presence of hoarseness to sound, word, phrase and sentence levels 88% accuracy, independent: GOAL MET  7/22/2021.   3. Patient will establish a consistent fundamental frequency during structured voice production tasks progressing from sound, word, phrase and sentence level 88% accuracy, independent: GOAL MET 7/22/2021.   4. Patient will improve strength of voice, completing vocal strengthening exercise with 100% accuracy, independent: 7/22/2021.   5. With use of structured vocal chart (1-5 scale), patient will identify loudness levels and/or independently increase loudness based on environment to increase intelligibility 4/5 obligatory contexts, minimal verbal prompts/ instruction/ cues: GOAL MET 7/22/2021.  Long Term Goals:  Patient will increase intelligibility across conversational environments and speakers with 95% or greater accuracy independent: GOAL MET 7/22/2021.   Therapy Recommendations  Recommendation:  No further speech therapy indicated at this time and Other, Consideration for Neuropsychology evaluation given patient reports of increased concern regarding memory   Plan Details:  D/C outpatient speech therapy.

## 2021-07-22 NOTE — OP THERAPY DISCHARGE SUMMARY
Outpatient Speech Language Pathology  DISCHARGE SUMMARY NOTE      [facility] Speech Language Pathology [address].  [address]  [facility] [address]  Phone:  [phone]  Fax:  [phone]        Patient Name:  Reyes Amezcua  :  1955  MR#:  [id number]    HICN:       Visits: 5        Cancel/No-Show: 0    Diagnosis/ICD-10:   1. Dysphonia  R49.0   2. Cough  R05         Referring Provider: Mayra Graves M.D.    SOC Date: 2021   Onset Date: 2020      Your patient is being discharged from Speech therapy with the following comments:  Goals Met      Comments:Mr. Reyes Amezcua, a 65 year old male, participated in direct, outpatient speech therapy addressing voice and speech production. Patient history is significant for TBI 2020.      Patient improvement in voice was characterized by improved fundamental frequency, resulting in improved vocal loudness. Although patient reports of improvement in voicing through administration of the VHI revealed limited reports of improvement, patient met short and long term voicing and speech production goals established during the intervention period. Given this, patient will be discharged from outpatient speech therapy at this time.      Patient with noted concern regarding deficits he continues to experience in memory following TBI. Patient education provided regarding recommendations at time of discharge from previous ST services, including Neuropsychological evaluation of cognitive function. Patient encouraged to discuss options with wife and following physiatrist to determine if further outpatient speech therapy addressing memory is recommended.     Goals  Short Term Goals:   1. Patient will eliminate vocal overuse to improve health of vocal folds by reducing episodes of throat clear/ cough and implementing vocal fold relaxation techniques to begin speech production with in 4 weeks as evidenced by patient report and SLP observations during  structured sessions 100% accuracy: GOAL MET 7/22/2021.   2. Patient will coordination breath-voice in hierarchical speech tasks by producing sound without presence of hoarseness to sound, word, phrase and sentence levels 88% accuracy, independent: GOAL MET 7/22/2021.   3. Patient will establish a consistent fundamental frequency during structured voice production tasks progressing from sound, word, phrase and sentence level 88% accuracy, independent: GOAL MET 7/22/2021.   4. Patient will improve strength of voice, completing vocal strengthening exercise with 100% accuracy, independent: 7/22/2021.   5. With use of structured vocal chart (1-5 scale), patient will identify loudness levels and/or independently increase loudness based on environment to increase intelligibility 4/5 obligatory contexts, minimal verbal prompts/ instruction/ cues: GOAL MET 7/22/2021.  Long Term Goals:  Patient will increase intelligibility across conversational environments and speakers with 95% or greater accuracy independent: GOAL MET 7/22/2021.            Limitations/Remaining:Patient noted continued deficits in memory, not addressed during the current treatment period.         Therapy Recommendations  Recommendation:  No further speech therapy indicated at this time and Other, Consideration for Neuropsychology evaluation given patient reports of increased concern regarding memory   Plan Details:  D/C outpatient speech therapy.           Nidhi Curry, SLP

## 2021-07-29 ENCOUNTER — APPOINTMENT (OUTPATIENT)
Dept: SPEECH THERAPY | Facility: REHABILITATION | Age: 66
End: 2021-07-29
Attending: OTOLARYNGOLOGY
Payer: COMMERCIAL

## 2021-09-09 ENCOUNTER — OFFICE VISIT (OUTPATIENT)
Dept: PHYSICAL MEDICINE AND REHAB | Facility: REHABILITATION | Age: 66
End: 2021-09-09
Payer: COMMERCIAL

## 2021-09-09 VITALS
BODY MASS INDEX: 20.53 KG/M2 | OXYGEN SATURATION: 99 % | DIASTOLIC BLOOD PRESSURE: 60 MMHG | SYSTOLIC BLOOD PRESSURE: 102 MMHG | TEMPERATURE: 98.8 F | HEART RATE: 74 BPM | WEIGHT: 160 LBS | RESPIRATION RATE: 15 BRPM | HEIGHT: 74 IN

## 2021-09-09 DIAGNOSIS — R41.840 IMPAIRED ATTENTION: ICD-10-CM

## 2021-09-09 DIAGNOSIS — S06.9X9A TRAUMATIC BRAIN INJURY WITH LOSS OF CONSCIOUSNESS, INITIAL ENCOUNTER (HCC): Primary | ICD-10-CM

## 2021-09-09 DIAGNOSIS — R53.83 OTHER FATIGUE: ICD-10-CM

## 2021-09-09 DIAGNOSIS — R41.840 CONCENTRATION DEFICIT: ICD-10-CM

## 2021-09-09 PROCEDURE — 99214 OFFICE O/P EST MOD 30 MIN: CPT | Performed by: PHYSICAL MEDICINE & REHABILITATION

## 2021-09-09 RX ORDER — MODAFINIL 100 MG/1
100 TABLET ORAL DAILY
Qty: 30 TABLET | Refills: 0 | Status: SHIPPED | OUTPATIENT
Start: 2021-09-09 | End: 2021-10-09

## 2021-09-09 ASSESSMENT — FIBROSIS 4 INDEX: FIB4 SCORE: 1.118861855571031545
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
## 2021-09-28 DIAGNOSIS — S06.9X9A TRAUMATIC BRAIN INJURY WITH LOSS OF CONSCIOUSNESS, INITIAL ENCOUNTER (HCC): ICD-10-CM

## 2021-09-28 DIAGNOSIS — M79.2 NEUROPATHIC PAIN: ICD-10-CM

## 2021-09-28 RX ORDER — GABAPENTIN 300 MG/1
300 CAPSULE ORAL 3 TIMES DAILY
Qty: 270 CAPSULE | Refills: 0 | Status: SHIPPED | OUTPATIENT
Start: 2021-09-28 | End: 2022-02-22 | Stop reason: SDUPTHER

## 2021-10-06 ENCOUNTER — APPOINTMENT (OUTPATIENT)
Dept: RADIOLOGY | Facility: MEDICAL CENTER | Age: 66
End: 2021-10-06
Attending: EMERGENCY MEDICINE
Payer: COMMERCIAL

## 2021-10-06 ENCOUNTER — HOSPITAL ENCOUNTER (EMERGENCY)
Facility: MEDICAL CENTER | Age: 66
End: 2021-10-06
Attending: EMERGENCY MEDICINE
Payer: COMMERCIAL

## 2021-10-06 VITALS
RESPIRATION RATE: 16 BRPM | DIASTOLIC BLOOD PRESSURE: 71 MMHG | TEMPERATURE: 98.5 F | OXYGEN SATURATION: 99 % | HEIGHT: 74 IN | HEART RATE: 59 BPM | BODY MASS INDEX: 20.53 KG/M2 | WEIGHT: 160 LBS | SYSTOLIC BLOOD PRESSURE: 135 MMHG

## 2021-10-06 DIAGNOSIS — J32.0 MAXILLARY SINUSITIS, UNSPECIFIED CHRONICITY: ICD-10-CM

## 2021-10-06 DIAGNOSIS — R55 SYNCOPE, UNSPECIFIED SYNCOPE TYPE: ICD-10-CM

## 2021-10-06 DIAGNOSIS — S01.01XA LACERATION OF SCALP, INITIAL ENCOUNTER: ICD-10-CM

## 2021-10-06 LAB
ALBUMIN SERPL BCP-MCNC: 3.8 G/DL (ref 3.2–4.9)
ALBUMIN/GLOB SERPL: 1.5 G/DL
ALP SERPL-CCNC: 56 U/L (ref 30–99)
ALT SERPL-CCNC: 15 U/L (ref 2–50)
ANION GAP SERPL CALC-SCNC: 12 MMOL/L (ref 7–16)
AST SERPL-CCNC: 20 U/L (ref 12–45)
BASOPHILS # BLD AUTO: 1 % (ref 0–1.8)
BASOPHILS # BLD: 0.07 K/UL (ref 0–0.12)
BILIRUB SERPL-MCNC: 0.6 MG/DL (ref 0.1–1.5)
BUN SERPL-MCNC: 13 MG/DL (ref 8–22)
CALCIUM SERPL-MCNC: 8.4 MG/DL (ref 8.5–10.5)
CHLORIDE SERPL-SCNC: 109 MMOL/L (ref 96–112)
CO2 SERPL-SCNC: 20 MMOL/L (ref 20–33)
CREAT SERPL-MCNC: 0.76 MG/DL (ref 0.5–1.4)
D DIMER PPP IA.FEU-MCNC: 0.38 UG/ML (FEU) (ref 0–0.5)
EKG IMPRESSION: NORMAL
EOSINOPHIL # BLD AUTO: 0.23 K/UL (ref 0–0.51)
EOSINOPHIL NFR BLD: 3.3 % (ref 0–6.9)
ERYTHROCYTE [DISTWIDTH] IN BLOOD BY AUTOMATED COUNT: 49.3 FL (ref 35.9–50)
GLOBULIN SER CALC-MCNC: 2.5 G/DL (ref 1.9–3.5)
GLUCOSE SERPL-MCNC: 113 MG/DL (ref 65–99)
HCT VFR BLD AUTO: 47.5 % (ref 42–52)
HGB BLD-MCNC: 14.8 G/DL (ref 14–18)
IMM GRANULOCYTES # BLD AUTO: 0.02 K/UL (ref 0–0.11)
IMM GRANULOCYTES NFR BLD AUTO: 0.3 % (ref 0–0.9)
LYMPHOCYTES # BLD AUTO: 3.31 K/UL (ref 1–4.8)
LYMPHOCYTES NFR BLD: 47.9 % (ref 22–41)
MCH RBC QN AUTO: 29.8 PG (ref 27–33)
MCHC RBC AUTO-ENTMCNC: 31.2 G/DL (ref 33.7–35.3)
MCV RBC AUTO: 95.6 FL (ref 81.4–97.8)
MONOCYTES # BLD AUTO: 0.57 K/UL (ref 0–0.85)
MONOCYTES NFR BLD AUTO: 8.2 % (ref 0–13.4)
NEUTROPHILS # BLD AUTO: 2.71 K/UL (ref 1.82–7.42)
NEUTROPHILS NFR BLD: 39.3 % (ref 44–72)
NRBC # BLD AUTO: 0 K/UL
NRBC BLD-RTO: 0 /100 WBC
PLATELET # BLD AUTO: 166 K/UL (ref 164–446)
PMV BLD AUTO: 11.8 FL (ref 9–12.9)
POTASSIUM SERPL-SCNC: 4.1 MMOL/L (ref 3.6–5.5)
PROT SERPL-MCNC: 6.3 G/DL (ref 6–8.2)
RBC # BLD AUTO: 4.97 M/UL (ref 4.7–6.1)
SODIUM SERPL-SCNC: 141 MMOL/L (ref 135–145)
TROPONIN T SERPL-MCNC: <6 NG/L (ref 6–19)
TROPONIN T SERPL-MCNC: <6 NG/L (ref 6–19)
WBC # BLD AUTO: 6.9 K/UL (ref 4.8–10.8)

## 2021-10-06 PROCEDURE — 93005 ELECTROCARDIOGRAM TRACING: CPT | Performed by: EMERGENCY MEDICINE

## 2021-10-06 PROCEDURE — 72125 CT NECK SPINE W/O DYE: CPT

## 2021-10-06 PROCEDURE — 71045 X-RAY EXAM CHEST 1 VIEW: CPT

## 2021-10-06 PROCEDURE — 84484 ASSAY OF TROPONIN QUANT: CPT

## 2021-10-06 PROCEDURE — 85379 FIBRIN DEGRADATION QUANT: CPT

## 2021-10-06 PROCEDURE — 80053 COMPREHEN METABOLIC PANEL: CPT

## 2021-10-06 PROCEDURE — 93005 ELECTROCARDIOGRAM TRACING: CPT

## 2021-10-06 PROCEDURE — 303747 HCHG EXTRA SUTURE

## 2021-10-06 PROCEDURE — 70450 CT HEAD/BRAIN W/O DYE: CPT

## 2021-10-06 PROCEDURE — 99285 EMERGENCY DEPT VISIT HI MDM: CPT

## 2021-10-06 PROCEDURE — 304999 HCHG REPAIR-SIMPLE/INTERMED LEVEL 1

## 2021-10-06 PROCEDURE — 85025 COMPLETE CBC W/AUTO DIFF WBC: CPT

## 2021-10-06 PROCEDURE — 700101 HCHG RX REV CODE 250: Performed by: EMERGENCY MEDICINE

## 2021-10-06 PROCEDURE — 304217 HCHG IRRIGATION SYSTEM

## 2021-10-06 RX ORDER — LIDOCAINE HYDROCHLORIDE AND EPINEPHRINE 10; 10 MG/ML; UG/ML
10 INJECTION, SOLUTION INFILTRATION; PERINEURAL ONCE
Status: COMPLETED | OUTPATIENT
Start: 2021-10-06 | End: 2021-10-06

## 2021-10-06 RX ORDER — FLUTICASONE PROPIONATE 50 MCG
1 SPRAY, SUSPENSION (ML) NASAL DAILY
Qty: 16 G | Refills: 0 | Status: SHIPPED | OUTPATIENT
Start: 2021-10-06 | End: 2022-10-27

## 2021-10-06 RX ORDER — AMOXICILLIN AND CLAVULANATE POTASSIUM 875; 125 MG/1; MG/1
1 TABLET, FILM COATED ORAL 2 TIMES DAILY
Qty: 14 TABLET | Refills: 0 | Status: SHIPPED | OUTPATIENT
Start: 2021-10-06 | End: 2021-10-13

## 2021-10-06 RX ADMIN — LIDOCAINE HYDROCHLORIDE,EPINEPHRINE BITARTRATE 10 ML: 10; .01 INJECTION, SOLUTION INFILTRATION; PERINEURAL at 08:30

## 2021-10-06 ASSESSMENT — PAIN DESCRIPTION - PAIN TYPE: TYPE: ACUTE PAIN

## 2021-10-06 ASSESSMENT — FIBROSIS 4 INDEX: FIB4 SCORE: 1.118861855571031545

## 2021-10-06 NOTE — ED PROVIDER NOTES
ED Provider Note    CHIEF COMPLAINT  Chief Complaint   Patient presents with   • Syncope     BIB EMS after sitting at the side of the bed to drink water and having syncopal episode. Pt denies dizziness prior to event, was found to be hypotensive and bradycardiac by EMS. Recieved 500ml NS in route. Pt is A&Ox4, normotensive but bradycardiac at 55 upon arrival.         HPI  Reyes Amezcua is a 65 y.o. male who presents to the emergency department after passing out.  Patient reports he ate a lot of spicy food last night.  He was sitting up in bed.  He felt some indigestion.  He drank some water and collapsed falling to the ground hitting his head on the way down.  Not knowing what he hit his head on.  Patient has a history of head trauma with residual cognitive deficits.  He also suffers from chronic recurrent vestibular problems resulting in relatively common dizziness.  He does not recall feeling dizzy or really any symptoms prior to this event.  He denies any symptoms after aside from a headache where he hit his head.  He has not had any chest pain, pleuritic symptoms, abdominal pain, nausea vomiting, diarrhea, black or bloody stool.  No fevers or recent illness.  Denies weakness numbness neurologic symptoms.  Does have vague diffuse neck pain    REVIEW OF SYSTEMS  As per HPI, otherwise a 10 point review of systems is negative    PAST MEDICAL HISTORY  Past Medical History:   Diagnosis Date   • ICB (intracranial bleed) ([facility]) 5/9/2020       Family history  No pertinent family medical history    SOCIAL HISTORY  Social History     Tobacco Use   • Smoking status: Never Smoker   • Smokeless tobacco: Never Used   Vaping Use   • Vaping Use: Never used   Substance Use Topics   • Alcohol use: Yes   • Drug use: Never       SURGICAL HISTORY  Past Surgical History:   Procedure Laterality Date   • PB LAP,GASTROSTOMY,W/O TUBE CONSTR  5/23/2020    Procedure: CREATION, GASTROSTOMY, LAPAROSCOPIC;  Surgeon: Norbert Prajapati  "M.D.;  Location: SURGERY [facility];  Service: General       CURRENT MEDICATIONS  Home Medications    **Home medications have not yet been reviewed for this encounter**         ALLERGIES  No Known Allergies    PHYSICAL EXAM  VITAL SIGNS: /78   Pulse (!) 56   Temp 37 °C (98.6 °F) (Temporal)   Resp 12   Ht 1.88 m (6' 2\")   Wt 72.6 kg (160 lb)   SpO2 99%   BMI 20.54 kg/m²    Constitutional: Awake and alert  HENT: Irregular curvilinear laceration with a central flap over the right forehead.  No other facial trauma  Eyes: Normal inspection  Neck: Cervical hard collar.  Mild diffuse tenderness.  Cardiovascular: Normal heart rate, Normal rhythm.  No murmur auscultated symmetric peripheral pulses.   Thorax & Lungs: No respiratory distress, No wheezing, No rales, No rhonchi, No chest tenderness.   Abdomen: Bowel sounds normal, soft, non-distended, nontender, no mass  Skin: Head laceration as above  Back: No tenderness, No CVA tenderness.   Extremities: No clubbing, cyanosis, edema, no Homans or cords.  Neuro: Alert & oriented x 3, Normal motor function, Normal sensory function, No focal deficits noted. Normal reflexes.  Normal Cranial Nerves.  Psychiatric: Normal for situation    RADIOLOGY/PROCEDURES  DX-CHEST-PORTABLE (1 VIEW)   Final Result         1.  No acute cardiopulmonary disease.      CT-HEAD W/O   Final Result         1.  No acute intracranial abnormality is identified, there are nonspecific white matter changes, commonly associated with small vessel ischemic disease.  Associated mild cerebral atrophy is noted.   2.  Low-density changes in the left frontal and left temporal lobes, appearance may be related to encephalomalacia, recommend further evaluation with MRI of the brain with contrast to exclude intracranial mass lesion and vasogenic edema which could have    similar radiographic appearance.   3.  Severe right maxillary sinusitis changes   4.  Atherosclerosis.      CT-CSPINE WITHOUT PLUS " RECONS   Final Result         1.  Multilevel degenerative changes of the cervical spine limit diagnostic sensitivity of this examination, otherwise no acute traumatic bony injury of the cervical spine is apparent.   2.  Atherosclerosis           Imaging is interpreted by radiologist    Laceration Repair Procedure Note    Indication: Laceration    Procedure: The patient was placed in the appropriate position and anesthesia around the laceration was obtained by infiltration using 1% Lidocaine with epinephrine. The area was then irrigated with normal saline. The laceration was closed with 4-0 Ethilon using a running stitch. There were no additional lacerations requiring repair. The wound area was then dressed with bacitracin and a sterile dressing.      Total repaired wound length: 4 cm.         Labs:  Results for orders placed or performed during the hospital encounter of 10/06/21   CBC WITH DIFFERENTIAL   Result Value Ref Range    WBC 6.9 4.8 - 10.8 K/uL    RBC 4.97 4.70 - 6.10 M/uL    Hemoglobin 14.8 14.0 - 18.0 g/dL    Hematocrit 47.5 42.0 - 52.0 %    MCV 95.6 81.4 - 97.8 fL    MCH 29.8 27.0 - 33.0 pg    MCHC 31.2 (L) 33.7 - 35.3 g/dL    RDW 49.3 35.9 - 50.0 fL    Platelet Count 166 164 - 446 K/uL    MPV 11.8 9.0 - 12.9 fL    Neutrophils-Polys 39.30 (L) 44.00 - 72.00 %    Lymphocytes 47.90 (H) 22.00 - 41.00 %    Monocytes 8.20 0.00 - 13.40 %    Eosinophils 3.30 0.00 - 6.90 %    Basophils 1.00 0.00 - 1.80 %    Immature Granulocytes 0.30 0.00 - 0.90 %    Nucleated RBC 0.00 /100 WBC    Neutrophils (Absolute) 2.71 1.82 - 7.42 K/uL    Lymphs (Absolute) 3.31 1.00 - 4.80 K/uL    Monos (Absolute) 0.57 0.00 - 0.85 K/uL    Eos (Absolute) 0.23 0.00 - 0.51 K/uL    Baso (Absolute) 0.07 0.00 - 0.12 K/uL    Immature Granulocytes (abs) 0.02 0.00 - 0.11 K/uL    NRBC (Absolute) 0.00 K/uL   COMP METABOLIC PANEL   Result Value Ref Range    Sodium 141 135 - 145 mmol/L    Potassium 4.1 3.6 - 5.5 mmol/L    Chloride 109 96 - 112 mmol/L     Co2 20 20 - 33 mmol/L    Anion Gap 12.0 7.0 - 16.0    Glucose 113 (H) 65 - 99 mg/dL    Bun 13 8 - 22 mg/dL    Creatinine 0.76 0.50 - 1.40 mg/dL    Calcium 8.4 (L) 8.5 - 10.5 mg/dL    AST(SGOT) 20 12 - 45 U/L    ALT(SGPT) 15 2 - 50 U/L    Alkaline Phosphatase 56 30 - 99 U/L    Total Bilirubin 0.6 0.1 - 1.5 mg/dL    Albumin 3.8 3.2 - 4.9 g/dL    Total Protein 6.3 6.0 - 8.2 g/dL    Globulin 2.5 1.9 - 3.5 g/dL    A-G Ratio 1.5 g/dL   TROPONIN   Result Value Ref Range    Troponin T <6 6 - 19 ng/L   D-DIMER   Result Value Ref Range    D-Dimer Screen 0.38 0.00 - 0.50 ug/mL (FEU)   ESTIMATED GFR   Result Value Ref Range    GFR If African American >60 >60 mL/min/1.73 m 2    GFR If Non African American >60 >60 mL/min/1.73 m 2   TROPONIN   Result Value Ref Range    Troponin T <6 6 - 19 ng/L   EKG   Result Value Ref Range    Report       [facility] Emergency Dept.    Test Date:  2021-10-06  Pt Name:    NANETTE DANIELS                    Department: ER  MRN:        [id number]                      Room:       [facility]  Gender:     Male                         Technician: [id number]  :        1955                   Requested By:ER TRIAGE PROTOCOL  Order #:    [id number]                    Reading MD: HOLGER WARE MD    Measurements  Intervals                                Axis  Rate:       57                           P:          73  TN:         184                          QRS:        62  QRSD:       102                          T:          53  QT:         460  QTc:        448    Interpretive Statements  SINUS BRADYCARDIA  RSR' IN V1 OR V2, PROBABLY NORMAL VARIANT  No previous ECG available for comparison  Electronically Signed On 10-6-2021 6:47:45 PDT by HOLGER WARE MD         Medications   lidocaine-epinephrine 1 %-1:242487 1 %-1:526827 injection 10 mL (10 mL Injection Given 10/6/21 0830)         COURSE & MEDICAL DECISION MAKING  Patient presents to the ER after syncopal episode.  He describes having  discomfort of indigestion prior to this event.  He was bradycardic and hypotensive prior to arrival.  His heart rate is relatively slow, but no overt bradycardia and his blood pressure is normal.  EKG was obtained on arrival.  There is no evidence of ischemia or conduction disturbance.  Will obtain laboratory data including troponin, CMP, CBC, D-dimer.  Obtain diagnostic imaging of the head and cervical spine.    Laboratory data returned unremarkable.  Obtain serial troponins that were negative.  D-dimer is negative ruling out PE.  He has no clinical suggestion of GI bleeding.  No anemia.  No severe electrolyte disturbance.    CT scan of the head is abnormal.  Radiologist interpreting image advised MRI, but on further discussion with radiology, Dr. Sotelo he stated that findings are compatible with his known traumatic brain injury and not remarkably changed from previous studies.  Patient was noted to have severe maxillary sinusitis.  He later reported that there may be some increased congestion.  No facial pain or evidence of systemic illness.  Has cervical spine CT scan was negative for fracture.    Patient's facial wound was repaired as noted above.    At this point the cause of his syncope is not clear.  He is not orthostatic.  Could have been a vagal event related to the indigestion he was having.  There does not appear to be an emergency condition.  Wife requested referral to cardiology which seems reasonable.  Believe he would benefit from echocardiogram and consideration of monitoring.  I placed referral into epic.  I have advised that he follow-up with his primary provider for further work-up.  He should have his stitches taken out in 6 to 7 days.  Return sooner for any signs of infection.  Given standard instructions for wound care.  Return immediately to the ER for recurrent syncope, chest pain or other concerning symptoms.      FINAL IMPRESSION  1.  Syncope  2.  Closed head injury  3.  Scalp  laceration      This dictation was created using voice recognition software. The accuracy of the dictation is limited to the abilities of the software.  The nursing notes were reviewed and certain aspects of this information were incorporated into this note.      Electronically signed by: Ángel Wiggins M.D., 10/6/2021 9:02 AM

## 2021-10-06 NOTE — ED TRIAGE NOTES
".  Chief Complaint   Patient presents with   • Syncope     BIB EMS after sitting at the side of the bed to drink water and having syncopal episode. Pt denies dizziness prior to event, was found to be hypotensive and bradycardiac by EMS. Recieved 500ml NS in route. Pt is A&Ox4, normotensive but bradycardiac at 55 upon arrival.       /63   Pulse (!) 55   Temp 37 °C (98.6 °F) (Temporal)   Resp 16   Ht 1.88 m (6' 2\")   Wt 72.6 kg (160 lb)   SpO2 98%   BMI 20.54 kg/m²     "

## 2021-10-06 NOTE — ED NOTES
Orthostatic BP's performed at bedside per Dr. Wiggins.     Supine 117/72  Sitting 136/72  Standing 127/69

## 2021-10-06 NOTE — ED NOTES
Bandage applied to scalp sutures. Patient provided discharge instructions. Patient verbalized understanding. Patient leaving ER in stable condition. Patient ambulatory with steady gait. Wristband and IV's removed.

## 2021-10-07 ENCOUNTER — TELEMEDICINE (OUTPATIENT)
Dept: PHYSICAL MEDICINE AND REHAB | Facility: REHABILITATION | Age: 66
End: 2021-10-07
Payer: COMMERCIAL

## 2021-10-07 VITALS — OXYGEN SATURATION: 99 % | HEART RATE: 64 BPM

## 2021-10-07 DIAGNOSIS — M79.2 NEUROPATHIC PAIN: ICD-10-CM

## 2021-10-07 DIAGNOSIS — R53.83 OTHER FATIGUE: ICD-10-CM

## 2021-10-07 DIAGNOSIS — S06.9X9A TRAUMATIC BRAIN INJURY WITH LOSS OF CONSCIOUSNESS, INITIAL ENCOUNTER (HCC): Primary | ICD-10-CM

## 2021-10-07 DIAGNOSIS — R20.8 ALLODYNIA: ICD-10-CM

## 2021-10-07 DIAGNOSIS — R41.840 IMPAIRED ATTENTION: ICD-10-CM

## 2021-10-07 DIAGNOSIS — R55 SYNCOPE, UNSPECIFIED SYNCOPE TYPE: ICD-10-CM

## 2021-10-07 PROCEDURE — 99214 OFFICE O/P EST MOD 30 MIN: CPT | Mod: 95 | Performed by: PHYSICAL MEDICINE & REHABILITATION
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
## 2021-10-14 ENCOUNTER — OFFICE VISIT (OUTPATIENT)
Dept: PHYSICAL MEDICINE AND REHAB | Facility: REHABILITATION | Age: 66
End: 2021-10-14
Payer: COMMERCIAL

## 2021-10-14 VITALS
TEMPERATURE: 97.8 F | BODY MASS INDEX: 20.79 KG/M2 | RESPIRATION RATE: 14 BRPM | SYSTOLIC BLOOD PRESSURE: 102 MMHG | WEIGHT: 162 LBS | OXYGEN SATURATION: 100 % | HEIGHT: 74 IN | HEART RATE: 68 BPM | DIASTOLIC BLOOD PRESSURE: 60 MMHG

## 2021-10-14 DIAGNOSIS — R53.83 OTHER FATIGUE: ICD-10-CM

## 2021-10-14 DIAGNOSIS — S06.9X9D TRAUMATIC BRAIN INJURY WITH LOSS OF CONSCIOUSNESS, SUBSEQUENT ENCOUNTER: Primary | ICD-10-CM

## 2021-10-14 DIAGNOSIS — R42 DIZZINESS: ICD-10-CM

## 2021-10-14 DIAGNOSIS — M79.2 NEUROPATHIC PAIN: ICD-10-CM

## 2021-10-14 PROCEDURE — 99214 OFFICE O/P EST MOD 30 MIN: CPT | Performed by: PHYSICAL MEDICINE & REHABILITATION

## 2021-10-14 ASSESSMENT — FIBROSIS 4 INDEX: FIB4 SCORE: 2.02
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
## 2021-11-08 DIAGNOSIS — R41.840 IMPAIRED ATTENTION: ICD-10-CM

## 2021-11-08 DIAGNOSIS — S06.9X9D TRAUMATIC BRAIN INJURY WITH LOSS OF CONSCIOUSNESS, SUBSEQUENT ENCOUNTER: ICD-10-CM

## 2021-11-08 DIAGNOSIS — R41.840 CONCENTRATION DEFICIT: ICD-10-CM

## 2021-11-08 RX ORDER — MODAFINIL 100 MG/1
100 TABLET ORAL DAILY
Qty: 45 TABLET | Refills: 0 | Status: SHIPPED | OUTPATIENT
Start: 2021-11-08 | End: 2021-12-14 | Stop reason: SDUPTHER

## 2021-11-15 ENCOUNTER — TELEPHONE (OUTPATIENT)
Dept: CARDIOLOGY | Facility: MEDICAL CENTER | Age: 66
End: 2021-11-15
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
## 2021-11-18 ENCOUNTER — OFFICE VISIT (OUTPATIENT)
Dept: CARDIOLOGY | Facility: MEDICAL CENTER | Age: 66
End: 2021-11-18
Payer: COMMERCIAL

## 2021-11-18 VITALS
DIASTOLIC BLOOD PRESSURE: 70 MMHG | HEART RATE: 70 BPM | HEIGHT: 74 IN | OXYGEN SATURATION: 99 % | SYSTOLIC BLOOD PRESSURE: 90 MMHG | BODY MASS INDEX: 21.3 KG/M2 | WEIGHT: 166 LBS

## 2021-11-18 DIAGNOSIS — R55 SYNCOPE AND COLLAPSE: ICD-10-CM

## 2021-11-18 DIAGNOSIS — I95.9 HYPOTENSION, UNSPECIFIED HYPOTENSION TYPE: ICD-10-CM

## 2021-11-18 DIAGNOSIS — E78.5 DYSLIPIDEMIA: ICD-10-CM

## 2021-11-18 PROCEDURE — 99244 OFF/OP CNSLTJ NEW/EST MOD 40: CPT | Performed by: STUDENT IN AN ORGANIZED HEALTH CARE EDUCATION/TRAINING PROGRAM

## 2021-11-18 ASSESSMENT — FIBROSIS 4 INDEX: FIB4 SCORE: 2.02

## 2021-11-18 ASSESSMENT — ENCOUNTER SYMPTOMS
WEAKNESS: 0
WHEEZING: 0
SHORTNESS OF BREATH: 0
PALPITATIONS: 0
PND: 0
IRREGULAR HEARTBEAT: 0
DIARRHEA: 0
NAUSEA: 0
NIGHT SWEATS: 0
VOMITING: 0
DIZZINESS: 0
ORTHOPNEA: 0
NEAR-SYNCOPE: 0
DYSPNEA ON EXERTION: 0
FOCAL WEAKNESS: 0
ABDOMINAL PAIN: 0
FEVER: 0
COUGH: 0
SYNCOPE: 0

## 2021-11-29 ENCOUNTER — TELEPHONE (OUTPATIENT)
Dept: CARDIOLOGY | Facility: MEDICAL CENTER | Age: 66
End: 2021-11-29

## 2021-11-29 ENCOUNTER — NON-PROVIDER VISIT (OUTPATIENT)
Dept: CARDIOLOGY | Facility: MEDICAL CENTER | Age: 66
End: 2021-11-29
Payer: COMMERCIAL

## 2021-11-29 DIAGNOSIS — I47.10 SVT (SUPRAVENTRICULAR TACHYCARDIA) (HCC): ICD-10-CM

## 2021-11-29 DIAGNOSIS — I49.3 PVC (PREMATURE VENTRICULAR CONTRACTION): ICD-10-CM

## 2021-11-29 NOTE — TELEPHONE ENCOUNTER
Patient enrolled in the 14 day Zio XT Holter monitoring program per Santa Monahan M.D. Monitor to be mailed by [employer].  >Currently pending EOS.

## 2021-12-14 ENCOUNTER — TELEMEDICINE (OUTPATIENT)
Dept: PHYSICAL MEDICINE AND REHAB | Facility: REHABILITATION | Age: 66
End: 2021-12-14
Payer: COMMERCIAL

## 2021-12-14 DIAGNOSIS — R41.840 CONCENTRATION DEFICIT: ICD-10-CM

## 2021-12-14 DIAGNOSIS — R41.840 IMPAIRED ATTENTION: ICD-10-CM

## 2021-12-14 DIAGNOSIS — S06.9X9D TRAUMATIC BRAIN INJURY WITH LOSS OF CONSCIOUSNESS, SUBSEQUENT ENCOUNTER: Primary | ICD-10-CM

## 2021-12-14 DIAGNOSIS — I95.1 ORTHOSTATIC HYPOTENSION: ICD-10-CM

## 2021-12-14 PROCEDURE — 99214 OFFICE O/P EST MOD 30 MIN: CPT | Mod: 95 | Performed by: PHYSICAL MEDICINE & REHABILITATION

## 2021-12-14 RX ORDER — MODAFINIL 100 MG/1
100 TABLET ORAL 2 TIMES DAILY
Qty: 60 TABLET | Refills: 1 | Status: SHIPPED | OUTPATIENT
Start: 2021-12-14 | End: 2022-02-12
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
## 2021-12-22 PROCEDURE — 93248 EXT ECG>7D<15D REV&INTERPJ: CPT | Performed by: STUDENT IN AN ORGANIZED HEALTH CARE EDUCATION/TRAINING PROGRAM

## 2021-12-23 DIAGNOSIS — R55 SYNCOPE AND COLLAPSE: ICD-10-CM

## 2021-12-24 ENCOUNTER — HOSPITAL ENCOUNTER (OUTPATIENT)
Dept: LAB | Facility: MEDICAL CENTER | Age: 66
End: 2021-12-24
Attending: STUDENT IN AN ORGANIZED HEALTH CARE EDUCATION/TRAINING PROGRAM
Payer: COMMERCIAL

## 2021-12-24 DIAGNOSIS — I95.9 HYPOTENSION, UNSPECIFIED HYPOTENSION TYPE: ICD-10-CM

## 2021-12-24 DIAGNOSIS — R55 SYNCOPE AND COLLAPSE: ICD-10-CM

## 2021-12-24 LAB
CORTIS SERPL-MCNC: 15.5 UG/DL (ref 0–23)
TSH SERPL DL<=0.005 MIU/L-ACNC: 5.05 UIU/ML (ref 0.38–5.33)

## 2021-12-24 PROCEDURE — 82533 TOTAL CORTISOL: CPT

## 2021-12-24 PROCEDURE — 84443 ASSAY THYROID STIM HORMONE: CPT

## 2021-12-24 PROCEDURE — 36415 COLL VENOUS BLD VENIPUNCTURE: CPT

## 2022-01-20 ENCOUNTER — HOSPITAL ENCOUNTER (OUTPATIENT)
Dept: CARDIOLOGY | Facility: MEDICAL CENTER | Age: 67
End: 2022-01-20
Attending: STUDENT IN AN ORGANIZED HEALTH CARE EDUCATION/TRAINING PROGRAM
Payer: COMMERCIAL

## 2022-01-20 DIAGNOSIS — R55 SYNCOPE AND COLLAPSE: ICD-10-CM

## 2022-01-20 PROCEDURE — 93306 TTE W/DOPPLER COMPLETE: CPT

## 2022-01-20 NOTE — OP THERAPY DAILY TREATMENT
Outpatient Physical Therapy  DAILY TREATMENT     [facility] [address].  [address]  [place] [address]  Phone:  [phone]  Fax:  [phone]    Date: 10/22/2020    Patient: Reyes Amezcua  YOB: 1955  MRN: [id number]     Time Calculation    Start time: 0901 930 1034 64 min         Chief Complaint: Shoulder Problem    Visit #: 13    SUBJECTIVE:  I'm seeing Dr. Vergara in two weeks to talk about my circulation. Walking now I almost have no shoulder pain. The pain in the back of my shoulder has been a lot better but reaching behind my back is still excruciating.    Objective:    Poor scapular and GH dissociation with anterior scap tilting observed with GH BTB    Triceps tightness: While stretch able to attain 140 deg shoulder elevation (assessed seated with GH flexion and elbow flexion, hand at scapula)    2 Taut bands noted in R triceps TTP  Additional TTP lateral infraspinatus R     GH IR BTB: Sacrum 7/10 pain    GH IR BTB after manual treatment: T7 3/4 pain   With scapular retraction: T12 5-6/10 pain    Therapeutic Exercises (CPT 03466):     1. UBE, x4 min; level 2 resist , warm up    2. Sleeper stretch in SL, 3x30 sec, added to hep    3. Triceps stretch, [id number], added to hep; edu to perform in supine for gravity assistance    14. UPOC: 11/5/20      Therapeutic Exercise Summary: Pt performed these exercises with instruction and SPV.  Provided handout with these exercises for daily HEP.      Therapeutic Treatments and Modalities:     1. Manual Therapy (CPT 60335), see below    Therapeutic Treatment and Modalities Summary: GH post mobs to R GH gr III with increasing elevation to 90 deg; GH maintained in scapular plane 4x60 sec ea followed by posterior capsule stretch with block to scapula 3x30 sec (pt supine for manual therapy)//pt reporting incr in discomfort with post capsule stretching with discomfort noted at posterior R RC  Then IASTM to R triceps muscle (2  Health Maintenance Due   Topic Date Due    Shingles Vaccine (1 of 2) Never done    Hemoglobin A1c  10/14/2021    PROSTATE-SPECIFIC ANTIGEN  10/23/2021    Eye Exam  12/01/2021    Colorectal Cancer Screening  12/07/2021     Updates were requested from care everywhere.  Chart was reviewed for overdue Proactive [facility] Encounters (HEIDI) topics (CRS, Breast Cancer Screening, Eye exam)  Health Maintenance has been updated.  LINKS immunization registry triggered.  Immunizations were reconciled.     taut areas TTP)    Time-based treatments/modalities:    Physical Therapy Timed Treatment Charges  Manual therapy minutes (CPT 50371): 30 minutes  Therapeutic exercise minutes (CPT 35492): 16 minutes  Pain pre treatment:    ASSESSMENT:   Response to treatment:   Pt demonstrating incr in GH IR BTB with decr pain after post GH mobs followed by posterior capsule stretching, further increased with IASTM to taut bands in triceps; however continued difficulty with dissociation of GH and Scapula (limited range and incr pain with attempts BTB with correct scapular set up).     PLAN/RECOMMENDATIONS:   Plan for treatment: therapy treatment to continue next visit.  Planned interventions for next visit: Review response to sleeper and triceps stretching and reassess GH BTB. Neuro re-ed and manual for GH and scapular dissociation

## 2022-01-24 LAB
LV EJECT FRACT MOD 2C 99903: 64.1
LV EJECT FRACT MOD 4C 99902: 57.15
LV EJECT FRACT MOD BP 99901: 60.55

## 2022-01-24 PROCEDURE — 93306 TTE W/DOPPLER COMPLETE: CPT | Mod: 26 | Performed by: STUDENT IN AN ORGANIZED HEALTH CARE EDUCATION/TRAINING PROGRAM

## 2022-02-15 ENCOUNTER — TELEMEDICINE (OUTPATIENT)
Dept: CARDIOLOGY | Facility: MEDICAL CENTER | Age: 67
End: 2022-02-15
Payer: COMMERCIAL

## 2022-02-15 VITALS — BODY MASS INDEX: 20.53 KG/M2 | HEIGHT: 74 IN | WEIGHT: 160 LBS

## 2022-02-15 DIAGNOSIS — I95.9 HYPOTENSION, UNSPECIFIED HYPOTENSION TYPE: ICD-10-CM

## 2022-02-15 DIAGNOSIS — E78.5 DYSLIPIDEMIA: ICD-10-CM

## 2022-02-15 DIAGNOSIS — R55 SYNCOPE AND COLLAPSE: ICD-10-CM

## 2022-02-15 PROCEDURE — 99213 OFFICE O/P EST LOW 20 MIN: CPT | Mod: 95 | Performed by: NURSE PRACTITIONER

## 2022-02-15 ASSESSMENT — ENCOUNTER SYMPTOMS
ABDOMINAL PAIN: 0
PND: 0
COUGH: 0
CLAUDICATION: 0
FEVER: 0
MYALGIAS: 0
SHORTNESS OF BREATH: 0
PALPITATIONS: 0
ORTHOPNEA: 0
DIZZINESS: 0

## 2022-02-15 ASSESSMENT — FIBROSIS 4 INDEX: FIB4 SCORE: 2.05

## 2022-02-15 NOTE — PROGRESS NOTES
Chief Complaint   Patient presents with   • Syncope     F/V Dx: Syncope and collapse       Subjective   This evaluation was conducted via Zoom using secure and encrypted videoconferencing technology. The patient was in their home in the [facility].    The patient's identity was confirmed and verbal consent was obtained for this virtual visit.    Reyes Amezcua is a 66 y.o. male who presents today for follow up on  His recent testing results.     Patient of Dr. Monahan.  Patient was last seen in clinic on 11/18/2021.  During that visit, patient was sent for testing for syncope.  He was sent for an echo, event monitor and lab testing.    Pt reports no further syncopal episodes. He does continue to have disorientation when getting up and vestibular system disturbance.      He denies chest pain, shortness of breath, palpitations, dizziness/lightheadedness, orthopnea, PND or Edema.     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)    -Dyslipidemia    Past Medical History:   Diagnosis Date   • ICB (intracranial bleed) ([facility]) 5/9/2020     Past Surgical History:   Procedure Laterality Date   • MT LAP,GASTROSTOMY,W/O TUBE CONSTR  5/23/2020    Procedure: CREATION, GASTROSTOMY, LAPAROSCOPIC;  Surgeon: Norbert Prajapati M.D.;  Location: SURGERY [facility];  Service: General     Family History   Problem Relation Age of Onset   • Glasses Mother      Social History     Socioeconomic History   • Marital status:      Spouse name: Not on file   • Number of children: Not on file   • Years of education: Not on file   • Highest education level: Not on file   Occupational History   • Not on file   Tobacco Use   • Smoking status: Never Smoker   • Smokeless tobacco: Never Used   Vaping Use   • Vaping Use: Never used   Substance and Sexual Activity   • Alcohol use: Yes   • Drug use: Never   • Sexual activity: Not on file   Other Topics  Concern   • Not on file   Social History Narrative    Not working     Social Determinants of Health     Financial Resource Strain:    • Difficulty of Paying Living Expenses: Not on file   Food Insecurity:    • Worried About Running Out of Food in the Last Year: Not on file   • Ran Out of Food in the Last Year: Not on file   Transportation Needs:    • Lack of Transportation (Medical): Not on file   • Lack of Transportation (Non-Medical): Not on file   Physical Activity:    • Days of Exercise per Week: Not on file   • Minutes of Exercise per Session: Not on file   Stress:    • Feeling of Stress : Not on file   Social Connections:    • Frequency of Communication with Friends and Family: Not on file   • Frequency of Social Gatherings with Friends and Family: Not on file   • Attends Latter-day Services: Not on file   • Active Member of Clubs or Organizations: Not on file   • Attends Club or Organization Meetings: Not on file   • Marital Status: Not on file   Intimate Partner Violence:    • Fear of Current or Ex-Partner: Not on file   • Emotionally Abused: Not on file   • Physically Abused: Not on file   • Sexually Abused: Not on file   Housing Stability:    • Unable to Pay for Housing in the Last Year: Not on file   • Number of Places Lived in the Last Year: Not on file   • Unstable Housing in the Last Year: Not on file     No Known Allergies  Outpatient Encounter Medications as of 2/15/2022   Medication Sig Dispense Refill   • fluticasone (FLONASE) 50 MCG/ACT nasal spray Administer 1 Spray into affected nostril(S) every day. 16 g 0   • gabapentin (NEURONTIN) 300 MG Cap Take 1 Capsule by mouth 3 times a day. (Patient taking differently: Take 300 mg by mouth every day.) 270 Capsule 0   • sildenafil citrate (VIAGRA) 50 MG tablet Take 1 tablet by mouth as needed for Erectile Dysfunction. 20 tablet 3   • sildenafil citrate (VIAGRA) 50 MG tablet Take 1 tablet by mouth as needed for Erectile Dysfunction. 10 tablet 3   •  "acetaminophen (TYLENOL) 325 MG Tab 2 Tabs by Per G Tube route every 6 hours as needed for Fever (T > 101.5). 30 Tab 0     No facility-administered encounter medications on file as of 2/15/2022.     Review of Systems   Constitutional: Negative for fever and malaise/fatigue.   HENT:        Vestibular problem   Respiratory: Negative for cough and shortness of breath.    Cardiovascular: Negative for chest pain, palpitations, orthopnea, claudication, leg swelling and PND.   Gastrointestinal: Negative for abdominal pain.   Musculoskeletal: Negative for myalgias.   Neurological: Negative for dizziness.   All other systems reviewed and are negative.             Objective     Ht 1.88 m (6' 2\")   Wt 72.6 kg (160 lb)   BMI 20.54 kg/m²     Physical Exam  Vitals reviewed.   Constitutional:       Appearance: He is well-developed.   HENT:      Head: Normocephalic and atraumatic.   Eyes:      Pupils: Pupils are equal, round, and reactive to light.   Neck:      Vascular: No JVD.   Cardiovascular:      Rate and Rhythm: Normal rate and regular rhythm.      Heart sounds: Normal heart sounds.   Pulmonary:      Effort: Pulmonary effort is normal. No respiratory distress.      Breath sounds: Normal breath sounds. No wheezing or rales.   Abdominal:      General: Bowel sounds are normal.      Palpations: Abdomen is soft.   Musculoskeletal:         General: Normal range of motion.      Cervical back: Normal range of motion and neck supple.      Right lower leg: No edema.      Left lower leg: No edema.   Skin:     General: Skin is warm and dry.   Neurological:      General: No focal deficit present.      Mental Status: He is alert and oriented to person, place, and time.   Psychiatric:         Behavior: Behavior normal.       Lab Results   Component Value Date/Time    CHOLSTRLTOT 177 06/04/2020 05:56 AM     (H) 06/04/2020 05:56 AM    HDL 35 (A) 06/04/2020 05:56 AM    TRIGLYCERIDE 148 06/04/2020 05:56 AM       Lab Results   Component " Value Date/Time    SODIUM 141 10/06/2021 06:35 AM    POTASSIUM 4.1 10/06/2021 06:35 AM    CHLORIDE 109 10/06/2021 06:35 AM    CO2 20 10/06/2021 06:35 AM    GLUCOSE 113 (H) 10/06/2021 06:35 AM    BUN 13 10/06/2021 06:35 AM    CREATININE 0.76 10/06/2021 06:35 AM     Lab Results   Component Value Date/Time    ALKPHOSPHAT 56 10/06/2021 06:35 AM    ASTSGOT 20 10/06/2021 06:35 AM    ALTSGPT 15 10/06/2021 06:35 AM    TBILIRUBIN 0.6 10/06/2021 06:35 AM      Event Monitor  DOS: 12/2/2021-12/16/2021   Summary:   The patient was monitored for 14 days.  Predominant underlying rhythm was sinus rhythm.  Minimum heart rate 49 bpm, maximum heart rate 179 bpm, and average heart rate 71 bpm.  13 runs of supraventricular tachycardia occurred, the run with the fastest interval lasting 7 beats with a max rate of 179 bpm, the longest lasting 11 beats with an average rate of 134 bpm.  Rare PACs less than 1%.  Rare PVCs less than 1%.  Symptoms correlated with sinus rhythm with heart rate 69 bpm without ectopy.     Transthoracic Echo Report 1/20/2022  Normal left ventricular systolic function.  The left ventricular   ejection fraction is visually estimated to be 60%.  Normal right ventricular size and systolic function.  No significant valvular abnormalities.  No prior study is available for comparison.          Assessment & Plan     1. Syncope and collapse     2. Dyslipidemia     3. Hypotension, unspecified hypotension type         Medical Decision Making: Today's Assessment/Status/Plan:        Syncopal episode:  -reviewed results with patient.   -Pt has not had any further episodes  -Continue to stay hydrated.  -Discussed following up with neuro. (My Chart message sent to pt if he would like a referral)    DLD:  -pt to follow up with PCP    Discussed with patient he can follow-up in clinic as needed in the future if any other problems develop.    Patient verbalizes understanding and agrees with the plan of care.     PLEASE NOTE: This Note  was created using voice recognition Software. I have made every reasonable attempt to correct obvious errors, but I expect that there are errors of grammar and possibly content that I did not discover before finalizing the note

## 2022-02-22 ENCOUNTER — PATIENT MESSAGE (OUTPATIENT)
Dept: PHYSICAL MEDICINE AND REHAB | Facility: REHABILITATION | Age: 67
End: 2022-02-22
Payer: COMMERCIAL

## 2022-02-22 DIAGNOSIS — S06.9X9A TRAUMATIC BRAIN INJURY WITH LOSS OF CONSCIOUSNESS, INITIAL ENCOUNTER (HCC): ICD-10-CM

## 2022-02-22 DIAGNOSIS — M79.2 NEUROPATHIC PAIN: ICD-10-CM

## 2022-02-23 RX ORDER — GABAPENTIN 300 MG/1
300 CAPSULE ORAL 3 TIMES DAILY
Qty: 270 CAPSULE | Refills: 0 | Status: SHIPPED
Start: 2022-02-23 | End: 2022-10-27

## 2022-03-14 ENCOUNTER — APPOINTMENT (OUTPATIENT)
Dept: PHYSICAL MEDICINE AND REHAB | Facility: REHABILITATION | Age: 67
End: 2022-03-14
Payer: COMMERCIAL

## 2022-03-21 ENCOUNTER — TELEMEDICINE (OUTPATIENT)
Dept: PHYSICAL MEDICINE AND REHAB | Facility: REHABILITATION | Age: 67
End: 2022-03-21
Payer: COMMERCIAL

## 2022-03-21 DIAGNOSIS — R41.840 CONCENTRATION DEFICIT: ICD-10-CM

## 2022-03-21 DIAGNOSIS — R41.840 IMPAIRED ATTENTION: ICD-10-CM

## 2022-03-21 DIAGNOSIS — M79.2 NEUROPATHIC PAIN: ICD-10-CM

## 2022-03-21 DIAGNOSIS — S06.9X9D TRAUMATIC BRAIN INJURY WITH LOSS OF CONSCIOUSNESS, SUBSEQUENT ENCOUNTER: Primary | ICD-10-CM

## 2022-03-21 PROCEDURE — 99214 OFFICE O/P EST MOD 30 MIN: CPT | Mod: 95 | Performed by: PHYSICAL MEDICINE & REHABILITATION

## 2022-03-21 RX ORDER — GABAPENTIN 100 MG/1
100 CAPSULE ORAL 2 TIMES DAILY
Qty: 60 CAPSULE | Refills: 0 | Status: SHIPPED | OUTPATIENT
Start: 2022-03-21 | End: 2023-03-30

## 2022-03-21 RX ORDER — MODAFINIL 100 MG/1
100 TABLET ORAL 2 TIMES DAILY
Qty: 60 TABLET | Refills: 2 | Status: SHIPPED | OUTPATIENT
Start: 2022-03-21 | End: 2022-06-19
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
## 2022-06-21 ENCOUNTER — APPOINTMENT (OUTPATIENT)
Dept: PHYSICAL MEDICINE AND REHAB | Facility: REHABILITATION | Age: 67
End: 2022-06-21
Payer: COMMERCIAL

## 2022-07-18 ENCOUNTER — TELEMEDICINE (OUTPATIENT)
Dept: PHYSICAL MEDICINE AND REHAB | Facility: REHABILITATION | Age: 67
End: 2022-07-18
Payer: COMMERCIAL

## 2022-07-18 DIAGNOSIS — R41.840 CONCENTRATION DEFICIT: ICD-10-CM

## 2022-07-18 DIAGNOSIS — S06.9X9D TRAUMATIC BRAIN INJURY WITH LOSS OF CONSCIOUSNESS, SUBSEQUENT ENCOUNTER: Primary | ICD-10-CM

## 2022-07-18 DIAGNOSIS — R41.840 IMPAIRED ATTENTION: ICD-10-CM

## 2022-07-18 PROCEDURE — 99214 OFFICE O/P EST MOD 30 MIN: CPT | Mod: 95 | Performed by: PHYSICAL MEDICINE & REHABILITATION

## 2022-07-18 RX ORDER — MODAFINIL 100 MG/1
100 TABLET ORAL DAILY
Qty: 30 TABLET | Refills: 2 | Status: SHIPPED | OUTPATIENT
Start: 2022-07-18 | End: 2022-10-16
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
## 2022-10-24 SDOH — ECONOMIC STABILITY: FOOD INSECURITY: WITHIN THE PAST 12 MONTHS, YOU WORRIED THAT YOUR FOOD WOULD RUN OUT BEFORE YOU GOT MONEY TO BUY MORE.: NEVER TRUE

## 2022-10-24 SDOH — ECONOMIC STABILITY: HOUSING INSECURITY: IN THE LAST 12 MONTHS, HOW MANY PLACES HAVE YOU LIVED?: 1

## 2022-10-24 SDOH — ECONOMIC STABILITY: FOOD INSECURITY: WITHIN THE PAST 12 MONTHS, THE FOOD YOU BOUGHT JUST DIDN'T LAST AND YOU DIDN'T HAVE MONEY TO GET MORE.: NEVER TRUE

## 2022-10-24 SDOH — ECONOMIC STABILITY: INCOME INSECURITY: IN THE LAST 12 MONTHS, WAS THERE A TIME WHEN YOU WERE NOT ABLE TO PAY THE MORTGAGE OR RENT ON TIME?: NO

## 2022-10-24 SDOH — HEALTH STABILITY: PHYSICAL HEALTH: ON AVERAGE, HOW MANY MINUTES DO YOU ENGAGE IN EXERCISE AT THIS LEVEL?: 40 MIN

## 2022-10-24 SDOH — ECONOMIC STABILITY: TRANSPORTATION INSECURITY
IN THE PAST 12 MONTHS, HAS THE LACK OF TRANSPORTATION KEPT YOU FROM MEDICAL APPOINTMENTS OR FROM GETTING MEDICATIONS?: NO

## 2022-10-24 SDOH — ECONOMIC STABILITY: TRANSPORTATION INSECURITY
IN THE PAST 12 MONTHS, HAS LACK OF TRANSPORTATION KEPT YOU FROM MEETINGS, WORK, OR FROM GETTING THINGS NEEDED FOR DAILY LIVING?: NO

## 2022-10-24 SDOH — ECONOMIC STABILITY: HOUSING INSECURITY
IN THE LAST 12 MONTHS, WAS THERE A TIME WHEN YOU DID NOT HAVE A STEADY PLACE TO SLEEP OR SLEPT IN A SHELTER (INCLUDING NOW)?: NO

## 2022-10-24 SDOH — HEALTH STABILITY: MENTAL HEALTH
STRESS IS WHEN SOMEONE FEELS TENSE, NERVOUS, ANXIOUS, OR CAN'T SLEEP AT NIGHT BECAUSE THEIR MIND IS TROUBLED. HOW STRESSED ARE YOU?: NOT AT ALL

## 2022-10-24 SDOH — ECONOMIC STABILITY: INCOME INSECURITY: HOW HARD IS IT FOR YOU TO PAY FOR THE VERY BASICS LIKE FOOD, HOUSING, MEDICAL CARE, AND HEATING?: NOT HARD AT ALL

## 2022-10-24 SDOH — ECONOMIC STABILITY: TRANSPORTATION INSECURITY
IN THE PAST 12 MONTHS, HAS LACK OF RELIABLE TRANSPORTATION KEPT YOU FROM MEDICAL APPOINTMENTS, MEETINGS, WORK OR FROM GETTING THINGS NEEDED FOR DAILY LIVING?: NO

## 2022-10-24 SDOH — HEALTH STABILITY: PHYSICAL HEALTH: ON AVERAGE, HOW MANY DAYS PER WEEK DO YOU ENGAGE IN MODERATE TO STRENUOUS EXERCISE (LIKE A BRISK WALK)?: 3 DAYS

## 2022-10-24 ASSESSMENT — SOCIAL DETERMINANTS OF HEALTH (SDOH)
HOW OFTEN DO YOU GET TOGETHER WITH FRIENDS OR RELATIVES?: ONCE A WEEK
HOW OFTEN DO YOU ATTENT MEETINGS OF THE CLUB OR ORGANIZATION YOU BELONG TO?: NEVER
DO YOU BELONG TO ANY CLUBS OR ORGANIZATIONS SUCH AS CHURCH GROUPS UNIONS, FRATERNAL OR ATHLETIC GROUPS, OR SCHOOL GROUPS?: NO
HOW OFTEN DO YOU ATTEND CHURCH OR RELIGIOUS SERVICES?: NEVER
HOW OFTEN DO YOU ATTEND CHURCH OR RELIGIOUS SERVICES?: NEVER
HOW OFTEN DO YOU HAVE SIX OR MORE DRINKS ON ONE OCCASION: NEVER
IN A TYPICAL WEEK, HOW MANY TIMES DO YOU TALK ON THE PHONE WITH FAMILY, FRIENDS, OR NEIGHBORS?: ONCE A WEEK
HOW OFTEN DO YOU ATTENT MEETINGS OF THE CLUB OR ORGANIZATION YOU BELONG TO?: NEVER
HOW OFTEN DO YOU GET TOGETHER WITH FRIENDS OR RELATIVES?: ONCE A WEEK
IN A TYPICAL WEEK, HOW MANY TIMES DO YOU TALK ON THE PHONE WITH FAMILY, FRIENDS, OR NEIGHBORS?: ONCE A WEEK
HOW MANY DRINKS CONTAINING ALCOHOL DO YOU HAVE ON A TYPICAL DAY WHEN YOU ARE DRINKING: 1 OR 2
DO YOU BELONG TO ANY CLUBS OR ORGANIZATIONS SUCH AS CHURCH GROUPS UNIONS, FRATERNAL OR ATHLETIC GROUPS, OR SCHOOL GROUPS?: NO
HOW HARD IS IT FOR YOU TO PAY FOR THE VERY BASICS LIKE FOOD, HOUSING, MEDICAL CARE, AND HEATING?: NOT HARD AT ALL
WITHIN THE PAST 12 MONTHS, YOU WORRIED THAT YOUR FOOD WOULD RUN OUT BEFORE YOU GOT THE MONEY TO BUY MORE: NEVER TRUE
HOW OFTEN DO YOU HAVE A DRINK CONTAINING ALCOHOL: 2-3 TIMES A WEEK

## 2022-10-24 ASSESSMENT — LIFESTYLE VARIABLES
AUDIT-C TOTAL SCORE: 3
HOW OFTEN DO YOU HAVE SIX OR MORE DRINKS ON ONE OCCASION: NEVER
SKIP TO QUESTIONS 9-10: 1
HOW MANY STANDARD DRINKS CONTAINING ALCOHOL DO YOU HAVE ON A TYPICAL DAY: 1 OR 2
HOW OFTEN DO YOU HAVE A DRINK CONTAINING ALCOHOL: 2-3 TIMES A WEEK

## 2022-10-27 ENCOUNTER — OFFICE VISIT (OUTPATIENT)
Dept: MEDICAL GROUP | Facility: MEDICAL CENTER | Age: 67
End: 2022-10-27
Payer: COMMERCIAL

## 2022-10-27 VITALS
RESPIRATION RATE: 16 BRPM | TEMPERATURE: 97.6 F | HEIGHT: 74 IN | WEIGHT: 163.14 LBS | HEART RATE: 68 BPM | DIASTOLIC BLOOD PRESSURE: 60 MMHG | SYSTOLIC BLOOD PRESSURE: 110 MMHG | OXYGEN SATURATION: 99 % | BODY MASS INDEX: 20.94 KG/M2

## 2022-10-27 DIAGNOSIS — Z23 NEED FOR VACCINATION: ICD-10-CM

## 2022-10-27 DIAGNOSIS — Z13.0 SCREENING FOR DEFICIENCY ANEMIA: ICD-10-CM

## 2022-10-27 DIAGNOSIS — M43.6 TORTICOLLIS: ICD-10-CM

## 2022-10-27 DIAGNOSIS — L60.0 INGROWING NAIL: ICD-10-CM

## 2022-10-27 DIAGNOSIS — Z12.5 PROSTATE CANCER SCREENING: ICD-10-CM

## 2022-10-27 DIAGNOSIS — Z13.6 ENCOUNTER FOR SCREENING FOR CARDIOVASCULAR DISORDERS: ICD-10-CM

## 2022-10-27 DIAGNOSIS — Z92.29 HAS RECEIVED THIRD DOSE OF HEPATITIS B VACCINE: ICD-10-CM

## 2022-10-27 PROCEDURE — 99214 OFFICE O/P EST MOD 30 MIN: CPT | Mod: 25 | Performed by: FAMILY MEDICINE

## 2022-10-27 PROCEDURE — 90677 PCV20 VACCINE IM: CPT | Performed by: FAMILY MEDICINE

## 2022-10-27 PROCEDURE — 90471 IMMUNIZATION ADMIN: CPT | Performed by: FAMILY MEDICINE

## 2022-10-27 RX ORDER — AMOXICILLIN AND CLAVULANATE POTASSIUM 875; 125 MG/1; MG/1
1 TABLET, FILM COATED ORAL 2 TIMES DAILY
Qty: 10 TABLET | Refills: 0 | Status: SHIPPED | OUTPATIENT
Start: 2022-10-27 | End: 2022-11-01

## 2022-10-27 RX ORDER — CYCLOBENZAPRINE HCL 5 MG
TABLET ORAL
Qty: 10 TABLET | Refills: 0 | Status: SHIPPED | OUTPATIENT
Start: 2022-10-27 | End: 2023-03-30 | Stop reason: SDUPTHER

## 2022-10-27 ASSESSMENT — PATIENT HEALTH QUESTIONNAIRE - PHQ9: CLINICAL INTERPRETATION OF PHQ2 SCORE: 0

## 2022-10-27 ASSESSMENT — FIBROSIS 4 INDEX: FIB4 SCORE: 2.05

## 2022-10-27 NOTE — PROGRESS NOTES
CC: Infected ingrowing nail    HPI:   Reyes presents today to discuss the following medical issues:    Ingrowing nail  Patient has been having swollen tender corner of the nail bed of the left big toe.denies any trauma to the nail.     Torticollis  Patient has been having neck pain for about 2 weeks, localized to the left side of the neck.  No arm or shoulder pain.      Patient requesting prostate cancer screening  Patient thinks that he had hepatitis B vaccine 3 doses in the past, but not sure.  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 10 Tablet 0    cyclobenzaprine (FLEXERIL) 5 mg tablet Take one tablet twice a day as needed for 5 days 10 Tablet 0    gabapentin (NEURONTIN) 100 MG Cap Take 1 Capsule by mouth 2 times a day. 60 Capsule 0    acetaminophen (TYLENOL) 325 MG Tab 2 Tabs by Per G Tube route every 6 hours as needed for Fever (T > 101.5). 30 Tab 0     No current facility-administered medications for this visit.         Allergies as of 10/27/2022    (No Known Allergies)        ROS: Denies any chest pain, Shortness of breath, Changes bowel or bladder, Lower extremity edema.    Physical Exam:  /60 (BP Location: Right arm, Patient Position: Sitting, BP Cuff Size: Adult)   Pulse 68   Temp 36.4 °C (97.6 °F) (Temporal)   Resp 16   Ht  "1.88 m (6' 2\")   Wt 74 kg (163 lb 2.3 oz)   SpO2 99%   BMI 20.95 kg/m²   Gen.: Well-developed, well-nourished, no apparent distress,pleasant and cooperative with the examination  Skin:  Warm and dry with good turgor. No rashes or suspicious lesions in visible areas  HEENT:Sinuses nontender with palpation, TMs clear, nares patent with pink mucosa and clear rhinorrhea,no septal deviation ,polyps or lesions. lips without lesions, oropharynx clear.  Neck: Trachea midline,no masses or adenopathy. No JVD.  Cor: Regular rate and rhythm without murmur, gallop or rub.  Lungs: Respirations unlabored.Clear to auscultation with equal breath sounds bilaterally. No wheezes, rhonchi.  Extremities: No cyanosis, clubbing or edema.  Ingrowing nail of the left big toe(tender swollen medial corner of the nail bed.      Assessment and Plan.   66 y.o. male     1. Ingrowing nail  Possibly infected ingrowing nail of the left big toe.  We will start patient on antibiotic  If no improvement will refer patient to podiatrist    - amoxicillin-clavulanate (AUGMENTIN) 875-125 MG Tab; Take 1 Tablet by mouth 2 times a day for 5 days.  Dispense: 10 Tablet; Refill: 0    2. Torticollis  Patient has been having neck pain for about 2 weeks, localized to the left side of the neck.  No arm or shoulder pain.  Probably torticollis.  I will start patient on muscle function.  Advised to take over-the-counter pain medication as needed.    - cyclobenzaprine (FLEXERIL) 5 mg tablet; Take one tablet twice a day as needed for 5 days  Dispense: 10 Tablet; Refill: 0    3. Prostate cancer screening  Requesting prostate cancer screening    - PROSTATE SPECIFIC AG SCREENING; Future    4. Encounter for screening for cardiovascular disorders    - Comp Metabolic Panel; Future  - Lipid Profile; Future    5. Screening for deficiency anemia    - CBC WITH DIFFERENTIAL; Future    6. Has received third dose of hepatitis B vaccine  Patient thinks that he had hepatitis B vaccine " 3 doses in the past, but not sure.  Wants to check his levels antibodie before repeating hepatitis B vaccine  - HEP B SURFACE AB; Future,    7. Need for vaccination  Due for pneumonia shot, given today.    - Pneumococcal Conjugate Vaccine 20-Valent (19 yrs+)

## 2022-11-08 LAB
ALBUMIN SERPL-MCNC: 4.7 G/DL (ref 3.8–4.8)
ALBUMIN/GLOB SERPL: 2 {RATIO} (ref 1.2–2.2)
ALP SERPL-CCNC: 76 IU/L (ref 44–121)
ALT SERPL-CCNC: 12 IU/L (ref 0–44)
AST SERPL-CCNC: 18 IU/L (ref 0–40)
BASOPHILS # BLD AUTO: 0.1 X10E3/UL (ref 0–0.2)
BASOPHILS NFR BLD AUTO: 1 %
BILIRUB SERPL-MCNC: 0.6 MG/DL (ref 0–1.2)
BUN SERPL-MCNC: 14 MG/DL (ref 8–27)
BUN/CREAT SERPL: 15 (ref 10–24)
CALCIUM SERPL-MCNC: 9.2 MG/DL (ref 8.6–10.2)
CHLORIDE SERPL-SCNC: 103 MMOL/L (ref 96–106)
CHOLEST SERPL-MCNC: 247 MG/DL (ref 100–199)
CO2 SERPL-SCNC: 23 MMOL/L (ref 20–29)
CREAT SERPL-MCNC: 0.93 MG/DL (ref 0.76–1.27)
EGFRCR SERPLBLD CKD-EPI 2021: 91 ML/MIN/1.73
EOSINOPHIL # BLD AUTO: 0.2 X10E3/UL (ref 0–0.4)
EOSINOPHIL NFR BLD AUTO: 3 %
ERYTHROCYTE [DISTWIDTH] IN BLOOD BY AUTOMATED COUNT: 13.1 % (ref 11.6–15.4)
GLOBULIN SER CALC-MCNC: 2.4 G/DL (ref 1.5–4.5)
GLUCOSE SERPL-MCNC: 96 MG/DL (ref 70–99)
HBV SURFACE AB SER QL: NON REACTIVE
HCT VFR BLD AUTO: 46.9 % (ref 37.5–51)
HDLC SERPL-MCNC: 48 MG/DL
HGB BLD-MCNC: 15.9 G/DL (ref 13–17.7)
IMM GRANULOCYTES # BLD AUTO: 0 X10E3/UL (ref 0–0.1)
IMM GRANULOCYTES NFR BLD AUTO: 0 %
IMMATURE CELLS  115398: NORMAL
LABORATORY COMMENT REPORT: ABNORMAL
LDLC SERPL CALC-MCNC: 176 MG/DL (ref 0–99)
LYMPHOCYTES # BLD AUTO: 2.4 X10E3/UL (ref 0.7–3.1)
LYMPHOCYTES NFR BLD AUTO: 44 %
MCH RBC QN AUTO: 30.6 PG (ref 26.6–33)
MCHC RBC AUTO-ENTMCNC: 33.9 G/DL (ref 31.5–35.7)
MCV RBC AUTO: 90 FL (ref 79–97)
MONOCYTES # BLD AUTO: 0.5 X10E3/UL (ref 0.1–0.9)
MONOCYTES NFR BLD AUTO: 9 %
MORPHOLOGY BLD-IMP: NORMAL
NEUTROPHILS # BLD AUTO: 2.4 X10E3/UL (ref 1.4–7)
NEUTROPHILS NFR BLD AUTO: 43 %
NRBC BLD AUTO-RTO: NORMAL %
PLATELET # BLD AUTO: 193 X10E3/UL (ref 150–450)
POTASSIUM SERPL-SCNC: 4.3 MMOL/L (ref 3.5–5.2)
PROT SERPL-MCNC: 7.1 G/DL (ref 6–8.5)
PSA SERPL-MCNC: 0.6 NG/ML (ref 0–4)
RBC # BLD AUTO: 5.2 X10E6/UL (ref 4.14–5.8)
SODIUM SERPL-SCNC: 139 MMOL/L (ref 134–144)
TRIGL SERPL-MCNC: 126 MG/DL (ref 0–149)
VLDLC SERPL CALC-MCNC: 23 MG/DL (ref 5–40)
WBC # BLD AUTO: 5.5 X10E3/UL (ref 3.4–10.8)

## 2022-11-09 ENCOUNTER — TELEMEDICINE (OUTPATIENT)
Dept: MEDICAL GROUP | Facility: MEDICAL CENTER | Age: 67
End: 2022-11-09
Payer: COMMERCIAL

## 2022-11-09 DIAGNOSIS — M54.2 NECK PAIN: ICD-10-CM

## 2022-11-09 DIAGNOSIS — E78.2 MIXED HYPERLIPIDEMIA: ICD-10-CM

## 2022-11-09 DIAGNOSIS — L60.0 INGROWING NAIL: ICD-10-CM

## 2022-11-09 PROCEDURE — 99214 OFFICE O/P EST MOD 30 MIN: CPT | Mod: 95 | Performed by: FAMILY MEDICINE

## 2022-11-09 RX ORDER — ROSUVASTATIN CALCIUM 10 MG/1
10 TABLET, COATED ORAL EVERY EVENING
Qty: 30 TABLET | Refills: 11 | Status: SHIPPED | OUTPATIENT
Start: 2022-11-09 | End: 2023-02-15 | Stop reason: SDUPTHER

## 2022-11-09 NOTE — PROGRESS NOTES
Telemedicine Video Visit: Established Patient   This Remote Face to Face encounter was conducted via Zoom. Given the importance of social distancing and other strategies recommended to reduce the risk of COVID-19 transmission, I am providing medical care to this patient via audio/video visit in place of an in person visit at the request of the patient. Verbal consent to telehealth, risks, benefits, and consequences were discussed. Patient retains the right to withdraw at any time. All existing confidentiality protections apply. The patient has access to all transmitted medical information. No dissemination of any patient images or information to other entities without further written consent.  Subjective:     Chief Complaint   Patient presents with    Results       Reyes Amezcua is a 66 y.o. male presenting for evaluation and management of:    Mixed hyperlipidemia  Most recent lipid panel showed     Component Ref Range & Units 2 d ago   (11/7/22) 2 yr ago   (6/4/20) 2 yr ago   (5/14/20) 2 yr ago   (5/11/20) 2 yr ago   (5/9/20)   Cholesterol,Tot 100 - 199 mg/dL 247 High   177       Triglycerides 0 - 149 mg/dL 126  148  106  126  190 High     HDL >39 mg/dL 48  35 Abnormal  R       VLDL Cholesterol Calc 5 - 40 mg/dL 23        LDL Chol Calc ([facility]) 0 - 99 mg/dL 176 High             Patient's ASCVD cardiac risk score is 12.5%.Discussed the statin with the patient, he is interested in starting it.  However patient denies any history of diabetes, CAD, or stroke.    Ingrowing nail  Patient has been having ingrowing toenail that has not improved with antibiotics and soaking his foot in a warm salty water.  Patient still have the corner of his left toenail swollen and tender.    Neck pain  Patient has been having neck pain, mainly the sides of the neck, gets worse with neck movements.  Was diagnosed with torticollis, however Flexeril to relax the neck muscles did not help.        ROS   Denies any recent fevers or chills. No  nausea or vomiting. No chest pains or shortness of breath.   Neck muscles: Decreased range of motion    No Known Allergies    Current medicines (including changes today)  Current Outpatient Medications   Medication Sig Dispense Refill    rosuvastatin (CRESTOR) 10 MG Tab Take 1 Tablet by mouth every evening. 30 Tablet 11    cyclobenzaprine (FLEXERIL) 5 mg tablet Take one tablet twice a day as needed for 5 days (Patient not taking: Reported on 11/9/2022) 10 Tablet 0    gabapentin (NEURONTIN) 100 MG Cap Take 1 Capsule by mouth 2 times a day. (Patient not taking: Reported on 11/9/2022) 60 Capsule 0    acetaminophen (TYLENOL) 325 MG Tab 2 Tabs by Per G Tube route every 6 hours as needed for Fever (T > 101.5). (Patient not taking: Reported on 11/9/2022) 30 Tab 0     No current facility-administered medications for this visit.       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  He  has a past surgical history that includes pr lap,gastrostomy,w/o tube constr (5/23/2020).       Objective:   Vitals obtained by patient:  There were no vitals taken for this visit.    Physical Exam:  Constitutional: Alert, no distress, well-groomed.  Skin: No rashes in visible areas.  Eye: Round. Conjunctiva clear, lids normal. No icterus.   ENMT: Lips pink without lesions, good dentition, moist mucous membranes. Phonation normal.  Neck: No masses, no thyromegaly. Moves freely without pain.  CV:  Pulse as reported by patient  Respiratory: Unlabored respiratory effort, no cough or audible wheeze  Psych: Alert and oriented x3, normal affect and mood.       Assessment and Plan:   The following treatment plan was discussed:   66 y.o. male     1. Mixed hyperlipidemia  Most recent lipid panel showed ,ASCVD cardiac risk score is 12.5%  Discussed the statin with the patient, he is interested in starting it.  I will start patient on rosuvastatin 10 mg daily, side effect discussed  We will repeat lipid panel and liver function in 3 months    - rosuvastatin (CRESTOR) 10 MG Tab; Take 1 Tablet by mouth every evening.  Dispense: 30 Tablet; Refill: 11  - Lipid Profile; Future  - HEPATIC FUNCTION PANEL; Future    2. Ingrowing nail  No improvement with antibiotics.  Will send patient to podiatrist for possible surgical treatment    - Referral to Podiatry    3. Neck pain  Possible torticollis.  Flexeril has not helped  Advised to take Advil 400 mg twice a day after food for 5 days, apply Voltaren cream twice a day.  If no improvement will send patient for an x-ray of the cervical spine

## 2023-02-15 DIAGNOSIS — E78.2 MIXED HYPERLIPIDEMIA: ICD-10-CM

## 2023-02-15 RX ORDER — ROSUVASTATIN CALCIUM 10 MG/1
10 TABLET, COATED ORAL EVERY EVENING
Qty: 30 TABLET | Refills: 11 | Status: SHIPPED | OUTPATIENT
Start: 2023-02-15 | End: 2023-02-15 | Stop reason: SDUPTHER

## 2023-02-15 RX ORDER — ROSUVASTATIN CALCIUM 10 MG/1
10 TABLET, COATED ORAL EVERY EVENING
Qty: 90 TABLET | Refills: 2 | Status: SHIPPED | OUTPATIENT
Start: 2023-02-15 | End: 2023-02-23 | Stop reason: SDUPTHER

## 2023-02-16 ENCOUNTER — OFFICE VISIT (OUTPATIENT)
Dept: URGENT CARE | Facility: CLINIC | Age: 68
End: 2023-02-16
Payer: COMMERCIAL

## 2023-02-16 ENCOUNTER — HOSPITAL ENCOUNTER (OUTPATIENT)
Facility: MEDICAL CENTER | Age: 68
End: 2023-02-16
Attending: PHYSICIAN ASSISTANT
Payer: COMMERCIAL

## 2023-02-16 VITALS
OXYGEN SATURATION: 100 % | RESPIRATION RATE: 18 BRPM | HEART RATE: 68 BPM | TEMPERATURE: 96.7 F | DIASTOLIC BLOOD PRESSURE: 70 MMHG | HEIGHT: 73 IN | WEIGHT: 158 LBS | BODY MASS INDEX: 20.94 KG/M2 | SYSTOLIC BLOOD PRESSURE: 108 MMHG

## 2023-02-16 DIAGNOSIS — J02.9 SORE THROAT: ICD-10-CM

## 2023-02-16 DIAGNOSIS — J02.0 STREP PHARYNGITIS: ICD-10-CM

## 2023-02-16 DIAGNOSIS — Z86.16 HISTORY OF COVID-19: ICD-10-CM

## 2023-02-16 PROCEDURE — 99213 OFFICE O/P EST LOW 20 MIN: CPT | Performed by: PHYSICIAN ASSISTANT

## 2023-02-16 PROCEDURE — 87070 CULTURE OTHR SPECIMN AEROBIC: CPT

## 2023-02-16 RX ORDER — AMOXICILLIN 500 MG/1
500 CAPSULE ORAL 2 TIMES DAILY
Qty: 20 CAPSULE | Refills: 0 | Status: SHIPPED | OUTPATIENT
Start: 2023-02-16 | End: 2023-02-26

## 2023-02-16 RX ORDER — BENZONATATE 100 MG/1
100 CAPSULE ORAL 3 TIMES DAILY PRN
Qty: 20 CAPSULE | Refills: 0 | Status: SHIPPED
Start: 2023-02-16 | End: 2023-03-30

## 2023-02-16 ASSESSMENT — ENCOUNTER SYMPTOMS
SWOLLEN GLANDS: 0
VOMITING: 0
TROUBLE SWALLOWING: 1
SHORTNESS OF BREATH: 0
STRIDOR: 0

## 2023-02-16 ASSESSMENT — FIBROSIS 4 INDEX: FIB4 SCORE: 1.8

## 2023-02-16 NOTE — PROGRESS NOTES
Subjective:   Reyes Amezcua is a 67 y.o. male who presents for Pharyngitis (X 7 days, sore throat, chills, cough, chest congestion )  This is a pleasant 67-year-old male who presents with chief complaint of sore throat, ongoing cough and chest congestion.  He tested positive for COVID a couple of weeks ago.  Symptoms have been persistent.  He reports loss of voice and painful talking due to sore throat.  He denies significant shortness of breath.  Initially with covid coughing fits that caused rib pain.  He occasionally has similar but overall this has improved dramatically.  He has ongling throat tickle.  Previously cough was more productive, now dry.  No SOB.  No underlying respiratory illnesses.  No fevers or chills.  ST is worse on right side.  Some pain with swallowing.  Vaccinated x 5.  He does also note a small area of tenderness around the occiput of unknown etiology    Pharyngitis   Associated symptoms include trouble swallowing. Pertinent negatives include no shortness of breath, stridor, swollen glands or vomiting.       Review of Systems   HENT:  Positive for trouble swallowing.    Respiratory:  Negative for shortness of breath and stridor.    Gastrointestinal:  Negative for vomiting.     Medications:  acetaminophen Tabs  cyclobenzaprine  gabapentin Caps  rosuvastatin Tabs    Allergies:             Patient has no known allergies.    Surgical History:         Past Surgical History:   Procedure Laterality Date    OH LAP,GASTROSTOMY,W/O TUBE CONSTR  5/23/2020    Procedure: CREATION, GASTROSTOMY, LAPAROSCOPIC;  Surgeon: Norbert Prajapati M.D.;  Location: [facility] [place] [facility];  Service: General       Past Social Hx:  Reyes Amezcua  reports that he has never smoked. He has never used smokeless tobacco. He reports current alcohol use of about 3.0 oz per week. He reports that he does not use drugs.     Past Family Hx:   Reyes Amezcua family history includes Glasses in his mother.       Problem  "list, medications, and allergies reviewed by myself today in Epic.     Objective:     /70   Pulse 68   Temp 35.9 °C (96.7 °F) (Temporal)   Resp 18   Ht 1.854 m (6' 1\")   Wt 71.7 kg (158 lb)   SpO2 100%   BMI 20.85 kg/m²     Physical Exam  Vitals and nursing note reviewed.   Constitutional:       General: He is not in acute distress.     Appearance: He is well-developed and well-groomed. He is not ill-appearing, toxic-appearing or diaphoretic.   HENT:      Head: Normocephalic and atraumatic.      Right Ear: Tympanic membrane, ear canal and external ear normal.      Left Ear: Tympanic membrane, ear canal and external ear normal.      Nose: Mucosal edema, congestion and rhinorrhea present.      Mouth/Throat:      Mouth: Mucous membranes are moist.      Pharynx: Uvula midline. Pharyngeal swelling and posterior oropharyngeal erythema present. No oropharyngeal exudate or uvula swelling.      Tonsils: No tonsillar exudate or tonsillar abscesses.   Eyes:      General:         Right eye: No discharge.         Left eye: No discharge.      Conjunctiva/sclera: Conjunctivae normal.      Pupils: Pupils are equal, round, and reactive to light.   Cardiovascular:      Rate and Rhythm: Normal rate and regular rhythm.      Pulses: Normal pulses.      Heart sounds: Normal heart sounds. No murmur heard.    No friction rub.   Pulmonary:      Effort: Pulmonary effort is normal. No tachypnea, accessory muscle usage, prolonged expiration or respiratory distress.      Breath sounds: Normal breath sounds. No stridor or decreased air movement. No decreased breath sounds, wheezing, rhonchi or rales.      Comments: Lungs clear to auscultation bilaterally, no rhonchi rales or wheezes.  Abdominal:      Palpations: Abdomen is soft.   Musculoskeletal:         General: Normal range of motion.      Cervical back: Normal range of motion. No rigidity.      Right lower leg: No edema.      Left lower leg: No edema.   Lymphadenopathy:      " Cervical: No cervical adenopathy.   Skin:     General: Skin is warm and dry.   Neurological:      Mental Status: He is alert and oriented to person, place, and time.   Psychiatric:         Behavior: Behavior is cooperative.     Tender timothy occipital  Rapid strep positive  Assessment/Plan:     Diagnosis and Associated Orders:     1. History of COVID-19  - benzonatate (TESSALON) 100 MG Cap; Take 1 Capsule by mouth 3 times a day as needed for Cough.  Dispense: 20 Capsule; Refill: 0    2. Strep pharyngitis  - amoxicillin (AMOXIL) 500 MG Cap; Take 1 Capsule by mouth 2 times a day for 10 days.  Dispense: 20 Capsule; Refill: 0  - CULTURE THROAT; Future    3. Sore throat  - CULTURE THROAT; Future        Comments/MDM:    Recent COVID positive.  Ongoing symptoms in this regard.  Fortunately vital signs stable and reassuring.  Lungs are clear to auscultation bilaterally.  Do not suspect bacterial pneumonia sore throat persistent.  Rapid strep positive.  Recommend antibiotic treatment.  Salt water gargles, Tylenol/ibuprofen as needed for pain.  Continue symptomatic treatment for COVID symptoms as below.  Return precautions discussed.    Symptomatic and supportive care:   Plenty of oral hydration and rest   Over the counter cough suppressant as directed.  Tylenol or ibuprofen for pain and fever as directed.   Warm salt water gargles for sore throat, soft foods, cool liquids.   Saline nasal spray and Flonase as a decongestant.   Infection control measures at home. Stay away from people, Hand washing, covering sneeze/cough, disinfect surfaces.   Remain home from work, school, and other populated environments.  Follow CDC guidelines regarding quarantine.  All questions were answered and patient demonstrated verbal understanding of above.      I personally reviewed prior external notes and test results pertinent to today's visit.  Red flags discussed as well as indications to present to the Emergency Department.  Supportive care,  natural history, differential diagnoses, and indications for immediate follow-up discussed.  Patient expresses understanding and agrees to plan.  Patient denies any other questions or concerns.    Follow-up with the primary care physician for recheck, reevaluation, and consideration of further management.      Please note that this dictation was created using voice recognition software. I have made a reasonable attempt to correct obvious errors, but I expect that there are errors of grammar and possibly content that I did not discover before finalizing the note.    This note was electronically signed by Tasia Juárez PA-C

## 2023-02-19 LAB
BACTERIA SPEC RESP CULT: NORMAL
SIGNIFICANT IND 70042: NORMAL
SITE SITE: NORMAL
SOURCE SOURCE: NORMAL

## 2023-02-23 DIAGNOSIS — E78.2 MIXED HYPERLIPIDEMIA: ICD-10-CM

## 2023-02-23 RX ORDER — ROSUVASTATIN CALCIUM 10 MG/1
10 TABLET, COATED ORAL EVERY EVENING
Qty: 90 TABLET | Refills: 3 | Status: SHIPPED | OUTPATIENT
Start: 2023-02-23 | End: 2024-02-12 | Stop reason: SDUPTHER

## 2023-03-23 LAB
CHOLEST SERPL-MCNC: 165 MG/DL (ref 100–199)
HDLC SERPL-MCNC: 60 MG/DL
LABORATORY COMMENT REPORT: NORMAL
LDLC SERPL CALC-MCNC: 93 MG/DL (ref 0–99)
TRIGL SERPL-MCNC: 63 MG/DL (ref 0–149)
VLDLC SERPL CALC-MCNC: 12 MG/DL (ref 5–40)

## 2023-03-30 ENCOUNTER — OFFICE VISIT (OUTPATIENT)
Dept: MEDICAL GROUP | Facility: MEDICAL CENTER | Age: 68
End: 2023-03-30
Payer: COMMERCIAL

## 2023-03-30 VITALS
HEIGHT: 74 IN | HEART RATE: 72 BPM | DIASTOLIC BLOOD PRESSURE: 60 MMHG | BODY MASS INDEX: 21.08 KG/M2 | TEMPERATURE: 97.4 F | OXYGEN SATURATION: 99 % | RESPIRATION RATE: 20 BRPM | SYSTOLIC BLOOD PRESSURE: 126 MMHG | WEIGHT: 164.25 LBS

## 2023-03-30 DIAGNOSIS — E78.2 MIXED HYPERLIPIDEMIA: ICD-10-CM

## 2023-03-30 DIAGNOSIS — M54.50 RIGHT-SIDED LOW BACK PAIN WITHOUT SCIATICA, UNSPECIFIED CHRONICITY: ICD-10-CM

## 2023-03-30 PROCEDURE — 99214 OFFICE O/P EST MOD 30 MIN: CPT | Performed by: FAMILY MEDICINE

## 2023-03-30 RX ORDER — CYCLOBENZAPRINE HCL 5 MG
TABLET ORAL
Qty: 10 TABLET | Refills: 0 | Status: SHIPPED
Start: 2023-03-30 | End: 2023-04-08

## 2023-03-30 ASSESSMENT — FIBROSIS 4 INDEX: FIB4 SCORE: 1.8

## 2023-03-30 ASSESSMENT — PATIENT HEALTH QUESTIONNAIRE - PHQ9: CLINICAL INTERPRETATION OF PHQ2 SCORE: 0

## 2023-03-30 NOTE — PROGRESS NOTES
CC: Low back pain, hyperlipidemia    HPI:   Reyes presents today to discuss the following medical issues:    Lower back pain  Patient reported right lower back pain started about 2 weeks ago.  Denies any recent history of trauma.  The pain is localized to the lower back, denies any leg pain or weakness, has been having normal bladder and bowel control.  Denies any saddle anesthesia.    Mixed hyperlipidemia  He has been tolerating the statin. Denies muscle pain LFTs has been normal.  Patient is currently on rosuvastatin 10 mg daily.  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 90 Tablet 3     No current facility-administered medications for this visit.         Allergies as of 03/30/2023    (No Known Allergies)        ROS: Denies any chest pain, Shortness of breath, Changes bowel or bladder, Lower extremity edema.    Physical Exam:  /60 (BP Location: Right arm, Patient Position: Sitting, BP Cuff Size: Adult)   Pulse 72   Temp 36.3 °C (97.4 °F)  "(Temporal)   Resp 20   Ht 1.88 m (6' 2\")   Wt 74.5 kg (164 lb 4 oz)   SpO2 99%   BMI 21.09 kg/m²   Gen.: Well-developed, well-nourished, no apparent distress,pleasant and cooperative with the examination  Skin:  Warm and dry with good turgor. No rashes or suspicious lesions in visible areas  HEENT:Sinuses nontender with palpation, TMs clear, nares patent with pink mucosa and clear rhinorrhea,no septal deviation ,polyps or lesions. lips without lesions, oropharynx clear.  Neck: Trachea midline,no masses or adenopathy. No JVD.  Cor: Regular rate and rhythm without murmur, gallop or rub.  Lungs: Respirations unlabored.Clear to auscultation with equal breath sounds bilaterally. No wheezes, rhonchi.  Extremities: No cyanosis, clubbing or edema.  Lower back: Decreased range of motion, no bony tenderness        Assessment and Plan.   67 y.o. male     1. Right-sided low back pain without sciatica, unspecified chronicity  Possibly sacroiliitis  CT of the lumbar spine in 2020 showed moderate degenerative changes L5-S1  Spinal muscles area slightly spastic, so we will try muscle relaxant.  Patient advised to continue taking over-the-counter nonsteroidal anti-inflammatory/acetaminophen as needed.  Refer patient to be evaluated by neurosurgeon(patient might benefit from nerve block)  - Referral to Neurosurgery  - cyclobenzaprine (FLEXERIL) 5 mg tablet; Take one tablet twice a day as needed for 5 days  Dispense: 10 Tablet; Refill: 0    2. Mixed hyperlipidemia  Improved total cholesterol and LDL as mentioned above.  He has been tolerating the statin. Denies muscle pain  Continue on rosuvastatin 20 mg daily.        "

## 2023-04-08 ENCOUNTER — OFFICE VISIT (OUTPATIENT)
Dept: URGENT CARE | Facility: CLINIC | Age: 68
End: 2023-04-08
Payer: COMMERCIAL

## 2023-04-08 VITALS
BODY MASS INDEX: 21.17 KG/M2 | TEMPERATURE: 97.4 F | DIASTOLIC BLOOD PRESSURE: 82 MMHG | HEART RATE: 72 BPM | RESPIRATION RATE: 16 BRPM | SYSTOLIC BLOOD PRESSURE: 122 MMHG | WEIGHT: 165 LBS | OXYGEN SATURATION: 100 % | HEIGHT: 74 IN

## 2023-04-08 DIAGNOSIS — M54.16 LUMBAR RADICULOPATHY: ICD-10-CM

## 2023-04-08 DIAGNOSIS — M79.18 MYOFASCIAL PAIN SYNDROME OF LUMBAR SPINE: ICD-10-CM

## 2023-04-08 PROBLEM — K40.90 LEFT INGUINAL HERNIA: Status: ACTIVE | Noted: 2021-08-09

## 2023-04-08 PROBLEM — K42.9 UMBILICAL HERNIA: Status: ACTIVE | Noted: 2021-08-09

## 2023-04-08 PROCEDURE — 99213 OFFICE O/P EST LOW 20 MIN: CPT | Performed by: PHYSICIAN ASSISTANT

## 2023-04-08 RX ORDER — DOXYCYCLINE HYCLATE 100 MG
TABLET ORAL
COMMUNITY
End: 2023-11-14

## 2023-04-08 RX ORDER — SCOLOPAMINE TRANSDERMAL SYSTEM 1 MG/1
PATCH, EXTENDED RELEASE TRANSDERMAL
COMMUNITY
End: 2023-11-14

## 2023-04-08 RX ORDER — OMEPRAZOLE 20 MG/1
CAPSULE, DELAYED RELEASE ORAL
COMMUNITY
End: 2023-12-13

## 2023-04-08 RX ORDER — DULOXETIN HYDROCHLORIDE 20 MG/1
CAPSULE, DELAYED RELEASE ORAL
COMMUNITY
End: 2023-12-13

## 2023-04-08 RX ORDER — TIZANIDINE 4 MG/1
4 TABLET ORAL EVERY 6 HOURS PRN
Qty: 15 TABLET | Refills: 0 | Status: SHIPPED
Start: 2023-04-08 | End: 2023-11-14

## 2023-04-08 RX ORDER — METHYLPREDNISOLONE 4 MG/1
4 TABLET ORAL DAILY
Qty: 21 TABLET | Refills: 0 | Status: SHIPPED
Start: 2023-04-08 | End: 2023-11-14

## 2023-04-08 ASSESSMENT — ENCOUNTER SYMPTOMS
TINGLING: 1
RESPIRATORY NEGATIVE: 1
CARDIOVASCULAR NEGATIVE: 1
CONSTITUTIONAL NEGATIVE: 1
BACK PAIN: 1

## 2023-04-08 ASSESSMENT — FIBROSIS 4 INDEX: FIB4 SCORE: 1.8

## 2023-04-08 NOTE — PROGRESS NOTES
"  Subjective:     Reyes Amezcua  is a 67 y.o. male who presents for Pain ((R) hip pain and now on (L) side from lower back into hips X 1 month but pain has gotten worse recently (patient states))       He presents today for new onset left-sided lumbar radiculopathy that began within the last 1-2 days.  States that the radicular symptoms do radiate into bilateral buttock  And extend into bilateral hamstring regions. he has been experiencing right-sided lumbar pain and lumbar radiculopathy over the past month, please recall he was seen by his PCP on 3/30/2023 for these right-sided symptoms; was given Flexeril and referred to neurosurgery at that time of the appointment.  Denies any specific injury or trauma associated with left-sided symptom onset.  No loss of bowel or bladder function, no saddle anesthesia.  Denies weakness of the lower extremities.  States that symptoms are worsened with ambulating and weightbearing.  Has been using Flexeril, Tylenol, ibuprofen for symptoms.     Review of Systems   Constitutional: Negative.    Respiratory: Negative.     Cardiovascular: Negative.    Musculoskeletal:  Positive for back pain.   Neurological:  Positive for tingling.    No Known Allergies  Past Medical History:   Diagnosis Date    ICB (intracranial bleed) ([facility]) 5/9/2020        Objective:   /82 (BP Location: Right arm, Patient Position: Sitting, BP Cuff Size: Adult)   Pulse 72   Temp 36.3 °C (97.4 °F) (Temporal)   Resp 16   Ht 1.88 m (6' 2\")   Wt 74.8 kg (165 lb)   SpO2 100%   BMI 21.18 kg/m²   Physical Exam  Vitals and nursing note reviewed.   Constitutional:       General: He is not in acute distress.     Appearance: He is not ill-appearing or toxic-appearing.   HENT:      Head: Normocephalic.      Nose: No rhinorrhea.   Eyes:      General: No scleral icterus.     Conjunctiva/sclera: Conjunctivae normal.   Pulmonary:      Effort: Pulmonary effort is normal. No respiratory distress.      Breath sounds: " No stridor.   Musculoskeletal:      Cervical back: Neck supple.      Comments: Examination of lumbar spine was negative for bony tenderness or bony step-off deformities.  Myotomal and dermatomal testing of the bilateral lower extremities were normal and comparable bilaterally.   Neurological:      Mental Status: He is alert and oriented to person, place, and time.   Psychiatric:         Mood and Affect: Mood normal.         Behavior: Behavior normal.         Thought Content: Thought content normal.         Judgment: Judgment normal.           Diagnostic testing: None    Assessment/Plan:     Encounter Diagnoses   Name Primary?    Lumbar radiculopathy     Myofascial pain syndrome of lumbar spine           Plan for care for today's complaint includes trial of Zanaflex and Medrol Dosepak.  Did discuss with patient discontinue Flexeril at this time as he does have adverse effects of head fogginess with this medication.  Continue to follow-up with primary care and neurosurgery for further evaluation and management.  Return to urgent care follow-up the emergency department symptoms worsen or become severe..  Prescription for Zanaflex, Medrol Dosepak provided.    See AVS Instructions below for written guidance provided to patient on after-visit management and care in addition to our verbal discussion during the visit.    Please note that this dictation was created using voice recognition software. I have attempted to correct all errors, but there may be sound-alike, spelling, grammar and possibly content errors that I did not discover before finalizing the note.    Ascencion Zamorano PA-C
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
## 2023-11-14 ENCOUNTER — OFFICE VISIT (OUTPATIENT)
Dept: MEDICAL GROUP | Facility: MEDICAL CENTER | Age: 68
End: 2023-11-14
Payer: COMMERCIAL

## 2023-11-14 ENCOUNTER — HOSPITAL ENCOUNTER (OUTPATIENT)
Dept: RADIOLOGY | Facility: MEDICAL CENTER | Age: 68
End: 2023-11-14
Attending: FAMILY MEDICINE
Payer: COMMERCIAL

## 2023-11-14 VITALS
HEIGHT: 74 IN | SYSTOLIC BLOOD PRESSURE: 110 MMHG | RESPIRATION RATE: 16 BRPM | HEART RATE: 70 BPM | TEMPERATURE: 98 F | OXYGEN SATURATION: 99 % | WEIGHT: 162 LBS | BODY MASS INDEX: 20.79 KG/M2 | DIASTOLIC BLOOD PRESSURE: 60 MMHG

## 2023-11-14 DIAGNOSIS — K21.9 GASTROESOPHAGEAL REFLUX DISEASE WITHOUT ESOPHAGITIS: ICD-10-CM

## 2023-11-14 DIAGNOSIS — R05.3 CHRONIC COUGH: ICD-10-CM

## 2023-11-14 PROCEDURE — 99214 OFFICE O/P EST MOD 30 MIN: CPT | Performed by: FAMILY MEDICINE

## 2023-11-14 PROCEDURE — 71046 X-RAY EXAM CHEST 2 VIEWS: CPT

## 2023-11-14 PROCEDURE — 3078F DIAST BP <80 MM HG: CPT | Performed by: FAMILY MEDICINE

## 2023-11-14 PROCEDURE — 3074F SYST BP LT 130 MM HG: CPT | Performed by: FAMILY MEDICINE

## 2023-11-14 RX ORDER — METHYLPREDNISOLONE 4 MG/1
TABLET ORAL
Qty: 21 TABLET | Refills: 0 | Status: SHIPPED
Start: 2023-11-14 | End: 2023-12-13

## 2023-11-14 RX ORDER — OMEPRAZOLE 20 MG/1
20 CAPSULE, DELAYED RELEASE ORAL 2 TIMES DAILY
Qty: 30 CAPSULE | Refills: 0 | Status: SHIPPED
Start: 2023-11-14 | End: 2023-12-13

## 2023-11-14 ASSESSMENT — FIBROSIS 4 INDEX: FIB4 SCORE: 1.8

## 2023-11-14 NOTE — PROGRESS NOTES
CC: Chronic cough    HPI:   Reyes presents today because he has been having chronic cough for several months.  Its more prominent at night.  Has been quite the patient usually coughs very violently at night, not associated with Phlegm.  Denies any shortness of breath.  During the day patient is mostly asymptomatic.  Patient has no history of asthma.  Patient is non-smoker.  Denies any sick contact.  Denies any fever, chills, headache, fatigability.      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 30 Capsule 0    methylPREDNISolone (MEDROL DOSEPAK) 4 MG Tablet Therapy Pack As directed on the packaging label. 21 Tablet 0    omeprazole (PRILOSEC) 20 MG delayed-release capsule omeprazole 20 mg capsule,delayed release   TAKE ONE CAPSULE BY MOUTH EVERY DAY      DULoxetine (CYMBALTA) 20 MG Cap DR Particles duloxetine 20 mg capsule,delayed release   TK 1 C PO QD      rosuvastatin (CRESTOR) 10 MG Tab Take 1 Tablet by mouth every evening. 90 Tablet 3     No current facility-administered medications for this visit.         Allergies as of 11/14/2023    (No Known Allergies)        ROS: Denies any chest pain, Shortness of breath, Changes bowel or bladder, Lower extremity edema.    Physical Exam:  /60 (BP Location: Right arm, Patient Position: Sitting, BP Cuff Size: Adult)   Pulse 70   Temp 36.7  "°C (98 °F) (Temporal)   Resp 16   Ht 1.88 m (6' 2\")   Wt 73.5 kg (162 lb)   SpO2 99%   BMI 20.80 kg/m²   Gen.: Well-developed, well-nourished, no apparent distress,pleasant and cooperative with the examination  Skin:  Warm and dry with good turgor. No rashes or suspicious lesions in visible areas  HEENT:Sinuses nontender with palpation, TMs clear, nares patent with pink mucosa and clear rhinorrhea,no septal deviation ,polyps or lesions. lips without lesions, oropharynx clear.  Neck: Trachea midline,no masses or adenopathy. No JVD.  Cor: Regular rate and rhythm without murmur, gallop or rub.  Lungs: Respirations unlabored.Clear to auscultation with equal breath sounds bilaterally. No wheezes, rhonchi.  Extremities: No cyanosis, clubbing or edema.      Assessment and Plan.   67 y.o. male     1. Chronic cough  Allergy versus acid reflux  However I will send the patient to have a chest x-ray to rule out other reasons.    - omeprazole (PRILOSEC) 20 MG delayed-release capsule; Take 1 Capsule by mouth 2 times a day.  Dispense: 30 Capsule; Refill: 0  - methylPREDNISolone (MEDROL DOSEPAK) 4 MG Tablet Therapy Pack; As directed on the packaging label.  Dispense: 21 Tablet; Refill: 0  - DX-CHEST-2 VIEWS; Future    2. Gastroesophageal reflux disease without esophagitis  Chronic.  He has not been taking the omeprazole symptoms has been there all the time  Advised to take omeprazole twice a day for 2 weeks, continue on famotidine 20 mg daily after that.    - omeprazole (PRILOSEC) 20 MG delayed-release capsule; Take 1 Capsule by mouth 2 times a day.  Dispense: 30 Capsule; Refill: 0        "

## 2023-12-08 ENCOUNTER — APPOINTMENT (OUTPATIENT)
Dept: MEDICAL GROUP | Facility: MEDICAL CENTER | Age: 68
End: 2023-12-08
Payer: COMMERCIAL

## 2023-12-13 ENCOUNTER — OFFICE VISIT (OUTPATIENT)
Dept: MEDICAL GROUP | Facility: MEDICAL CENTER | Age: 68
End: 2023-12-13
Payer: COMMERCIAL

## 2023-12-13 VITALS
HEART RATE: 68 BPM | HEIGHT: 74 IN | BODY MASS INDEX: 20.92 KG/M2 | RESPIRATION RATE: 20 BRPM | WEIGHT: 163 LBS | DIASTOLIC BLOOD PRESSURE: 60 MMHG | TEMPERATURE: 97.5 F | OXYGEN SATURATION: 100 % | SYSTOLIC BLOOD PRESSURE: 108 MMHG

## 2023-12-13 DIAGNOSIS — R41.3 MEMORY PROBLEM: ICD-10-CM

## 2023-12-13 DIAGNOSIS — Z00.00 ENCOUNTER FOR PREVENTIVE HEALTH EXAMINATION: ICD-10-CM

## 2023-12-13 DIAGNOSIS — E78.2 MIXED HYPERLIPIDEMIA: ICD-10-CM

## 2023-12-13 DIAGNOSIS — R05.3 CHRONIC COUGH: ICD-10-CM

## 2023-12-13 DIAGNOSIS — K21.9 GASTROESOPHAGEAL REFLUX DISEASE WITHOUT ESOPHAGITIS: ICD-10-CM

## 2023-12-13 DIAGNOSIS — Z13.0 SCREENING FOR DEFICIENCY ANEMIA: ICD-10-CM

## 2023-12-13 PROBLEM — K63.9 COLON WALL THICKENING: Status: RESOLVED | Noted: 2020-05-23 | Resolved: 2023-12-13

## 2023-12-13 PROBLEM — B37.0 THRUSH: Status: RESOLVED | Noted: 2020-05-25 | Resolved: 2023-12-13

## 2023-12-13 PROBLEM — H49.9 OPHTHALMOPLEGIA: Status: RESOLVED | Noted: 2020-09-22 | Resolved: 2023-12-13

## 2023-12-13 PROBLEM — M25.561 RIGHT KNEE PAIN: Status: RESOLVED | Noted: 2020-09-08 | Resolved: 2023-12-13

## 2023-12-13 PROBLEM — H46.8 TRAUMATIC OPTIC NEUROPATHY: Status: RESOLVED | Noted: 2020-09-22 | Resolved: 2023-12-13

## 2023-12-13 PROBLEM — Z78.9 NO CONTRAINDICATION TO DEEP VEIN THROMBOSIS (DVT) PROPHYLAXIS: Status: RESOLVED | Noted: 2020-05-09 | Resolved: 2023-12-13

## 2023-12-13 PROBLEM — R50.9 FEVER, UNSPECIFIED: Status: RESOLVED | Noted: 2020-05-17 | Resolved: 2023-12-13

## 2023-12-13 PROBLEM — Z11.9 SCREENING EXAMINATION FOR INFECTIOUS DISEASE: Status: RESOLVED | Noted: 2020-05-09 | Resolved: 2023-12-13

## 2023-12-13 PROCEDURE — 3074F SYST BP LT 130 MM HG: CPT | Performed by: FAMILY MEDICINE

## 2023-12-13 PROCEDURE — 99397 PER PM REEVAL EST PAT 65+ YR: CPT | Performed by: FAMILY MEDICINE

## 2023-12-13 PROCEDURE — 3078F DIAST BP <80 MM HG: CPT | Performed by: FAMILY MEDICINE

## 2023-12-13 ASSESSMENT — FIBROSIS 4 INDEX: FIB4 SCORE: 1.8

## 2023-12-13 NOTE — PROGRESS NOTES
CC:     HPI:   Reyes presents today for preventive annual wellness.      Patient Active Problem List    Diagnosis Date Noted    Mixed hyperlipidemia 12/13/2023    Gastroesophageal reflux disease without esophagitis 12/13/2023    Chronic cough 12/13/2023    Umbilical hernia 08/09/2021    Left inguinal hernia 08/09/2021    Anisometropia 09/22/2020    Diffuse axonal brain injury (HCC) 05/09/2020       Current Outpatient Medications   Medication Sig Dispense Refill    rosuvastatin (CRESTOR) 10 MG Tab Take 1 Tablet by mouth every evening. 90 Tablet 3     No current facility-administered medications for this visit.         Allergies as of 12/13/2023    (No Known Allergies)        ROS: Denies any chest pain, Shortness of breath, Changes bowel or bladder, Lower extremity edema.      Patient Active Problem List    Diagnosis Date Noted    Mixed hyperlipidemia 12/13/2023    Gastroesophageal reflux disease without esophagitis 12/13/2023    Chronic cough 12/13/2023    Umbilical hernia 08/09/2021    Left inguinal hernia 08/09/2021    Anisometropia 09/22/2020    Diffuse axonal brain injury (HCC) 05/09/2020       Current Outpatient Medications   Medication Sig Dispense Refill    rosuvastatin (CRESTOR) 10 MG Tab Take 1 Tablet by mouth every evening. 90 Tablet 3     No current facility-administered medications for this visit.          Current supplements as per medication list.     Allergies: Patient has no known allergies.    Current social contact/activities:     He  reports that he has never smoked. He has never used smokeless tobacco. He reports current alcohol use of about 3.0 oz of alcohol per week. He reports that he does not use drugs.  Counseling given: Not Answered      ROS:    Gait: Uses no assistive device  Ostomy: No  Other tubes: No  Amputations: No  Chronic oxygen use: No  Last eye exam: Not sure  Wears hearing aids: Yes  : No leak    Screening:    Depression Screening  Little interest or pleasure in doing things?    0  Feeling down, depressed , or hopeless?  0  Trouble falling or staying asleep, or sleeping too much?   0  Feeling tired or having little energy?   0  Poor appetite or overeating?   0  Feeling bad about yourself - or that you are a failure or have let yourself or your family down?  0  Trouble concentrating on things, such as reading the newspaper or watching television?  0  Moving or speaking so slowly that other people could have noticed.  Or the opposite - being so fidgety or restless that you have been moving around a lot more than usual?     Thoughts that you would be better off dead, or of hurting yourself?   0  Patient Health Questionnaire Score:  0    If depressive symptoms identified deferred to follow up visit unless specifically addressed in assessment and plan.    Interpretation of PHQ-9 Total Score   Score Severity   1-4 No Depression   5-9 Mild Depression   10-14 Moderate Depression   15-19 Moderately Severe Depression   20-27 Severe Depression    Screening for Cognitive Impairment  Do you or any of your friends or family members have any concern about your memory?    Three Minute Recall (Banana, Sunrise, Chair)  3/3    Paulo clock face with all 12 numbers and set the hands to show 20 past 8.    yes   Cognitive concerns identified deferred for follow up unless specifically addressed in assessment and plan.    Fall Risk Assessment  Has the patient had two or more falls in the last year or any fall with injury in the last year?   no      dvance Care Planning  Do you have an Advance Directive, Living Will, Durable Power of , or POLST?                   Health Maintenance Summary            Overdue - Hepatitis C Screening (Once) Overdue - never done      No completion history exists for this topic.              Overdue - Colorectal Cancer Screening (Colonoscopy - Every 10 Years) Overdue - never done      No completion history exists for this topic.              Overdue - COVID-19 Vaccine (7 -  2023-24 season) Overdue since 12/1/2023      10/06/2023  Imm Admin: Comirnaty (Covid-19 Vaccine, Mrna, 1321-5121 Formula)    10/06/2022  Imm Admin: PFIZER BIVALENT SARS-COV-2 VACCINE (12+)    07/05/2022  Imm Admin: MODERNA SARS-COV-2 VACCINE (PED 6-11)    11/29/2021  Imm Admin: MODERNA SARS-COV-2 VACCINE (12+)    03/23/2021  Imm Admin: PFIZER PURPLE CAP SARS-COV-2 VACCINATION (12+)    Only the first 5 history entries have been loaded, but more history exists.              IMM DTaP/Tdap/Td Vaccine (2 - Td or Tdap) Next due on 10/28/2025      10/28/2015  Imm Admin: Tdap Vaccine              Zoster (Shingles) Vaccines (Series Information) Completed      03/05/2019  Imm Admin: Zoster Vaccine Recombinant (RZV) (SHINGRIX)    07/24/2018  Imm Admin: Zoster Vaccine Recombinant (RZV) (SHINGRIX)    10/04/2016  Imm Admin: Zoster Vaccine Live (ZVL) (Zostavax) - HISTORICAL DATA              Pneumococcal Vaccine: 65+ Years (Series Information) Completed      10/27/2022  Imm Admin: Pneumococcal Conjugate Vaccine (PCV20)    10/08/2021  Imm Admin: Pneumococcal Conjugate Vaccine (Prevnar/PCV-13)              Influenza Vaccine (Series Information) Completed      10/06/2023  Imm Admin: Influenza Vaccine Adult HD    10/06/2022  Imm Admin: Influenza, Unspecified - HISTORICAL DATA    10/08/2021  Imm Admin: Influenza, Unspecified - HISTORICAL DATA    09/26/2020  Imm Admin: Influenza Vaccine Quad Inj (Pf)              Hepatitis A Vaccine (Hep A) (Series Information) Aged Out      No completion history exists for this topic.              Hepatitis B Vaccine (Hep B) (Series Information) Aged Out      No completion history exists for this topic.              HPV Vaccines (Series Information) Aged Out      No completion history exists for this topic.              Polio Vaccine (Inactivated Polio) (Series Information) Aged Out      No completion history exists for this topic.              Meningococcal Immunization (Series Information) Aged Out  "     No completion history exists for this topic.                    Patient Care Team:  Sharon Blanco M.D. as PCP - General (Geriatrics)  Pcp Pt States None (Family Medicine)      Social History     Tobacco Use    Smoking status: Never    Smokeless tobacco: Never   Vaping Use    Vaping Use: Never used   Substance Use Topics    Alcohol use: Yes     Alcohol/week: 3.0 oz     Types: 5 Standard drinks or equivalent per week    Drug use: Never     Family History   Problem Relation Age of Onset    Glasses Mother      He  has a past medical history of ICB (intracranial bleed) ([facility]) (5/9/2020).   Past Surgical History:   Procedure Laterality Date    TN LAP,GASTROSTOMY,W/O TUBE CONSTR  5/23/2020    Procedure: CREATION, GASTROSTOMY, LAPAROSCOPIC;  Surgeon: Norbert Prajapati M.D.;  Location: SURGERY [facility];  Service: General       Exam:   /60 (BP Location: Right arm, Patient Position: Sitting, BP Cuff Size: Adult)   Pulse 68   Temp 36.4 °C (97.5 °F) (Temporal)   Resp 20   Ht 1.88 m (6' 2\")   Wt 73.9 kg (163 lb)   SpO2 100%  Body mass index is 20.93 kg/m².    Hearing has a hearing aid  Dentition , good  Alert, oriented in no acute distress.  Eye contact is good, speech goal directed, affect calm    Gen.: Well-developed, well-nourished, no apparent distress,pleasant and cooperative with the examination  Skin:  Warm and dry with good turgor. No rashes or suspicious lesions in visible areas  HEENT:Sinuses nontender with palpation, TMs clear, nares patent with pink mucosa and clear rhinorrhea,no septal deviation ,polyps or lesions. lips without lesions, oropharynx clear.  Neck: Trachea midline,no masses or adenopathy. No JVD.  Cor: Regular rate and rhythm without murmur, gallop or rub.  Lungs: Respirations unlabored.Clear to auscultation with equal breath sounds bilaterally. No wheezes, rhonchi.  Extremities: No cyanosis, clubbing or edema.  Assessment and Plan. The following treatment and " monitoring plan is recommended:    67 y.o. male     1. Mixed hyperlipidemia  He has been tolerating the statin. Denies muscle pain LFTs has been normal  Continue rosuvastatin 10 mg daily.  However patient interested to have cardiac CT scoring.    - TSH; Future  - Lipid Profile; Future  - CT-CARDIAC SCORING; Future    2. Gastroesophageal reflux disease without esophagitis  Has improved.  Uses omeprazole was only as needed    3. Chronic cough  Improved but he still comes and goes., possibly related to acid reflux.  Chest x-ray showed:No acute cardiopulmonary disease evident.     4. Screening for deficiency anemia    - CBC WITH DIFFERENTIAL; Future  - Comp Metabolic Panel; Future    5. Memory problem  Sometimes has a problem with the short memory especially after his accident.  However patient is able to remember the 3 wards test.    6. Encounter for preventive health examination  Preventive measures and chronic medical issues reviewed.  Patient had colonoscopy about 3 years ago and was fine.    Anticipatory guidance included the following: Patient counseled about skin care, diet, supplements, smoking, drugs/alcohol use, safe sex and exercise.

## 2023-12-14 ENCOUNTER — HOSPITAL ENCOUNTER (OUTPATIENT)
Dept: RADIOLOGY | Facility: MEDICAL CENTER | Age: 68
End: 2023-12-14
Attending: FAMILY MEDICINE
Payer: COMMERCIAL

## 2023-12-14 DIAGNOSIS — E78.2 MIXED HYPERLIPIDEMIA: ICD-10-CM

## 2023-12-14 PROCEDURE — 4410556 CT-CARDIAC SCORING (SELF PAY ONLY)

## 2024-02-12 DIAGNOSIS — E78.2 MIXED HYPERLIPIDEMIA: ICD-10-CM

## 2024-02-13 RX ORDER — ROSUVASTATIN CALCIUM 10 MG/1
10 TABLET, COATED ORAL EVERY EVENING
Qty: 90 TABLET | Refills: 3 | Status: SHIPPED | OUTPATIENT
Start: 2024-02-13

## 2024-02-13 NOTE — TELEPHONE ENCOUNTER
Received request via: Pharmacy    Was the patient seen in the last year in this department? Yes    Does the patient have an active prescription (recently filled or refills available) for medication(s) requested? No    Pharmacy Name: [facility] - [address]     Does the patient have care home Plus and need 100 day supply (blood pressure, diabetes and cholesterol meds only)? Patient does not have SCP

## 2024-06-17 NOTE — OP THERAPY DAILY TREATMENT
"  Outpatient Speech Therapy  DAILY TREATMENT     [facility] [address]  [address]  [place] [address]  Phone:  [phone]  Fax:  [phone]    Date: 10/16/2020    Patient: Reyes Amezcua  YOB: 1955  MRN: [id number]     Time Calculation    Start time: 0900  Stop time: 0955 Time Calculation (min): 55 minutes         Chief Complaint: Poor Memory    Visit #: 13    Subjective:   Reason for Therapy:     Reason For Evaluation:  Cognition  Social Support:     Accompanied By:  Spouse (patient was brought to outpatient speech therapy by wife)    Patient Mental Status:  Responsive (Oriented x 4; good spirits)  Progress Factors:     Progression:  Getting Better  Additional Subjective Comments:      \"I want things to work on that are fun.\"       Objective:   Other Treatment Interventions:  3 digit multiplication  Treatment Intervention tool(s) used:  Cognitive linguistic skills targeted through completion of structured activities addressing:    Solving complex math problems targeted through completion of 3 digit multiplication: patient completed with: 0/5 with direct cues provided through clinician solving next to patient to allow patient to self-correct errors.    Memory for new learning to multiple units of information addressed with patient provided with direct instruction to participate in simple card game. Patient initiated and used compensatory strategies of write it down to assist in recall of parameters and directions in completion of task involving 6 newly learned parameters and 3 step activities.  Following distraction and 10 minute time delay, patient was effective in providing instruction to teach clinician game recalling 6 parameters and 3 steps to participate in card game with 100% accuracy; independent.     Expressive language skills targeted in combination with reasoning skills with patient asked to state 2 meanings given a single word: patient completed with " Patient is scheduled for sleep CPAP July 14, 2024 at 9:30pm   9/10 = 90% accuracy, independent. Patient was then provided with 2 words and asked to rearrange words to create 3 sentences of difference meanings completing with 3/3 - 100% accuracy; given minimal verbal support as/ when necessary.          Speech Therapy Assessment:     Expressive Language Assessment:     Patient's ability to sustain dialogues within a given topic is WFL.    Patient's ability to formulate complex/abstract language is Supervision Required.    Expressive language comments: Patient appears to be demonstrating an overall improvement in accuracy of expressive language skills with no episodes of word finding demonstrated during structured, language generation tasks and to simple, novel conversation with new learning of multiple units of information.     Cognitive Linguistic Assessment:     Patient prospective and time delay memory: WFL (with use of write it down, compensatory memory strategy)    Patient complex problem solving ability: Minimal    Cognitive-Linguistic comments: Patient continues to demonstrate increased difficulty in solving of complex, mathematics based problems, which were consistent with patient previous level of function; although patient is able to use calculator effectively to ensure accuracy in a work related situation.         Speech Therapy Plan :   Prognosis & Recommendations  Impression Summary:  Memory for new learning was focus of today's session with incorporation of activity that can be repetitively taught to others to allow patient the opportunity to share an activity that patient sees as fun, but is also functional in achieving patient short and long term cognitive linguistic therapy goals. With increased interest, patient was noted to demonstrate improved accuracy in recall to newly learned information with independence in initiation of compensatory strategies of memory in addition to improved independence in recall of both parameters and sequence of activity.    Prognosis:  Good  Prognosis Details:  Patient continues to demonstrate progress in cognitive linguistic skills through participation in direct, outpatient speech therapy services. Patient is willing to participate in challenging therapy tasks and learn new information and strategies. Patient continues to demonstrate progress towards short and long term goals/ objective set forth by outpatient speech therapy at this time.   Therapy Recommendations  Recommendation:  Individual Speech Therapy,  Planned Therapy Interventions:  Home Program, Compensatory Strategy Training, Patient/Caregiver Education, Cognitive-Linguistic training and Speech/Language training,   Plan Details:  Continue with POC. Review previously educated card game at next session to assess in carryover of learning independent of external memory aids.

## 2024-07-23 NOTE — DISCHARGE PLANNING
LSW received an e-mail from pt's wife, Diane ([email or web address] expressing some concerns. LSW escalated concerns to unit leadership. RN aware.    Yes

## 2024-09-11 ENCOUNTER — DOCUMENTATION (OUTPATIENT)
Dept: HEALTH INFORMATION MANAGEMENT | Facility: OTHER | Age: 69
End: 2024-09-11
Payer: COMMERCIAL

## 2024-09-17 ENCOUNTER — TELEPHONE (OUTPATIENT)
Dept: HEALTH INFORMATION MANAGEMENT | Facility: OTHER | Age: 69
End: 2024-09-17
Payer: COMMERCIAL

## 2024-10-16 SDOH — HEALTH STABILITY: PHYSICAL HEALTH: ON AVERAGE, HOW MANY DAYS PER WEEK DO YOU ENGAGE IN MODERATE TO STRENUOUS EXERCISE (LIKE A BRISK WALK)?: 3 DAYS

## 2024-10-16 SDOH — ECONOMIC STABILITY: FOOD INSECURITY: WITHIN THE PAST 12 MONTHS, THE FOOD YOU BOUGHT JUST DIDN'T LAST AND YOU DIDN'T HAVE MONEY TO GET MORE.: NEVER TRUE

## 2024-10-16 SDOH — ECONOMIC STABILITY: FOOD INSECURITY: WITHIN THE PAST 12 MONTHS, YOU WORRIED THAT YOUR FOOD WOULD RUN OUT BEFORE YOU GOT MONEY TO BUY MORE.: NEVER TRUE

## 2024-10-16 SDOH — ECONOMIC STABILITY: INCOME INSECURITY: HOW HARD IS IT FOR YOU TO PAY FOR THE VERY BASICS LIKE FOOD, HOUSING, MEDICAL CARE, AND HEATING?: NOT HARD AT ALL

## 2024-10-16 SDOH — ECONOMIC STABILITY: INCOME INSECURITY: IN THE LAST 12 MONTHS, WAS THERE A TIME WHEN YOU WERE NOT ABLE TO PAY THE MORTGAGE OR RENT ON TIME?: NO

## 2024-10-16 SDOH — HEALTH STABILITY: MENTAL HEALTH
STRESS IS WHEN SOMEONE FEELS TENSE, NERVOUS, ANXIOUS, OR CAN'T SLEEP AT NIGHT BECAUSE THEIR MIND IS TROUBLED. HOW STRESSED ARE YOU?: TO SOME EXTENT

## 2024-10-16 SDOH — HEALTH STABILITY: PHYSICAL HEALTH: ON AVERAGE, HOW MANY MINUTES DO YOU ENGAGE IN EXERCISE AT THIS LEVEL?: 30 MIN

## 2024-10-16 ASSESSMENT — SOCIAL DETERMINANTS OF HEALTH (SDOH)
HOW OFTEN DO YOU HAVE A DRINK CONTAINING ALCOHOL: 4 OR MORE TIMES A WEEK
HOW OFTEN DO YOU ATTENT MEETINGS OF THE CLUB OR ORGANIZATION YOU BELONG TO?: NEVER
HOW OFTEN DO YOU GET TOGETHER WITH FRIENDS OR RELATIVES?: ONCE A WEEK
DO YOU BELONG TO ANY CLUBS OR ORGANIZATIONS SUCH AS CHURCH GROUPS UNIONS, FRATERNAL OR ATHLETIC GROUPS, OR SCHOOL GROUPS?: NO
IN A TYPICAL WEEK, HOW MANY TIMES DO YOU TALK ON THE PHONE WITH FAMILY, FRIENDS, OR NEIGHBORS?: ONCE A WEEK
HOW OFTEN DO YOU ATTEND CHURCH OR RELIGIOUS SERVICES?: NEVER
HOW OFTEN DO YOU HAVE SIX OR MORE DRINKS ON ONE OCCASION: NEVER
IN A TYPICAL WEEK, HOW MANY TIMES DO YOU TALK ON THE PHONE WITH FAMILY, FRIENDS, OR NEIGHBORS?: ONCE A WEEK
HOW MANY DRINKS CONTAINING ALCOHOL DO YOU HAVE ON A TYPICAL DAY WHEN YOU ARE DRINKING: 1 OR 2
HOW OFTEN DO YOU ATTENT MEETINGS OF THE CLUB OR ORGANIZATION YOU BELONG TO?: NEVER
HOW OFTEN DO YOU GET TOGETHER WITH FRIENDS OR RELATIVES?: ONCE A WEEK
WITHIN THE PAST 12 MONTHS, YOU WORRIED THAT YOUR FOOD WOULD RUN OUT BEFORE YOU GOT THE MONEY TO BUY MORE: NEVER TRUE
HOW OFTEN DO YOU ATTEND CHURCH OR RELIGIOUS SERVICES?: NEVER
IN THE PAST 12 MONTHS, HAS THE ELECTRIC, GAS, OIL, OR WATER COMPANY THREATENED TO SHUT OFF SERVICE IN YOUR HOME?: NO
HOW HARD IS IT FOR YOU TO PAY FOR THE VERY BASICS LIKE FOOD, HOUSING, MEDICAL CARE, AND HEATING?: NOT HARD AT ALL
DO YOU BELONG TO ANY CLUBS OR ORGANIZATIONS SUCH AS CHURCH GROUPS UNIONS, FRATERNAL OR ATHLETIC GROUPS, OR SCHOOL GROUPS?: NO

## 2024-10-16 ASSESSMENT — LIFESTYLE VARIABLES
HOW MANY STANDARD DRINKS CONTAINING ALCOHOL DO YOU HAVE ON A TYPICAL DAY: 1 OR 2
AUDIT-C TOTAL SCORE: 4
HOW OFTEN DO YOU HAVE SIX OR MORE DRINKS ON ONE OCCASION: NEVER
SKIP TO QUESTIONS 9-10: 1
HOW OFTEN DO YOU HAVE A DRINK CONTAINING ALCOHOL: 4 OR MORE TIMES A WEEK

## 2024-10-17 ENCOUNTER — OFFICE VISIT (OUTPATIENT)
Dept: MEDICAL GROUP | Facility: CLINIC | Age: 69
End: 2024-10-17
Payer: COMMERCIAL

## 2024-10-17 VITALS
DIASTOLIC BLOOD PRESSURE: 75 MMHG | BODY MASS INDEX: 21.3 KG/M2 | SYSTOLIC BLOOD PRESSURE: 128 MMHG | TEMPERATURE: 97.8 F | RESPIRATION RATE: 16 BRPM | HEART RATE: 63 BPM | OXYGEN SATURATION: 98 % | HEIGHT: 74 IN | WEIGHT: 166 LBS

## 2024-10-17 DIAGNOSIS — M25.551 PAIN OF RIGHT HIP: ICD-10-CM

## 2024-10-17 DIAGNOSIS — R05.3 CHRONIC COUGH: ICD-10-CM

## 2024-10-17 DIAGNOSIS — R19.7 DIARRHEA, UNSPECIFIED TYPE: ICD-10-CM

## 2024-10-17 DIAGNOSIS — K21.9 GASTROESOPHAGEAL REFLUX DISEASE WITHOUT ESOPHAGITIS: ICD-10-CM

## 2024-10-17 DIAGNOSIS — E78.2 MIXED HYPERLIPIDEMIA: ICD-10-CM

## 2024-10-17 PROCEDURE — 99215 OFFICE O/P EST HI 40 MIN: CPT

## 2024-10-17 RX ORDER — ROSUVASTATIN CALCIUM 10 MG/1
10 TABLET, COATED ORAL EVERY EVENING
Qty: 90 TABLET | Refills: 3 | Status: SHIPPED | OUTPATIENT
Start: 2024-10-17

## 2024-10-17 RX ORDER — FLUTICASONE PROPIONATE 50 MCG
1 SPRAY, SUSPENSION (ML) NASAL 2 TIMES DAILY
Qty: 16 G | Refills: 1 | Status: SHIPPED | OUTPATIENT
Start: 2024-10-17 | End: 2024-11-16

## 2024-10-17 RX ORDER — CETIRIZINE HYDROCHLORIDE 10 MG/1
10 TABLET ORAL DAILY
Qty: 30 TABLET | Refills: 3 | Status: SHIPPED | OUTPATIENT
Start: 2024-10-17 | End: 2024-11-16

## 2024-10-17 ASSESSMENT — ENCOUNTER SYMPTOMS
DEPRESSION: 1
COUGH: 1
HEMOPTYSIS: 0
HEARTBURN: 1
DIARRHEA: 1
FEVER: 0
CONSTIPATION: 0
VOMITING: 0
ABDOMINAL PAIN: 1
NAUSEA: 0

## 2024-10-17 ASSESSMENT — FIBROSIS 4 INDEX: FIB4 SCORE: 1.83

## 2024-10-17 ASSESSMENT — PATIENT HEALTH QUESTIONNAIRE - PHQ9
5. POOR APPETITE OR OVEREATING: 0 - NOT AT ALL
CLINICAL INTERPRETATION OF PHQ2 SCORE: 2
SUM OF ALL RESPONSES TO PHQ QUESTIONS 1-9: 4

## 2024-10-22 ENCOUNTER — OFFICE VISIT (OUTPATIENT)
Dept: MEDICAL GROUP | Facility: CLINIC | Age: 69
End: 2024-10-22
Payer: COMMERCIAL

## 2024-10-22 ENCOUNTER — APPOINTMENT (OUTPATIENT)
Dept: RADIOLOGY | Facility: CLINIC | Age: 69
End: 2024-10-22
Payer: COMMERCIAL

## 2024-10-22 VITALS
TEMPERATURE: 98 F | RESPIRATION RATE: 16 BRPM | WEIGHT: 163 LBS | DIASTOLIC BLOOD PRESSURE: 77 MMHG | OXYGEN SATURATION: 98 % | BODY MASS INDEX: 20.92 KG/M2 | HEART RATE: 60 BPM | SYSTOLIC BLOOD PRESSURE: 128 MMHG | HEIGHT: 74 IN

## 2024-10-22 DIAGNOSIS — M25.551 PAIN OF RIGHT HIP: ICD-10-CM

## 2024-10-22 PROCEDURE — 99213 OFFICE O/P EST LOW 20 MIN: CPT | Mod: GE

## 2024-10-22 PROCEDURE — 73502 X-RAY EXAM HIP UNI 2-3 VIEWS: CPT | Mod: TC,RT | Performed by: RADIOLOGY

## 2024-10-22 ASSESSMENT — FIBROSIS 4 INDEX: FIB4 SCORE: 1.83

## 2024-11-07 ENCOUNTER — OFFICE VISIT (OUTPATIENT)
Dept: MEDICAL GROUP | Facility: CLINIC | Age: 69
End: 2024-11-07
Payer: COMMERCIAL

## 2024-11-07 VITALS
HEART RATE: 66 BPM | BODY MASS INDEX: 20.98 KG/M2 | TEMPERATURE: 97.3 F | OXYGEN SATURATION: 98 % | SYSTOLIC BLOOD PRESSURE: 111 MMHG | HEIGHT: 74 IN | DIASTOLIC BLOOD PRESSURE: 66 MMHG | RESPIRATION RATE: 18 BRPM | WEIGHT: 163.5 LBS

## 2024-11-07 DIAGNOSIS — M25.551 PAIN OF RIGHT HIP: ICD-10-CM

## 2024-11-07 PROCEDURE — 3074F SYST BP LT 130 MM HG: CPT | Mod: GE

## 2024-11-07 PROCEDURE — 99213 OFFICE O/P EST LOW 20 MIN: CPT | Mod: GE

## 2024-11-07 PROCEDURE — 3078F DIAST BP <80 MM HG: CPT | Mod: GE

## 2024-11-07 NOTE — PROGRESS NOTES
OMT Note    CC: right hip pain    HPI:  Patient states that he was performing yoga approximately 2 months ago when he began having right hip pain. He locates the pain to his lateral hip. Denies any sensory changes associated. He states that his pain is exacerbated with ambulating and relieved with stretching. Denies any trauma to the hip or previous imaging.      Objective:  Vitals:    11/07/24 0840   BP: 111/66   Pulse: 66   Resp: 18   Temp: 36.3 °C (97.3 °F)   SpO2: 98%       Physical Exam:   General: alert, NAD, healthy appearing   HEENT: normocephalic, atraumatic; no scleral icterus  CV: RRR, no murmurs   Resp: CTA, No respiratory distress  Skin: pink, dry, warm, no jaundice  Psych: appropriate affect  MSK: strength 5/5 BUE and BLE, no bony spinous process tenderness.  Neuro: CN II-XII grossly intact b/l, sensation intact b/l UE and LE     OMT Exam:  RLE: Tenderness to palpation of right anterior hip, moderately limited range of motion with pelvic tilt test on the right.  Right psoas counterstrain tender point palpated   Thoracic: Tenderness and hypertonicity noted to right thoracolumbar junction, tenderness and hypertonicity to left border of scapula   Lumbar spine: Hypertonicity with tenderness noted to bilateral lumbar paraspinal muscles     ASSESSMENT/PLAN:  Problem List Items Addressed This Visit       Pain of right hip     DX hip reviewed from 10/2024 which showed Mild bilateral hip osteoarthritis. No acute fracture seen.  Patient did not feel that he benefited or was hurt from OMT last session.  OMT was performed as stated below today.  Have referred patient to physical therapy as I feel that deep core strengthening exercises as well as hip strengthening would benefit patient.  I do not feel that patient would benefit from further OMT sessions at this time.  Discussed with patient and we will happily see patient following physical therapy if he felt that OMT does benefit him         Relevant Orders     Referral to Physical Therapy           Discussed findings of somatic dysfunction. patient provided verbal consent for gentle osteopathic manipulative treatment.  Please see procedure note below for details.  Patient tolerated procedure well, with improvement in pain and ambulation.  Discussed aftercare including hydration, and advised to follow-up as needed for treatment.        OMT Performed:   The procedure note of osteopathic manipulative treatment (OMT) was indicated for above findings of somatic dysfunction and was discussed with the patient. Risks, benefits, and alternatives were discussed. Questions were answered to the patient’s satisfaction, and verbal consent was obtained. The following areas of somatic dysfunction were treated with the following modalities:    RLE: Tenderness to palpation of right anterior hip, moderately limited range of motion with pelvic tilt test on the right.  Right psoas counterstrain tender point palpated treated with counterstrain technique with minimal relief of hypertonicity and tenderness.  Thoracic: Tenderness and hypertonicity noted to right thoracolumbar junction treated with soft tissue technique, tenderness and hypertonicity to left border of scapula treated with soft tissue technique, these provided minimal relief and tenderness and hypertonicity.  Lumbar spine: Hypertonicity with tenderness noted to bilateral lumbar paraspinal muscles treated with soft tissue as well as BLT with minimal relief in hypertonicity and tenderness.      The patient tolerated the procedure well with good reduction in somatic dysfunction and no obvious post-treatment reactions noted. The patient was counseled that they may experience muscle aches or mild discomfort over the next 24-48 hours. I expressed the importance of hydration. Patient was instructed to call the office or go to the emergency department immediately if symptoms worsen significantly. Patient may apply an ice pack or heat as  needed to sore areas for no longer than 20 minutes every 2 hours while awake. Patient should avoid aggravating activity. OTC Ibuprofen or Tylenol may be used as needed for pain        F/u: prn, OMT does not appear to be helping patient's symptoms.  Advised patient to follow-up pending how physical therapy helps with his symptoms.    Teresa Healy, DO  PGY-1, [facility]

## 2024-11-07 NOTE — ASSESSMENT & PLAN NOTE
DX hip reviewed from 10/2024 which showed Mild bilateral hip osteoarthritis. No acute fracture seen.  Patient did not feel that he benefited or was hurt from OMT last session.  OMT was performed as stated below today.  Have referred patient to physical therapy as I feel that deep core strengthening exercises as well as hip strengthening would benefit patient.  I do not feel that patient would benefit from further OMT sessions at this time.  Discussed with patient and we will happily see patient following physical therapy if he felt that OMT does benefit him

## 2024-11-21 ENCOUNTER — OFFICE VISIT (OUTPATIENT)
Dept: MEDICAL GROUP | Facility: CLINIC | Age: 69
End: 2024-11-21
Payer: COMMERCIAL

## 2024-11-21 VITALS
OXYGEN SATURATION: 98 % | DIASTOLIC BLOOD PRESSURE: 76 MMHG | HEART RATE: 62 BPM | WEIGHT: 169 LBS | TEMPERATURE: 98.3 F | SYSTOLIC BLOOD PRESSURE: 117 MMHG | HEIGHT: 74 IN | BODY MASS INDEX: 21.69 KG/M2

## 2024-11-21 DIAGNOSIS — R05.3 CHRONIC COUGH: ICD-10-CM

## 2024-11-21 DIAGNOSIS — R19.7 DIARRHEA, UNSPECIFIED TYPE: ICD-10-CM

## 2024-11-21 DIAGNOSIS — E78.2 MIXED HYPERLIPIDEMIA: ICD-10-CM

## 2024-11-21 DIAGNOSIS — F32.A DEPRESSION, UNSPECIFIED DEPRESSION TYPE: ICD-10-CM

## 2024-11-21 PROCEDURE — 3078F DIAST BP <80 MM HG: CPT

## 2024-11-21 PROCEDURE — 3074F SYST BP LT 130 MM HG: CPT

## 2024-11-21 PROCEDURE — 99214 OFFICE O/P EST MOD 30 MIN: CPT

## 2024-11-21 RX ORDER — ROSUVASTATIN CALCIUM 10 MG/1
10 TABLET, COATED ORAL EVERY EVENING
Qty: 90 TABLET | Refills: 3 | Status: SHIPPED | OUTPATIENT
Start: 2024-11-21

## 2024-11-21 ASSESSMENT — ANXIETY QUESTIONNAIRES
5. BEING SO RESTLESS THAT IT IS HARD TO SIT STILL: NOT AT ALL
1. FEELING NERVOUS, ANXIOUS, OR ON EDGE: SEVERAL DAYS
6. BECOMING EASILY ANNOYED OR IRRITABLE: MORE THAN HALF THE DAYS
4. TROUBLE RELAXING: NOT AT ALL
GAD7 TOTAL SCORE: 4
3. WORRYING TOO MUCH ABOUT DIFFERENT THINGS: NOT AT ALL
2. NOT BEING ABLE TO STOP OR CONTROL WORRYING: NOT AT ALL
7. FEELING AFRAID AS IF SOMETHING AWFUL MIGHT HAPPEN: SEVERAL DAYS

## 2024-11-21 ASSESSMENT — PATIENT HEALTH QUESTIONNAIRE - PHQ9
SUM OF ALL RESPONSES TO PHQ QUESTIONS 1-9: 10
5. POOR APPETITE OR OVEREATING: 0 - NOT AT ALL
CLINICAL INTERPRETATION OF PHQ2 SCORE: 4

## 2024-11-21 NOTE — LETTER
[facility]  Mele Jaramillo M.D.  [address]  [place] [address]  Fax: [phone]   Authorization for Release/Disclosure of   Protected Health Information   Name: REYES DANIELS : 1955 SSN: [id number]   Address: [address] [place] [address] [place] [address] Phone:    [phone] (home)    I authorize the entity listed below to release/disclose the PHI below to:   [facility]/Mele Jaramillo M.D. and Mele Jaramillo M.D.   Provider or Entity Name:  [facility]   Address   City, State, Zip   Phone:      Fax:     Reason for request: continuity of care   Information to be released:    [  ] LAST COLONOSCOPY,  including any PATH REPORT and follow-up  [  ] LAST FIT/COLOGUARD RESULT [  ] LAST DEXA  [  ] LAST MAMMOGRAM  [  ] LAST PAP  [  ] LAST LABS [  ] RETINA EXAM REPORT  [  ] IMMUNIZATION RECORDS  [ x] Release all info      [  ] Check here and initial the line next to each item to release ALL health information INCLUDING  _____ Care and treatment for drug and / or alcohol abuse  _____ HIV testing, infection status, or AIDS  _____ Genetic Testing    DATES OF SERVICE OR TIME PERIOD TO BE DISCLOSED: _____________  I understand and acknowledge that:  * This Authorization may be revoked at any time by you in writing, except if your health information has already been used or disclosed.  * Your health information that will be used or disclosed as a result of you signing this authorization could be re-disclosed by the recipient. If this occurs, your re-disclosed health information may no longer be protected by State or Federal laws.  * You may refuse to sign this Authorization. Your refusal will not affect your ability to obtain treatment.  * This Authorization becomes effective upon signing and will  on (date) __________.      If no date is indicated, this Authorization will  one (1) year from the signature date.    Name: Reyes Daniels  Signature: Date:   2024      PLEASE FAX REQUESTED RECORDS BACK TO: [phone]

## 2024-11-21 NOTE — ASSESSMENT & PLAN NOTE
"Improved \"a bit\" but he is still coughing up mucus. Intermittent. The antihistamine gave him fatigue and sluggishness even when taking before bed. He feels a little clearer with the flonase. Not dependent on seasons. Some days or weeks it is better. Sometimes at night, he lays down and coughs (he drinks some water to get it down). He has not noticed increased coughing after he eats.  Coughing did not seem to improve with the flonase.  - omeprazole  - CT chest    "

## 2024-11-21 NOTE — ASSESSMENT & PLAN NOTE
"Some days that are down days and some days that are better. \"Difficulty finding cain in things\". No SI/HI/AVH. No PMHx of SA or psych hospitalizations.  He also reports anxiety. He used to bicycle, play guitar, etc but he doesn't do that much any more.  PHQ-9=10  OLIVER-7=4    "

## 2024-11-21 NOTE — PROGRESS NOTES
"This note is formatted in an APSO format, for additional subjective and objective evaluation please scroll to the bottom of the note.    CC:  Chief Complaint   Patient presents with    Follow-Up         Assessment/Plan:  Problem List Items Addressed This Visit       Chronic cough     Improved \"a bit\" but he is still coughing up mucus. Intermittent. The antihistamine gave him fatigue and sluggishness even when taking before bed. He feels a little clearer with the flonase. Not dependent on seasons. Some days or weeks it is better. Sometimes at night, he lays down and coughs (he drinks some water to get it down). He has not noticed increased coughing after he eats.  Coughing did not seem to improve with the flonase.  - omeprazole  - CT chest           Relevant Orders    CT-CHEST (THORAX) W/O    Depression     Some days that are down days and some days that are better. \"Difficulty finding cain in things\". No SI/HI/AVH. No PMHx of SA or psych hospitalizations.  He also reports anxiety. He used to bicycle, play guitar, etc but he doesn't do that much any more.             Diarrhea     Continued symptoms. Labs/stool studies negative. Ttg negative. Does not appear to be infectious.  - Can try elimination of food groups  - Metamucil trial  - Immodium trial  - GI referral         Relevant Orders    Referral to Gastroenterology    Mixed hyperlipidemia    Relevant Medications    rosuvastatin (CRESTOR) 10 MG Tab      Orders Placed This Encounter    CT-CHEST (THORAX) W/O    Referral to Gastroenterology    rosuvastatin (CRESTOR) 10 MG Tab    omeprazole (PRILOSEC) 20 MG delayed-release capsule       HISTORY OF PRESENT ILLNESS:   He last saw a GI (formerly [facility]) doctor in 2021. He had a colonoscopy and endoscopy at that time.    Problem   Depression   Diarrhea   Chronic Cough         Exam:    /76 (BP Location: Right arm, Patient Position: Sitting, BP Cuff Size: Adult)   Pulse 62   Temp 36.8 °C (98.3 °F) (Temporal)   Ht 1.88 m (6' 2\")  "  Wt 76.7 kg (169 lb)   SpO2 98%   BMI 21.70 kg/m²  Body mass index is 21.7 kg/m².    Gen: Well appearing. No apparent distress. Well developed. Sitting comfortably on chair  HEENT: NCAT, MMM  Neck: Supple, FROM  Chest: No deformities, Equal chest expansion  Lungs: Normal effort, CTA bilaterally.  CV: Regular rate and rhythm. Pulse palpable. No murmur  Abd: Non-distended. NTTP. No guarding.  Ext: No cyanosis. No edema.  Skin: Warm/dry. No visible rashes.  Neuro: Non-focal. A&Ox4.  Psych: Normal behavior, normal affect    Return in about 1 month (around 12/21/2024) for diarrhea, cough, depression.    Mele Jaramillo MD  [facility]

## 2024-11-21 NOTE — ASSESSMENT & PLAN NOTE
Continued symptoms. Labs/stool studies negative. Ttg negative. Does not appear to be infectious.  - Can try elimination of food groups  - Metamucil trial  - Immodium trial  - GI referral

## 2025-01-11 ENCOUNTER — PATIENT MESSAGE (OUTPATIENT)
Dept: MEDICAL GROUP | Facility: CLINIC | Age: 70
End: 2025-01-11
Payer: COMMERCIAL

## 2025-01-11 PROBLEM — K76.0 STEATOSIS OF LIVER: Status: ACTIVE | Noted: 2025-01-11

## 2025-01-13 NOTE — PROGRESS NOTES
Change of shift report obtained, care resumed. Patient vitals wnl, patient slept comfortably all shift, he started to become restless which resolved with he was toileted. He used his call bell x1 in am and told RN he needed to get out of bed. Patient tolerated bolus feed and flushes w/o issues. Patient also tolerated ice chips when sitting at nursing station in the morning, no coughing or s/s of aspiration. No issue or concerns, patients condition improving.    Include Location In Plan?: No Detail Level: Zone

## 2025-01-16 ENCOUNTER — OFFICE VISIT (OUTPATIENT)
Dept: MEDICAL GROUP | Facility: CLINIC | Age: 70
End: 2025-01-16
Payer: COMMERCIAL

## 2025-01-16 ENCOUNTER — PHYSICAL THERAPY (OUTPATIENT)
Dept: PHYSICAL THERAPY | Facility: REHABILITATION | Age: 70
End: 2025-01-16
Payer: COMMERCIAL

## 2025-01-16 VITALS
HEART RATE: 72 BPM | BODY MASS INDEX: 21.97 KG/M2 | HEIGHT: 74 IN | SYSTOLIC BLOOD PRESSURE: 130 MMHG | DIASTOLIC BLOOD PRESSURE: 74 MMHG | TEMPERATURE: 97.6 F | OXYGEN SATURATION: 97 % | WEIGHT: 171.2 LBS

## 2025-01-16 DIAGNOSIS — N52.9 ERECTILE DYSFUNCTION, UNSPECIFIED ERECTILE DYSFUNCTION TYPE: ICD-10-CM

## 2025-01-16 DIAGNOSIS — M25.551 PAIN OF RIGHT HIP: ICD-10-CM

## 2025-01-16 DIAGNOSIS — R05.3 CHRONIC COUGH: ICD-10-CM

## 2025-01-16 DIAGNOSIS — E78.2 MIXED HYPERLIPIDEMIA: ICD-10-CM

## 2025-01-16 DIAGNOSIS — K21.9 GASTROESOPHAGEAL REFLUX DISEASE WITHOUT ESOPHAGITIS: ICD-10-CM

## 2025-01-16 DIAGNOSIS — F33.1 MODERATE EPISODE OF RECURRENT MAJOR DEPRESSIVE DISORDER (HCC): ICD-10-CM

## 2025-01-16 DIAGNOSIS — R19.7 DIARRHEA, UNSPECIFIED TYPE: ICD-10-CM

## 2025-01-16 PROCEDURE — 97535 SELF CARE MNGMENT TRAINING: CPT

## 2025-01-16 PROCEDURE — 97162 PT EVAL MOD COMPLEX 30 MIN: CPT

## 2025-01-16 PROCEDURE — 3075F SYST BP GE 130 - 139MM HG: CPT

## 2025-01-16 PROCEDURE — 3078F DIAST BP <80 MM HG: CPT

## 2025-01-16 PROCEDURE — 99215 OFFICE O/P EST HI 40 MIN: CPT

## 2025-01-16 RX ORDER — ROSUVASTATIN CALCIUM 10 MG/1
10 TABLET, COATED ORAL EVERY EVENING
Qty: 90 TABLET | Refills: 3 | Status: SHIPPED | OUTPATIENT
Start: 2025-01-16

## 2025-01-16 RX ORDER — BUPROPION HYDROCHLORIDE 150 MG/1
TABLET ORAL
Qty: 173 TABLET | Refills: 0 | Status: SHIPPED | OUTPATIENT
Start: 2025-01-16 | End: 2025-04-16

## 2025-01-16 RX ORDER — FAMOTIDINE 20 MG/1
20 TABLET, FILM COATED ORAL
Qty: 90 TABLET | Refills: 3 | Status: SHIPPED | OUTPATIENT
Start: 2025-01-16

## 2025-01-16 ASSESSMENT — ANXIETY QUESTIONNAIRES
GAD7 TOTAL SCORE: 7
6. BECOMING EASILY ANNOYED OR IRRITABLE: MORE THAN HALF THE DAYS
2. NOT BEING ABLE TO STOP OR CONTROL WORRYING: SEVERAL DAYS
1. FEELING NERVOUS, ANXIOUS, OR ON EDGE: MORE THAN HALF THE DAYS
5. BEING SO RESTLESS THAT IT IS HARD TO SIT STILL: NOT AT ALL
7. FEELING AFRAID AS IF SOMETHING AWFUL MIGHT HAPPEN: SEVERAL DAYS
4. TROUBLE RELAXING: NOT AT ALL
3. WORRYING TOO MUCH ABOUT DIFFERENT THINGS: SEVERAL DAYS

## 2025-01-16 ASSESSMENT — PATIENT HEALTH QUESTIONNAIRE - PHQ9
5. POOR APPETITE OR OVEREATING: 0 - NOT AT ALL
CLINICAL INTERPRETATION OF PHQ2 SCORE: 4
SUM OF ALL RESPONSES TO PHQ QUESTIONS 1-9: 10

## 2025-01-16 ASSESSMENT — ACTIVITIES OF DAILY LIVING (ADL): POOR_BALANCE: 1

## 2025-01-16 NOTE — ASSESSMENT & PLAN NOTE
Viagra didn't help. He does get erections in the morning.  Suspect erectile dysfunction is more related to depression.

## 2025-01-16 NOTE — ASSESSMENT & PLAN NOTE
He took the omperazole for a couple weeks, now takes as needed. The cough does seem to have improved but he still has some coughing over night.  This is not bothering him and he would like to continue with as needed treatment for GERD.  We can try daily allergy medication such as Claritin/Zyrtec in the future if needed as it does sound like postnasal drip may be contributing factor here.  I suspect the cough is combination of GERD and postnasal drip as it is bothering him most laying down at night.

## 2025-01-16 NOTE — ASSESSMENT & PLAN NOTE
He only takes the omeprazole when he feels symptoms.  -Advised switching to famotidine as needed  -Patient will schedule an appointment with GI

## 2025-01-16 NOTE — ASSESSMENT & PLAN NOTE
Had a few bouts, late December he had an episode but it hasn't been bothering him as much. Twice in the last month he had diarrhea.

## 2025-01-16 NOTE — OP THERAPY EVALUATION
"  Outpatient Physical Therapy  INITIAL EVALUATION    [facility] [address]  [address]  [place] [address]  Phone:  [phone]  Fax:  [phone]    Date of Evaluation: 01/16/2025    Patient: Reyes Amezcua  YOB: 1955  MRN: [id number]     Referring Provider: [place] [address]   Referring Diagnosis Pain of right hip [M25.551]     Time Calculation  Start time: 0730  Stop time: 0815 Time Calculation (min): 45 minutes         Chief Complaint: Hip Problem    Visit Diagnoses     ICD-10-CM   1. Pain of right hip  M25.551       Date of onset of impairment: 5/16/2024    Subjective:   History of Present Illness:     Mechanism of injury:  R hip pain pain consistent x8mo, intermittent R knee pain, R ankle stiffness in the morning, neck pain x2 months. Hx of TBI and was in PT in 2020.     Hip pain: dull aching with movement, eases with sitting and rest, worsens with long walks  Knee pain: started prior to hip pain, intense with performing stairs and walking  Neck pain: pain on same side at base of skull when turning head    DO appt 11/26/24 \"Patient states that he was performing yoga approximately 2 months ago when he began having right hip pain. He locates the pain to his lateral hip. Denies any sensory changes associated. He states that his pain is exacerbated with ambulating and relieved with stretching. Denies any trauma to the hip or previous imaging\"  Patient Goals:     Other patient goals:  Putting on shoes easier      Past Medical History:   Diagnosis Date    ICB (intracranial bleed) [facility] 5/9/2020     Past Surgical History:   Procedure Laterality Date    MT LAP GASTROSTOMY W/O TUBE CONSTR  5/23/2020    Procedure: CREATION, GASTROSTOMY, LAPAROSCOPIC;  Surgeon: Norbert Prajapati M.D.;  Location: SURGERY [facility];  Service: General     Social History     Tobacco Use    Smoking status: Never    Smokeless tobacco: " Never   Substance Use Topics    Alcohol use: Yes     Alcohol/week: 3.0 oz     Types: 5 Standard drinks or equivalent per week     Comment: wine with dinner     Family and Occupational History     Socioeconomic History    Marital status:      Spouse name: Not on file    Number of children: Not on file    Years of education: Not on file    Highest education level: Master's degree (e.g., MA, MS, Lenora, MEd, MSW, JONH)   Occupational History    Not on file       Objective     Lumbar Screen  Lumbar range of motion within normal limits.    Palpation     Right   Tenderness of the gluteus sarmad, gluteus medius, piriformis and TFL.     Tenderness     Right Hip   No tenderness in the greater trochanter.     Passive Range of Motion   Left Hip   Flexion: 130 degrees   External rotation (90/90): 50 degrees   Internal rotation (90/90): 15 degrees     Right Hip   Flexion: 130 degrees with pain  External rotation (90/90): 40 degrees   Internal rotation (90/90): 15 degrees     Strength:      Left Hip   Planes of Motion   Flexion: 5  Extension: 5  Abduction: 5  External rotation: 5  Internal rotation: 5    Right Hip   Planes of Motion   Flexion: 4  Abduction: 4+  External rotation: 4  Internal rotation: 5    Additional Strength Details  Bridge hold: 1:35 (dropped pelvis)    Tests     Right Hip   Positive FADIR.     Additional Tests Details  Negative R hip ER derotation test    General Comments     Hip Comments   Gait: initial shuffling gait and decreased velocity then improved stride length with about 10 steps (hx of TBI and vestibular issues)        Therapeutic Treatments and Modalities:     1. Self Care ADL Training (CPT 05213), education regarding physical activity and activity modifications    Time-based treatments/modalities:    Physical Therapy Timed Treatment Charges  Functional training, self care minutes (CPT 67036): 10 minutes      Assessment, Response and Plan:   Impairments: abnormal or restricted ROM, impaired  balance, impaired physical strength, lacks appropriate home exercise program, limited mobility and pain with function    Assessment details:  Patient is a 70yo male who presents to PT with chronic R lateral hip pain as well as chronic R knee and upper neck pain. Deficits include decreased R hip ER PROM, R hip and posterior chain weakness, and R positive FADIR. These deficits affect performance of stair and long walks. Further evaluation of knee and neck pain to be complete next visit. Pt will benefit from skilled therapeutic interventions at this time in order to address functional limitations and help reach their functional goals.     Barriers to therapy:  Age, psychosocial and out-of-pocket cost of treatment  Prognosis: good    Goals:   Short Term Goals:   R hip ER PROM 50deg to improve functional mobility  Compliant with HEP 3x/wk to improve self efficacy  Short term goal time span:  2-4 weeks      Long Term Goals:    LEFS <46% disability to indicate functional improvement  Pt able to descend staircase with <3/10 pain to improve functional strength and mobility  Long term goal time span:  1-2 months    Plan:   Therapy options:  Physical therapy treatment to continue  Planned therapy interventions:  Manual Therapy (CPT 38276), Therapeutic Activities (CPT 56914) and Therapeutic Exercise (CPT 60617)  Frequency:  1x week  Duration in weeks:  8  Discussed with:  Patient      Functional Assessment Used  Lower Extremity Functional Scale Percentage: 56.25     Referring provider co-signature:  I have reviewed this plan of care and my co-signature certifies the need for services.    Certification Period: 01/16/2025 to  03/27/25    Physician Signature: ________________________________ Date: ______________

## 2025-01-16 NOTE — PROGRESS NOTES
This note is formatted in an APSO format, for additional subjective and objective evaluation please scroll to the bottom of the note.    CC: Cough, depression, diarrhea, erectile dysfunction, GERD    Assessment/Plan:  Problem List Items Addressed This Visit       Chronic cough     He took the omperazole for a couple weeks, now takes as needed. The cough does seem to have improved but he still has some coughing over night.  This is not bothering him and he would like to continue with as needed treatment for GERD.  We can try daily allergy medication such as Claritin/Zyrtec in the future if needed as it does sound like postnasal drip may be contributing factor here.  I suspect the cough is combination of GERD and postnasal drip as it is bothering him most laying down at night.         Depression     PHQ-9=10 today (stable from last visit), OLIVER-7=7.  Patient is also concerned about erectile dysfunction which is likely related to depression.  -Patient found some therapist on APA website and plan to make an appointment  -Start Wellbutrin 150, titrate to 300 after 1 week  -Follow-up in 2-4 weeks to assess effectiveness, can titrate to 450 if needed  -Reassess after 8 weeks         Relevant Medications    buPROPion (WELLBUTRIN XL) 150 MG XL tablet    Diarrhea     Had a few bouts, late December he had an episode but it hasn't been bothering him as much. Twice in the last month he had diarrhea.         Erectile dysfunction     Viagra didn't help. He does get erections in the morning.  Suspect erectile dysfunction is more related to depression.         Gastroesophageal reflux disease without esophagitis     He only takes the omeprazole when he feels symptoms.  -Advised switching to famotidine as needed  -Patient will schedule an appointment with GI         Relevant Medications    famotidine (PEPCID) 20 MG Tab    Mixed hyperlipidemia    Relevant Medications    rosuvastatin (CRESTOR) 10 MG Tab      Orders Placed This Encounter  "   famotidine (PEPCID) 20 MG Tab    rosuvastatin (CRESTOR) 10 MG Tab    buPROPion (WELLBUTRIN XL) 150 MG XL tablet     HISTORY OF PRESENT ILLNESS:     Problem   Erectile Dysfunction   Depression    1/16/25: PHQ-9=10, prior to therapy/meds  1/16/25: Starting wellbutrin, titrating to 300 daily     Diarrhea   Gastroesophageal Reflux Disease Without Esophagitis   Chronic Cough         Exam:    /74 (BP Location: Right arm, Patient Position: Sitting, BP Cuff Size: Adult)   Pulse 72   Temp 36.4 °C (97.6 °F)   Ht 1.88 m (6' 2\")   Wt 77.7 kg (171 lb 3.2 oz)   SpO2 97%   BMI 21.98 kg/m²  Body mass index is 21.98 kg/m².    Gen: Well appearing. No apparent distress. Well developed. Sitting comfortably on exam table  HEENT: NCAT, MMM, no lymphadenopathy, PERRL, oropharynx clear  Neck: Supple, FROM  Chest: No deformities, Equal chest expansion  Lungs: Normal effort, CTA bilaterally.  CV: Regular rate and rhythm. Pulse palpable. No murmur  Abd: Non-distended.  Ext: No cyanosis. No edema.  Skin: Warm/dry. No visible rashes.  Neuro: Non-focal. A&Ox4.  Psych: Normal behavior, normal affect    I spent a total of 41 minutes with record review, exam, communication with the patient, communication with other providers, and documentation of this encounter.      Return in about 3 weeks (around 2/6/2025) for f/u wellbutrin.    Mele Jaramillo MD  [facility]    "

## 2025-01-16 NOTE — ASSESSMENT & PLAN NOTE
PHQ-9=10 today (stable from last visit), OLIVER-7=7.  Patient is also concerned about erectile dysfunction which is likely related to depression.  -Patient found some therapist on APA website and plan to make an appointment  -Start Wellbutrin 150, titrate to 300 after 1 week  -Follow-up in 2-4 weeks to assess effectiveness, can titrate to 450 if needed  -Reassess after 8 weeks

## 2025-01-23 ENCOUNTER — TELEPHONE (OUTPATIENT)
Dept: MEDICAL GROUP | Facility: CLINIC | Age: 70
End: 2025-01-23

## 2025-01-23 ENCOUNTER — PHYSICAL THERAPY (OUTPATIENT)
Dept: PHYSICAL THERAPY | Facility: REHABILITATION | Age: 70
End: 2025-01-23
Payer: COMMERCIAL

## 2025-01-23 DIAGNOSIS — M25.551 PAIN OF RIGHT HIP: ICD-10-CM

## 2025-01-23 PROCEDURE — 97110 THERAPEUTIC EXERCISES: CPT

## 2025-01-23 NOTE — OP THERAPY DAILY TREATMENT
"  Outpatient Physical Therapy  DAILY TREATMENT     [facility] [address]  [address]  [place] [address]  Phone:  [phone]  Fax:  [phone]    Date: 01/23/2025    Patient: Reyes Amezcua  YOB: 1955  MRN: [id number]     Time Calculation    Start time: 0735  Stop time: 0815 Time Calculation (min): 40 minutes         Chief Complaint: Knee Problem and Hip Problem    Visit #: 2    SUBJECTIVE:  Pt reports that his knee is more bothersome today right under the knee cap.      Mechanism of injury:  R hip pain pain consistent x8mo, intermittent R knee pain, R ankle stiffness in the morning, neck pain x2 months. Hx of TBI and was in PT in 2020.      Hip pain: dull aching with movement, eases with sitting and rest, worsens with long walks  Knee pain: started prior to hip pain, intense with performing stairs and walking  Neck pain: pain on same side at base of skull when turning head     DO appt 11/26/24 \"Patient states that he was performing yoga approximately 2 months ago when he began having right hip pain. He locates the pain to his lateral hip. Denies any sensory changes associated. He states that his pain is exacerbated with ambulating and relieved with stretching. Denies any trauma to the hip or previous imaging\"  Patient Goals:     Other patient goals:  Putting on shoes easier    OBJECTIVE:  Current objective measures:   R knee:  Negative lachmans, varus, and valgus testing, negative thessaly, no tenderness at patellar tendon, ITB, or medial/lateral joint line, positive emy test on R for shortened R hip flexor, and slightly shorted R quad in prone, pain with knee ext resistance           Therapeutic Exercises (CPT 61783):     1. Physiostep, 10min, lv 4    2. Hip abd standing, piTB around ankles, 2x12    3. Objective measures, see above      Time-based treatments/modalities:    Physical Therapy Timed Treatment Charges  Therapeutic exercise minutes (CPT 94526): 40 " minutes      ASSESSMENT:   Response to treatment: assessed anterior knee pain, deficits include pain with knee extension resistance and positive R emy test for shortened R hip flexor, introduced stretches for pain management and intro to hip strengthening and stability training, next TKE    PLAN/RECOMMENDATIONS:   Plan for treatment: therapy treatment to continue next visit.  Planned interventions for next visit: continue with current treatment.

## 2025-01-29 ENCOUNTER — PHYSICAL THERAPY (OUTPATIENT)
Dept: PHYSICAL THERAPY | Facility: REHABILITATION | Age: 70
End: 2025-01-29
Payer: COMMERCIAL

## 2025-01-29 DIAGNOSIS — M25.551 PAIN OF RIGHT HIP: ICD-10-CM

## 2025-01-29 PROCEDURE — 97140 MANUAL THERAPY 1/> REGIONS: CPT

## 2025-01-29 PROCEDURE — 97110 THERAPEUTIC EXERCISES: CPT

## 2025-01-29 NOTE — OP THERAPY DAILY TREATMENT
"  Outpatient Physical Therapy  DAILY TREATMENT     [facility] [address].  [address]  [place] [address]  Phone:  [phone]  Fax:  [phone]    Date: 01/29/2025    Patient: Reyes Amezcua  YOB: 1955  MRN: [id number]     Time Calculation    Start time: 1400  Stop time: 1445 Time Calculation (min): 45 minutes         Chief Complaint: Hip Problem and Neck Problem    Visit #: 3    SUBJECTIVE:  R knee pain has eased with stretches given, goal to increase flexibility because picking up leaves this week was very difficult and straining for his back due to hamstring tightness, neck is bothering him today      Mechanism of injury:  R hip pain pain consistent x8mo, intermittent R knee pain, R ankle stiffness in the morning, neck pain x2 months. Hx of TBI and was in PT in 2020.      Hip pain: dull aching with movement, eases with sitting and rest, worsens with long walks  Knee pain: started prior to hip pain, intense with performing stairs and walking  Neck pain: pain on same side at base of skull when turning head     DO appt 11/26/24 \"Patient states that he was performing yoga approximately 2 months ago when he began having right hip pain. He locates the pain to his lateral hip. Denies any sensory changes associated. He states that his pain is exacerbated with ambulating and relieved with stretching. Denies any trauma to the hip or previous imaging\"  Patient Goals:     Other patient goals:  Putting on shoes easier    OBJECTIVE:  Current objective measures:   R knee:  Negative lachmans, varus, and valgus testing, negative thessaly, no tenderness at patellar tendon, ITB, or medial/lateral joint line, positive emy test on R for shortened R hip flexor, and slightly shorted R quad in prone, pain with knee ext resistance           Therapeutic Exercises (CPT 80354):     1. Physiostep, 10min, lv 4, NT    2. Hip abd standing, piTB around ankles, 2x12, NT    3. Seated " hamstring stretch, 2x30s ea    4. Hip hinge at wall, 5x no weight, 10x with 10# KB    5. Piriformis stretch supine, 30s ea    6. Standing hip flexor stretch    7. SNAGs, 5x5s ea    8. Chin retractions    9. Cervical Isometrics    10. B shoulder ER, pink TB, 15x    Therapeutic Treatments and Modalities:     1. Manual Therapy (CPT 26266), see below    Therapeutic Treatment and Modalities Summary: C/S PA mobs, SNAGs with C/S rotation, SB mobs, Sub occipital release with manual traction and raking, STM upper trap/levators/c/s paraspinals      Time-based treatments/modalities:    Physical Therapy Timed Treatment Charges  Manual therapy minutes (CPT 84409): 15 minutes  Therapeutic exercise minutes (CPT 61138): 30 minutes      ASSESSMENT:   Response to treatment: pt with specific goals to increase flexibility, noted to have R suboccipital hypertonicity compared to left, pt reported slight relief of neck pain and improved mobility in neck following manual treatment and self mobs with towel    PLAN/RECOMMENDATIONS:   Plan for treatment: therapy treatment to continue next visit.  Planned interventions for next visit: continue with current treatment.

## 2025-02-03 ENCOUNTER — PHYSICAL THERAPY (OUTPATIENT)
Dept: PHYSICAL THERAPY | Facility: REHABILITATION | Age: 70
End: 2025-02-03
Payer: COMMERCIAL

## 2025-02-03 DIAGNOSIS — M25.551 PAIN OF RIGHT HIP: ICD-10-CM

## 2025-02-03 PROCEDURE — 97110 THERAPEUTIC EXERCISES: CPT

## 2025-02-03 NOTE — OP THERAPY DAILY TREATMENT
"  Outpatient Physical Therapy  DAILY TREATMENT     [facility] Physical Therapy [address]  [address]  [place] [address]  Phone:  [phone]  Fax:  [phone]    Date: 02/03/2025    Patient: Reyes Amezcua  YOB: 1955  MRN: [id number]     Time Calculation    Start time: 0815  Stop time: 0900 Time Calculation (min): 45 minutes         Chief Complaint: Hip Problem and Neck Problem    Visit #: 4    SUBJECTIVE:  \"Things are hurting less\" hasn't been in the yard because of wind but plans to do some work in the garden today. Feels ready for discharge today with exercises given.       Mechanism of injury:  R hip pain pain consistent x8mo, intermittent R knee pain, R ankle stiffness in the morning, neck pain x2 months. Hx of TBI and was in PT in 2020.      Hip pain: dull aching with movement, eases with sitting and rest, worsens with long walks  Knee pain: started prior to hip pain, intense with performing stairs and walking  Neck pain: pain on same side at base of skull when turning head     DO appt 11/26/24 \"Patient states that he was performing yoga approximately 2 months ago when he began having right hip pain. He locates the pain to his lateral hip. Denies any sensory changes associated. He states that his pain is exacerbated with ambulating and relieved with stretching. Denies any trauma to the hip or previous imaging\"  Patient Goals:     Other patient goals:  Putting on shoes easier    OBJECTIVE:  Current objective measures:   R knee:  Negative lachmans, varus, and valgus testing, negative thessaly, no tenderness at patellar tendon, ITB, or medial/lateral joint line, positive emy test on R for shortened R hip flexor, and slightly shorted R quad in prone, pain with knee ext resistance           Therapeutic Exercises (CPT 39697):     1. Physiostep, 10min, lv 4, NT    2. Hip abd standing, piTB around ankles, 2x12    3. Seated hamstring stretch, 2x30s ea, NT    4. Hip hinge at " wall, 5x no weight, 10x with 10# KB, reviewed    5. Piriformis stretch supine, 30s ea, reviewed    6. Standing hip flexor stretch, reviewed    7. SNAGs, 5x5s ea, reviewed    8. Chin retractions, reviewed    9. Cervical Isometrics, reviewed    10. B shoulder ER, pink TB, 15x, reviewed    11. SLB    12. Standing resisted march, pi TB    Therapeutic Treatments and Modalities:     Therapeutic Treatment and Modalities Summary:       Time-based treatments/modalities:    Physical Therapy Timed Treatment Charges  Therapeutic exercise minutes (CPT 40102): 45 minutes      ASSESSMENT:   Pt is appropriate for discharge from PT at this time due to functional goals met and anticipated continued progress with adherence to their HEP. Pt is instructed to contact PT as needed with any questions or concerns.     Goals:   Short Term Goals:   R hip ER PROM 50deg to improve functional mobility - met  Compliant with HEP 3x/wk to improve self efficacy - met  Short term goal time span:  2-4 weeks      Long Term Goals:    LEFS <46% disability to indicate functional improvement (56.25 at eval)- progressing  Pt able to descend staircase with <3/10 pain to improve functional strength and mobility - met  Long term goal time span:  1-2 months    PLAN/RECOMMENDATIONS:   Plan for treatment: therapy treatment to continue next visit.  Planned interventions for next visit: continue with current treatment.

## 2025-02-03 NOTE — OP THERAPY DISCHARGE SUMMARY
Outpatient Physical Therapy  DISCHARGE SUMMARY NOTE      [facility] Physical Therapy [address].  [address]  [place] [address]  Phone:  [phone]  Fax:  [phone]    Date of Visit: 02/03/2025    Patient: Reyes Amezcua  YOB: 1955  MRN: [id number]     Referring Provider: Teresa Healy D.O.  [address] [place] [address]   Referring Diagnosis Pain in right hip [M25.551]         Functional Assessment Used  Lower Extremity Functional Scale Percentage: 71.25     Your patient is being discharged from Physical Therapy with the following comments:   Goals met    Comments:    Goals:   Short Term Goals:   R hip ER PROM 50deg to improve functional mobility - met  Compliant with HEP 3x/wk to improve self efficacy - met  Short term goal time span:  2-4 weeks      Long Term Goals:    LEFS <46% disability to indicate functional improvement (56.25 at eval)- progressing  Pt able to descend staircase with <3/10 pain to improve functional strength and mobility - met  Long term goal time span:  1-2 months     Limitations Remaining:  Flexibility    Recommendations:  Pt is appropriate for discharge from PT at this time due to functional goals met and anticipated continued progress with adherence to their HEP. Pt is instructed to contact PT as needed with any questions or concerns.     Sarina Alvarenga, PT    Date: 2/3/2025

## 2025-02-06 ENCOUNTER — APPOINTMENT (OUTPATIENT)
Dept: MEDICAL GROUP | Facility: CLINIC | Age: 70
End: 2025-02-06
Payer: COMMERCIAL

## 2025-02-06 VITALS
HEART RATE: 64 BPM | DIASTOLIC BLOOD PRESSURE: 73 MMHG | WEIGHT: 171 LBS | RESPIRATION RATE: 16 BRPM | OXYGEN SATURATION: 97 % | BODY MASS INDEX: 21.94 KG/M2 | SYSTOLIC BLOOD PRESSURE: 138 MMHG | HEIGHT: 74 IN

## 2025-02-06 DIAGNOSIS — R05.3 CHRONIC COUGH: ICD-10-CM

## 2025-02-06 DIAGNOSIS — R19.7 DIARRHEA, UNSPECIFIED TYPE: ICD-10-CM

## 2025-02-06 DIAGNOSIS — F33.1 MODERATE EPISODE OF RECURRENT MAJOR DEPRESSIVE DISORDER (HCC): ICD-10-CM

## 2025-02-06 PROCEDURE — 99214 OFFICE O/P EST MOD 30 MIN: CPT

## 2025-02-06 PROCEDURE — 3078F DIAST BP <80 MM HG: CPT

## 2025-02-06 PROCEDURE — 3075F SYST BP GE 130 - 139MM HG: CPT

## 2025-02-06 RX ORDER — BUPROPION HYDROCHLORIDE 150 MG/1
450 TABLET ORAL EVERY MORNING
Qty: 270 TABLET | Refills: 3 | Status: SHIPPED | OUTPATIENT
Start: 2025-03-06

## 2025-02-06 ASSESSMENT — ANXIETY QUESTIONNAIRES
2. NOT BEING ABLE TO STOP OR CONTROL WORRYING: NOT AT ALL
3. WORRYING TOO MUCH ABOUT DIFFERENT THINGS: NOT AT ALL
GAD7 TOTAL SCORE: 3
6. BECOMING EASILY ANNOYED OR IRRITABLE: MORE THAN HALF THE DAYS
1. FEELING NERVOUS, ANXIOUS, OR ON EDGE: SEVERAL DAYS
4. TROUBLE RELAXING: NOT AT ALL
5. BEING SO RESTLESS THAT IT IS HARD TO SIT STILL: NOT AT ALL
7. FEELING AFRAID AS IF SOMETHING AWFUL MIGHT HAPPEN: NOT AT ALL

## 2025-02-06 ASSESSMENT — PATIENT HEALTH QUESTIONNAIRE - PHQ9
SUM OF ALL RESPONSES TO PHQ QUESTIONS 1-9: 9
5. POOR APPETITE OR OVEREATING: 0 - NOT AT ALL
CLINICAL INTERPRETATION OF PHQ2 SCORE: 5

## 2025-02-06 NOTE — ASSESSMENT & PLAN NOTE
Improved with PPI and H2 blocker, but not completely resolved. He thinks it's his throat. CT chest was ordered but he would like to not get the scan.  - Continue H2 blocker

## 2025-02-06 NOTE — PROGRESS NOTES
"This note is formatted in an APSO format, for additional subjective and objective evaluation please scroll to the bottom of the note.    CC:  Chief Complaint   Patient presents with    Follow-Up     Assessment/Plan:  Problem List Items Addressed This Visit       Chronic cough     Improved with PPI and H2 blocker, but not completely resolved. He thinks it's his throat. CT chest was ordered but he would like to not get the scan.  - Continue H2 blocker         Depression     Some response but not as much as we were looking for.  - Increase to 450  - Establishing with therapy  - Consider adding another agent (possibly SSRI?) if needed         Relevant Medications    buPROPion (WELLBUTRIN XL) 150 MG XL tablet (Start on 3/6/2025)    Diarrhea     Improved with metamucil           Orders Placed This Encounter    buPROPion (WELLBUTRIN XL) 150 MG XL tablet       HISTORY OF PRESENT ILLNESS:     Now not sleeping during the day (he was taking naps). By 6 or 7pm he is \"kind of spent\". He usually goes to bed around 10pm. He is noticing very small changes in how he feels but it is \"minuscule\". He doesn't feel much different.  Per his wife, he is more active during the day but he cycles with this type of thing and it might just be normal cycles.     Not jittery. No increased anxiety or palpitation.     Has an appointment with psychologist next week.    Improved, but still a small cough at night.  Taking famotidine as need. He only needs it 1-2x/week.    Small amount of metamucil tablets has resolved the diarrhea.  DHA - colonoscopy in around 2021, f/u in 10 yr    Problem   Depression    1/16/25: PHQ-9=10, prior to therapy/meds  1/16/25: Starting wellbutrin, titrating to 300 daily  2/6/25: Inc wellbutrin to 450mg     Diarrhea   Chronic Cough         Exam:    /73 (BP Location: Left arm, Patient Position: Sitting, BP Cuff Size: Adult)   Pulse 64   Resp 16   Ht 1.88 m (6' 2\")   Wt 77.6 kg (171 lb)   SpO2 97%   BMI 21.96 " kg/m²  Body mass index is 21.96 kg/m².    Gen: Well appearing. No apparent distress. Well developed. Sitting comfortably on chair  HEENT: NCAT, MMM. No LAD. Oropharynx clear.   Neck: Supple, FROM  Chest: No deformities, Equal chest expansion  Lungs: Normal effort, CTA bilaterally.  CV: Regular rate and rhythm. Pulse palpable. No murmur  Abd: Non-distended.  Ext: No cyanosis. No edema.  Skin: Warm/dry. No visible rashes.  Neuro: Non-focal. A&Ox4.  Psych: Normal behavior, normal affect    I spent a total of 36 minutes with record review, exam, communication with the patient, communication with other providers, and documentation of this encounter.      Return in about 3 months (around 5/6/2025).    Mele Jaramillo MD  [facility]

## 2025-02-06 NOTE — ASSESSMENT & PLAN NOTE
Some response but not as much as we were looking for.  - Increase to 450  - Establishing with therapy  - Consider adding another agent (possibly SSRI?) if needed

## 2025-02-26 ENCOUNTER — APPOINTMENT (OUTPATIENT)
Dept: PHYSICAL THERAPY | Facility: REHABILITATION | Age: 70
End: 2025-02-26
Payer: COMMERCIAL

## 2025-03-27 ENCOUNTER — APPOINTMENT (OUTPATIENT)
Dept: URBAN - METROPOLITAN AREA CLINIC 6 | Facility: CLINIC | Age: 70
Setting detail: DERMATOLOGY
End: 2025-03-27

## 2025-03-27 DIAGNOSIS — D18.0 HEMANGIOMA: ICD-10-CM

## 2025-03-27 DIAGNOSIS — D22 MELANOCYTIC NEVI: ICD-10-CM

## 2025-03-27 DIAGNOSIS — L21.8 OTHER SEBORRHEIC DERMATITIS: ICD-10-CM

## 2025-03-27 DIAGNOSIS — L82.1 OTHER SEBORRHEIC KERATOSIS: ICD-10-CM

## 2025-03-27 DIAGNOSIS — L81.4 OTHER MELANIN HYPERPIGMENTATION: ICD-10-CM

## 2025-03-27 DIAGNOSIS — L81.5 LEUKODERMA, NOT ELSEWHERE CLASSIFIED: ICD-10-CM

## 2025-03-27 DIAGNOSIS — Z71.89 OTHER SPECIFIED COUNSELING: ICD-10-CM

## 2025-03-27 PROBLEM — D22.62 MELANOCYTIC NEVI OF LEFT UPPER LIMB, INCLUDING SHOULDER: Status: ACTIVE | Noted: 2025-03-27

## 2025-03-27 PROBLEM — D22.71 MELANOCYTIC NEVI OF RIGHT LOWER LIMB, INCLUDING HIP: Status: ACTIVE | Noted: 2025-03-27

## 2025-03-27 PROBLEM — D18.01 HEMANGIOMA OF SKIN AND SUBCUTANEOUS TISSUE: Status: ACTIVE | Noted: 2025-03-27

## 2025-03-27 PROBLEM — D22.5 MELANOCYTIC NEVI OF TRUNK: Status: ACTIVE | Noted: 2025-03-27

## 2025-03-27 PROBLEM — D22.61 MELANOCYTIC NEVI OF RIGHT UPPER LIMB, INCLUDING SHOULDER: Status: ACTIVE | Noted: 2025-03-27

## 2025-03-27 PROBLEM — D22.72 MELANOCYTIC NEVI OF LEFT LOWER LIMB, INCLUDING HIP: Status: ACTIVE | Noted: 2025-03-27

## 2025-03-27 PROCEDURE — ? PRESCRIPTION

## 2025-03-27 PROCEDURE — 99203 OFFICE O/P NEW LOW 30 MIN: CPT

## 2025-03-27 PROCEDURE — ? OBSERVATION

## 2025-03-27 PROCEDURE — ? PHOTO-DOCUMENTATION

## 2025-03-27 PROCEDURE — ? PRESCRIPTION MEDICATION MANAGEMENT

## 2025-03-27 PROCEDURE — ? COUNSELING

## 2025-03-27 RX ORDER — KETOCONAZOLE 20 MG/G
CREAM TOPICAL QD
Qty: 60 | Refills: 4 | Status: ERX | COMMUNITY
Start: 2025-03-27

## 2025-03-27 RX ADMIN — KETOCONAZOLE: 20 CREAM TOPICAL at 00:00

## 2025-03-27 ASSESSMENT — LOCATION DETAILED DESCRIPTION DERM
LOCATION DETAILED: LEFT CENTRAL MALAR CHEEK
LOCATION DETAILED: LEFT ANTERIOR DISTAL UPPER ARM
LOCATION DETAILED: RIGHT ANTERIOR PROXIMAL THIGH
LOCATION DETAILED: RIGHT PROXIMAL DORSAL FOREARM
LOCATION DETAILED: RIGHT NASOLABIAL FOLD
LOCATION DETAILED: RIGHT ANTERIOR DISTAL UPPER ARM
LOCATION DETAILED: RIGHT VENTRAL LATERAL PROXIMAL FOREARM
LOCATION DETAILED: LEFT VENTRAL DISTAL FOREARM
LOCATION DETAILED: LEFT UPPER CUTANEOUS LIP
LOCATION DETAILED: LEFT VENTRAL PROXIMAL FOREARM
LOCATION DETAILED: RIGHT MEDIAL INFERIOR CHEST
LOCATION DETAILED: LEFT PROXIMAL CALF
LOCATION DETAILED: LEFT INFERIOR UPPER BACK
LOCATION DETAILED: LEFT DISTAL POSTERIOR THIGH
LOCATION DETAILED: LEFT CENTRAL EYEBROW
LOCATION DETAILED: LEFT PROXIMAL DORSAL FOREARM
LOCATION DETAILED: RIGHT SUPERIOR LATERAL MIDBACK
LOCATION DETAILED: RIGHT VENTRAL DISTAL FOREARM
LOCATION DETAILED: LEFT ANTERIOR PROXIMAL THIGH
LOCATION DETAILED: RIGHT MID-UPPER BACK
LOCATION DETAILED: LEFT RIB CAGE
LOCATION DETAILED: RIGHT LATERAL FRONTAL SCALP
LOCATION DETAILED: RIGHT DISTAL POSTERIOR THIGH
LOCATION DETAILED: RIGHT VENTRAL PROXIMAL FOREARM
LOCATION DETAILED: RIGHT LATERAL DISTAL PRETIBIAL REGION
LOCATION DETAILED: RIGHT PROXIMAL CALF
LOCATION DETAILED: RIGHT CENTRAL EYEBROW

## 2025-03-27 ASSESSMENT — LOCATION SIMPLE DESCRIPTION DERM
LOCATION SIMPLE: RIGHT UPPER ARM
LOCATION SIMPLE: LEFT THIGH
LOCATION SIMPLE: RIGHT EYEBROW
LOCATION SIMPLE: RIGHT FOREARM
LOCATION SIMPLE: LEFT POSTERIOR THIGH
LOCATION SIMPLE: RIGHT LIP
LOCATION SIMPLE: RIGHT LOWER BACK
LOCATION SIMPLE: LEFT FOREARM
LOCATION SIMPLE: RIGHT PRETIBIAL REGION
LOCATION SIMPLE: LEFT UPPER BACK
LOCATION SIMPLE: RIGHT UPPER BACK
LOCATION SIMPLE: LEFT LIP
LOCATION SIMPLE: LEFT UPPER ARM
LOCATION SIMPLE: RIGHT POSTERIOR THIGH
LOCATION SIMPLE: CHEST
LOCATION SIMPLE: RIGHT CALF
LOCATION SIMPLE: RIGHT SCALP
LOCATION SIMPLE: LEFT CHEEK
LOCATION SIMPLE: LEFT CALF
LOCATION SIMPLE: RIGHT THIGH
LOCATION SIMPLE: ABDOMEN
LOCATION SIMPLE: LEFT EYEBROW

## 2025-03-27 ASSESSMENT — LOCATION ZONE DERM
LOCATION ZONE: SCALP
LOCATION ZONE: TRUNK
LOCATION ZONE: ARM
LOCATION ZONE: LIP
LOCATION ZONE: LEG
LOCATION ZONE: FACE

## 2025-03-27 NOTE — PROCEDURE: PRESCRIPTION MEDICATION MANAGEMENT
Detail Level: Zone
Initiate Treatment: ketoconazole 2 % topical cream Qd
Render In Strict Bullet Format?: No

## 2025-06-06 ENCOUNTER — OFFICE VISIT (OUTPATIENT)
Dept: MEDICAL GROUP | Facility: CLINIC | Age: 70
End: 2025-06-06
Payer: COMMERCIAL

## 2025-06-06 VITALS
SYSTOLIC BLOOD PRESSURE: 126 MMHG | BODY MASS INDEX: 22.29 KG/M2 | HEIGHT: 74 IN | TEMPERATURE: 97.5 F | RESPIRATION RATE: 12 BRPM | WEIGHT: 173.7 LBS | HEART RATE: 63 BPM | DIASTOLIC BLOOD PRESSURE: 76 MMHG | OXYGEN SATURATION: 97 %

## 2025-06-06 DIAGNOSIS — M79.662 PAIN AND SWELLING OF LEFT LOWER LEG: ICD-10-CM

## 2025-06-06 DIAGNOSIS — M79.89 PAIN AND SWELLING OF LEFT LOWER LEG: ICD-10-CM

## 2025-06-06 PROBLEM — R05.3 CHRONIC COUGH: Status: RESOLVED | Noted: 2023-12-13 | Resolved: 2025-06-06

## 2025-06-06 PROBLEM — R19.7 DIARRHEA: Status: RESOLVED | Noted: 2024-10-17 | Resolved: 2025-06-06

## 2025-06-06 PROBLEM — F32.A DEPRESSION: Status: RESOLVED | Noted: 2024-11-21 | Resolved: 2025-06-06

## 2025-06-06 PROCEDURE — 99212 OFFICE O/P EST SF 10 MIN: CPT | Performed by: STUDENT IN AN ORGANIZED HEALTH CARE EDUCATION/TRAINING PROGRAM

## 2025-06-06 PROCEDURE — 3078F DIAST BP <80 MM HG: CPT | Performed by: STUDENT IN AN ORGANIZED HEALTH CARE EDUCATION/TRAINING PROGRAM

## 2025-06-06 PROCEDURE — 3074F SYST BP LT 130 MM HG: CPT | Performed by: STUDENT IN AN ORGANIZED HEALTH CARE EDUCATION/TRAINING PROGRAM

## 2025-06-06 RX ORDER — OMEPRAZOLE 20 MG/1
20 CAPSULE, DELAYED RELEASE ORAL
COMMUNITY

## 2025-06-06 RX ORDER — SILDENAFIL 50 MG/1
50 TABLET, FILM COATED ORAL PRN
COMMUNITY
Start: 2021-06-01

## 2025-06-06 NOTE — PROGRESS NOTES
"[facility] OFFICE VISIT    Date: 6/6/2025    MRN: [id number]  Patient ID: Reyes Amezcua    SUBJECTIVE:  Reyes Amezcua is a 69 y.o. male here for evaluation of unilateral left leg swelling and pain.  Patient reports that on Sunday of last week, he and spouse went on a hike near [facility].  The following day, went for an additional 3 mile hike before driving home for approximately 4 to 5 hours.  Did stop once for food/gas.  Thereafter, noted that he had unilateral left leg swelling associated with some pain.  Reports that the pain has been gradually improving over the course of the week, and that swelling may have slightly decreased.  However, left leg continues to be slightly more swollen than the right leg.    PMHx/PSHx:  Past Medical History[1]  Problem List[2]  Past Surgical History[3]    Allergies: Patient has no known allergies.    OBJECTIVE:  Vitals:    06/06/25 1455   BP: 126/76   Pulse: 63   Resp: 12   Temp: 36.4 °C (97.5 °F)   SpO2: 97%     Vitals:    06/06/25 1455   BP: 126/76   Weight: 78.8 kg (173 lb 11.2 oz)   Height: 1.88 m (6' 2\")       Physical Examination:  General: Well appearing male in no acute distress, resting on arrival to room  HEENT: Normocephalic, atraumatic, EOMI  Cardiovascular: Skin pink, no acrocyanosis  Pulmonary: No tachypnea or retractions  Extremities: Moves all spontaneously, 1+ pitting edema bilateral lower extremities to mid calf with slight increase in pitting of left lower extremity relative to right, mild tenderness to palpation adjacent to left medial tibia along mid calf  Neurological: Alert and oriented    ASSESSMENT & PLAN:  Reyes Amezcua is a 69 y.o. male here for pain and swelling of the left lower extremity as discussed below.    1. Pain and swelling of left lower leg  US-EXTREMITY VENOUS LOWER UNILAT LEFT          Orders Placed This Encounter    US-EXTREMITY VENOUS LOWER UNILAT LEFT    sildenafil citrate (VIAGRA) 50 MG tablet    " omeprazole (PRILOSEC) 20 MG delayed-release capsule       # Pain and swelling left lower extremity  Performed point-of-care ultrasound which did not demonstrate acute deep venous thrombosis.  However, discussed that as many as 15% of thromboses occur distal to point-of-care testing, with approximately 6% progressing proximally thereafter.  Discussed that it is somewhat reassuring that symptoms have been gradually improving as patient may be breaking up any clots which are present.  Alternatively, localized swelling and pain may be secondary to injury obtained during hiking such as sprain which is improving.  At this time, I have ordered a stat ultrasound of the left lower extremity deep venous system to further evaluate for superficial venous thrombosis as well as reevaluate for distal venous thrombosis, likely be completed within about 48 to 72 hours of today's examination.  Advised patient I will always contact him with study results within 1 week of having a test performed, and to contact the office if he does not hear back on results during that time.  Also stressed the importance of encouraging mobility on long car drives, with need to ambulate every 1.5 to 2 hours.  Patient verbalized agreement and understanding with plan of care.    Vinnie Cui M.D.               [1]   Past Medical History:  Diagnosis Date    Chronic cough 12/13/2023    Depression 11/21/2024 1/16/25: PHQ-9=10, prior to therapy/meds  1/16/25: Starting wellbutrin, titrating to 300 daily  2/6/25: Inc wellbutrin to 450mg      ICB (intracranial bleed) (HCC) 05/09/2020   [2]   Patient Active Problem List  Diagnosis    Diffuse axonal brain injury (HCC)    Anisometropia    Umbilical hernia    Left inguinal hernia    Mixed hyperlipidemia    Gastroesophageal reflux disease without esophagitis    Pain of right hip    Steatosis of liver    Erectile dysfunction   [3]   Past Surgical History:  Procedure Laterality Date    LA LAP GASTROSTOMY W/O TUBE  CONSTR  5/23/2020    Procedure: CREATION, GASTROSTOMY, LAPAROSCOPIC;  Surgeon: Norbert Prajapati M.D.;  Location: SURGERY [facility];  Service: General

## 2025-06-07 ENCOUNTER — HOSPITAL ENCOUNTER (OUTPATIENT)
Dept: RADIOLOGY | Facility: MEDICAL CENTER | Age: 70
End: 2025-06-07
Attending: STUDENT IN AN ORGANIZED HEALTH CARE EDUCATION/TRAINING PROGRAM
Payer: COMMERCIAL

## 2025-06-07 DIAGNOSIS — M79.89 PAIN AND SWELLING OF LEFT LOWER LEG: ICD-10-CM

## 2025-06-07 DIAGNOSIS — M79.662 PAIN AND SWELLING OF LEFT LOWER LEG: ICD-10-CM

## 2025-06-07 PROCEDURE — 93971 EXTREMITY STUDY: CPT | Mod: LT

## 2025-06-09 ENCOUNTER — RESULTS FOLLOW-UP (OUTPATIENT)
Dept: MEDICAL GROUP | Facility: CLINIC | Age: 70
End: 2025-06-09

## (undated) DEVICE — TUBING INSUFFLATION - (10/BX)

## (undated) DEVICE — GLOVE BIOGEL SZ 6.5 SURGICAL PF LTX (50PR/BX 4BX/CA)

## (undated) DEVICE — GOWN WARMING STANDARD FLEX - (30/CA)

## (undated) DEVICE — TOWELS CLOTH SURGICAL - (4/PK 20PK/CA)

## (undated) DEVICE — SENSOR SPO2 NEO LNCS ADHESIVE (20/BX) SEE USER NOTES

## (undated) DEVICE — BLADE SURGICAL CLIPPER - (50EA/CA)

## (undated) DEVICE — GLOVE SZ 6.5 BIOGEL PI MICRO - PF LF (50PR/BX)

## (undated) DEVICE — PROTECTOR ULNA NERVE - (36PR/CA)

## (undated) DEVICE — PACK LAP CHOLE OR - (2EA/CA)

## (undated) DEVICE — DRESSING TRANSPARENT FILM TEGADERM 2.375 X 2.75"  (100EA/BX)"

## (undated) DEVICE — NEEDLE INSFL 120MM 14GA VRRS - (20/BX)

## (undated) DEVICE — NEPTUNE 4 PORT MANIFOLD - (20/PK)

## (undated) DEVICE — ELECTRODE 850 FOAM ADHESIVE - HYDROGEL RADIOTRNSPRNT (50/PK)

## (undated) DEVICE — SYRINGE 50ML CATHETER TIP (40EA/BX 4BX/CA)

## (undated) DEVICE — GLOVE BIOGEL SZ 7.5 SURGICAL PF LTX - (50PR/BX 4BX/CA)

## (undated) DEVICE — SUTURE 4-0 MONOCRYL PLUS PS-2 - 27 INCH (36/BX)

## (undated) DEVICE — SET EXTENSION WITH 2 PORTS (48EA/CA) ***PART #2C8610 IS A SUBSTITUTE*****

## (undated) DEVICE — GLOVE BIOGEL PI INDICATOR SZ 7.0 SURGICAL PF LF - (50/BX 4BX/CA)

## (undated) DEVICE — TROCAR 5X100 NON BLADED Z-TH - READ KII (6/BX)

## (undated) DEVICE — BANDAGE ROLL STERILE BULKEE 4.5 IN X 4 YD (100EA/CA)

## (undated) DEVICE — SUCTION INSTRUMENT YANKAUER BULBOUS TIP W/O VENT (50EA/CA)

## (undated) DEVICE — SET LEADWIRE 5 LEAD BEDSIDE DISPOSABLE ECG (1SET OF 5/EA)

## (undated) DEVICE — LACTATED RINGERS INJ 1000 ML - (14EA/CA 60CA/PF)

## (undated) DEVICE — TUBING CLEARLINK DUO-VENT - C-FLO (48EA/CA)

## (undated) DEVICE — TUBE GASTROSTOMY 18FR 20CC

## (undated) DEVICE — TROCAR 5X100 BLADED Z-THREAD - KII (6/BX)

## (undated) DEVICE — SUTURE GENERAL

## (undated) DEVICE — GOWN SURGEONS X-LARGE - DISP. (30/CA)

## (undated) DEVICE — CHLORAPREP 26 ML APPLICATOR - ORANGE TINT(25/CA)

## (undated) DEVICE — MASK ANESTHESIA ADULT  - (100/CA)

## (undated) DEVICE — SPONGE GAUZESTER. 2X2 4-PL - (2/PK 50PK/BX 30BX/CS)

## (undated) DEVICE — HEAD HOLDER JUNIOR/ADULT

## (undated) DEVICE — ELECTRODE DUAL RETURN W/ CORD - (50/PK)

## (undated) DEVICE — CANISTER SUCTION 3000ML MECHANICAL FILTER AUTO SHUTOFF MEDI-VAC NONSTERILE LF DISP  (40EA/CA)

## (undated) DEVICE — KIT ANESTHESIA W/CIRCUIT & 3/LT BAG W/FILTER (20EA/CA)

## (undated) DEVICE — GLOVE BIOGEL INDICATOR SZ 8 SURGICAL PF LTX - (50/BX 4BX/CA)

## (undated) DEVICE — WATER IRRIGATION STERILE 1000ML (12EA/CA)

## (undated) DEVICE — KIT 18FR INITIAL PLACEMENT - (1/CA)